# Patient Record
Sex: FEMALE | Race: BLACK OR AFRICAN AMERICAN | Employment: FULL TIME | URBAN - METROPOLITAN AREA
[De-identification: names, ages, dates, MRNs, and addresses within clinical notes are randomized per-mention and may not be internally consistent; named-entity substitution may affect disease eponyms.]

---

## 2017-01-24 ENCOUNTER — TELEPHONE (OUTPATIENT)
Dept: FAMILY MEDICINE CLINIC | Age: 42
End: 2017-01-24

## 2017-01-24 NOTE — TELEPHONE ENCOUNTER
We don't do Dexa in pre menopausal women unless there is a health problem discovered that warrants it- unless she has a reason, will wait until office visit to discuss.    Left message

## 2017-01-24 NOTE — TELEPHONE ENCOUNTER
appt scheduled   Patient states at last visit Dr Shawna Sauer was going to order Dexa scan.  Would like this done

## 2017-01-24 NOTE — TELEPHONE ENCOUNTER
----- Message from Pearl Lea sent at 1/24/2017  8:56 AM EST -----  Regarding: Dr. Radha Lutz  Pt has an appointment scheduled for 2/14/17 for medication refills. She would like to speak with someone to try and be worked into the schedule before then.   Her contact number is [phone]

## 2017-01-26 ENCOUNTER — OFFICE VISIT (OUTPATIENT)
Dept: FAMILY MEDICINE CLINIC | Age: 42
End: 2017-01-26

## 2017-01-26 VITALS
TEMPERATURE: 98.5 F | DIASTOLIC BLOOD PRESSURE: 62 MMHG | OXYGEN SATURATION: 98 % | HEIGHT: 64 IN | WEIGHT: 187.8 LBS | SYSTOLIC BLOOD PRESSURE: 118 MMHG | BODY MASS INDEX: 32.06 KG/M2 | RESPIRATION RATE: 18 BRPM | HEART RATE: 97 BPM

## 2017-01-26 DIAGNOSIS — M85.80 OSTEOPENIA: ICD-10-CM

## 2017-01-26 DIAGNOSIS — L70.0 ACNE VULGARIS: ICD-10-CM

## 2017-01-26 DIAGNOSIS — M50.30 DDD (DEGENERATIVE DISC DISEASE), CERVICAL: ICD-10-CM

## 2017-01-26 DIAGNOSIS — L02.32 BOIL OF BUTTOCK: ICD-10-CM

## 2017-01-26 DIAGNOSIS — G43.109 MIGRAINE WITH AURA AND WITHOUT STATUS MIGRAINOSUS, NOT INTRACTABLE: ICD-10-CM

## 2017-01-26 DIAGNOSIS — F42.9 OBSESSIVE-COMPULSIVE DISORDER: ICD-10-CM

## 2017-01-26 DIAGNOSIS — F98.8 ADD (ATTENTION DEFICIT DISORDER) WITHOUT HYPERACTIVITY: Primary | Chronic | ICD-10-CM

## 2017-01-26 DIAGNOSIS — F41.8 DEPRESSION WITH ANXIETY: ICD-10-CM

## 2017-01-26 RX ORDER — ALPRAZOLAM 0.25 MG/1
TABLET ORAL
Qty: 25 TAB | Refills: 2 | Status: SHIPPED | OUTPATIENT
Start: 2017-01-26 | End: 2017-04-28 | Stop reason: SDUPTHER

## 2017-01-26 RX ORDER — DEXTROAMPHETAMINE SACCHARATE, AMPHETAMINE ASPARTATE, DEXTROAMPHETAMINE SULFATE AND AMPHETAMINE SULFATE 2.5; 2.5; 2.5; 2.5 MG/1; MG/1; MG/1; MG/1
TABLET ORAL
Qty: 120 TAB | Refills: 0 | Status: SHIPPED | OUTPATIENT
Start: 2017-02-24 | End: 2017-04-28 | Stop reason: SDUPTHER

## 2017-01-26 RX ORDER — DEXTROAMPHETAMINE SACCHARATE, AMPHETAMINE ASPARTATE, DEXTROAMPHETAMINE SULFATE AND AMPHETAMINE SULFATE 2.5; 2.5; 2.5; 2.5 MG/1; MG/1; MG/1; MG/1
10 TABLET ORAL SEE ADMIN INSTRUCTIONS
Qty: 120 TAB | Refills: 0 | Status: SHIPPED | OUTPATIENT
Start: 2017-01-26 | End: 2017-04-28 | Stop reason: SDUPTHER

## 2017-01-26 RX ORDER — AZITHROMYCIN 250 MG/1
TABLET, FILM COATED ORAL
Qty: 6 TAB | Refills: 0 | Status: SHIPPED | OUTPATIENT
Start: 2017-01-26 | End: 2017-01-31

## 2017-01-26 RX ORDER — OXYCODONE AND ACETAMINOPHEN 10; 325 MG/1; MG/1
1 TABLET ORAL
Qty: 50 TAB | Refills: 0 | Status: SHIPPED | OUTPATIENT
Start: 2017-01-26 | End: 2017-04-28 | Stop reason: SDUPTHER

## 2017-01-26 RX ORDER — DEXTROAMPHETAMINE SACCHARATE, AMPHETAMINE ASPARTATE, DEXTROAMPHETAMINE SULFATE AND AMPHETAMINE SULFATE 2.5; 2.5; 2.5; 2.5 MG/1; MG/1; MG/1; MG/1
TABLET ORAL
Qty: 120 TAB | Refills: 0 | Status: SHIPPED | OUTPATIENT
Start: 2017-03-25 | End: 2017-04-28 | Stop reason: SDUPTHER

## 2017-01-26 RX ORDER — OXYCODONE AND ACETAMINOPHEN 10; 325 MG/1; MG/1
1 TABLET ORAL
Qty: 50 TAB | Refills: 0 | Status: SHIPPED | OUTPATIENT
Start: 2017-02-25 | End: 2017-04-28 | Stop reason: SDUPTHER

## 2017-01-26 RX ORDER — DOXYCYCLINE 100 MG/1
100 CAPSULE ORAL 2 TIMES DAILY
Qty: 30 CAP | Refills: 11 | Status: SHIPPED | OUTPATIENT
Start: 2017-01-26 | End: 2017-07-15 | Stop reason: ALTCHOICE

## 2017-01-26 RX ORDER — OXYCODONE AND ACETAMINOPHEN 10; 325 MG/1; MG/1
1 TABLET ORAL
Qty: 50 TAB | Refills: 0 | Status: SHIPPED | OUTPATIENT
Start: 2017-03-25 | End: 2017-04-28 | Stop reason: SDUPTHER

## 2017-01-26 NOTE — LETTER
1/26/2017 11:25 AM 
 
Ms. Kenzie Cano0 [facility] [address] Effective January 1, 5989 a new policy will be implemented by [address] [place] [address] for patients who are receiving controlled pain medications (i.e. hydrocodone, oxycodone, hydromorphone, etc). While these medications are intended to help individuals with their chronic pain symptoms, they have the potential for serious side effects. These side effects may include, but are not limited to respiratory distress, low blood pressure, confusion, dependency and abuse. Since these medications are regulated by the Drug Enforcement Agency [facility]) and given a rise in abuse of these medications across the nation, we are obligated to monitor the use of these medications more closely. All patients receiving chronic opiate pain medications will be required to sign a controlled substance agreement. This is an agreement between patient and provider to ensure compliance with treatment. It also includes urine drug screens, maximum dosing limits for medications and follow-up appointments every three months for prescription refills. Your prescriptions will only be filled at a office visit We understand these changes may be inconvenient or frustrating, however, they align with current clinical guidelines and are intended to improve patient care and safety. Please call now to schedule an appointment with me for your next refill. Please address any questions or concerns with your provider at your next visit. Thank you for entrusting your healthcare in our hands. [address] [place] [address] Providers Sincerely, 
 
 
Giorgio Ceja MD

## 2017-01-26 NOTE — PATIENT INSTRUCTIONS

## 2017-01-26 NOTE — PROGRESS NOTES
HISTORY OF PRESENT ILLNESS  HPI  Erika Hdz is a 37 y.o. Female with a history of ADD, depression and OCD who presents to the office today for a chronic pain follow up and a skin lesion. Pt notes she has had a boil on her right buttock for the past 2 months. She notes it will go away and then come back when her period starts. She also notes a hard lump on her back. Pt is also concerned about her acne. She has had it for years and it continues to be active. She doesn't recall what she has used for it in the past. She mainly has small cysts that form with very few pustules or papules. Pt notes ongoing problems with carpal tunnel type symptoms. They have improved with the use of night time wrist splints but have not resolved. 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 Gino Dakin 8/25/2016    Asthma      last attack 2 months ago    Bilateral carpal tunnel syndrome- R>>L 9/25/2016    Insomnia     Migraine 2/20/2010    Psychiatric disorder      Depression, anxiety    Psychophysiological insomnia 2/23/2016     Past Surgical History   Procedure Laterality Date    Hx breast reduction  2002    Hx appendectomy  1996    Hx orthopaedic  2003     cervical disc    Hx orthopaedic       second right hand digit fx with pins index     Current Outpatient Prescriptions on File Prior to Visit   Medication Sig Dispense Refill    escitalopram oxalate (LEXAPRO) 20 mg tablet TAKE 1 TABLET BY MOUTH EVERY DAY 90 Tab 3    zolpidem (AMBIEN) 10 mg tablet TAKE 2 TABLETS BY MOUTH AT BEDTIME 60 Tab 5     No current facility-administered medications on file prior to visit.       Allergies   Allergen Reactions    Pcn [Penicillins] Other (comments)     As a child     Family History   Problem Relation Age of Onset    Diabetes Father     Hypertension Father     Heart Attack Father     High Cholesterol Father     Diabetes Mother     High Cholesterol Mother      Social History     Social History    Marital status:      Spouse name: N/A    Number of children: N/A    Years of education: N/A     Social History Main Topics    Smoking status: Current Every Day Smoker     Packs/day: 1.50     Years: 18.00     Types: Cigarettes    Smokeless tobacco: Never Used    Alcohol use Yes      Comment: occasionally    Drug use: No    Sexual activity: Yes     Partners: Male     Birth control/ protection: Condom     Other Topics Concern    None     Social History Narrative             Review of Systems   Constitutional: Negative for chills, diaphoresis, fever, malaise/fatigue and weight loss. Eyes: Negative for blurred vision, double vision, pain and redness. Respiratory: Negative for cough, shortness of breath and wheezing. Cardiovascular: Negative for chest pain, palpitations, orthopnea, claudication, leg swelling and PND. Musculoskeletal: Positive for joint pain (hands). Skin: Negative for itching and rash. Boil on right buttock  Lump on back  Acne   Neurological: Positive for tingling (hands). Negative for dizziness, tremors, sensory change, speech change, focal weakness, seizures, loss of consciousness, weakness and headaches.       Results for orders placed or performed during the hospital encounter of 06/01/16   CULTURE, URINE   Result Value Ref Range    Special Requests: NO SPECIAL REQUESTS      [place] Count 76110  COLONIES/mL        Culture result: MIXED UROGENITAL JOHANNA ISOLATED     URINALYSIS W/MICROSCOPIC   Result Value Ref Range    Color YELLOW/STRAW      Appearance CLOUDY (A) CLEAR      Specific gravity 1.018 1.003 - 1.030      pH (UA) 7.0 5.0 - 8.0      Protein NEGATIVE  NEG mg/dL    Glucose NEGATIVE  NEG mg/dL    Ketone NEGATIVE  NEG mg/dL    Bilirubin NEGATIVE  NEG      Blood NEGATIVE  NEG      Urobilinogen 1.0 0.2 - 1.0 EU/dL    Nitrites NEGATIVE  NEG      Leukocyte Esterase SMALL (A) NEG      WBC 5-10 0 - 4 /hpf    RBC 0-5 0 - 5 /hpf    Epithelial cells MODERATE (A) FEW /lpf    Bacteria NEGATIVE  NEG /hpf    Hyaline Cast 0-2 0 - 5 /lpf   HCG URINE, QL   Result Value Ref Range    HCG urine, Ql. NEGATIVE  NEG               Physical Exam  Visit Vitals    /62 (BP 1 Location: Left arm, BP Patient Position: Sitting)    Pulse 97    Temp 98.5 °F (36.9 °C) (Oral)    Resp 18    Ht 5' 4\" (1.626 m)    Wt 187 lb 12.8 oz (85.2 kg)    LMP 01/12/2017 (Approximate)    SpO2 98%    BMI 32.24 kg/m2     Head: Normocephalic, without obvious abnormality, atraumatic  Eyes: conjunctivae/corneas clear. PERRL, EOM's intact. Neck: supple, symmetrical, trachea midline, no adenopathy, thyroid: not enlarged, symmetric, no tenderness/mass/nodules, no carotid bruit and no JVD  Lungs: clear to auscultation bilaterally  Heart: regular rate and rhythm, S1, S2 normal, no murmur, click, rub or gallop  Extremities: extremities normal, atraumatic, no cyanosis or edema  Pulses: 2+ and symmetric  Lymph nodes: Cervical, supraclavicular, and axillary nodes normal.  Neurologic: Grossly normal  Skin: she has several 3-4mm cysts that are forming on the face related to her acne, she does have multiple scars from previous acne; on her back she has a typical 1-2cm sebaceous cyst with no signs of infection; in the buttock crease where it meats the proximal medial thigh, there is a 2.5cm in diameter cyst that appears to be chronically infected with some dusky erythema, there is a sign of it draining recently         ASSESSMENT and PLAN    ICD-10-CM ICD-9-CM    1. 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 Porfirio Sandifer F90.0 314.00 dextroamphetamine-amphetamine (ADDERALL) 10 mg tablet      dextroamphetamine-amphetamine (ADDERALL) 10 mg tablet      dextroamphetamine-amphetamine (ADDERALL) 10 mg tablet   2. Migraine with aura and without status migrainosus, not intractable G43.109 346.00    3.  Depression with anxiety F41.8 300.4 ALPRAZolam (XANAX) 0.25 mg tablet   4. DDD (degenerative disc disease), cervical M50.30 722.4 oxyCODONE-acetaminophen (PERCOCET 10)  mg per tablet      oxyCODONE-acetaminophen (PERCOCET 10)  mg per tablet      oxyCODONE-acetaminophen (PERCOCET 10)  mg per tablet   5. Boil of buttock L02.32 680.5 azithromycin (ZITHROMAX) 250 mg tablet   6. Osteopenia M85.80 733.90 DEXA BONE DENSITY STUDY AXIAL   7. Obsessive-compulsive disorder F42.9 300.3    8. Acne vulgaris L70.0 706.1 doxycycline (VIBRAMYCIN) 100 mg capsule     Sabino Antonio was seen today for attention deficit disorder, pain (chronic) and skin problem. Diagnoses and all orders for this visit:    ADD (attention deficit disorder) without hyperactivity- neuropsych testing + ADD -Dr. Flower Rollins  -     dextroamphetamine-amphetamine (ADDERALL) 10 mg tablet; Take 1 Tab (10 mg total) by mouth See Admin InstructionsEarliest Fill Date: 1/26/17.  2 in am and 1-2 in afternoon    May fill today  -     dextroamphetamine-amphetamine (ADDERALL) 10 mg tablet; Earliest Fill Date: 2/24/17. TAKE 2 TABS PO Q AM AND 1-2 TABS IN AFTERNOON   DO NOT FILL 02/24/17  -     dextroamphetamine-amphetamine (ADDERALL) 10 mg tablet; Earliest Fill Date: 3/25/17. TAKE 2 TABS PO Q AM, AND 1-2 TABS Q AFTERNOON  DO NOT AMENA UNTIL 03/25/17    Migraine with aura and without status migrainosus, not intractable    Depression with anxiety  -     ALPRAZolam (XANAX) 0.25 mg tablet; TAKE 1/2 TO 1 TABLET BY MOUTH TWICE A DAY AS NEEDED  MAY FILL TODAY    DDD (degenerative disc disease), cervical  -     oxyCODONE-acetaminophen (PERCOCET 10)  mg per tablet; Take 1 Tab by mouth every six (6) hours as needed for Pain. 50 is a 30 day supply    MAY FILL TODAY  -     oxyCODONE-acetaminophen (PERCOCET 10)  mg per tablet; Take 1 Tab by mouth every six (6) hours as needed for Pain. Max Daily Amount: 4 Tabs.  50 IS A 30 DAY SUPPLY  DO NOT FILL UNTIL 02/24/17  -     oxyCODONE-acetaminophen (PERCOCET 10)  mg per tablet; Take 1 Tab by mouth every six (6) hours as needed for Pain. Max Daily Amount: 4 Tabs. 50 IS A 30 DAY SUPPLY  DO NOT FILL UNTIL 03/25/2017    Boil of buttock  -     azithromycin (ZITHROMAX) 250 mg tablet; Take 2 tablets today, then take 1 tablet daily    Osteopenia  -     DEXA BONE DENSITY STUDY AXIAL; Future    Obsessive-compulsive disorder    Acne vulgaris  -     doxycycline (VIBRAMYCIN) 100 mg capsule; Take 1 Cap by mouth two (2) times a day. Follow-up Disposition:  Return in about 3 months (around 4/26/2017), or if right prox thigh/buttock boil/symptoms worsen or fail to improve, for F/U chronic controlled substance use. reviewed medications and side effects in detail  Please call my office if there are any questions- [phone]. Discussed expected course/resolution/complications of diagnosis in detail with patient. Medication risks/benefits/costs/interactions/alternatives discussed with patient. Pt was given an after visit summary which includes diagnoses, current medications & vitals. Pt expressed understanding with the diagnosis and plan. Total 45 minutes,60 % counseling re: Patient to call if no better in 3 -4 days and prn new problems. Her depression is much better on her present regimen. Denies suicidal or homicidal ideations. I suggested changing soaps to an antibacterial soap like Lever 2000 or Dial which may help some of these cysts from forming. I suggested warms soaks on any actively infected cysts and I gave her a Z-shaun. After she is done with the Z-shaun she should start on Doxycycline 500mg once a day for the acne on her face; follow up in 1 month. Reviewed the controlled substance agreement, patient signed it and has no questions. A copy was placed in the chart.  was checked and there was no suspicious behavior.    Also, discussed symptoms of concern that were noted today in the note above, treatment options( including doing nothing), when to follow up before recommended time frame. Also, answered all questions. This document was written by Lazara Rodriguez, as dictated by Keith Cole MD.   I have reviewed and agree with the above note and have made corrections where appropriate Clarke Daugherty M.D.

## 2017-01-26 NOTE — PROGRESS NOTES
Chief Complaint   Patient presents with    Attention Deficit Disorder     follow up    Pain (Chronic)     follow up DDD cervical, shaina have patient sign Controll substance agreement    Skin Problem     c/o boil right buttucks on and off few months little tender       Reviewed Record in preparation for visit and have obtained necessary documentation. Identified pt with two pt identifiers (Name @ )    Health Maintenance Due   Topic    PAP AKA CERVICAL CYTOLOGY          1. Have you been to the ER, urgent care clinic since your last visit? Hospitalized since your last visit? No    2. Have you seen or consulted any other health care providers outside of the [address] [place] [address] since your last visit? Include any pap smears or colon screening.  No

## 2017-01-26 NOTE — MR AVS SNAPSHOT
Visit Information Date & Time Provider Department Dept. Phone Encounter #  
 1/26/2017 11:00 AM Edgar Tripp MD [facility] [phone] [id number] Your Appointments 2/14/2017  2:00 PM  
ROUTINE CARE with Edgar Tripp MD  
[facility]) Appt Note: Medication refills [address] [phone]  
[phone]  
  
   
 [address] [phone] Upcoming Health Maintenance Date Due  
 PAP AKA CERVICAL CYTOLOGY 8/11/2015 DTaP/Tdap/Td series (2 - Td) 6/6/2026 Allergies as of 1/26/2017  Review Complete On: 1/26/2017 By: Cece Flores LPN Severity Noted Reaction Type Reactions Pcn [Penicillins]  02/20/2010    Other (comments) As a child Current Immunizations  Never Reviewed Name Date Influenza Vaccine (Quad) PF 9/23/2016 Not reviewed this visit You Were Diagnosed With   
  
 Codes Comments ADD (attention deficit disorder) without hyperactivity    -  Primary ICD-10-CM: F90.0 ICD-9-CM: 314.00 Migraine with aura and without status migrainosus, not intractable     ICD-10-CM: G43.109 ICD-9-CM: 346.00 Depression with anxiety     ICD-10-CM: F41.8 ICD-9-CM: 300.4 DDD (degenerative disc disease), cervical     ICD-10-CM: M50.30 ICD-9-CM: 722.4 Boil of buttock     ICD-10-CM: L02.32 
ICD-9-CM: 680.5 Osteopenia     ICD-10-CM: M85.80 ICD-9-CM: 733.90 Vitals BP Pulse Temp Resp Height(growth percentile) Weight(growth percentile)  
 118/62 (BP 1 Location: Left arm, BP Patient Position: Sitting) 97 98.5 °F (36.9 °C) (Oral) 18 5' 4\" (1.626 m) 187 lb 12.8 oz (85.2 kg) LMP SpO2 BMI OB Status Smoking Status 01/12/2017 (Approximate) 98% 32.24 kg/m2 Having regular periods Current Every Day Smoker Vitals History BMI and BSA Data Body Mass Index Body Surface Area  
 32.24 kg/m 2 1.96 m 2 Preferred Pharmacy Pharmacy Name Phone [facility], [facility] [address] [place] [address] [phone] Your Updated Medication List  
  
   
This list is accurate as of: 1/26/17 12:50 PM.  Always use your most recent med list.  
  
  
  
  
 ALPRAZolam 0.25 mg tablet Commonly known as:  XANAX  
TAKE 1/2 TO 1 TABLET BY MOUTH TWICE A DAY AS NEEDED MAY FILL TODAY  
  
 azithromycin 250 mg tablet Commonly known as:  Idelia Fines Take 2 tablets today, then take 1 tablet daily * dextroamphetamine-amphetamine 10 mg tablet Commonly known as:  ADDERALL Take 1 Tab (10 mg total) by mouth See Admin InstructionsEarliest Fill Date: 1/26/17.  2 in am and 1-2 in afternoon  May fill today * dextroamphetamine-amphetamine 10 mg tablet Commonly known as:  ADDERALL Earliest Fill Date: 2/24/17. TAKE 2 TABS PO Q AM AND 1-2 TABS IN AFTERNOON  DO NOT FILL 02/24/17 Start taking on:  2/24/2017 * dextroamphetamine-amphetamine 10 mg tablet Commonly known as:  ADDERALL Earliest Fill Date: 3/25/17. TAKE 2 TABS PO Q AM, AND 1-2 TABS Q AFTERNOON DO NOT MAENA UNTIL 03/25/17 Start taking on:  3/25/2017  
  
 doxycycline 100 mg capsule Commonly known as:  VIBRAMYCIN Take 1 Cap by mouth two (2) times a day. escitalopram oxalate 20 mg tablet Commonly known as:  Arty Hampton TAKE 1 TABLET BY MOUTH EVERY DAY  
  
 * oxyCODONE-acetaminophen  mg per tablet Commonly known as:  PERCOCET 10 Take 1 Tab by mouth every six (6) hours as needed for Pain. 50 is a 30 day supply  MAY FILL TODAY  
  
 * oxyCODONE-acetaminophen  mg per tablet Commonly known as:  PERCOCET 10 Take 1 Tab by mouth every six (6) hours as needed for Pain. Max Daily Amount: 4 Tabs. 50 IS A 30 DAY SUPPLY DO NOT FILL UNTIL 02/24/17 Start taking on:  2/25/2017 * oxyCODONE-acetaminophen  mg per tablet Commonly known as:  PERCOCET 10 Take 1 Tab by mouth every six (6) hours as needed for Pain.  Max Daily Amount: 4 Tabs. 50 IS A 30 DAY SUPPLY DO NOT FILL UNTIL 03/25/2017 Start taking on:  3/25/2017  
  
 zolpidem 10 mg tablet Commonly known as:  AMBIEN  
TAKE 2 TABLETS BY MOUTH AT BEDTIME  
  
 * Notice: This list has 6 medication(s) that are the same as other medications prescribed for you. Read the directions carefully, and ask your doctor or other care provider to review them with you. Prescriptions Printed Refills  
 dextroamphetamine-amphetamine (ADDERALL) 10 mg tablet 0 Sig: Take 1 Tab (10 mg total) by mouth See Admin InstructionsEarliest Fill Date: 1/26/17.  2 in am and 1-2 in afternoon May fill today Class: Print Route: Oral  
 dextroamphetamine-amphetamine (ADDERALL) 10 mg tablet 0 Starting on: 2/24/2017 Sig: Earliest Lynn Lubin Date: 2/24/17. TAKE 2 TABS PO Q AM AND 1-2 TABS IN AFTERNOON  
DO NOT FILL 02/24/17 Class: Print  
 dextroamphetamine-amphetamine (ADDERALL) 10 mg tablet 0 Starting on: 3/25/2017 Sig: Earliest Lynn Lubin Date: 3/25/17. TAKE 2 TABS PO Q AM, AND 1-2 TABS Q AFTERNOON 
DO NOT AMENA UNTIL 03/25/17 Class: Print  
 oxyCODONE-acetaminophen (PERCOCET 10)  mg per tablet 0 Sig: Take 1 Tab by mouth every six (6) hours as needed for Pain. 50 is a 30 day supply MAY FILL TODAY Class: Print Route: Oral  
 oxyCODONE-acetaminophen (PERCOCET 10)  mg per tablet 0 Starting on: 2/25/2017 Sig: Take 1 Tab by mouth every six (6) hours as needed for Pain. Max Daily Amount: 4 Tabs. 50 IS A 30 DAY SUPPLY 
DO NOT FILL UNTIL 02/24/17 Class: Print Route: Oral  
 oxyCODONE-acetaminophen (PERCOCET 10)  mg per tablet 0 Starting on: 3/25/2017 Sig: Take 1 Tab by mouth every six (6) hours as needed for Pain. Max Daily Amount: 4 Tabs. 50 IS A 30 DAY SUPPLY 
DO NOT FILL UNTIL 03/25/2017 Class: Print Route: Oral  
 ALPRAZolam (XANAX) 0.25 mg tablet 2 Sig: TAKE 1/2 TO 1 TABLET BY MOUTH TWICE A DAY AS NEEDED 
MAY FILL TODAY Class: Print Prescriptions Sent to Pharmacy Refills  
 azithromycin (ZITHROMAX) 250 mg tablet 0 Sig: Take 2 tablets today, then take 1 tablet daily Class: Normal  
 Pharmacy: [facility]pharmacy [place] [address] Ph #: [phone]  
 doxycycline (VIBRAMYCIN) 100 mg capsule 11 Sig: Take 1 Cap by mouth two (2) times a day. Class: Normal  
 Pharmacy: [address] [place] [address], [address] [place] [address] Ph #: [phone] Route: Oral  
  
To-Do List   
 01/26/2017 Imaging:  DEXA BONE DENSITY STUDY AXIAL Referral Information Referral ID Referred By Referred To  
  
 [id number] [facility] Not Available Visits Status Start Date End Date 1 New Request 1/26/17 1/26/18 If your referral has a status of pending review or denied, additional information will be sent to support the outcome of this decision. Patient Instructions Learning About Anxiety Disorders What are anxiety disorders? Anxiety disorders are a type of medical problem. They cause severe anxiety. When you feel anxious, you feel that something bad is about to happen. This feeling interferes with your life. These disorders include: · Generalized anxiety disorder. You feel worried and stressed about many everyday events and activities. This goes on for several months and disrupts your life on most days. · Panic disorder. You have repeated panic attacks. A panic attack is a sudden, intense fear or anxiety. It may make you feel short of breath. Your heart may pound. · Social anxiety disorder. You feel very anxious about what you will say or do in front of people. For example, you may be scared to talk or eat in public. This problem affects your daily life. · Phobias. You are very scared of a specific object, situation, or activity. For example, you may fear spiders, high places, or small spaces. What are the symptoms? Generalized anxiety disorder Symptoms may include: · Feeling worried and stressed about many things almost every day. · Feeling tired or irritable. You may have a hard time concentrating. · Having headaches or muscle aches. · Having a hard time swallowing. · Feeling shaky, sweating, or having hot flashes. Panic disorder You may have repeated panic attacks when there is no reason for feeling afraid. You may change your daily activities because you worry that you will have another attack. Symptoms may include: 
· Intense fear, terror, or anxiety. · Trouble breathing or very fast breathing. · Chest pain or tightness. · A heartbeat that races or is not regular. Social anxiety disorder Symptoms may include: · Fear about a social situation, such as eating in front of others or speaking in public. You may worry a lot. Or you may be afraid that something bad will happen. · Anxiety that can cause you to blush, sweat, and feel shaky. · A heartbeat that is faster than normal. 
· A hard time focusing. Phobias Symptoms may include: · More fear than most people of being around an object, being in a situation, or doing an activity. You might also be stressed about the chance of being around the thing you fear. · Worry about losing control, panicking, fainting, or having physical symptoms like a faster heartbeat when you are around the situation or object. How are these disorders treated? Anxiety disorders can be treated with medicines or counseling. A combination of both may be used. Medicines may include: · Antidepressants. These may help your symptoms by keeping chemicals in your brain in balance. · Benzodiazepines. These may give you short-term relief of your symptoms. Some people use cognitive-behavioral therapy. A therapist helps you learn to change stressful or bad thoughts into helpful thoughts. Lead a healthy lifestyle A healthy lifestyle may help you feel better. · Get at least 30 minutes of exercise on most days of the week. Walking is a good choice. · Eat a healthy diet. Include fruits, vegetables, lean proteins, and whole grains in your diet each day. · Try to go to bed at the same time every night. Try for 8 hours of sleep a night. · Find ways to manage stress. Try relaxation exercises. · Avoid alcohol and illegal drugs. Follow-up care is a key part of your treatment and safety. Be sure to make and go to all appointments, and call your doctor if you are having problems. It's also a good idea to know your test results and keep a list of the medicines you take. Where can you learn more? Go to http://clare-amanda.info/. Enter A980 in the search box to learn more about \"Learning About Anxiety Disorders. \" Current as of: July 26, 2016 Content Version: 11.1 © 5250-8660 [employer]. Care instructions adapted under license by [employer] (which disclaims liability or warranty for this information). If you have questions about a medical condition or this instruction, always ask your healthcare professional. [place] [address] any warranty or liability for your use of this information. Introducing [facility]! Mg [employer]ello introduces [employer] patient portal. Now you can access parts of your medical record, email your doctor's office, and request medication refills online. 1. In your internet browser, go to [email or web address] 2. Click on the First Time User? Click Here link in the Sign In box. You will see the New Member Sign Up page. 3. Enter your [employer] Access Code exactly as it appears below. You will not need to use this code after youve completed the sign-up process. If you do not sign up before the expiration date, you must request a new code. · [employer] Access Code: [email or web address] Expires: 4/26/2017 12:50 PM 
 
 4. Enter the last four digits of your Social Security Number (xxxx) and Date of Birth (mm/dd/yyyy) as indicated and click Submit. You will be taken to the next sign-up page. 5. Create a KAYAK ID. This will be your KAYAK login ID and cannot be changed, so think of one that is secure and easy to remember. 6. Create a KAYAK password. You can change your password at any time. 7. Enter your Password Reset Question and Answer. This can be used at a later time if you forget your password. 8. Enter your e-mail address. You will receive e-mail notification when new information is available in [address] 9. Click Sign Up. You can now view and download portions of your medical record. 10. Click the Download Summary menu link to download a portable copy of your medical information. If you have questions, please visit the Frequently Asked Questions section of the KAYAK website. Remember, KAYAK is NOT to be used for urgent needs. For medical emergencies, dial 911. Now available from your iPhone and Android! Please provide this summary of care documentation to your next provider. Your primary care clinician is listed as [place] [address], [facility]/s . If you have any questions after today's visit, please call [phone].

## 2017-02-28 ENCOUNTER — HOSPITAL ENCOUNTER (OUTPATIENT)
Dept: BONE DENSITY | Age: 42
Discharge: HOME | End: 2017-02-28
Attending: FAMILY MEDICINE
Payer: COMMERCIAL

## 2017-02-28 DIAGNOSIS — M85.80 OSTEOPENIA: ICD-10-CM

## 2017-02-28 PROCEDURE — 77080 DXA BONE DENSITY AXIAL: CPT

## 2017-03-10 ENCOUNTER — TELEPHONE (OUTPATIENT)
Dept: FAMILY MEDICINE CLINIC | Age: 42
End: 2017-03-10

## 2017-03-10 NOTE — TELEPHONE ENCOUNTER
Stan [address] [place] [address]    Patient is requesting a call with her dexa scan. She states that it is ok to leave a detailed message on her voicemail.

## 2017-04-17 DIAGNOSIS — F51.04 PSYCHOPHYSIOLOGICAL INSOMNIA: ICD-10-CM

## 2017-04-17 RX ORDER — ZOLPIDEM TARTRATE 10 MG/1
TABLET ORAL
Qty: 60 TAB | Refills: 5 | Status: SHIPPED | OUTPATIENT
Start: 2017-04-17 | End: 2017-06-15 | Stop reason: SDUPTHER

## 2017-04-26 ENCOUNTER — TELEPHONE (OUTPATIENT)
Dept: FAMILY MEDICINE CLINIC | Age: 42
End: 2017-04-26

## 2017-04-26 NOTE — TELEPHONE ENCOUNTER
----- Message from Shady Tomas sent at 4/26/2017  7:05 AM EDT -----  Regarding: Dr. Isabel Sheldon would like a callback to schedule sick visit for Cyst on R leg. Pt would like to be seen today 4/26/17, or 4/27/17. Best (C) [phone].

## 2017-04-26 NOTE — TELEPHONE ENCOUNTER
Called patient to schedule appointment, patient informed me that she needs to have her medications refilled, she needs to come in for anxiety, and she needs to be seen for her cyst. Advised patient I could get her in with NP Sunny Mcgraw today for her cyst but she would have to see Dr. Mindy Mancera for medications, advised patient of Dr. Bertin Marie next available in June. Patient states that this is unacceptable and she wants to speak with the nurse, advised patient per new rules we have no \"work in visits\" anymore. Patient states that there has been before so why now, advised patient I could not speak for the physicians and there schedules. Advised patient next time that she comes in for a medication appointment to go ahead and schedule 3 months out instead of waiting till the month she comes in, patient states that she now understands this, she states that she has never had a problem before as Flavia Mac normally works her in. Advised patient again we can no longer do this.

## 2017-04-28 ENCOUNTER — OFFICE VISIT (OUTPATIENT)
Dept: FAMILY MEDICINE CLINIC | Age: 42
End: 2017-04-28

## 2017-04-28 VITALS
HEART RATE: 86 BPM | RESPIRATION RATE: 18 BRPM | DIASTOLIC BLOOD PRESSURE: 74 MMHG | HEIGHT: 64 IN | BODY MASS INDEX: 31.21 KG/M2 | TEMPERATURE: 98 F | SYSTOLIC BLOOD PRESSURE: 118 MMHG | WEIGHT: 182.8 LBS | OXYGEN SATURATION: 99 %

## 2017-04-28 DIAGNOSIS — F98.8 ADD (ATTENTION DEFICIT DISORDER) WITHOUT HYPERACTIVITY: Chronic | ICD-10-CM

## 2017-04-28 DIAGNOSIS — F41.8 DEPRESSION WITH ANXIETY: ICD-10-CM

## 2017-04-28 DIAGNOSIS — G56.03 BILATERAL CARPAL TUNNEL SYNDROME: Chronic | ICD-10-CM

## 2017-04-28 DIAGNOSIS — L72.3 INFECTED SEBACEOUS CYST: Primary | ICD-10-CM

## 2017-04-28 DIAGNOSIS — M50.30 DDD (DEGENERATIVE DISC DISEASE), CERVICAL: ICD-10-CM

## 2017-04-28 DIAGNOSIS — L08.9 INFECTED SEBACEOUS CYST: Primary | ICD-10-CM

## 2017-04-28 RX ORDER — DEXTROAMPHETAMINE SACCHARATE, AMPHETAMINE ASPARTATE, DEXTROAMPHETAMINE SULFATE AND AMPHETAMINE SULFATE 2.5; 2.5; 2.5; 2.5 MG/1; MG/1; MG/1; MG/1
TABLET ORAL
Qty: 120 TAB | Refills: 0 | Status: SHIPPED | OUTPATIENT
Start: 2017-04-28 | End: 2017-06-15 | Stop reason: SDUPTHER

## 2017-04-28 RX ORDER — OXYCODONE AND ACETAMINOPHEN 10; 325 MG/1; MG/1
1 TABLET ORAL
Qty: 50 TAB | Refills: 0 | Status: SHIPPED | OUTPATIENT
Start: 2017-04-28 | End: 2017-06-15 | Stop reason: SDUPTHER

## 2017-04-28 RX ORDER — DEXTROAMPHETAMINE SACCHARATE, AMPHETAMINE ASPARTATE, DEXTROAMPHETAMINE SULFATE AND AMPHETAMINE SULFATE 2.5; 2.5; 2.5; 2.5 MG/1; MG/1; MG/1; MG/1
10 TABLET ORAL SEE ADMIN INSTRUCTIONS
Qty: 120 TAB | Refills: 0 | Status: SHIPPED | OUTPATIENT
Start: 2017-06-27 | End: 2017-07-26 | Stop reason: SDUPTHER

## 2017-04-28 RX ORDER — ALPRAZOLAM 0.25 MG/1
TABLET ORAL
Qty: 25 TAB | Refills: 2 | Status: SHIPPED | OUTPATIENT
Start: 2017-04-28 | End: 2017-06-28 | Stop reason: SDUPTHER

## 2017-04-28 RX ORDER — OXYCODONE AND ACETAMINOPHEN 10; 325 MG/1; MG/1
1 TABLET ORAL
Qty: 50 TAB | Refills: 0 | Status: SHIPPED | OUTPATIENT
Start: 2017-05-28 | End: 2017-06-28 | Stop reason: SDUPTHER

## 2017-04-28 RX ORDER — OXYCODONE AND ACETAMINOPHEN 10; 325 MG/1; MG/1
1 TABLET ORAL
Qty: 50 TAB | Refills: 0 | Status: SHIPPED | OUTPATIENT
Start: 2017-06-27 | End: 2017-07-26 | Stop reason: SDUPTHER

## 2017-04-28 RX ORDER — DEXTROAMPHETAMINE SACCHARATE, AMPHETAMINE ASPARTATE, DEXTROAMPHETAMINE SULFATE AND AMPHETAMINE SULFATE 2.5; 2.5; 2.5; 2.5 MG/1; MG/1; MG/1; MG/1
TABLET ORAL
Qty: 120 TAB | Refills: 0 | Status: SHIPPED | OUTPATIENT
Start: 2017-05-28 | End: 2017-06-15 | Stop reason: SDUPTHER

## 2017-04-28 NOTE — PROGRESS NOTES
HISTORY OF PRESENT ILLNESS  HPI  Amanda Warner is a 37 y.o. Female with history of migraines, insomnia, OCD, anxiety, depression, and ADD who presents to office today for medication follow-up. Pt takes 1-2 Adderall BID, usually 2 in the AM and 1-2 in the afternoon. Pt takes oxycodone for back pain and migraines. She takes Xanax for anxiety, with 25 lasting a month. Pt complains of an intermittent cyst on the inside of her right thigh at the crease. She has been given Z-shaun and doxycycline before with temporary relief but notes that the cyst returned 3-4 weeks later. She states that the cyst seems to return right before she begins menstruating. Pt complains of bilateral tingling in her fingers. She wears wrist splints at night. 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 Becki Robert 8/25/2016    Asthma     last attack 2 months ago    Bilateral carpal tunnel syndrome- R>>L 9/25/2016    Insomnia     Migraine 2/20/2010    Psychiatric disorder     Depression, anxiety    Psychophysiological insomnia 2/23/2016     Past Surgical History:   Procedure Laterality Date    HX APPENDECTOMY  1996    HX BREAST REDUCTION  2002    HX ORTHOPAEDIC  2003    cervical disc    HX ORTHOPAEDIC      second right hand digit fx with pins index     Current Outpatient Prescriptions on File Prior to Visit   Medication Sig Dispense Refill    zolpidem (AMBIEN) 10 mg tablet TAKE 2 TABLETS BY MOUTH AT BEDTIME AS NEEDED FOR INSOMNIA 60 Tab 5    doxycycline (VIBRAMYCIN) 100 mg capsule Take 1 Cap by mouth two (2) times a day. 30 Cap 11    escitalopram oxalate (LEXAPRO) 20 mg tablet TAKE 1 TABLET BY MOUTH EVERY DAY 90 Tab 3     No current facility-administered medications on file prior to visit.       Allergies   Allergen Reactions    Pcn [Penicillins] Other (comments)     As a child     Family History   Problem Relation Age of Onset    Diabetes Father     Hypertension Father     Heart Attack Father     High Cholesterol Father     Diabetes Mother     High Cholesterol Mother      Social History     Social History    Marital status:      Spouse name: N/A    Number of children: N/A    Years of education: N/A     Social History Main Topics    Smoking status: Current Every Day Smoker     Packs/day: 1.50     Years: 18.00     Types: Cigarettes    Smokeless tobacco: Never Used    Alcohol use Yes      Comment: occasionally    Drug use: No    Sexual activity: Yes     Partners: Male     Birth control/ protection: Condom     Other Topics Concern    None     Social History Narrative             Review of Systems   Constitutional: Negative for chills, diaphoresis, fever, malaise/fatigue and weight loss. Eyes: Negative for blurred vision, double vision, pain and redness. Respiratory: Negative for cough, shortness of breath and wheezing. Cardiovascular: Negative for chest pain, palpitations, orthopnea, claudication, leg swelling and PND. Skin: Positive for rash (right thigh cyst). Negative for itching. Neurological: Positive for tingling (bilateral fingers). Negative for dizziness, tremors, sensory change, speech change, focal weakness, seizures, loss of consciousness, weakness and headaches.      Results for orders placed or performed during the hospital encounter of 06/01/16   CULTURE, URINE   Result Value Ref Range    Special Requests: NO SPECIAL REQUESTS      [place] Count [id number]  COLONIES/mL        Culture result: MIXED UROGENITAL JOHANNA ISOLATED     URINALYSIS W/MICROSCOPIC   Result Value Ref Range    Color YELLOW/STRAW      Appearance CLOUDY (A) CLEAR      Specific gravity 1.018 1.003 - 1.030      pH (UA) 7.0 5.0 - 8.0      Protein NEGATIVE  NEG mg/dL    Glucose NEGATIVE  NEG mg/dL    Ketone NEGATIVE  NEG mg/dL    Bilirubin NEGATIVE  NEG      Blood NEGATIVE  NEG      Urobilinogen 1.0 0.2 - 1.0 EU/dL    Nitrites NEGATIVE  NEG Leukocyte Esterase SMALL (A) NEG      WBC 5-10 0 - 4 /hpf    RBC 0-5 0 - 5 /hpf    Epithelial cells MODERATE (A) FEW /lpf    Bacteria NEGATIVE  NEG /hpf    Hyaline cast 0-2 0 - 5 /lpf   HCG URINE, QL   Result Value Ref Range    HCG urine, Ql. NEGATIVE  NEG               Physical Exam  Visit Vitals    /74 (BP 1 Location: Left arm, BP Patient Position: Sitting)    Pulse 86    Temp 98 °F (36.7 °C) (Oral)    Resp 18    Ht 5' 4\" (1.626 m)    Wt 182 lb 12.8 oz (82.9 kg)    LMP 04/04/2017 (Approximate)    SpO2 99%    BMI 31.38 kg/m2     Neck: supple, symmetrical, trachea midline, no adenopathy, thyroid: not enlarged, symmetric, no tenderness/mass/nodules, no carotid bruit and no JVD  Lungs: clear to auscultation bilaterally  Heart: regular rate and rhythm, S1, S2 normal, no murmur, click, rub or gallop  Skin: 1.5cm in diameter tender subcutaneous cyst in the right proximal thigh at the crease          ASSESSMENT and PLAN    ICD-10-CM ICD-9-CM    1. Infected sebaceous cyst L72.3 706.2     L08.9     2. 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 Mary De La Cruz F98.8 314.00 dextroamphetamine-amphetamine (ADDERALL) 10 mg tablet      dextroamphetamine-amphetamine (ADDERALL) 10 mg tablet      dextroamphetamine-amphetamine (ADDERALL) 10 mg tablet   3. DDD (degenerative disc disease), cervical M50.30 722.4 oxyCODONE-acetaminophen (PERCOCET 10)  mg per tablet      oxyCODONE-acetaminophen (PERCOCET 10)  mg per tablet      oxyCODONE-acetaminophen (PERCOCET 10)  mg per tablet   4. Depression with anxiety F41.8 300.4 ALPRAZolam (XANAX) 0.25 mg tablet   5. Bilateral carpal tunnel syndrome- R>>L G56.03 354.0      Stephania Sidhu was seen today for attention deficit disorder and pain (chronic).     Diagnoses and all orders for this visit:    Infected sebaceous cyst    ADD (attention deficit disorder) without hyperactivity- neuropsych testing + ADD -Dr. Mary De La Cruz  - dextroamphetamine-amphetamine (ADDERALL) 10 mg tablet; Take 1 Tab (10 mg total) by mouth See Admin InstructionsEarliest Fill Date: 6/27/17.  2 in am and 1-2 in afternoon    May fill 06/27/17  -     dextroamphetamine-amphetamine (ADDERALL) 10 mg tablet; Earliest Fill Date: 5/28/17. TAKE 2 TABS PO Q AM AND 1-2 TABS IN AFTERNOON   DO NOT FILL 05/28/17  -     dextroamphetamine-amphetamine (ADDERALL) 10 mg tablet; Earliest Fill Date: 4/28/17. TAKE 2 TABS PO Q AM, AND 1-2 TABS Q AFTERNOON    DDD (degenerative disc disease), cervical  -     oxyCODONE-acetaminophen (PERCOCET 10)  mg per tablet; Take 1 Tab by mouth every six (6) hours as needed for Pain. 50 is a 30 day supply    MAY FILL 06/27/17  -     oxyCODONE-acetaminophen (PERCOCET 10)  mg per tablet; Take 1 Tab by mouth every six (6) hours as needed for Pain. Max Daily Amount: 4 Tabs. 50 IS A 30 DAY SUPPLY  DO NOT FILL UNTIL 05/28/17  -     oxyCODONE-acetaminophen (PERCOCET 10)  mg per tablet; Take 1 Tab by mouth every six (6) hours as needed for Pain. Max Daily Amount: 4 Tabs. 50 IS A 30 DAY SUPPLY    Depression with anxiety  -     ALPRAZolam (XANAX) 0.25 mg tablet; TAKE 1/2 TO 1 TABLET BY MOUTH TWICE A DAY AS NEEDED  MAY FILL TODAY    Bilateral carpal tunnel syndrome- R>>L      Follow-up Disposition:  Return in about 3 months (around 7/28/2017), or Carpal tunnel symptoms don't resolve, for F/U chronic controlled substance use. reviewed medications and side effects in detail  Please call my office if there are any questions- [phone]. Discussed expected course/resolution/complications of diagnosis in detail with patient. Medication risks/benefits/costs/interactions/alternatives discussed with patient. Pt was given an after visit summary which includes diagnoses, current medications & vitals. Pt expressed understanding with the diagnosis and plan.   Total 25 minutes,60 % counseling re: Patient to call if infected cystno better in 3 -4 days and prn new problems. I suggested heat on the infected cyst area, and she'll use doxycycline that she has at home. I suggested she give Dr. Areli Bridges a call to have that surgically removed, as it will probably keep coming back otherwise. Reviewed the controlled substance agreement, patient signed it and has no questions. A copy was placed in the chart.  was checked and there was no suspicious behavior. I reviewed her medicines and refilled the Adderall and the Xanax, as well as the oxycodone. She notes that she's having continuing carpal tunnel-like symptoms with tingling in her fingers bilaterally even though she's wearing splints at night, so I referred her to Dr. Vinh Borjas, a hand specialist. She should continue the splints at night. Also, discussed symptoms of concern that were noted today in the note above, treatment options( including doing nothing), when to follow up before recommended time frame. Also, answered all questions. This document was written by Yaniv Rodrigues, as dictated by Bridget Mon MD.   I have reviewed and agree with the above note and have made corrections where appropriate Clarke Hunter M.D.

## 2017-04-28 NOTE — PROGRESS NOTES
Chief Complaint   Patient presents with    Attention Deficit Disorder     med refill    Pain (Chronic)     1. Have you been to the ER, urgent care clinic since your last visit? Hospitalized since your last visit? No    2. Have you seen or consulted any other health care providers outside of the [facility] since your last visit? Include any pap smears or colon screening.  No

## 2017-04-28 NOTE — MR AVS SNAPSHOT
Visit Information Date & Time Provider Department Dept. Phone Encounter #  
 4/28/2017 11:30 AM Cassandra Mack MD [address] [place] [address] [phone] [id number] Your Appointments 7/26/2017  2:00 PM  
ROUTINE CARE with Cassandra Mack MD  
[facility]) Appt Note: 3 month med check [address] [address]  
[phone]  
  
   
 [address] [address] Upcoming Health Maintenance Date Due  
 PAP AKA CERVICAL CYTOLOGY 8/11/2015 DTaP/Tdap/Td series (2 - Td) 6/6/2026 Allergies as of 4/28/2017  Review Complete On: 4/28/2017 By: Kenny Branch LPN Severity Noted Reaction Type Reactions Pcn [Penicillins]  02/20/2010    Other (comments) As a child Current Immunizations  Never Reviewed Name Date Influenza Vaccine (Quad) PF 9/23/2016 Not reviewed this visit You Were Diagnosed With   
  
 Codes Comments ADD (attention deficit disorder) without hyperactivity     ICD-10-CM: F98.8 ICD-9-CM: 314.00   
 DDD (degenerative disc disease), cervical     ICD-10-CM: M50.30 ICD-9-CM: 722.4 Depression with anxiety     ICD-10-CM: F41.8 ICD-9-CM: 300.4 Vitals BP Pulse Temp Resp Height(growth percentile) Weight(growth percentile) 118/74 (BP 1 Location: Left arm, BP Patient Position: Sitting) 86 98 °F (36.7 °C) (Oral) 18 5' 4\" (1.626 m) 182 lb 12.8 oz (82.9 kg) LMP SpO2 BMI OB Status Smoking Status 04/04/2017 (Approximate) 99% 31.38 kg/m2 Having regular periods Current Every Day Smoker BMI and BSA Data Body Mass Index Body Surface Area  
 31.38 kg/m 2 1.93 m 2 Preferred Pharmacy Pharmacy Name Phone [facility] [address] [place] [address] [phone] Your Updated Medication List  
  
   
This list is accurate as of: 4/28/17 12:55 PM.  Always use your most recent med list.  
  
  
  
  
 ALPRAZolam 0.25 mg tablet Commonly known as:  XANAX  
TAKE 1/2 TO 1 TABLET BY MOUTH TWICE A DAY AS NEEDED MAY FILL TODAY * dextroamphetamine-amphetamine 10 mg tablet Commonly known as:  ADDERALL Earliest Fill Date: 4/28/17. TAKE 2 TABS PO Q AM, AND 1-2 TABS Q AFTERNOON  
  
 * dextroamphetamine-amphetamine 10 mg tablet Commonly known as:  ADDERALL Earliest Fill Date: 5/28/17. TAKE 2 TABS PO Q AM AND 1-2 TABS IN AFTERNOON  DO NOT FILL 05/28/17 Start taking on:  5/28/2017 * dextroamphetamine-amphetamine 10 mg tablet Commonly known as:  ADDERALL Take 1 Tab (10 mg total) by mouth See Admin InstructionsEarliest Fill Date: 6/27/17.  2 in am and 1-2 in afternoon  May fill 06/27/17 Start taking on:  6/27/2017  
  
 doxycycline 100 mg capsule Commonly known as:  VIBRAMYCIN Take 1 Cap by mouth two (2) times a day. escitalopram oxalate 20 mg tablet Commonly known as:  Jose Mancera TAKE 1 TABLET BY MOUTH EVERY DAY  
  
 * oxyCODONE-acetaminophen  mg per tablet Commonly known as:  PERCOCET 10 Take 1 Tab by mouth every six (6) hours as needed for Pain. Max Daily Amount: 4 Tabs. 50 IS A 30 DAY SUPPLY  
  
 * oxyCODONE-acetaminophen  mg per tablet Commonly known as:  PERCOCET 10 Take 1 Tab by mouth every six (6) hours as needed for Pain. Max Daily Amount: 4 Tabs. 50 IS A 30 DAY SUPPLY DO NOT FILL UNTIL 05/28/17 Start taking on:  5/28/2017 * oxyCODONE-acetaminophen  mg per tablet Commonly known as:  PERCOCET 10 Take 1 Tab by mouth every six (6) hours as needed for Pain. 50 is a 30 day supply  MAY FILL 06/27/17 Start taking on:  6/27/2017  
  
 zolpidem 10 mg tablet Commonly known as:  AMBIEN  
TAKE 2 TABLETS BY MOUTH AT BEDTIME AS NEEDED FOR INSOMNIA * Notice: This list has 6 medication(s) that are the same as other medications prescribed for you.  Read the directions carefully, and ask your doctor or other care provider to review them with you. Prescriptions Printed Refills  
 dextroamphetamine-amphetamine (ADDERALL) 10 mg tablet 0 Starting on: 6/27/2017 Sig: Take 1 Tab (10 mg total) by mouth See Admin InstructionsEarliest Fill Date: 6/27/17.  2 in am and 1-2 in afternoon May fill 06/27/17 Class: Print Route: Oral  
 dextroamphetamine-amphetamine (ADDERALL) 10 mg tablet 0 Starting on: 5/28/2017 Sig: Earliest Vin Vázquezo Date: 5/28/17. TAKE 2 TABS PO Q AM AND 1-2 TABS IN AFTERNOON  
DO NOT FILL 05/28/17 Class: Print  
 dextroamphetamine-amphetamine (ADDERALL) 10 mg tablet 0 Sig: Earliest Vin Vázquezo Date: 4/28/17. TAKE 2 TABS PO Q AM, AND 1-2 TABS Q AFTERNOON Class: Print  
 oxyCODONE-acetaminophen (PERCOCET 10)  mg per tablet 0 Starting on: 6/27/2017 Sig: Take 1 Tab by mouth every six (6) hours as needed for Pain. 50 is a 30 day supply MAY FILL 06/27/17 Class: Print Route: Oral  
 oxyCODONE-acetaminophen (PERCOCET 10)  mg per tablet 0 Starting on: 5/28/2017 Sig: Take 1 Tab by mouth every six (6) hours as needed for Pain. Max Daily Amount: 4 Tabs. 50 IS A 30 DAY SUPPLY 
DO NOT FILL UNTIL 05/28/17 Class: Print Route: Oral  
 oxyCODONE-acetaminophen (PERCOCET 10)  mg per tablet 0 Sig: Take 1 Tab by mouth every six (6) hours as needed for Pain. Max Daily Amount: 4 Tabs. 50 IS A 30 DAY SUPPLY Class: Print Route: Oral  
 ALPRAZolam (XANAX) 0.25 mg tablet 2 Sig: TAKE 1/2 TO 1 TABLET BY MOUTH TWICE A DAY AS NEEDED 
MAY FILL TODAY Class: Print Patient Instructions Migraine Headache: Care Instructions Your Care Instructions Migraines are painful, throbbing headaches that often start on one side of the head. They may cause nausea and vomiting and make you sensitive to light, sound, or smell. Without treatment, migraines can last from 4 hours to a few days. Medicines can help prevent migraines or stop them after they have started. Your doctor can help you find which ones work best for you. Follow-up care is a key part of your treatment and safety. Be sure to make and go to all appointments, and call your doctor if you are having problems. It's also a good idea to know your test results and keep a list of the medicines you take. How can you care for yourself at home? · Do not drive if you have taken a prescription pain medicine. · Rest in a quiet, dark room until your headache is gone. Close your eyes, and try to relax or go to sleep. Don't watch TV or read. · Put a cold, moist cloth or cold pack on the painful area for 10 to 20 minutes at a time. Put a thin cloth between the cold pack and your skin. · Use a warm, moist towel or a heating pad set on low to relax tight shoulder and neck muscles. · Have someone gently massage your neck and shoulders. · Take your medicines exactly as prescribed. Call your doctor if you think you are having a problem with your medicine. You will get more details on the specific medicines your doctor prescribes. · Be careful not to take pain medicine more often than the instructions allow. You could get worse or more frequent headaches when the medicine wears off. To prevent migraines · Keep a headache diary so you can figure out what triggers your headaches. Avoiding triggers may help you prevent headaches. Record when each headache began, how long it lasted, and what the pain was like. (Was it throbbing, aching, stabbing, or dull?) Write down any other symptoms you had with the headache, such as nausea, flashing lights or dark spots, or sensitivity to bright light or loud noise. Note if the headache occurred near your period. List anything that might have triggered the headache. Triggers may include certain foods (chocolate, cheese, wine) or odors, smoke, bright light, stress, or lack of sleep. · If your doctor has prescribed medicine for your migraines, take it as directed. You may have medicine that you take only when you get a migraine and medicine that you take all the time to help prevent migraines. ¨ If your doctor has prescribed medicine for when you get a headache, take it at the first sign of a migraine, unless your doctor has given you other instructions. ¨ If your doctor has prescribed medicine to prevent migraines, take it exactly as prescribed. Call your doctor if you think you are having a problem with your medicine. · Find healthy ways to deal with stress. Migraines are most common during or right after stressful times. Take time to relax before and after you do something that has caused a migraine in the past. 
· Try to keep your muscles relaxed by keeping good posture. Check your jaw, face, neck, and shoulder muscles for tension. Try to relax them. When you sit at a desk, change positions often. And make sure to stretch for 30 seconds each hour. · Get plenty of sleep and exercise. · Eat meals on a regular schedule. Avoid foods and drinks that often trigger migraines. These include chocolate, alcohol (especially red wine and port), aspartame, monosodium glutamate (MSG), and some additives found in foods (such as hot dogs, mandel, cold cuts, aged cheeses, and pickled foods). · Limit caffeine. Don't drink too much coffee, tea, or soda. But don't quit caffeine suddenly. That can also give you migraines. · Do not smoke or allow others to smoke around you. If you need help quitting, talk to your doctor about stop-smoking programs and medicines. These can increase your chances of quitting for good. · If you are taking birth control pills or hormone therapy, talk to your doctor about whether they are triggering your migraines. When should you call for help? Call 911 anytime you think you may need emergency care. For example, call if: 
· You have signs of a stroke. These may include: ¨ Sudden numbness, paralysis, or weakness in your face, arm, or leg, especially on only one side of your body. ¨ Sudden vision changes. ¨ Sudden trouble speaking. ¨ Sudden confusion or trouble understanding simple statements. ¨ Sudden problems with walking or balance. ¨ A sudden, severe headache that is different from past headaches. Call your doctor now or seek immediate medical care if: 
· You have new or worse nausea and vomiting. · You have a new or higher fever. · Your headache gets much worse. Watch closely for changes in your health, and be sure to contact your doctor if: 
· You are not getting better after 2 days (48 hours). Where can you learn more? Go to http://clare-amanda.info/. Enter S847 in the search box to learn more about \"Migraine Headache: Care Instructions. \" Current as of: October 14, 2016 Content Version: 11.2 © 8575-3400 [employer]. Care instructions adapted under license by [employer] (which disclaims liability or warranty for this information). If you have questions about a medical condition or this instruction, always ask your healthcare professional. [place] [address] any warranty or liability for your use of this information. Introducing [facility]! [address] introduces [employer] patient portal. Now you can access parts of your medical record, email your doctor's office, and request medication refills online. 1. In your internet browser, go to https://[employer]. [employer] 2. Click on the First Time User? Click Here link in the Sign In box. You will see the New Member Sign Up page. 3. Enter your [employer] Access Code exactly as it appears below. You will not need to use this code after youve completed the sign-up process. If you do not sign up before the expiration date, you must request a new code. · [employer] Access Code: [email or web address] Expires: 7/27/2017 12:55 PM 
 
 4. Enter the last four digits of your Social Security Number (xxxx) and Date of Birth (mm/dd/yyyy) as indicated and click Submit. You will be taken to the next sign-up page. 5. Create a CARD.com ID. This will be your [employer]com login ID and cannot be changed, so think of one that is secure and easy to remember. 6. Create a [employer]com password. You can change your password at any time. 7. Enter your Password Reset Question and Answer. This can be used at a later time if you forget your password. 8. Enter your e-mail address. You will receive e-mail notification when new information is available in [address] 9. Click Sign Up. You can now view and download portions of your medical record. 10. Click the Download Summary menu link to download a portable copy of your medical information. If you have questions, please visit the Frequently Asked Questions section of the [email or web address] website. Remember, [employer]ARD[employer]com is NOT to be used for urgent needs. For medical emergencies, dial 911. Now available from your iPhone and Android! Please provide this summary of care documentation to your next provider. Your primary care clinician is listed as [place] [address], [facility]/s . If you have any questions after today's visit, please call [phone].

## 2017-06-15 ENCOUNTER — TELEPHONE (OUTPATIENT)
Dept: FAMILY MEDICINE CLINIC | Age: 42
End: 2017-06-15

## 2017-06-15 ENCOUNTER — OFFICE VISIT (OUTPATIENT)
Dept: FAMILY MEDICINE CLINIC | Age: 42
End: 2017-06-15

## 2017-06-15 VITALS
TEMPERATURE: 98.7 F | WEIGHT: 187.6 LBS | HEART RATE: 87 BPM | BODY MASS INDEX: 32.03 KG/M2 | HEIGHT: 64 IN | OXYGEN SATURATION: 99 % | SYSTOLIC BLOOD PRESSURE: 124 MMHG | DIASTOLIC BLOOD PRESSURE: 84 MMHG | RESPIRATION RATE: 18 BRPM

## 2017-06-15 DIAGNOSIS — F98.8 ADD (ATTENTION DEFICIT DISORDER) WITHOUT HYPERACTIVITY: Chronic | ICD-10-CM

## 2017-06-15 DIAGNOSIS — F31.32 BIPOLAR 1 DISORDER, DEPRESSED, MODERATE (HCC): Primary | ICD-10-CM

## 2017-06-15 DIAGNOSIS — F42.2 MIXED OBSESSIONAL THOUGHTS AND ACTS: ICD-10-CM

## 2017-06-15 DIAGNOSIS — M50.30 DDD (DEGENERATIVE DISC DISEASE), CERVICAL: ICD-10-CM

## 2017-06-15 DIAGNOSIS — F51.04 PSYCHOPHYSIOLOGICAL INSOMNIA: ICD-10-CM

## 2017-06-15 RX ORDER — LITHIUM CARBONATE 300 MG/1
300 CAPSULE ORAL 2 TIMES DAILY WITH MEALS
Qty: 60 CAP | Refills: 0 | Status: SHIPPED | OUTPATIENT
Start: 2017-06-15 | End: 2017-06-29 | Stop reason: SDUPTHER

## 2017-06-15 NOTE — PROGRESS NOTES
HISTORY OF PRESENT ILLNESS  HPI  Arian Garcia is a 37 y.o. Female with history of migraines, insomnia, OCD, anxiety, depression, and ADD who presents to office today for depression. Pt complains of worsening depression and anxiety. She states that she has read about bipolar disorder and thinks that it fits her symptoms, noting periods where she excessively spends money; she has never been on lithium before. Pt has not been able to see a psychiatrist recently. She has seen several psychiatrists before, including Dr. Leonila Trinidad and Dr. Franklin Mariee, and last saw a psychiatrist several years ago. Pt notes a family history of anxiety and depression with her mother and sister, but she denies any family history of bipolar disorder. Pt takes Ambien with relief from insomnia. 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 Ramirez Olivas 8/25/2016    Asthma     last attack 2 months ago    Bilateral carpal tunnel syndrome- R>>L 9/25/2016    Insomnia     Migraine 2/20/2010    Psychiatric disorder     Depression, anxiety    Psychophysiological insomnia 2/23/2016     Past Surgical History:   Procedure Laterality Date    HX APPENDECTOMY  1996    HX BREAST REDUCTION  2002    HX ORTHOPAEDIC  2003    cervical disc    HX ORTHOPAEDIC      second right hand digit fx with pins index     Current Outpatient Prescriptions on File Prior to Visit   Medication Sig Dispense Refill    [START ON 6/27/2017] dextroamphetamine-amphetamine (ADDERALL) 10 mg tablet Take 1 Tab (10 mg total) by mouth See Admin InstructionsEarliest Fill Date: 6/27/17.  2 in am and 1-2 in afternoon    May fill 06/27/17 120 Tab 0    [START ON 6/27/2017] oxyCODONE-acetaminophen (PERCOCET 10)  mg per tablet Take 1 Tab by mouth every six (6) hours as needed for Pain.  50 is a 30 day supply    MAY FILL 06/27/17 50 Tab 0    oxyCODONE-acetaminophen (PERCOCET 10)  mg per tablet Take 1 Tab by mouth every six (6) hours as needed for Pain. Max Daily Amount: 4 Tabs. 50 IS A 30 DAY SUPPLY  DO NOT FILL UNTIL 05/28/17 50 Tab 0    ALPRAZolam (XANAX) 0.25 mg tablet TAKE 1/2 TO 1 TABLET BY MOUTH TWICE A DAY AS NEEDED  MAY FILL TODAY 25 Tab 2    doxycycline (VIBRAMYCIN) 100 mg capsule Take 1 Cap by mouth two (2) times a day. 30 Cap 11    escitalopram oxalate (LEXAPRO) 20 mg tablet TAKE 1 TABLET BY MOUTH EVERY DAY 90 Tab 3     No current facility-administered medications on file prior to visit. Allergies   Allergen Reactions    Pcn [Penicillins] Other (comments)     As a child     Family History   Problem Relation Age of Onset    Diabetes Father     Hypertension Father     Heart Attack Father     High Cholesterol Father     Diabetes Mother     High Cholesterol Mother      Social History     Social History    Marital status:      Spouse name: N/A    Number of children: N/A    Years of education: N/A     Social History Main Topics    Smoking status: Current Every Day Smoker     Packs/day: 1.50     Years: 18.00     Types: Cigarettes    Smokeless tobacco: Never Used    Alcohol use Yes      Comment: occasionally    Drug use: No    Sexual activity: Yes     Partners: Male     Birth control/ protection: Condom     Other Topics Concern    None     Social History Narrative               Review of Systems   Constitutional: Negative for chills, diaphoresis, fever, malaise/fatigue and weight loss. Eyes: Negative for blurred vision, double vision, pain and redness. Respiratory: Negative for cough, shortness of breath and wheezing. Cardiovascular: Negative for chest pain, palpitations, orthopnea, claudication, leg swelling and PND. Skin: Negative for itching and rash. Neurological: Negative for dizziness, tingling, tremors, sensory change, speech change, focal weakness, seizures, loss of consciousness, weakness and headaches. Psychiatric/Behavioral: Positive for depression. Negative for suicidal ideas. The patient is nervous/anxious. Results for orders placed or performed during the hospital encounter of 06/01/16   CULTURE, URINE   Result Value Ref Range    Special Requests: NO SPECIAL REQUESTS      [place] Count [id number]  COLONIES/mL        Culture result: MIXED UROGENITAL JOHANNA ISOLATED     URINALYSIS W/MICROSCOPIC   Result Value Ref Range    Color YELLOW/STRAW      Appearance CLOUDY (A) CLEAR      Specific gravity 1.018 1.003 - 1.030      pH (UA) 7.0 5.0 - 8.0      Protein NEGATIVE  NEG mg/dL    Glucose NEGATIVE  NEG mg/dL    Ketone NEGATIVE  NEG mg/dL    Bilirubin NEGATIVE  NEG      Blood NEGATIVE  NEG      Urobilinogen 1.0 0.2 - 1.0 EU/dL    Nitrites NEGATIVE  NEG      Leukocyte Esterase SMALL (A) NEG      WBC 5-10 0 - 4 /hpf    RBC 0-5 0 - 5 /hpf    Epithelial cells MODERATE (A) FEW /lpf    Bacteria NEGATIVE  NEG /hpf    Hyaline cast 0-2 0 - 5 /lpf   HCG URINE, QL   Result Value Ref Range    HCG urine, Ql. NEGATIVE  NEG                 Physical Exam  Visit Vitals    /84 (BP 1 Location: Left arm, BP Patient Position: Sitting)    Pulse 87    Temp 98.7 °F (37.1 °C) (Oral)    Resp 18    Ht 5' 4\" (1.626 m)    Wt 187 lb 9.6 oz (85.1 kg)    LMP 06/02/2017 (Exact Date)    SpO2 99%    BMI 32.2 kg/m2     Pt's teary-eyed during most of the visit today. She is obviously overwhelmed but does have normal thought processes. Lungs: clear to auscultation bilaterally  Heart: regular rate and rhythm, S1, S2 normal, no murmur, click, rub or gallop  Neurologic: Grossly normal            ASSESSMENT and PLAN    ICD-10-CM ICD-9-CM    1. Bipolar 1 disorder, depressed, moderate (HCC) F31.32 296.52 lithium carbonate 300 mg capsule   2. 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 Talia Gee F98.8 314.00    3. Mixed obsessional thoughts and acts F42.2 300.3    4. Psychophysiological insomnia F51.04 307.42    5.  DDD (degenerative disc disease), cervical M50.30 722.4      Logan Trivedi was seen today for anxiety. Diagnoses and all orders for this visit:    Bipolar 1 disorder, depressed, moderate (HCC)  -     lithium carbonate 300 mg capsule; Take 1 Cap by mouth two (2) times daily (with meals). 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 Christopher Otero    Mixed obsessional thoughts and acts    Psychophysiological insomnia    DDD (degenerative disc disease), cervical    Follow-up Disposition: Not on File     reviewed medications and side effects in detail  Please call my office if there are any questions- [phone]. Discussed expected course/resolution/complications of diagnosis in detail with patient. Medication risks/benefits/costs/interactions/alternatives discussed with patient. Pt was given an after visit summary which includes diagnoses, current medications & vitals. Pt expressed understanding with the diagnosis and plan. Total 45 minutes,60 % counseling re: Patient to call if no better in 3 -4 days and prn new problems. I started her on lithium today and kahlil come back in two weeks, at which point well check her electrolytes and see how shes responding. Well check her lithium level as well. I reviewed the side effects from lithium, and shell call us if she has any of those. I encouraged her to try to make an appointment with a psychiatrist even if its months away, as they can help us adjust her medicines if the lithium isnt working for her. She denies SIHI. Also, discussed symptoms of concern that were noted today in the note above, treatment options( including doing nothing), when to follow up before recommended time frame. Also, answered all questions. This document was written by Pedro Hinds, as dictated by Analy Alanis MD.  I have reviewed and agree with the above note and have made corrections where appropriate Clarke Reza M.D.

## 2017-06-15 NOTE — MR AVS SNAPSHOT
Visit Information Date & Time Provider Department Dept. Phone Encounter #  
 6/15/2017  1:45 PM Oscar Baeza  [facility] [phone] [id number] Your Appointments 6/28/2017  4:15 PM  
ROUTINE CARE with Oscar Baeza MD  
[facility]) Appt Note: 2 week follow up  
 [address] [facility] [address]  
[phone]  
  
   
 [address] [address]  
  
    
 7/26/2017  2:00 PM  
ROUTINE CARE with Oscar Baeza MD  
[address] [facility]) Appt Note: 3 month med check [address] [place] [address]  
[phone] Upcoming Health Maintenance Date Due  
 PAP AKA CERVICAL CYTOLOGY 8/11/2015 INFLUENZA AGE 9 TO ADULT 8/1/2017 DTaP/Tdap/Td series (2 - Td) 6/6/2026 Allergies as of 6/15/2017  Review Complete On: 6/15/2017 By: Malathi Spence LPN Severity Noted Reaction Type Reactions Pcn [Penicillins]  02/20/2010    Other (comments) As a child Current Immunizations  Never Reviewed Name Date Influenza Vaccine (Quad) PF 9/23/2016 Not reviewed this visit Vitals BP Pulse Temp Resp Height(growth percentile) Weight(growth percentile) 124/84 (BP 1 Location: Left arm, BP Patient Position: Sitting) 87 98.7 °F (37.1 °C) (Oral) 18 5' 4\" (1.626 m) 187 lb 9.6 oz (85.1 kg) LMP SpO2 BMI OB Status Smoking Status 06/02/2017 (Exact Date) 99% 32.2 kg/m2 Having regular periods Current Every Day Smoker BMI and BSA Data Body Mass Index Body Surface Area  
 32.2 kg/m 2 1.96 m 2 Preferred Pharmacy Pharmacy Name Phone [facility] [address] [place] [address] [facility] [address] [place] [address] [phone] Your Updated Medication List  
  
   
This list is accurate as of: 6/15/17  2:36 PM.  Always use your most recent med list.  
  
  
  
  
 ALPRAZolam 0.25 mg tablet Commonly known as:  Jamil Garcia TAKE 1/2 TO 1 TABLET BY MOUTH TWICE A DAY AS NEEDED MAY FILL TODAY  
  
 dextroamphetamine-amphetamine 10 mg tablet Commonly known as:  ADDERALL Take 1 Tab (10 mg total) by mouth See Admin InstructionsEarliest Fill Date: 6/27/17.  2 in am and 1-2 in afternoon  May fill 06/27/17 Start taking on:  6/27/2017  
  
 doxycycline 100 mg capsule Commonly known as:  VIBRAMYCIN Take 1 Cap by mouth two (2) times a day. escitalopram oxalate 20 mg tablet Commonly known as:  Wendelin Saas TAKE 1 TABLET BY MOUTH EVERY DAY  
  
 lithium carbonate 300 mg capsule Take 1 Cap by mouth two (2) times daily (with meals). * oxyCODONE-acetaminophen  mg per tablet Commonly known as:  PERCOCET 10 Take 1 Tab by mouth every six (6) hours as needed for Pain. Max Daily Amount: 4 Tabs. 50 IS A 30 DAY SUPPLY DO NOT FILL UNTIL 05/28/17 * oxyCODONE-acetaminophen  mg per tablet Commonly known as:  PERCOCET 10 Take 1 Tab by mouth every six (6) hours as needed for Pain. 50 is a 30 day supply  MAY FILL 06/27/17 Start taking on:  6/27/2017 * Notice: This list has 2 medication(s) that are the same as other medications prescribed for you. Read the directions carefully, and ask your doctor or other care provider to review them with you. Prescriptions Sent to Pharmacy Refills  
 lithium carbonate 300 mg capsule 0 Sig: Take 1 Cap by mouth two (2) times daily (with meals). Class: Normal  
 Pharmacy: [address] [place] [address] #: [phone] Route: Oral  
  
Patient Instructions Anxiety Disorder: Care Instructions Your Care Instructions Anxiety is a normal reaction to stress. Difficult situations can cause you to have symptoms such as sweaty palms and a nervous feeling. In an anxiety disorder, the symptoms are far more severe.  Constant worry, muscle tension, trouble sleeping, nausea and diarrhea, and other symptoms can make normal daily activities difficult or impossible. These symptoms may occur for no reason, and they can affect your work, school, or social life. Medicines, counseling, and self-care can all help. Follow-up care is a key part of your treatment and safety. Be sure to make and go to all appointments, and call your doctor if you are having problems. It's also a good idea to know your test results and keep a list of the medicines you take. How can you care for yourself at home? · Take medicines exactly as directed. Call your doctor if you think you are having a problem with your medicine. · Go to your counseling sessions and follow-up appointments. · Recognize and accept your anxiety. Then, when you are in a situation that makes you anxious, say to yourself, \"This is not an emergency. I feel uncomfortable, but I am not in danger. I can keep going even if I feel anxious. \" · Be kind to your body: ¨ Relieve tension with exercise or a massage. ¨ Get enough rest. 
¨ Avoid alcohol, caffeine, nicotine, and illegal drugs. They can increase your anxiety level and cause sleep problems. ¨ Learn and do relaxation techniques. See below for more about these techniques. · Engage your mind. Get out and do something you enjoy. Go to a funny movie, or take a walk or hike. Plan your day. Having too much or too little to do can make you anxious. · Keep a record of your symptoms. Discuss your fears with a good friend or family member, or join a support group for people with similar problems. Talking to others sometimes relieves stress. · Get involved in social groups, or volunteer to help others. Being alone sometimes makes things seem worse than they are. · Get at least 30 minutes of exercise on most days of the week to relieve stress. Walking is a good choice. You also may want to do other activities, such as running, swimming, cycling, or playing tennis or team sports. Relaxation techniques Do relaxation exercises 10 to 20 minutes a day. You can play soothing, relaxing music while you do them, if you wish. · Tell others in your house that you are going to do your relaxation exercises. Ask them not to disturb you. · Find a comfortable place, away from all distractions and noise. · Lie down on your back, or sit with your back straight. · Focus on your breathing. Make it slow and steady. · Breathe in through your nose. Breathe out through either your nose or mouth. · Breathe deeply, filling up the area between your navel and your rib cage. Breathe so that your belly goes up and down. · Do not hold your breath. · Breathe like this for 5 to 10 minutes. Notice the feeling of calmness throughout your whole body. As you continue to breathe slowly and deeply, relax by doing the following for another 5 to 10 minutes: · Tighten and relax each muscle group in your body. You can begin at your toes and work your way up to your head. · Imagine your muscle groups relaxing and becoming heavy. · Empty your mind of all thoughts. · Let yourself relax more and more deeply. · Become aware of the state of calmness that surrounds you. · When your relaxation time is over, you can bring yourself back to alertness by moving your fingers and toes and then your hands and feet and then stretching and moving your entire body. Sometimes people fall asleep during relaxation, but they usually wake up shortly afterward. · Always give yourself time to return to full alertness before you drive a car or do anything that might cause an accident if you are not fully alert. Never play a relaxation tape while you drive a car. When should you call for help? Call 911 anytime you think you may need emergency care. For example, call if: 
· You feel you cannot stop from hurting yourself or someone else.  
Keep the numbers for these national suicide hotlines: 2-268-420-TALK (9-994-416-705.689.8488) and 0-317-LALHGNR (5-919.836.5525). If you or someone you know talks about suicide or feeling hopeless, get help right away. Watch closely for changes in your health, and be sure to contact your doctor if: 
· You have anxiety or fear that affects your life. · You have symptoms of anxiety that are new or different from those you had before. Where can you learn more? Go to http://clare-amanda.info/. Enter P754 in the search box to learn more about \"Anxiety Disorder: Care Instructions. \" Current as of: July 26, 2016 Content Version: 11.2 © 5334-3837 [employer]arel. Care instructions adapted under license by [employer] (which disclaims liability or warranty for this information). If you have questions about a medical condition or this instruction, always ask your healthcare professional. [place] [address] any warranty or liability for your use of this information. Introducing [facility] & HEALTH SERVICES! Aram Andres introduces Easy Pairings patient portal. Now you can access parts of your medical record, email your doctor's office, and request medication refills online. 1. In your internet browser, go to [email or web address] 2. Click on the First Time User? Click Here link in the Sign In box. You will see the New Member Sign Up page. 3. Enter your Easy Pairings Access Code exactly as it appears below. You will not need to use this code after youve completed the sign-up process. If you do not sign up before the expiration date, you must request a new code. · Easy Pairings Access Code: [email or web address] Expires: 7/27/2017 12:55 PM 
 
4. Enter the last four digits of your Social Security Number (xxxx) and Date of Birth (mm/dd/yyyy) as indicated and click Submit. You will be taken to the next sign-up page. 5. Create a Easy Pairings ID.  This will be your Easy Pairings login ID and cannot be changed, so think of one that is secure and easy to remember. 6. Create a Lithera password. You can change your password at any time. 7. Enter your Password Reset Question and Answer. This can be used at a later time if you forget your password. 8. Enter your e-mail address. You will receive e-mail notification when new information is available in [address] 9. Click Sign Up. You can now view and download portions of your medical record. 10. Click the Download Summary menu link to download a portable copy of your medical information. If you have questions, please visit the Frequently Asked Questions section of the Lithera website. Remember, Lithera is NOT to be used for urgent needs. For medical emergencies, dial 911. Now available from your iPhone and Android! Please provide this summary of care documentation to your next provider. Your primary care clinician is listed as [address] [place] [address], [facility] . If you have any questions after today's visit, please call [phone].

## 2017-06-15 NOTE — LETTER
NOTIFICATION RETURN TO WORK / SCHOOL 
 
6/15/2017 2:27 PM 
 
Ms. Nazanin Savage [address] [id number] To Whom It May Concern: 
 
Nazanin Savage is currently under the care of TOD Fleming. She will be out of work until 6/26/2017 due to illness. If there are questions or concerns please have the patient contact our office.  
 
 
 
Sincerely, 
 
 
Cezar De La Cruz MD

## 2017-06-15 NOTE — PROGRESS NOTES
Chief Complaint   Patient presents with    Anxiety     anxiety and depression. Patient thinks that she may be bipolar. Patient feels like she is having all of the symptoms of being bipolar. 1. Have you been to the ER, urgent care clinic since your last visit? Hospitalized since your last visit? No    2. Have you seen or consulted any other health care providers outside of the [address] [place] [address] since your last visit? Include any pap smears or colon screening. No    Patient states that work increases anxiety and depression.

## 2017-06-15 NOTE — LETTER
NOTIFICATION RETURN TO WORK / SCHOOL 
 
6/15/2017 2:25 PM 
 
Ms. Billi Drew [address] [facility] [address] To Whom It May Concern: 
 
Billi Drew is currently under the care of TOD Fleming. She will be If there are questions or concerns please have the patient contact our office.  
 
 
 
Sincerely, 
 
 
Carissa Arteaga MD

## 2017-06-15 NOTE — PATIENT INSTRUCTIONS

## 2017-06-26 ENCOUNTER — TELEPHONE (OUTPATIENT)
Dept: FAMILY MEDICINE CLINIC | Age: 42
End: 2017-06-26

## 2017-06-26 NOTE — TELEPHONE ENCOUNTER
MD Rachelle Vanegas LPN        Caller: Unspecified (Today, 10:48 AM)                     OK medications one day early

## 2017-06-26 NOTE — TELEPHONE ENCOUNTER
José @ [facility]  [phone]    Toma Finch states that Rob Harris had prescriptions for percocet 10 mg and adderrall 10 mg that say not to fill until 06/27/17. She is wanting to fill them today and asked the pharmacist to check with Dr. Kush Allison.

## 2017-06-28 ENCOUNTER — OFFICE VISIT (OUTPATIENT)
Dept: FAMILY MEDICINE CLINIC | Age: 42
End: 2017-06-28

## 2017-06-28 VITALS
HEART RATE: 87 BPM | TEMPERATURE: 98.6 F | DIASTOLIC BLOOD PRESSURE: 82 MMHG | RESPIRATION RATE: 18 BRPM | OXYGEN SATURATION: 96 % | HEIGHT: 64 IN | BODY MASS INDEX: 32.27 KG/M2 | WEIGHT: 189 LBS | SYSTOLIC BLOOD PRESSURE: 128 MMHG

## 2017-06-28 DIAGNOSIS — F31.32 BIPOLAR DISORDER WITH MODERATE DEPRESSION (HCC): Primary | ICD-10-CM

## 2017-06-28 DIAGNOSIS — Z79.899 LITHIUM USE: ICD-10-CM

## 2017-06-28 DIAGNOSIS — F98.8 ADD (ATTENTION DEFICIT DISORDER) WITHOUT HYPERACTIVITY: Chronic | ICD-10-CM

## 2017-06-28 DIAGNOSIS — R41.840 DISTURBED CONCENTRATION: ICD-10-CM

## 2017-06-28 DIAGNOSIS — F51.04 PSYCHOPHYSIOLOGICAL INSOMNIA: ICD-10-CM

## 2017-06-28 DIAGNOSIS — F41.8 DEPRESSION WITH ANXIETY: ICD-10-CM

## 2017-06-28 RX ORDER — ALPRAZOLAM 0.25 MG/1
TABLET ORAL
Qty: 25 TAB | Refills: 2 | Status: SHIPPED | OUTPATIENT
Start: 2017-06-28 | End: 2017-08-22 | Stop reason: SDUPTHER

## 2017-06-28 NOTE — MR AVS SNAPSHOT
Visit Information Date & Time Provider Department Dept. Phone Encounter #  
 6/28/2017  4:15 PM Oscar Baeza MD [address] [place] [address] [phone] [id number] Your Appointments 7/26/2017  2:00 PM  
ROUTINE CARE with Oscar Baeza MD  
[facility]) Appt Note: 3 month med check [address] [address]  
[phone]  
  
   
 [address] [address] Upcoming Health Maintenance Date Due  
 PAP AKA CERVICAL CYTOLOGY 8/11/2015 INFLUENZA AGE 9 TO ADULT 8/1/2017 DTaP/Tdap/Td series (2 - Td) 6/6/2026 Allergies as of 6/28/2017  Review Complete On: 6/28/2017 By: Malathi Spence LPN Severity Noted Reaction Type Reactions Pcn [Penicillins]  02/20/2010    Other (comments) As a child Current Immunizations  Never Reviewed Name Date Influenza Vaccine (Quad) PF 9/23/2016 Not reviewed this visit You Were Diagnosed With   
  
 Codes Comments Bipolar [facility] with [facility] ([facility]    -  Primary ICD-10-CM: F31.32 
ICD-9-CM: 296.52 Depression with anxiety     ICD-10-CM: F41.8 ICD-9-CM: 300.4 Lithium use     ICD-10-CM: K90.838 ICD-9-CM: V58.69 Vitals BP Pulse Temp Resp Height(growth percentile) Weight(growth percentile) 128/82 (BP 1 Location: Right arm, BP Patient Position: Sitting) 87 98.6 °F (37 °C) (Oral) 18 5' 4\" (1.626 m) 189 lb (85.7 kg) LMP SpO2 BMI OB Status Smoking Status 06/27/2017 (Exact Date) 96% 32.44 kg/m2 Having regular periods Current Every Day Smoker BMI and BSA Data Body Mass Index Body Surface Area  
 32.44 kg/m 2 1.97 m 2 Preferred Pharmacy Pharmacy Name Phone [facility] [address] [place] [address] [phone] Your Updated Medication List  
  
   
This list is accurate as of: 6/28/17  5:22 PM.  Always use your most recent med list.  
  
  
  
  
 ALPRAZolam 0.25 mg tablet Commonly known as:  XANAX  
TAKE 1/2 TO 1 TABLET BY MOUTH TWICE A DAY AS NEEDED MAY FILL TODAY  
  
 dextroamphetamine-amphetamine 10 mg tablet Commonly known as:  ADDERALL Take 1 Tab (10 mg total) by mouth See Admin InstructionsEarliest Fill Date: 6/27/17.  2 in am and 1-2 in afternoon  May fill 06/27/17  
  
 doxycycline 100 mg capsule Commonly known as:  VIBRAMYCIN Take 1 Cap by mouth two (2) times a day. escitalopram oxalate 20 mg tablet Commonly known as:  Markie Cameron TAKE 1 TABLET BY MOUTH EVERY DAY  
  
 lithium carbonate 300 mg capsule Take 1 Cap by mouth two (2) times daily (with meals). oxyCODONE-acetaminophen  mg per tablet Commonly known as:  PERCOCET 10 Take 1 Tab by mouth every six (6) hours as needed for Pain. 50 is a 30 day supply  MAY FILL 06/27/17 Prescriptions Printed Refills ALPRAZolam (XANAX) 0.25 mg tablet 2 Sig: TAKE 1/2 TO 1 TABLET BY MOUTH TWICE A DAY AS NEEDED 
MAY FILL TODAY Class: Print We Performed the Following CBC W/O DIFF [71140 CPT(R)] LITHIUM [id number] CPT(R)] METABOLIC PANEL, COMPREHENSIVE [39942 CPT(R)] TSH 3RD GENERATION [48918 CPT(R)] Patient Instructions Bipolar Disorder: Care Instructions Your Care Instructions Bipolar disorder is an illness that causes extreme mood changes, from times of very high energy (manic episodes) to times of depression. But many people with bipolar disorder show only the symptoms of depression. These moods may cause problems with your work, school, family life, friendships, and how well you function. This disease is also called manic-depression. There is no cure for bipolar disorder, but it can be helped with medicines. Counseling may also help. It is important to take your medicines exactly as prescribed, even when you feel well. You may need lifelong treatment. Follow-up care is a key part of your treatment and safety. Be sure to make and go to all appointments, and call your doctor if you are having problems. It's also a good idea to know your test results and keep a list of the medicines you take. How can you care for yourself at home? · Be safe with medicines. Take your medicines exactly as prescribed. Do not stop or change a medicine without talking to your doctor first. Martha Hinojosa and your doctor may need to try different combinations of medicines to find what works for you. · Take your medicines on schedule to keep your moods even. When you feel good, you may think that you do not need your medicines. But it is important to keep taking them. · Go to your counseling sessions. Call and talk with your counselor if you can't go to a session or if you don't think the sessions are helping. Do not just stop going. · Get at least 30 minutes of activity on most days of the week. Walking is a good choice. You also may want to do other things, such as running, swimming, or cycling. · Get enough sleep. Keep your room dark and quiet. Try to go to bed at the same time every night. · Eat a healthy diet. This includes whole grains, dairy, fruits, vegetables, and protein. Eat foods from each of these groups. · Try to lower your stress. Manage your time, build a strong support system, and lead a healthy lifestyle. To lower your stress, try physical activity, slow deep breathing, or getting a massage. · Do not use alcohol or illegal drugs. · Learn the early signs of your mood changes. You can then take steps to help yourself feel better. · Ask for help from friends and family when you need it. You may need help with daily chores when you are depressed. When you are manic, you may need support to control your high energy levels. What should you do if someone in your family has bipolar disorder? · Learn about the disease and the signs that it is getting worse. · Remind your family member that you love him or her. · Make a plan with all family members about how to take care of your loved one when his or her symptoms are bad. · Talk about your fears and concerns and those of other family members. Seek counseling if needed. · Do not focus attention only on the person who is in treatment. · Remind yourself that it will take time for changes to occur. · Do not blame yourself for the disease. · Know your legal rights and the legal rights of your family member. Support groups or counselors can help you with this information. · Take care of yourself. Keep up with your own interests, such as your career, hobbies, and friends. Use exercise, positive self-talk, deep breathing, and other relaxing exercises to help lower your stress. · Give yourself time to grieve. You may need to deal with emotions such as anger, fear, and frustration. After you work through your feelings, you will be better able to care for yourself and your family. · If you are having a hard time with your feelings or with your relationship with your family member, talk with a counselor. When should you call for help? Call 911 anytime you think you may need emergency care. For example, call if: 
· You feel like hurting yourself or someone else. · Someone who has bipolar disorder displays dangerous behavior, and you think the person might hurt himself or herself or someone else. Call your doctor now or seek immediate medical care if: 
· You hear voices. · Someone you know has bipolar disorder and talks about suicide. Keep the numbers for these national suicide hotlines: 6-019-898-TALK (1-262-599-841-631-5627) and 6-777-RZXBGYQ (5-629.720.6388). If a suicide threat seems real, with a specific plan and a way to carry it out, stay with the person, or ask someone you trust to stay with the person, until you can get help. · Someone you know has bipolar disorder and: 
¨ Starts to give away possessions. ¨ Is using illegal drugs or drinking alcohol heavily. ¨ Talks or writes about death, including writing suicide notes or talking about guns, knives, or pills. ¨ Talks or writes about hurting someone else. ¨ Starts to spend a lot of time alone. ¨ Acts very aggressively or suddenly appears calm. ¨ Talks about beliefs that are not based in reality (delusions). Watch closely for changes in your health, and be sure to contact your doctor if: 
· You cannot go to your counseling sessions. Where can you learn more? Go to http://clare-amanda.info/. Enter K052 in the search box to learn more about \"Bipolar Disorder: Care Instructions. \" Current as of: July 26, 2016 Content Version: 11.3 © 2350-8617 Divitel, ARI. Care instructions adapted under license by Krik[employer] (which disclaims liability or warranty for this information). If you have questions about a medical condition or this instruction, always ask your healthcare professional. [place] [address] any warranty or liability for your use of this information. Introducing hospitals & HEALTH SERVICES! [facility] introduces [employer] patient portal. Now you can access parts of your medical record, email your doctor's office, and request medication refills online. 1. In your internet browser, go to [email or web address] 2. Click on the First Time User? Click Here link in the Sign In box. You will see the New Member Sign Up page. 3. Enter your [employer] Access Code exactly as it appears below. You will not need to use this code after youve completed the sign-up process. If you do not sign up before the expiration date, you must request a new code. · [employer] Access Code: [email or web address] Expires: 7/27/2017 12:55 PM 
 
4. Enter the last four digits of your Social Security Number (xxxx) and Date of Birth (mm/dd/yyyy) as indicated and click Submit.  You will be taken to the next sign-up page. 5. Create a Tabblo ID. This will be your Tabblo login ID and cannot be changed, so think of one that is secure and easy to remember. 6. Create a Tabblo password. You can change your password at any time. 7. Enter your Password Reset Question and Answer. This can be used at a later time if you forget your password. 8. Enter your e-mail address. You will receive e-mail notification when new information is available in [address]. 9. Click Sign Up. You can now view and download portions of your medical record. 10. Click the Download Summary menu link to download a portable copy of your medical information. If you have questions, please visit the Frequently Asked Questions section of the Tabblo website. Remember, Tabblo is NOT to be used for urgent needs. For medical emergencies, dial 911. Now available from your iPhone and Android! Please provide this summary of care documentation to your next provider. Your primary care clinician is listed as [address] [place] [address], [facility]/s . If you have any questions after today's visit, please call [phone].

## 2017-06-28 NOTE — PROGRESS NOTES
Chief Complaint   Patient presents with    Follow-up     medication change     1. Have you been to the ER, urgent care clinic since your last visit? Hospitalized since your last visit? No    2. Have you seen or consulted any other health care providers outside of the [address] [place] [address] since your last visit? Include any pap smears or colon screening.  No

## 2017-06-28 NOTE — PROGRESS NOTES
HISTORY OF PRESENT ILLNESS  HPI  Adams Zambrano is a 37 y.o. Female with history of migraines, insomnia, OCD, ADD, anxiety, and depression who presents to office today with her friend for medication follow-up. Pt began lithium 300mg BID on 6/15/17. She notes that the lithium seems to be relieving her anxiety and depression symptoms a little. She states that she had some initial stomach upset with the lithium but that this seems to be resolving. Pt also takes Xanax 0.25mg prn up to BID with relief, typically earlier in the day. Pt requests that FMLA forms be filled out regarding her anxiety and depression. She was out of work from 6/18 - 6/26/17. Pt provides a contact number of [phone]. Pt notes that she performs better when working from home. 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 Андрей Mccormack 8/25/2016    Asthma     last attack 2 months ago    Bilateral carpal tunnel syndrome- R>>L 9/25/2016    Bipolar disorder with moderate depression ([facility] [address] 6/28/2017    Depression with anxiety 3/2/2010    Insomnia     Lithium use 6/28/2017    Migraine 2/20/2010    Psychiatric disorder     Depression, anxiety    Psychophysiological insomnia 2/23/2016     Past Surgical History:   Procedure Laterality Date    HX APPENDECTOMY  1996    HX BREAST REDUCTION  2002    HX ORTHOPAEDIC  2003    cervical disc    HX ORTHOPAEDIC      second right hand digit fx with pins index     Current Outpatient Prescriptions on File Prior to Visit   Medication Sig Dispense Refill    lithium carbonate 300 mg capsule Take 1 Cap by mouth two (2) times daily (with meals).  60 Cap 0    dextroamphetamine-amphetamine (ADDERALL) 10 mg tablet Take 1 Tab (10 mg total) by mouth See Admin InstructionsEarliest Fill Date: 6/27/17.  2 in am and 1-2 in afternoon    May fill 06/27/17 120 Tab 0    oxyCODONE-acetaminophen (PERCOCET 10)  mg per tablet Take 1 Tab by mouth every six (6) hours as needed for Pain. 50 is a 30 day supply    MAY FILL 06/27/17 50 Tab 0    doxycycline (VIBRAMYCIN) 100 mg capsule Take 1 Cap by mouth two (2) times a day. 30 Cap 11    escitalopram oxalate (LEXAPRO) 20 mg tablet TAKE 1 TABLET BY MOUTH EVERY DAY 90 Tab 3     No current facility-administered medications on file prior to visit. Allergies   Allergen Reactions    Pcn [Penicillins] Other (comments)     As a child     Family History   Problem Relation Age of Onset    Diabetes Father     Hypertension Father     Heart Attack Father     High Cholesterol Father     Diabetes Mother     High Cholesterol Mother      Social History     Social History    Marital status:      Spouse name: N/A    Number of children: N/A    Years of education: N/A     Social History Main Topics    Smoking status: Current Every Day Smoker     Packs/day: 1.50     Years: 18.00     Types: Cigarettes    Smokeless tobacco: Never Used    Alcohol use Yes      Comment: occasionally    Drug use: No    Sexual activity: Yes     Partners: Male     Birth control/ protection: Condom     Other Topics Concern    None     Social History Narrative               Review of Systems   Constitutional: Negative for chills, diaphoresis, fever, malaise/fatigue and weight loss. Eyes: Negative for blurred vision, double vision, pain and redness. Respiratory: Negative for cough, shortness of breath and wheezing. Cardiovascular: Negative for chest pain, palpitations, orthopnea, claudication, leg swelling and PND. Skin: Negative for itching and rash. Neurological: Negative for dizziness, tingling, tremors, sensory change, speech change, focal weakness, seizures, loss of consciousness, weakness and headaches. Psychiatric/Behavioral: Negative for suicidal ideas.      Results for orders placed or performed during the hospital encounter of 06/01/16   CULTURE, URINE   Result Value Ref Range    Special Requests: NO SPECIAL REQUESTS      [place] Count [id number]  COLONIES/mL        Culture result: MIXED UROGENITAL JOHANNA ISOLATED     URINALYSIS W/MICROSCOPIC   Result Value Ref Range    Color YELLOW/STRAW      Appearance CLOUDY (A) CLEAR      Specific gravity 1.018 1.003 - 1.030      pH (UA) 7.0 5.0 - 8.0      Protein NEGATIVE  NEG mg/dL    Glucose NEGATIVE  NEG mg/dL    Ketone NEGATIVE  NEG mg/dL    Bilirubin NEGATIVE  NEG      Blood NEGATIVE  NEG      Urobilinogen 1.0 0.2 - 1.0 EU/dL    Nitrites NEGATIVE  NEG      Leukocyte Esterase SMALL (A) NEG      WBC 5-10 0 - 4 /hpf    RBC 0-5 0 - 5 /hpf    Epithelial cells MODERATE (A) FEW /lpf    Bacteria NEGATIVE  NEG /hpf    Hyaline cast 0-2 0 - 5 /lpf   HCG URINE, QL   Result Value Ref Range    HCG urine, Ql. NEGATIVE  NEG                 Physical Exam  Visit Vitals    /82 (BP 1 Location: Right arm, BP Patient Position: Sitting)    Pulse 87    Temp 98.6 °F (37 °C) (Oral)    Resp 18    Ht 5' 4\" (1.626 m)    Wt 189 lb (85.7 kg)    LMP 06/27/2017 (Exact Date)    SpO2 96%    BMI 32.44 kg/m2     Pt still has a flat affect but was not teary-eyed and not nearly as upset as she was last visit. Lungs: clear to auscultation bilaterally  Heart: regular rate and rhythm, S1, S2 normal, no murmur, click, rub or gallop  Neurologic: Grossly normal            ASSESSMENT and PLAN    ICD-10-CM ICD-9-CM    1. Bipolar disorder with moderate depression ([facility] [address] F31.32 296.52 LITHIUM      CBC W/O DIFF      TSH 3RD GENERATION      METABOLIC PANEL, COMPREHENSIVE      CANCELED: ELECTROLYTES   2. Depression with anxiety F41.8 300.4 ALPRAZolam (XANAX) 0.25 mg tablet      TSH 3RD GENERATION   3. Lithium use Z79.899 V58.69 CBC W/O DIFF      TSH 3RD GENERATION      METABOLIC PANEL, COMPREHENSIVE   4. 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 Yolanda Cramer F98.8 314.00    5. Psychophysiological insomnia F51.04 307.42    6.  Disturbed concentration- acute due to depression R41.840 799.51      Jose Regan was seen today for follow-up. Diagnoses and all orders for this visit:    Bipolar disorder with moderate depression ([facility] [address]  -     LITHIUM  -     Cancel: ELECTROLYTES  -     CBC W/O DIFF  -     TSH 3RD GENERATION  -     METABOLIC PANEL, COMPREHENSIVE    Depression with anxiety  -     ALPRAZolam (XANAX) 0.25 mg tablet; TAKE 1/2 TO 1 TABLET BY MOUTH TWICE A DAY AS NEEDED  MAY FILL TODAY  -     TSH 3RD GENERATION    Lithium use  -     CBC W/O DIFF  -     TSH 3RD GENERATION  -     METABOLIC PANEL, COMPREHENSIVE    ADD (attention deficit disorder) without hyperactivity- neuropsych testing + ADD -Dr. Talia Gee    Psychophysiological insomnia    Disturbed concentration- acute due to depression      Follow-up Disposition: Not on File     lab results and schedule of future lab studies reviewed with patient  reviewed diet, exercise and weight control  cardiovascular risk and specific lipid/LDL goals reviewed  reviewed medications and side effects in detail  Please call my office if there are any questions- [phone]. I will arrange for follow up after the lab tests done from today return  Recommended a weekly \"heart check. \" I went into detail how to do this. Call for refills on medications as needed. Discussed expected course/resolution/complications of diagnosis in detail with patient. Medication risks/benefits/costs/interactions/alternatives discussed with patient. Pt was given an after visit summary which includes diagnoses, current medications & vitals. Pt expressed understanding with the diagnosis and plan. Total 25 minutes,60 % counseling re: We'll check her lithium level and some other labs to monitor the use of lithium. I told her that lithium can work quickly within a week or so but will very often take 2-3 weeks, and that's after you get to a good lithium level.  After we get her lithium level back, we'll learn when she should start to see some improvements and whether she'll need to adjust the dose, and we'll have to follow up again in a few weeks. She asked about counseling; I encouraged her to check her insurance card for the number for counseling, and they should be able to direct her to somebody. If they give her a list of providers, I would be glad to look at that and provide suggestions. .  Denies suicidal or homicidal ideations. Also, discussed symptoms of concern that were noted today in the note above, treatment options( including doing nothing), when to follow up before recommended time frame. Also, answered all questions. This document was written by Aly Castro, as dictated by Kvng Sena MD.  I have reviewed and agree with the above note and have made corrections where appropriate Clarke Hatch M.D.

## 2017-06-29 ENCOUNTER — TELEPHONE (OUTPATIENT)
Dept: FAMILY MEDICINE CLINIC | Age: 42
End: 2017-06-29

## 2017-06-29 DIAGNOSIS — F31.32 BIPOLAR 1 DISORDER, DEPRESSED, MODERATE (HCC): ICD-10-CM

## 2017-06-29 LAB
ALBUMIN SERPL-MCNC: 4.2 G/DL (ref 3.5–5.5)
ALBUMIN/GLOB SERPL: 1.7 {RATIO} (ref 1.2–2.2)
ALP SERPL-CCNC: 80 IU/L (ref 39–117)
ALT SERPL-CCNC: 12 IU/L (ref 0–32)
AST SERPL-CCNC: 15 IU/L (ref 0–40)
BILIRUB SERPL-MCNC: <0.2 MG/DL (ref 0–1.2)
BUN SERPL-MCNC: 10 MG/DL (ref 6–24)
BUN/CREAT SERPL: 17 (ref 9–23)
CALCIUM SERPL-MCNC: 9.1 MG/DL (ref 8.7–10.2)
CHLORIDE SERPL-SCNC: 100 MMOL/L (ref 96–106)
CO2 SERPL-SCNC: 22 MMOL/L (ref 18–29)
CREAT SERPL-MCNC: 0.6 MG/DL (ref 0.57–1)
ERYTHROCYTE [DISTWIDTH] IN BLOOD BY AUTOMATED COUNT: 13.1 % (ref 12.3–15.4)
GLOBULIN SER CALC-MCNC: 2.5 G/DL (ref 1.5–4.5)
GLUCOSE SERPL-MCNC: 72 MG/DL (ref 65–99)
HCT VFR BLD AUTO: 41.2 % (ref 34–46.6)
HGB BLD-MCNC: 13.6 G/DL (ref 11.1–15.9)
LITHIUM SERPL-SCNC: 0.2 MMOL/L (ref 0.6–1.2)
MCH RBC QN AUTO: 30.8 PG (ref 26.6–33)
MCHC RBC AUTO-ENTMCNC: 33 G/DL (ref 31.5–35.7)
MCV RBC AUTO: 93 FL (ref 79–97)
PLATELET # BLD AUTO: 306 X10E3/UL (ref 150–379)
POTASSIUM SERPL-SCNC: 4.6 MMOL/L (ref 3.5–5.2)
PROT SERPL-MCNC: 6.7 G/DL (ref 6–8.5)
RBC # BLD AUTO: 4.42 X10E6/UL (ref 3.77–5.28)
SODIUM SERPL-SCNC: 138 MMOL/L (ref 134–144)
TSH SERPL DL<=0.005 MIU/L-ACNC: 2.67 UIU/ML (ref 0.45–4.5)
WBC # BLD AUTO: 10.2 X10E3/UL (ref 3.4–10.8)

## 2017-06-29 RX ORDER — LITHIUM CARBONATE 300 MG/1
300 CAPSULE ORAL
Qty: 90 CAP | Refills: 0
Start: 2017-06-29 | End: 2017-08-28 | Stop reason: DRUGHIGH

## 2017-06-29 NOTE — TELEPHONE ENCOUNTER
Lithium level is low. Everything else is OK. Increase the Lithium to 300 TID and recheck in 2 weeks, non fasting.    Work note OK

## 2017-06-29 NOTE — TELEPHONE ENCOUNTER
Not feeling well  today. Did not go work today and wanted you to know. Lots of anxiety about going into work today.  Just wanted you to know in case she needs a note

## 2017-06-29 NOTE — LETTER
NOTIFICATION RETURN TO WORK / SCHOOL 
 
6/30/2017 9:04 AM 
 
Ms. Say Cota [address] [facility] [address] To Whom It May Concern: 
 
Say Cota is currently under the care of TOD Fleming. She was out of work 06/29/17 and 06/30/17 If there are questions or concerns please have the patient contact our office.  
 
 
 
Sincerely, 
 
 
David Silva MD

## 2017-06-29 NOTE — TELEPHONE ENCOUNTER
Patient is calling, patient states that she would like a call back from [facility] because she is doing really bad today, patient states that she is having really bad anxiety and depression and would like to know what to do. Patient did not state any further details other than she wanted a call back from [facility].     Best call back # for patient: [phone]

## 2017-06-30 NOTE — TELEPHONE ENCOUNTER
----- Message from Meredith Batres sent at 6/29/2017  5:51 PM EDT -----  Regarding: Dr. Yany Fink  Pt is returning call to [facility], nurse, regarding test results. Best contact number to reach pt is [phone].

## 2017-06-30 NOTE — TELEPHONE ENCOUNTER
Patient informed of results. Voiced understanding. She will fax FMLA forms.  Work not given for 06/29-06/30

## 2017-07-01 NOTE — PROGRESS NOTES
Pt already informed about low Lithium level. Everything else is OK. Will increase the Litium to 300 mg TID and recheck in 2-3 weeks. Everything else the same.

## 2017-07-03 ENCOUNTER — TELEPHONE (OUTPATIENT)
Dept: FAMILY MEDICINE CLINIC | Age: 42
End: 2017-07-03

## 2017-07-03 NOTE — TELEPHONE ENCOUNTER
Patient is calling, patient states that she would like a call back from [employer] in regards to her time off from work. Advised patient I would send back a message and [employer] will be calling her back as soon as possible.      Best call back # for patient: [phone]

## 2017-07-03 NOTE — TELEPHONE ENCOUNTER
Patient states approved for short term disability till 07/19/17  She has follow up appointment 07/14/17

## 2017-07-14 ENCOUNTER — OFFICE VISIT (OUTPATIENT)
Dept: FAMILY MEDICINE CLINIC | Age: 42
End: 2017-07-14

## 2017-07-14 VITALS
DIASTOLIC BLOOD PRESSURE: 90 MMHG | TEMPERATURE: 98.9 F | HEIGHT: 64 IN | RESPIRATION RATE: 16 BRPM | WEIGHT: 187 LBS | HEART RATE: 80 BPM | BODY MASS INDEX: 31.92 KG/M2 | OXYGEN SATURATION: 98 % | SYSTOLIC BLOOD PRESSURE: 136 MMHG

## 2017-07-14 DIAGNOSIS — F31.32 BIPOLAR DISORDER WITH MODERATE DEPRESSION (HCC): Primary | ICD-10-CM

## 2017-07-14 RX ORDER — ZOLPIDEM TARTRATE 10 MG/1
TABLET ORAL
COMMUNITY
Start: 2017-07-12 | End: 2017-10-04 | Stop reason: SDUPTHER

## 2017-07-14 RX ORDER — RIZATRIPTAN BENZOATE 10 MG/1
10 TABLET, ORALLY DISINTEGRATING ORAL
Qty: 15 TAB | Refills: 2 | Status: SHIPPED | OUTPATIENT
Start: 2017-07-14 | End: 2017-10-16 | Stop reason: SDUPTHER

## 2017-07-14 NOTE — PROGRESS NOTES
Chief Complaint   Patient presents with    Follow-up     2 week follow up      1. Have you been to the ER, urgent care clinic since your last visit? Hospitalized since your last visit? No    2. Have you seen or consulted any other health care providers outside of the [facility]"" since your last visit? Include any pap smears or colon screening.  No

## 2017-07-14 NOTE — PROGRESS NOTES
HISTORY OF PRESENT ILLNESS  HPI  Ozzie Roberson is a 37 y.o. Female with history of migraines, insomnia, bipolar disorder, anxiety, depression, ADD, and OCD who presents to office today for medication follow-up. Pt began lithium 300mg BID on 6/15/17. Her dose was increased to TID on 6/29/17, and she notes that her anxiety and depression symptoms have improved since then. Pt is also taking Lexapro 20mg once a day. Pt states that she has migraines 3-4 times a week, for which she takes Percocet; she notes that she is currently having a migraine. She has tried Imitrex and Topamax before without relief. 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 Army Ramirez 8/25/2016    Asthma     last attack 2 months ago    Bilateral carpal tunnel syndrome- R>>L 9/25/2016    Bipolar disorder with moderate depression ([address] 6/28/2017    Depression with anxiety 3/2/2010    Insomnia     Lithium use 6/28/2017    Migraine 2/20/2010    Psychiatric disorder     Depression, anxiety    Psychophysiological insomnia 2/23/2016     Past Surgical History:   Procedure Laterality Date    HX APPENDECTOMY  1996    HX BREAST REDUCTION  2002    HX ORTHOPAEDIC  2003    cervical disc    HX ORTHOPAEDIC      second right hand digit fx with pins index     Current Outpatient Prescriptions on File Prior to Visit   Medication Sig Dispense Refill    lithium carbonate 300 mg capsule Take 1 Cap by mouth three (3) times daily (with meals).  90 Cap 0    ALPRAZolam (XANAX) 0.25 mg tablet TAKE 1/2 TO 1 TABLET BY MOUTH TWICE A DAY AS NEEDED  MAY FILL TODAY 25 Tab 2    dextroamphetamine-amphetamine (ADDERALL) 10 mg tablet Take 1 Tab (10 mg total) by mouth See Admin InstructionsEarliest Fill Date: 6/27/17.  2 in am and 1-2 in afternoon    May fill 06/27/17 120 Tab 0    oxyCODONE-acetaminophen (PERCOCET 10)  mg per tablet Take 1 Tab by mouth every six (6) hours as needed for Pain. 50 is a 30 day supply    MAY FILL 06/27/17 50 Tab 0    escitalopram oxalate (LEXAPRO) 20 mg tablet TAKE 1 TABLET BY MOUTH EVERY DAY 90 Tab 3     No current facility-administered medications on file prior to visit. Allergies   Allergen Reactions    Pcn [Penicillins] Other (comments)     As a child     Family History   Problem Relation Age of Onset    Diabetes Father     Hypertension Father     Heart Attack Father     High Cholesterol Father     Diabetes Mother     High Cholesterol Mother      Social History     Social History    Marital status:      Spouse name: N/A    Number of children: N/A    Years of education: N/A     Social History Main Topics    Smoking status: Current Every Day Smoker     Packs/day: 1.50     Years: 18.00     Types: Cigarettes    Smokeless tobacco: Never Used    Alcohol use Yes      Comment: occasionally    Drug use: No    Sexual activity: Yes     Partners: Male     Birth control/ protection: Condom     Other Topics Concern    None     Social History Narrative             Review of Systems   Constitutional: Negative for chills, diaphoresis, fever, malaise/fatigue and weight loss. Eyes: Positive for photophobia. Negative for blurred vision, double vision, pain and redness. Respiratory: Negative for cough, shortness of breath and wheezing. Cardiovascular: Negative for chest pain, palpitations, orthopnea, claudication, leg swelling and PND. Skin: Negative for itching and rash. Neurological: Negative for dizziness, tingling, tremors, sensory change, speech change, focal weakness, seizures, loss of consciousness, weakness and headaches.      Results for orders placed or performed in visit on 06/28/17   LITHIUM   Result Value Ref Range    Lithium level 0.2 (L) 0.6 - 1.2 mmol/L   CBC W/O DIFF   Result Value Ref Range    WBC 10.2 3.4 - 10.8 x10E3/uL    RBC 4.42 3.77 - 5.28 x10E6/uL    HGB 13.6 11.1 - 15.9 g/dL    HCT 41.2 34.0 - 46.6 %    MCV 93 79 - 97 fL    MCH 30.8 26.6 - 33.0 pg    MCHC 33.0 31.5 - 35.7 g/dL    RDW 13.1 12.3 - 15.4 %    PLATELET 502 373 - 364 x10E3/uL   TSH 3RD GENERATION   Result Value Ref Range    TSH 2.670 0.450 - 6.438 uIU/mL   METABOLIC PANEL, COMPREHENSIVE   Result Value Ref Range    Glucose 72 65 - 99 mg/dL    BUN 10 6 - 24 mg/dL    Creatinine 0.60 0.57 - 1.00 mg/dL    GFR est non- >59 mL/min/1.73    GFR est  >59 mL/min/1.73    BUN/Creatinine ratio 17 9 - 23    Sodium 138 134 - 144 mmol/L    Potassium 4.6 3.5 - 5.2 mmol/L    Chloride 100 96 - 106 mmol/L    CO2 22 18 - 29 mmol/L    Calcium 9.1 8.7 - 10.2 mg/dL    Protein, total 6.7 6.0 - 8.5 g/dL    Albumin 4.2 3.5 - 5.5 g/dL    GLOBULIN, TOTAL 2.5 1.5 - 4.5 g/dL    A-G Ratio 1.7 1.2 - 2.2    Bilirubin, total <0.2 0.0 - 1.2 mg/dL    Alk. phosphatase 80 39 - 117 IU/L    AST (SGOT) 15 0 - 40 IU/L    ALT (SGPT) 12 0 - 32 IU/L               Physical Exam  Visit Vitals    /90 (BP 1 Location: Left arm, BP Patient Position: Sitting)    Pulse 80    Temp 98.9 °F (37.2 °C) (Oral)    Resp 16    Ht 5' 4\" (1.626 m)    Wt 187 lb (84.8 kg)    LMP 06/27/2017 (Exact Date)    SpO2 98%    BMI 32.1 kg/m2     She seems to be very light sensitive. Eyes: conjunctivae/corneas clear. PERRL, EOM's intact. Fundi benign  Lungs: clear to auscultation bilaterally  Heart: regular rate and rhythm, S1, S2 normal, no murmur, click, rub or gallop  Neurologic: Grossly normal          ASSESSMENT and PLAN    ICD-10-CM ICD-9-CM    1. Bipolar disorder with moderate depression ([address] N99.23 531.53 METABOLIC PANEL, COMPREHENSIVE      LITHIUM     Oh Vito was seen today for follow-up. Diagnoses and all orders for this visit:    Bipolar disorder with moderate depression ([facility] [address]  -     METABOLIC PANEL, COMPREHENSIVE  -     LITHIUM    Other orders  -     rizatriptan (MAXALT-MLT) 10 mg disintegrating tablet; Take 1 Tab by mouth once as needed for Migraine for up to 1 dose.       Follow-up Disposition:  Return in about 1 month (around 8/14/2017), or if depression or migraine symptoms worsen or fail to improve, for F/U bipolar and migraines, response to maxalt. lab results and schedule of future lab studies reviewed with patient  reviewed diet, exercise and weight control  cardiovascular risk and specific lipid/LDL goals reviewed  reviewed medications and side effects in detail  Please call my office if there are any questions- [phone]. I will arrange for follow up after the lab tests done from today return  Recommended a weekly \"heart check. \" I went into detail how to do this. Call for refills on medications as needed. Discussed expected course/resolution/complications of diagnosis in detail with patient. Medication risks/benefits/costs/interactions/alternatives discussed with patient. Pt was given an after visit summary which includes diagnoses, current medications & vitals. Pt expressed understanding with the diagnosis and plan    We'll check her lithium on a new dose of 300mg TID. I checked a CMP to monitor the electrolytes. I suggested we try Maxalt for her migraine headaches, and I gave her directions on how to use that. She'll call back if it does not seem to work or is not covered by her insurance. This document was written by Flower Espino, as dictated by Stevan Loyola MD.  I have reviewed and agree with the above note and have made corrections where appropriate Clarke Barragan M.D.

## 2017-07-14 NOTE — MR AVS SNAPSHOT
Visit Information Date & Time Provider Department Dept. Phone Encounter #  
 7/14/2017 10:00 AM Ladan López MD [address] [place] [address] [phone] [id number] Your Appointments 7/26/2017  2:00 PM  
ROUTINE CARE with Ladan López MD  
[facility]) Appt Note: 3 month med check [address] [id number]  
[phone]  
  
   
 [address] [address] Upcoming Health Maintenance Date Due  
 PAP AKA CERVICAL CYTOLOGY 8/11/2015 INFLUENZA AGE 9 TO ADULT 8/1/2017 DTaP/Tdap/Td series (2 - Td) 6/6/2026 Allergies as of 7/14/2017  Review Complete On: 7/14/2017 By: Remington Martínez Severity Noted Reaction Type Reactions Pcn [Penicillins]  02/20/2010    Other (comments) As a child Current Immunizations  Never Reviewed Name Date Influenza Vaccine (Quad) PF 9/23/2016 Not reviewed this visit You Were Diagnosed With   
  
 Codes Comments Bipolar disorder with moderate depression ([facility]    -  Primary ICD-10-CM: F31.32 
ICD-9-CM: 296.52 Vitals BP Pulse Temp Resp Height(growth percentile) Weight(growth percentile) 136/90 (BP 1 Location: Left arm, BP Patient Position: Sitting) 80 98.9 °F (37.2 °C) (Oral) 16 5' 4\" (1.626 m) 187 lb (84.8 kg) LMP SpO2 BMI OB Status Smoking Status 06/27/2017 (Exact Date) 98% 32.1 kg/m2 Having regular periods Current Every Day Smoker Vitals History BMI and BSA Data Body Mass Index Body Surface Area  
 32.1 kg/m 2 1.96 m 2 Preferred Pharmacy Pharmacy Name Phone [facility] [address] [phone] Your Updated Medication List  
  
   
This list is accurate as of: 7/14/17 10:54 AM.  Always use your most recent med list.  
  
  
  
  
 ALPRAZolam 0.25 mg tablet Commonly known as:  Darline Poe TAKE 1/2 TO 1 TABLET BY MOUTH TWICE A DAY AS NEEDED MAY FILL TODAY  
  
 dextroamphetamine-amphetamine 10 mg tablet Commonly known as:  ADDERALL Take 1 Tab (10 mg total) by mouth See Admin InstructionsEarliest Fill Date: 6/27/17.  2 in am and 1-2 in afternoon  May fill 06/27/17  
  
 doxycycline 100 mg capsule Commonly known as:  VIBRAMYCIN Take 1 Cap by mouth two (2) times a day. escitalopram oxalate 20 mg tablet Commonly known as:  Paul Rachel TAKE 1 TABLET BY MOUTH EVERY DAY  
  
 lithium carbonate 300 mg capsule Take 1 Cap by mouth three (3) times daily (with meals). oxyCODONE-acetaminophen  mg per tablet Commonly known as:  PERCOCET 10 Take 1 Tab by mouth every six (6) hours as needed for Pain. 50 is a 30 day supply  MAY FILL 06/27/17  
  
 rizatriptan 10 mg disintegrating tablet Commonly known as:  MAXALT-MLT Take 1 Tab by mouth once as needed for Migraine for up to 1 dose. zolpidem 10 mg tablet Commonly known as:  AMBIEN Prescriptions Sent to Pharmacy Refills  
 rizatriptan (MAXALT-MLT) 10 mg disintegrating tablet 2 Sig: Take 1 Tab by mouth once as needed for Migraine for up to 1 dose. Class: Normal  
 Pharmacy: [address] [place] [address] #: [phone] Route: Oral  
  
We Performed the Following LITHIUM [id number] CPT(R)] METABOLIC PANEL, COMPREHENSIVE [50111 CPT(R)] Introducing [facility]! Ariel Deras introduces [employer] patient portal. Now you can access parts of your medical record, email your doctor's office, and request medication refills online. 1. In your internet browser, go to [email or web address] 2. Click on the First Time User? Click Here link in the Sign In box. You will see the New Member Sign Up page. 3. Enter your [employer] Access Code exactly as it appears below.  You will not need to use this code after youve completed the sign-up process. If you do not sign up before the expiration date, you must request a new code. · [employer] Access Code: [email or web address] Expires: 7/27/2017 12:55 PM 
 
4. Enter the last four digits of your Social Security Number (xxxx) and Date of Birth (mm/dd/yyyy) as indicated and click Submit. You will be taken to the next sign-up page. 5. Create a [employer] ID. This will be your [employer] login ID and cannot be changed, so think of one that is secure and easy to remember. 6. Create a [employer] password. You can change your password at any time. 7. Enter your Password Reset Question and Answer. This can be used at a later time if you forget your password. 8. Enter your e-mail address. You will receive e-mail notification when new information is available in [address] 9. Click Sign Up. You can now view and download portions of your medical record. 10. Click the Download Summary menu link to download a portable copy of your medical information. If you have questions, please visit the Frequently Asked Questions section of the [employer] website. Remember, [employer] is NOT to be used for urgent needs. For medical emergencies, dial 911. Now available from your iPhone and Android! Please provide this summary of care documentation to your next provider. Your primary care clinician is listed as [address] [address], [facility]/s . If you have any questions after today's visit, please call [phone].

## 2017-07-15 LAB
ALBUMIN SERPL-MCNC: 4.5 G/DL (ref 3.5–5.5)
ALBUMIN/GLOB SERPL: 1.7 {RATIO} (ref 1.2–2.2)
ALP SERPL-CCNC: 77 IU/L (ref 39–117)
ALT SERPL-CCNC: 23 IU/L (ref 0–32)
AST SERPL-CCNC: 25 IU/L (ref 0–40)
BILIRUB SERPL-MCNC: 0.4 MG/DL (ref 0–1.2)
BUN SERPL-MCNC: 8 MG/DL (ref 6–24)
BUN/CREAT SERPL: 11 (ref 9–23)
CALCIUM SERPL-MCNC: 9.3 MG/DL (ref 8.7–10.2)
CHLORIDE SERPL-SCNC: 100 MMOL/L (ref 96–106)
CO2 SERPL-SCNC: 22 MMOL/L (ref 18–29)
CREAT SERPL-MCNC: 0.71 MG/DL (ref 0.57–1)
GLOBULIN SER CALC-MCNC: 2.7 G/DL (ref 1.5–4.5)
GLUCOSE SERPL-MCNC: 84 MG/DL (ref 65–99)
LITHIUM SERPL-SCNC: 0.6 MMOL/L (ref 0.6–1.2)
POTASSIUM SERPL-SCNC: 4.1 MMOL/L (ref 3.5–5.2)
PROT SERPL-MCNC: 7.2 G/DL (ref 6–8.5)
SODIUM SERPL-SCNC: 139 MMOL/L (ref 134–144)

## 2017-07-17 NOTE — PROGRESS NOTES
GREAT NEWS!! Lithium is at the low end of therapeutic range for the first time; let's recheck in 1 month. Everything else is OK.

## 2017-07-18 ENCOUNTER — TELEPHONE (OUTPATIENT)
Dept: FAMILY MEDICINE CLINIC | Age: 42
End: 2017-07-18

## 2017-07-18 NOTE — TELEPHONE ENCOUNTER
Patient has appt 7/26/17 with pcp and would like to know if she can come only to  meds and not be seen in the office visit since she was just seen last week.     Advised I will send to provider

## 2017-07-20 NOTE — TELEPHONE ENCOUNTER
Dr. Jenn Sapp says its ok to cancel appt next week as long as she is clearly doing better; call for refill as needed, but I do want to recheck things in 4-6 weeks with an appt non fasting to decide on dose adjustment, go over things as far as how long to take the medication, side effects, etc, whether tapering it is recommended, etc.                                                                                                                                                                                                 Patient requesting refill and will follow up in one month.

## 2017-07-26 DIAGNOSIS — M50.30 DDD (DEGENERATIVE DISC DISEASE), CERVICAL: ICD-10-CM

## 2017-07-26 DIAGNOSIS — F98.8 ADD (ATTENTION DEFICIT DISORDER) WITHOUT HYPERACTIVITY: Chronic | ICD-10-CM

## 2017-07-26 RX ORDER — DEXTROAMPHETAMINE SACCHARATE, AMPHETAMINE ASPARTATE, DEXTROAMPHETAMINE SULFATE AND AMPHETAMINE SULFATE 2.5; 2.5; 2.5; 2.5 MG/1; MG/1; MG/1; MG/1
10 TABLET ORAL SEE ADMIN INSTRUCTIONS
Qty: 120 TAB | Refills: 0 | Status: SHIPPED | OUTPATIENT
Start: 2017-07-26 | End: 2017-08-28 | Stop reason: SDUPTHER

## 2017-07-26 RX ORDER — OXYCODONE AND ACETAMINOPHEN 10; 325 MG/1; MG/1
1 TABLET ORAL
Qty: 50 TAB | Refills: 0 | Status: SHIPPED | OUTPATIENT
Start: 2017-07-26 | End: 2017-08-28 | Stop reason: SDUPTHER

## 2017-08-18 ENCOUNTER — TELEPHONE (OUTPATIENT)
Dept: FAMILY MEDICINE CLINIC | Age: 42
End: 2017-08-18

## 2017-08-18 DIAGNOSIS — F41.8 DEPRESSION WITH ANXIETY: ICD-10-CM

## 2017-08-18 NOTE — TELEPHONE ENCOUNTER
[facility]     -   [phone]      -    Patient called back to schedule an appt- Next avail is 9-5-2017-  Patient requesting something sooner-  Please advise-

## 2017-08-22 NOTE — TELEPHONE ENCOUNTER
Appointment scheduled for an earlier date. Patient also request refill on Xanax. Refill request put in and routed to provider.     Last refill on xanax 6/28/2017

## 2017-08-22 NOTE — TELEPHONE ENCOUNTER
[address]    Patient is calling to check the status of her request to be worked in with Dr. Rebekah Sweeney sooner than 09/05/17. The original request came in on 08/18 and appears to have been incorrectly routed. Forwarding to Dr. Alma Rosa Jimenez nurse.

## 2017-08-23 RX ORDER — ALPRAZOLAM 0.25 MG/1
TABLET ORAL
Qty: 25 TAB | Refills: 2 | Status: SHIPPED | OUTPATIENT
Start: 2017-08-23 | End: 2017-11-11 | Stop reason: SDUPTHER

## 2017-08-28 ENCOUNTER — OFFICE VISIT (OUTPATIENT)
Dept: FAMILY MEDICINE CLINIC | Age: 42
End: 2017-08-28

## 2017-08-28 VITALS
HEART RATE: 88 BPM | OXYGEN SATURATION: 98 % | HEIGHT: 64 IN | DIASTOLIC BLOOD PRESSURE: 72 MMHG | BODY MASS INDEX: 32.85 KG/M2 | RESPIRATION RATE: 16 BRPM | SYSTOLIC BLOOD PRESSURE: 108 MMHG | TEMPERATURE: 98.2 F | WEIGHT: 192.4 LBS

## 2017-08-28 DIAGNOSIS — M50.30 DDD (DEGENERATIVE DISC DISEASE), CERVICAL: ICD-10-CM

## 2017-08-28 DIAGNOSIS — F31.32 BIPOLAR 1 DISORDER, DEPRESSED, MODERATE (HCC): ICD-10-CM

## 2017-08-28 DIAGNOSIS — F41.8 DEPRESSION WITH ANXIETY: ICD-10-CM

## 2017-08-28 DIAGNOSIS — Z79.899 LITHIUM USE: ICD-10-CM

## 2017-08-28 DIAGNOSIS — F42.2 MIXED OBSESSIONAL THOUGHTS AND ACTS: ICD-10-CM

## 2017-08-28 DIAGNOSIS — F31.32 BIPOLAR DISORDER WITH MODERATE DEPRESSION (HCC): Primary | ICD-10-CM

## 2017-08-28 DIAGNOSIS — F98.8 ADD (ATTENTION DEFICIT DISORDER) WITHOUT HYPERACTIVITY: Chronic | ICD-10-CM

## 2017-08-28 RX ORDER — OXYCODONE AND ACETAMINOPHEN 10; 325 MG/1; MG/1
1 TABLET ORAL
Qty: 50 TAB | Refills: 0 | Status: SHIPPED | OUTPATIENT
Start: 2017-10-28 | End: 2017-11-27 | Stop reason: SDUPTHER

## 2017-08-28 RX ORDER — DEXTROAMPHETAMINE SACCHARATE, AMPHETAMINE ASPARTATE, DEXTROAMPHETAMINE SULFATE AND AMPHETAMINE SULFATE 2.5; 2.5; 2.5; 2.5 MG/1; MG/1; MG/1; MG/1
10 TABLET ORAL SEE ADMIN INSTRUCTIONS
Qty: 120 TAB | Refills: 0 | Status: SHIPPED | OUTPATIENT
Start: 2017-08-28 | End: 2017-11-27 | Stop reason: SDUPTHER

## 2017-08-28 RX ORDER — DEXTROAMPHETAMINE SACCHARATE, AMPHETAMINE ASPARTATE, DEXTROAMPHETAMINE SULFATE AND AMPHETAMINE SULFATE 2.5; 2.5; 2.5; 2.5 MG/1; MG/1; MG/1; MG/1
TABLET ORAL
Qty: 120 TAB | Refills: 0 | Status: SHIPPED | OUTPATIENT
Start: 2017-09-28 | End: 2017-11-27 | Stop reason: SDUPTHER

## 2017-08-28 RX ORDER — LITHIUM CARBONATE 300 MG/1
300 CAPSULE ORAL 2 TIMES DAILY WITH MEALS
Qty: 60 CAP | Refills: 0
Start: 2017-08-28 | End: 2018-02-27 | Stop reason: SDUPTHER

## 2017-08-28 RX ORDER — OXYCODONE AND ACETAMINOPHEN 10; 325 MG/1; MG/1
1 TABLET ORAL
Qty: 50 TAB | Refills: 0 | Status: SHIPPED | OUTPATIENT
Start: 2017-09-28 | End: 2017-11-27 | Stop reason: SDUPTHER

## 2017-08-28 RX ORDER — OXYCODONE AND ACETAMINOPHEN 10; 325 MG/1; MG/1
1 TABLET ORAL
Qty: 50 TAB | Refills: 0 | Status: SHIPPED | OUTPATIENT
Start: 2017-08-28 | End: 2017-11-27 | Stop reason: SDUPTHER

## 2017-08-28 RX ORDER — DEXTROAMPHETAMINE SACCHARATE, AMPHETAMINE ASPARTATE, DEXTROAMPHETAMINE SULFATE AND AMPHETAMINE SULFATE 2.5; 2.5; 2.5; 2.5 MG/1; MG/1; MG/1; MG/1
10 TABLET ORAL SEE ADMIN INSTRUCTIONS
Qty: 120 TAB | Refills: 0 | Status: SHIPPED | OUTPATIENT
Start: 2017-10-28 | End: 2017-11-27 | Stop reason: SDUPTHER

## 2017-08-28 NOTE — LETTER
NOTIFICATION RETURN TO WORK / SCHOOL 
 
8/28/2017 11:47 AM 
 
Ms. Tio Vasquez [address] [facility] [address] To Whom It May Concern: 
 
Tio Vasquez is currently under the care of TOD Fleming. She was seen in the office today If there are questions or concerns please have the patient contact our office.  
 
 
 
Sincerely, 
 
 
Aminah Garcia MD

## 2017-08-28 NOTE — MR AVS SNAPSHOT
Visit Information Date & Time Provider Department Dept. Phone Encounter #  
 8/28/2017 10:15 AM Cesar Tirado MD [facility] [phone] [id number] Your Appointments 11/27/2017 10:45 AM  
ROUTINE CARE with Cesar Tirado MD  
[facility]) Appt Note: 3 month med check [address] [phone]  
[phone]  
  
   
 [address] [phone] Upcoming Health Maintenance Date Due  
 PAP AKA CERVICAL CYTOLOGY 8/28/2020 DTaP/Tdap/Td series (2 - Td) 6/6/2026 Allergies as of 8/28/2017  Review Complete On: 7/15/2017 By: Cesar Tirado MD  
  
 Severity Noted Reaction Type Reactions Pcn [Penicillins]  02/20/2010    Other (comments) As a child Current Immunizations  Never Reviewed Name Date Influenza Vaccine (Quad) PF 9/23/2016 Not reviewed this visit You Were Diagnosed With   
  
 Codes Comments Bipolar disorder with moderate depression ([facility] [address]    -  Primary ICD-10-CM: F31.32 
ICD-9-CM: 296.52 Depression with anxiety     ICD-10-CM: F41.8 ICD-9-CM: 300.4 Mixed obsessional thoughts and acts     ICD-10-CM: F42.2 ICD-9-CM: 300.3 Lithium use     ICD-10-CM: C69.794 ICD-9-CM: V58.69 Bipolar 1 disorder, depressed, moderate (HCC)     ICD-10-CM: F31.32 
ICD-9-CM: 296.52   
 ADD (attention deficit disorder) without hyperactivity     ICD-10-CM: F98.8 ICD-9-CM: 314.00   
 DDD (degenerative disc disease), cervical     ICD-10-CM: M50.30 ICD-9-CM: 722.4 Vitals BP Pulse Temp Resp Height(growth percentile) Weight(growth percentile) 108/72 88 98.2 °F (36.8 °C) (Oral) 16 5' 4\" (1.626 m) 192 lb 6.4 oz (87.3 kg) LMP SpO2 BMI OB Status Smoking Status 08/28/2017 98% 33.03 kg/m2 Having regular periods Current Every Day Smoker Vitals History BMI and BSA Data Body Mass Index Body Surface Area  33.03 kg/m 2 1.99 m 2  
  
  
 Preferred Pharmacy Pharmacy Name Phone [facility] Desouza, [facility] [address] [place] [address] [phone] Your Updated Medication List  
  
   
This list is accurate as of: 8/28/17 11:48 AM.  Always use your most recent med list.  
  
  
  
  
 ALPRAZolam 0.25 mg tablet Commonly known as:  XANAX  
TAKE 1/2 TO 1 TABLET BY MOUTH TWICE A DAY AS NEEDED  
  
 * dextroamphetamine-amphetamine 10 mg tablet Commonly known as:  ADDERALL Take 1 Tab (10 mg total) by mouth See Admin Instructions. 2 in am and 1-2 in afternoon * dextroamphetamine-amphetamine 10 mg tablet Commonly known as:  ADDERALL Earliest Fill Date: 9/28/17. TAKE 2 TABS PO Q AM AND 1-2 TABS IN AFTERNOON  DO NOT FILL 09/28/17 Start taking on:  9/28/2017 * dextroamphetamine-amphetamine 10 mg tablet Commonly known as:  ADDERALL Take 1 Tab (10 mg total) by mouth See Admin InstructionsEarliest Fill Date: 10/28/17.  2 in am and 1-2 in afternoon  May fill 10/28/17 Start taking on:  10/28/2017  
  
 escitalopram oxalate 20 mg tablet Commonly known as:  Donnie Goes TAKE 1 TABLET BY MOUTH EVERY DAY  
  
 lithium carbonate 300 mg capsule Take 1 Cap by mouth two (2) times daily (with meals). * oxyCODONE-acetaminophen  mg per tablet Commonly known as:  PERCOCET 10 Take 1 Tab by mouth every six (6) hours as needed for Pain. 50 is a 30 day supply * oxyCODONE-acetaminophen  mg per tablet Commonly known as:  PERCOCET 10 Take 1 Tab by mouth every six (6) hours as needed for Pain. Max Daily Amount: 4 Tabs. 50 IS A 30 DAY SUPPLY DO NOT FILL UNTIL 09/28/17 Start taking on:  9/28/2017 * oxyCODONE-acetaminophen  mg per tablet Commonly known as:  PERCOCET 10 Take 1 Tab by mouth every six (6) hours as needed for Pain. 50 is a 30 day supply  MAY FILL 10/28/17 Start taking on:  10/28/2017  
  
 rizatriptan 10 mg disintegrating tablet Commonly known as:  MAXALT-MLT Take 1 Tab by mouth once as needed for Migraine for up to 1 dose. zolpidem 10 mg tablet Commonly known as:  AMBIEN  
  
 * Notice: This list has 6 medication(s) that are the same as other medications prescribed for you. Read the directions carefully, and ask your doctor or other care provider to review them with you. Prescriptions Printed Refills  
 dextroamphetamine-amphetamine (ADDERALL) 10 mg tablet 0 Starting on: 10/28/2017 Sig: Take 1 Tab (10 mg total) by mouth See Admin InstructionsEarliest Fill Date: 10/28/17.  2 in am and 1-2 in afternoon May fill 10/28/17 Class: Print Route: Oral  
 dextroamphetamine-amphetamine (ADDERALL) 10 mg tablet 0 Starting on: 9/28/2017 Sig: Earliest Robbert Fears Date: 9/28/17. TAKE 2 TABS PO Q AM AND 1-2 TABS IN AFTERNOON  
DO NOT FILL 09/28/17 Class: Print  
 oxyCODONE-acetaminophen (PERCOCET 10)  mg per tablet 0 Starting on: 10/28/2017 Sig: Take 1 Tab by mouth every six (6) hours as needed for Pain. 50 is a 30 day supply MAY FILL 10/28/17 Class: Print Route: Oral  
 oxyCODONE-acetaminophen (PERCOCET 10)  mg per tablet 0 Starting on: 9/28/2017 Sig: Take 1 Tab by mouth every six (6) hours as needed for Pain. Max Daily Amount: 4 Tabs. 50 IS A 30 DAY SUPPLY 
DO NOT FILL UNTIL 09/28/17 Class: Print Route: Oral  
 oxyCODONE-acetaminophen (PERCOCET 10)  mg per tablet 0 Sig: Take 1 Tab by mouth every six (6) hours as needed for Pain. 50 is a 30 day supply Class: Print Route: Oral  
 dextroamphetamine-amphetamine (ADDERALL) 10 mg tablet 0 Sig: Take 1 Tab (10 mg total) by mouth See Admin Instructions. 2 in am and 1-2 in afternoon Class: Print Route: Oral  
  
We Performed the Following CBC W/O DIFF [34642 CPT(R)] LITHIUM [id number] CPT(R)] METABOLIC PANEL, COMPREHENSIVE [75397 CPT(R)] Introducing [facility]! Alem Almanza introduces [employer] patient portal. Now you can access parts of your medical record, email your doctor's office, and request medication refills online. 1. In your internet browser, go to [email or web address] 2. Click on the First Time User? Click Here link in the Sign In box. You will see the New Member Sign Up page. 3. Enter your [employer] Access Code exactly as it appears below. You will not need to use this code after youve completed the sign-up process. If you do not sign up before the expiration date, you must request a new code. · [employer] Access Code: [email or web address] Expires: 11/26/2017 11:48 AM 
 
4. Enter the last four digits of your Social Security Number (xxxx) and Date of Birth (mm/dd/yyyy) as indicated and click Submit. You will be taken to the next sign-up page. 5. Create a [employer] ID. This will be your [employer] login ID and cannot be changed, so think of one that is secure and easy to remember. 6. Create a [employer] password. You can change your password at any time. 7. Enter your Password Reset Question and Answer. This can be used at a later time if you forget your password. 8. Enter your e-mail address. You will receive e-mail notification when new information is available in [facility] 9. Click Sign Up. You can now view and download portions of your medical record. 10. Click the Download Summary menu link to download a portable copy of your medical information. If you have questions, please visit the Frequently Asked Questions section of the [employer] website. Remember, [employer] is NOT to be used for urgent needs. For medical emergencies, dial 911. Now available from your iPhone and Android! Please provide this summary of care documentation to your next provider. Your primary care clinician is listed as [address] [address], [facility]/s  If you have any questions after today's visit, please call [phone].

## 2017-08-28 NOTE — PROGRESS NOTES
Pt presents to office today for a 1 month follow up on Depression and Anxiety and recheck Lithium levels as well. Pt states that she has taking her Lithium only once daily instead of 3 times . She reports that she has been needing her Xanax more lately. Chief Complaint   Patient presents with    Labs     Lithium levels. (1 month recheck), pt states she has been taking only 1 tab daily. 1. Have you been to the ER, urgent care clinic since your last visit? Hospitalized since your last visit? No    2. Have you seen or consulted any other health care providers outside of the [address] [place] [address] since your last visit? Include any pap smears or colon screening.  No

## 2017-08-28 NOTE — PROGRESS NOTES
HISTORY OF PRESENT ILLNESS  HPI  Yvonne Zarate is a 40 y.o. Female with history of migraines, bipolar disorder, depression, anxiety, OCD, and ADD who presents to office today for follow-up. Pt states that she has been going to therapy for her depression and has attended three sessions so far. She states, however, that she has only been taking her lithium 300mg once a day for the past 3-4 weeks due to side effects of fatigue and weight gain; she had been taking the lithium TID. Pt notes increased stress due to her job. 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 Louie Adams 8/25/2016    Asthma     last attack 2 months ago    Bilateral carpal tunnel syndrome- R>>L 9/25/2016    Bipolar disorder with moderate depression ([facility] [address] 6/28/2017    Depression with anxiety 3/2/2010    Insomnia     Lithium use 6/28/2017    Migraine 2/20/2010    Psychiatric disorder     Depression, anxiety    Psychophysiological insomnia 2/23/2016     Past Surgical History:   Procedure Laterality Date    HX APPENDECTOMY  1996    HX BREAST REDUCTION  2002    HX ORTHOPAEDIC  2003    cervical disc    HX ORTHOPAEDIC      second right hand digit fx with pins index     Current Outpatient Prescriptions on File Prior to Visit   Medication Sig Dispense Refill    ALPRAZolam (XANAX) 0.25 mg tablet TAKE 1/2 TO 1 TABLET BY MOUTH TWICE A DAY AS NEEDED 25 Tab 2    zolpidem (AMBIEN) 10 mg tablet       rizatriptan (MAXALT-MLT) 10 mg disintegrating tablet Take 1 Tab by mouth once as needed for Migraine for up to 1 dose. 15 Tab 2    escitalopram oxalate (LEXAPRO) 20 mg tablet TAKE 1 TABLET BY MOUTH EVERY DAY 90 Tab 3     No current facility-administered medications on file prior to visit.       Allergies   Allergen Reactions    Pcn [Penicillins] Other (comments)     As a child     Family History   Problem Relation Age of Onset    Diabetes Father     Hypertension Father     Heart Attack Father     High Cholesterol Father     Diabetes Mother     High Cholesterol Mother      Social History     Social History    Marital status:      Spouse name: N/A    Number of children: N/A    Years of education: N/A     Social History Main Topics    Smoking status: Current Every Day Smoker     Packs/day: 1.50     Years: 18.00     Types: Cigarettes    Smokeless tobacco: Never Used    Alcohol use Yes      Comment: occasionally    Drug use: No    Sexual activity: Yes     Partners: Male     Birth control/ protection: Condom     Other Topics Concern    None     Social History Narrative               Review of Systems   Constitutional: Negative for chills, diaphoresis, fever, malaise/fatigue and weight loss. Eyes: Negative for blurred vision, double vision, pain and redness. Respiratory: Negative for cough, shortness of breath and wheezing. Cardiovascular: Negative for chest pain, palpitations, orthopnea, claudication, leg swelling and PND. Skin: Negative for itching and rash. Neurological: Negative for dizziness, tingling, tremors, sensory change, speech change, focal weakness, seizures, loss of consciousness, weakness and headaches.      Results for orders placed or performed in visit on 08/28/17   LITHIUM   Result Value Ref Range    Lithium level 0.3 (L) 0.6 - 1.2 mmol/L   CBC W/O DIFF   Result Value Ref Range    WBC 9.1 3.4 - 10.8 x10E3/uL    RBC 4.27 3.77 - 5.28 x10E6/uL    HGB 13.0 11.1 - 15.9 g/dL    HCT 39.1 34.0 - 46.6 %    MCV 92 79 - 97 fL    MCH 30.4 26.6 - 33.0 pg    MCHC 33.2 31.5 - 35.7 g/dL    RDW 13.2 12.3 - 15.4 %    PLATELET 359 642 - 766 Q00M4/HN   METABOLIC PANEL, COMPREHENSIVE   Result Value Ref Range    Glucose 79 65 - 99 mg/dL    BUN 10 6 - 24 mg/dL    Creatinine 0.74 0.57 - 1.00 mg/dL    GFR est non- >59 mL/min/1.73    GFR est  >59 mL/min/1.73    BUN/Creatinine ratio 14 9 - 23    Sodium 139 134 - 144 mmol/L    Potassium 4.8 3.5 - 5.2 mmol/L    Chloride 102 96 - 106 mmol/L    CO2 22 18 - 29 mmol/L    Calcium 9.0 8.7 - 10.2 mg/dL    Protein, total 6.6 6.0 - 8.5 g/dL    Albumin 4.3 3.5 - 5.5 g/dL    GLOBULIN, TOTAL 2.3 1.5 - 4.5 g/dL    A-G Ratio 1.9 1.2 - 2.2    Bilirubin, total <0.2 0.0 - 1.2 mg/dL    Alk. phosphatase 65 39 - 117 IU/L    AST (SGOT) 14 0 - 40 IU/L    ALT (SGPT) 8 0 - 32 IU/L             Physical Exam  Visit Vitals    /72    Pulse 88    Temp 98.2 °F (36.8 °C) (Oral)    Resp 16    Ht 5' 4\" (1.626 m)    Wt 192 lb 6.4 oz (87.3 kg)    LMP 08/28/2017    SpO2 98%    BMI 33.03 kg/m2      Pt is much less distraught than she was several months ago; in fact, she looks much more relaxed. She still has somewhat of a flat affect. Head: Normocephalic, without obvious abnormality, atraumatic  Eyes: conjunctivae/corneas clear. PERRL, EOM's intact. Neck: supple, symmetrical, trachea midline, no adenopathy, thyroid: not enlarged, symmetric, no tenderness/mass/nodules, no carotid bruit and no JVD  Lungs: clear to auscultation bilaterally  Heart: regular rate and rhythm, S1, S2 normal, no murmur, click, rub or gallop  Extremities: extremities normal, atraumatic, no cyanosis or edema  Pulses: 2+ and symmetric  Lymph nodes: Cervical, supraclavicular, and axillary nodes normal.  Neurologic: Grossly normal          ASSESSMENT and PLAN    ICD-10-CM ICD-9-CM    1. Bipolar disorder with moderate depression ([facility] [address] F31.32 296.52    2. Depression with anxiety F41.8 300.4    3. Mixed obsessional thoughts and acts F42.2 300.3    4. Lithium use Z79.899 V58.69 LITHIUM      CBC W/O DIFF      METABOLIC PANEL, COMPREHENSIVE   5. Bipolar 1 disorder, depressed, moderate (HCC) F31.32 296.52 lithium carbonate 300 mg capsule   6.  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 Andrés Vela F98.8 314.00 dextroamphetamine-amphetamine (ADDERALL) 10 mg tablet      dextroamphetamine-amphetamine (ADDERALL) 10 mg tablet dextroamphetamine-amphetamine (ADDERALL) 10 mg tablet   7. DDD (degenerative disc disease), cervical M50.30 722.4 oxyCODONE-acetaminophen (PERCOCET 10)  mg per tablet      oxyCODONE-acetaminophen (PERCOCET 10)  mg per tablet      oxyCODONE-acetaminophen (PERCOCET 10)  mg per tablet     Diagnoses and all orders for this visit:    1. Bipolar disorder with moderate depression ([address]    2. Depression with anxiety    3. Mixed obsessional thoughts and acts    4. Lithium use  -     LITHIUM  -     CBC W/O DIFF  -     METABOLIC PANEL, COMPREHENSIVE    5. Bipolar 1 disorder, depressed, moderate (HCC)  -     lithium carbonate 300 mg capsule; Take 1 Cap by mouth two (2) times daily (with meals). 6. 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 Ashley Escudero  -     dextroamphetamine-amphetamine (ADDERALL) 10 mg tablet; Take 1 Tab (10 mg total) by mouth See Admin InstructionsEarliest Fill Date: 10/28/17.  2 in am and 1-2 in afternoon    May fill 10/28/17  -     dextroamphetamine-amphetamine (ADDERALL) 10 mg tablet; Earliest Fill Date: 9/28/17. TAKE 2 TABS PO Q AM AND 1-2 TABS IN AFTERNOON   DO NOT FILL 09/28/17  -     dextroamphetamine-amphetamine (ADDERALL) 10 mg tablet; Take 1 Tab (10 mg total) by mouth See Admin Instructions. 2 in am and 1-2 in afternoon    7. DDD (degenerative disc disease), cervical  -     oxyCODONE-acetaminophen (PERCOCET 10)  mg per tablet; Take 1 Tab by mouth every six (6) hours as needed for Pain. 50 is a 30 day supply    MAY FILL 10/28/17  -     oxyCODONE-acetaminophen (PERCOCET 10)  mg per tablet; Take 1 Tab by mouth every six (6) hours as needed for Pain. Max Daily Amount: 4 Tabs. 50 IS A 30 DAY SUPPLY  DO NOT FILL UNTIL 09/28/17  -     oxyCODONE-acetaminophen (PERCOCET 10)  mg per tablet; Take 1 Tab by mouth every six (6) hours as needed for Pain.  50 is a 30 day supply      Follow-up Disposition:  Return in about 1 month (around 9/28/2017), or if symptoms worsen or fail to improve, for changes in medication for depression. reviewed medications and side effects in detail  Please call my office if there are any questions- [phone]. Discussed expected course/resolution/complications of diagnosis in detail with patient. Medication risks/benefits/costs/interactions/alternatives discussed with patient. Pt was given an after visit summary which includes diagnoses, current medications & vitals. Pt expressed understanding with the diagnosis and plan. .  Total 25 minutes,60 % counseling re: I recommended that, because she's not responding well to the lithium, only having a partial response and unable to tolerate the dose that she might need, she try to contact a psychiatrist through her counselor. She tried this on her own and couldn't get an appointment for 6 months; hopefully they can get her in earlier. I re-stressed to the pt that I do not treat bipolar disease until the pt is stable on medication, and I look to the psychiatrist to help us on that. I started medicine purely because we couldn't get her in to a [facility] chiatrist. I encouraged her to continue her counseling as well. She agreed to increase the lithium to BID, but she is still concerned about her weight gain. She has done well with the Atkins diet in the past, and I encouraged her to go back to that as far as weight loss. Also, discussed symptoms of concern that were noted today in the note above, treatment options( including doing nothing), when to follow up before recommended time frame. Also, answered all questions. This document was written by Kevin Guadalupe, as dictated by Loman Bernheim, MD.  I have reviewed and agree with the above note and have made corrections where appropriate Clarke Schaefer M.D.

## 2017-08-29 LAB
ALBUMIN SERPL-MCNC: 4.3 G/DL (ref 3.5–5.5)
ALBUMIN/GLOB SERPL: 1.9 {RATIO} (ref 1.2–2.2)
ALP SERPL-CCNC: 65 IU/L (ref 39–117)
ALT SERPL-CCNC: 8 IU/L (ref 0–32)
AST SERPL-CCNC: 14 IU/L (ref 0–40)
BILIRUB SERPL-MCNC: <0.2 MG/DL (ref 0–1.2)
BUN SERPL-MCNC: 10 MG/DL (ref 6–24)
BUN/CREAT SERPL: 14 (ref 9–23)
CALCIUM SERPL-MCNC: 9 MG/DL (ref 8.7–10.2)
CHLORIDE SERPL-SCNC: 102 MMOL/L (ref 96–106)
CO2 SERPL-SCNC: 22 MMOL/L (ref 18–29)
CREAT SERPL-MCNC: 0.74 MG/DL (ref 0.57–1)
ERYTHROCYTE [DISTWIDTH] IN BLOOD BY AUTOMATED COUNT: 13.2 % (ref 12.3–15.4)
GLOBULIN SER CALC-MCNC: 2.3 G/DL (ref 1.5–4.5)
GLUCOSE SERPL-MCNC: 79 MG/DL (ref 65–99)
HCT VFR BLD AUTO: 39.1 % (ref 34–46.6)
HGB BLD-MCNC: 13 G/DL (ref 11.1–15.9)
LITHIUM SERPL-SCNC: 0.3 MMOL/L (ref 0.6–1.2)
MCH RBC QN AUTO: 30.4 PG (ref 26.6–33)
MCHC RBC AUTO-ENTMCNC: 33.2 G/DL (ref 31.5–35.7)
MCV RBC AUTO: 92 FL (ref 79–97)
PLATELET # BLD AUTO: 259 X10E3/UL (ref 150–379)
POTASSIUM SERPL-SCNC: 4.8 MMOL/L (ref 3.5–5.2)
PROT SERPL-MCNC: 6.6 G/DL (ref 6–8.5)
RBC # BLD AUTO: 4.27 X10E6/UL (ref 3.77–5.28)
SODIUM SERPL-SCNC: 139 MMOL/L (ref 134–144)
WBC # BLD AUTO: 9.1 X10E3/UL (ref 3.4–10.8)

## 2017-10-02 ENCOUNTER — TELEPHONE (OUTPATIENT)
Dept: FAMILY MEDICINE CLINIC | Age: 42
End: 2017-10-02

## 2017-10-02 NOTE — TELEPHONE ENCOUNTER
Patient is calling, she is requesting a call back from the nurse as she has paperwork that needs to be filled out, and she would like to know if she needs to come in for an appointment.     Best call back # for patient: [phone]

## 2017-10-04 RX ORDER — ZOLPIDEM TARTRATE 10 MG/1
TABLET ORAL
Qty: 60 TAB | Refills: 5 | Status: SHIPPED | OUTPATIENT
Start: 2017-10-04 | End: 2018-03-16 | Stop reason: SDUPTHER

## 2017-10-06 ENCOUNTER — OFFICE VISIT (OUTPATIENT)
Dept: FAMILY MEDICINE CLINIC | Age: 42
End: 2017-10-06

## 2017-10-06 VITALS
RESPIRATION RATE: 18 BRPM | WEIGHT: 181.8 LBS | DIASTOLIC BLOOD PRESSURE: 79 MMHG | HEART RATE: 79 BPM | SYSTOLIC BLOOD PRESSURE: 134 MMHG | HEIGHT: 64 IN | TEMPERATURE: 98.2 F | OXYGEN SATURATION: 98 % | BODY MASS INDEX: 31.04 KG/M2

## 2017-10-06 DIAGNOSIS — F42.9 OBSESSIVE-COMPULSIVE DISORDER, UNSPECIFIED TYPE: ICD-10-CM

## 2017-10-06 DIAGNOSIS — F31.32 BIPOLAR DISORDER WITH MODERATE DEPRESSION (HCC): ICD-10-CM

## 2017-10-06 DIAGNOSIS — G43.109 MIGRAINE WITH AURA AND WITHOUT STATUS MIGRAINOSUS, NOT INTRACTABLE: Primary | ICD-10-CM

## 2017-10-06 RX ORDER — METOPROLOL SUCCINATE 25 MG/1
25 TABLET, EXTENDED RELEASE ORAL DAILY
Qty: 30 TAB | Refills: 2 | Status: SHIPPED | OUTPATIENT
Start: 2017-10-06 | End: 2017-11-14 | Stop reason: SDUPTHER

## 2017-10-06 NOTE — PROGRESS NOTES
HISTORY OF PRESENT ILLNESS  HPI  Vel Lepe is a 40 y.o. female with history of migraines, insomnia, bipolar disorder, OCD, anxiety, and depression who presents to office today with her boyfriend for documentation. Pt brings in FMLA papers to be filled out regarding her migraines. She states she missed three days of work last month due to migraines and has missed none so far this month. She takes Percocet and rizatriptan prn migraines with some relief. She has tried Topamax before, which she stopped due to side effects; she does not believe she has tried any beta blockers before. Pt is currently taking lithium 300mg BID for depression/ bipolar disease, though she notes that she occasionally forgets to take the second dose. Her next counseling appointment is next week. Denies suicidal or homicidal ideations. 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 Lorraine Hawk 8/25/2016    Asthma     last attack 2 months ago    Bilateral carpal tunnel syndrome- R>>L 9/25/2016    Bipolar disorder with moderate depression ([facility] [address] 6/28/2017    Depression with anxiety 3/2/2010    Insomnia     Lithium use 6/28/2017    Migraine 2/20/2010    Psychiatric disorder     Depression, anxiety    Psychophysiological insomnia 2/23/2016     Past Surgical History:   Procedure Laterality Date    HX APPENDECTOMY  1996    HX BREAST REDUCTION  2002    HX ORTHOPAEDIC  2003    cervical disc    HX ORTHOPAEDIC      second right hand digit fx with pins index     Current Outpatient Prescriptions on File Prior to Visit   Medication Sig Dispense Refill    zolpidem (AMBIEN) 10 mg tablet TAKE 2 TABLETS BY MOUTH AT BEDTIME 60 Tab 5    lithium carbonate 300 mg capsule Take 1 Cap by mouth two (2) times daily (with meals).  60 Cap 0    [START ON 10/28/2017] dextroamphetamine-amphetamine (ADDERALL) 10 mg tablet Take 1 Tab (10 mg total) by mouth See Admin InstructionsEarliest Fill Date: 10/28/17.  2 in am and 1-2 in afternoon    May fill 10/28/17 120 Tab 0    dextroamphetamine-amphetamine (ADDERALL) 10 mg tablet Earliest Fill Date: 9/28/17. TAKE 2 TABS PO Q AM AND 1-2 TABS IN AFTERNOON   DO NOT FILL 09/28/17 120 Tab 0    [START ON 10/28/2017] oxyCODONE-acetaminophen (PERCOCET 10)  mg per tablet Take 1 Tab by mouth every six (6) hours as needed for Pain. 50 is a 30 day supply    MAY FILL 10/28/17 50 Tab 0    oxyCODONE-acetaminophen (PERCOCET 10)  mg per tablet Take 1 Tab by mouth every six (6) hours as needed for Pain. Max Daily Amount: 4 Tabs. 50 IS A 30 DAY SUPPLY  DO NOT FILL UNTIL 09/28/17 50 Tab 0    oxyCODONE-acetaminophen (PERCOCET 10)  mg per tablet Take 1 Tab by mouth every six (6) hours as needed for Pain. 50 is a 30 day supply 50 Tab 0    dextroamphetamine-amphetamine (ADDERALL) 10 mg tablet Take 1 Tab (10 mg total) by mouth See Admin Instructions. 2 in am and 1-2 in afternoon 120 Tab 0    ALPRAZolam (XANAX) 0.25 mg tablet TAKE 1/2 TO 1 TABLET BY MOUTH TWICE A DAY AS NEEDED 25 Tab 2    rizatriptan (MAXALT-MLT) 10 mg disintegrating tablet Take 1 Tab by mouth once as needed for Migraine for up to 1 dose. 15 Tab 2    escitalopram oxalate (LEXAPRO) 20 mg tablet TAKE 1 TABLET BY MOUTH EVERY DAY 90 Tab 3     No current facility-administered medications on file prior to visit.       Allergies   Allergen Reactions    Pcn [Penicillins] Other (comments)     As a child     Family History   Problem Relation Age of Onset    Diabetes Father     Hypertension Father     Heart Attack Father     High Cholesterol Father     Diabetes Mother     High Cholesterol Mother      Social History     Social History    Marital status:      Spouse name: N/A    Number of children: N/A    Years of education: N/A     Social History Main Topics    Smoking status: Current Every Day Smoker     Packs/day: 1.50     Years: 18.00     Types: Cigarettes    Smokeless tobacco: Never Used    Alcohol use Yes      Comment: occasionally    Drug use: No    Sexual activity: Yes     Partners: Male     Birth control/ protection: Condom     Other Topics Concern    None     Social History Narrative             Review of Systems   Constitutional: Negative for chills, diaphoresis, fever, malaise/fatigue and weight loss. Eyes: Negative for blurred vision, double vision, pain and redness. Respiratory: Negative for cough, shortness of breath and wheezing. Cardiovascular: Negative for chest pain, palpitations, orthopnea, claudication, leg swelling and PND. Skin: Negative for itching and rash. Neurological: Negative for dizziness, tingling, tremors, sensory change, speech change, focal weakness, seizures, loss of consciousness, weakness and headaches. Results for orders placed or performed in visit on 08/28/17   LITHIUM   Result Value Ref Range    Lithium level 0.3 (L) 0.6 - 1.2 mmol/L   CBC W/O DIFF   Result Value Ref Range    WBC 9.1 3.4 - 10.8 x10E3/uL    RBC 4.27 3.77 - 5.28 x10E6/uL    HGB 13.0 11.1 - 15.9 g/dL    HCT 39.1 34.0 - 46.6 %    MCV 92 79 - 97 fL    MCH 30.4 26.6 - 33.0 pg    MCHC 33.2 31.5 - 35.7 g/dL    RDW 13.2 12.3 - 15.4 %    PLATELET 363 022 - 484 [id number]/HK   METABOLIC PANEL, COMPREHENSIVE   Result Value Ref Range    Glucose 79 65 - 99 mg/dL    BUN 10 6 - 24 mg/dL    Creatinine 0.74 0.57 - 1.00 mg/dL    GFR est non- >59 mL/min/1.73    GFR est  >59 mL/min/1.73    BUN/Creatinine ratio 14 9 - 23    Sodium 139 134 - 144 mmol/L    Potassium 4.8 3.5 - 5.2 mmol/L    Chloride 102 96 - 106 mmol/L    CO2 22 18 - 29 mmol/L    Calcium 9.0 8.7 - 10.2 mg/dL    Protein, total 6.6 6.0 - 8.5 g/dL    Albumin 4.3 3.5 - 5.5 g/dL    GLOBULIN, TOTAL 2.3 1.5 - 4.5 g/dL    A-G Ratio 1.9 1.2 - 2.2    Bilirubin, total <0.2 0.0 - 1.2 mg/dL    Alk.  phosphatase 65 39 - 117 IU/L    AST (SGOT) 14 0 - 40 IU/L    ALT (SGPT) 8 0 - 32 IU/L             Physical Exam  Visit Vitals    /79 (BP 1 Location: Left arm, BP Patient Position: Sitting)    Pulse 79    Temp 98.2 °F (36.8 °C) (Oral)    Resp 18    Ht 5' 4\" (1.626 m)    Wt 181 lb 12.8 oz (82.5 kg)    LMP 09/09/2017    SpO2 98%    BMI 31.21 kg/m2     Lungs: clear to auscultation bilaterally  Heart: regular rate and rhythm, S1, S2 normal, no murmur, click, rub or gallop  Neurologic: Grossly normal          ASSESSMENT and PLAN    ICD-10-CM ICD-9-CM    1. Migraine with aura and without status migrainosus, not intractable G43.109 346.00 metoprolol succinate (TOPROL-XL) 25 mg XL tablet   2. Bipolar disorder with moderate depression ([facility] [address] F31.32 296.52    3. Obsessive-compulsive disorder, unspecified type F42.9 300.3      Diagnoses and all orders for this visit:    1. Migraine with aura and without status migrainosus, not intractable  -     metoprolol succinate (TOPROL-XL) 25 mg XL tablet; Take 1 Tab by mouth daily. Indications: MIGRAINE PREVENTION    2. Bipolar disorder with moderate depression ([facility] [address]    3. Obsessive-compulsive disorder, unspecified type      Follow-up Disposition:  Return in about 1 month (around 11/6/2017), or worsening of migraine frequency, for F/U start of Toprol XL for migraine prevention. reviewed medications and side effects in detail  Please call my office if there are any questions- [phone]. Discussed expected course/resolution/complications of diagnosis in detail with patient. Medication risks/benefits/costs/interactions/alternatives discussed with patient. Pt was given an after visit summary which includes diagnoses, current medications & vitals. Pt expressed understanding with the diagnosis and plan. Total 25 minutes,60 % counseling re: Form to be filled out for pt and faxed to the number on the form. It will be ready by Monday the 9th.  She is interested in trying a preventative medicine for her headache and has tried Topamax before but nothing else that she can recall, so we'll try her on Toprol XL and start at 25mg. We'll increase it depending on how she tolerates it and her response to prevention of the migraines. She left a telephone number of [phone] to call her back about the form we're filling out for her. Also, discussed symptoms of concern that were noted today in the note above, treatment options( including doing nothing), when to follow up before recommended time frame. Also, answered all questions. Patient to call prn new problems. This document was written by Toño Carver, as dictated by Tala Varela MD.  I have reviewed and agree with the above note and have made corrections where appropriate Clarke Howell M.D.

## 2017-10-06 NOTE — MR AVS SNAPSHOT
Visit Information Date & Time Provider Department Dept. Phone Encounter #  
 10/6/2017  4:00 PM Rui Marquez MD [address] [place] [address] [phone] [id number] Your Appointments 11/27/2017 10:45 AM  
ROUTINE CARE with Rui Marquez MD  
[facility]) Appt Note: 3 month med check [address] [phone]  
[phone]  
  
   
 [address] [phone] Upcoming Health Maintenance Date Due  
 PAP AKA CERVICAL CYTOLOGY 8/28/2020 DTaP/Tdap/Td series (2 - Td) 6/6/2026 Allergies as of 10/6/2017  Review Complete On: 10/6/2017 By: Sumit Malone LPN Severity Noted Reaction Type Reactions Pcn [Penicillins]  02/20/2010    Other (comments) As a child Current Immunizations  Never Reviewed Name Date Influenza Vaccine (Quad) PF 9/23/2016 Not reviewed this visit Vitals BP Pulse Temp Resp Height(growth percentile) Weight(growth percentile) 134/79 (BP 1 Location: Left arm, BP Patient Position: Sitting) 79 98.2 °F (36.8 °C) (Oral) 18 5' 4\" (1.626 m) 181 lb 12.8 oz (82.5 kg) LMP SpO2 BMI OB Status Smoking Status 09/09/2017 98% 31.21 kg/m2 Having regular periods Current Every Day Smoker Vitals History BMI and BSA Data Body Mass Index Body Surface Area  
 31.21 kg/m 2 1.93 m 2 Preferred Pharmacy Pharmacy Name Phone [facility] [address] [place] [address] [phone] Your Updated Medication List  
  
   
This list is accurate as of: 10/6/17  5:16 PM.  Always use your most recent med list.  
  
  
  
  
 ALPRAZolam 0.25 mg tablet Commonly known as:  XANAX  
TAKE 1/2 TO 1 TABLET BY MOUTH TWICE A DAY AS NEEDED  
  
 * dextroamphetamine-amphetamine 10 mg tablet Commonly known as:  ADDERALL Take 1 Tab (10 mg total) by mouth See Admin Instructions. 2 in am and 1-2 in afternoon * dextroamphetamine-amphetamine 10 mg tablet Commonly known as:  ADDERALL Earliest Fill Date: 9/28/17. TAKE 2 TABS PO Q AM AND 1-2 TABS IN AFTERNOON  DO NOT FILL 09/28/17 * dextroamphetamine-amphetamine 10 mg tablet Commonly known as:  ADDERALL Take 1 Tab (10 mg total) by mouth See Admin InstructionsEarliest Fill Date: 10/28/17.  2 in am and 1-2 in afternoon  May fill 10/28/17 Start taking on:  10/28/2017  
  
 escitalopram oxalate 20 mg tablet Commonly known as:  Gradie Kil TAKE 1 TABLET BY MOUTH EVERY DAY  
  
 lithium carbonate 300 mg capsule Take 1 Cap by mouth two (2) times daily (with meals). * oxyCODONE-acetaminophen  mg per tablet Commonly known as:  PERCOCET 10 Take 1 Tab by mouth every six (6) hours as needed for Pain. 50 is a 30 day supply * oxyCODONE-acetaminophen  mg per tablet Commonly known as:  PERCOCET 10 Take 1 Tab by mouth every six (6) hours as needed for Pain. Max Daily Amount: 4 Tabs. 50 IS A 30 DAY SUPPLY DO NOT FILL UNTIL 09/28/17 * oxyCODONE-acetaminophen  mg per tablet Commonly known as:  PERCOCET 10 Take 1 Tab by mouth every six (6) hours as needed for Pain. 50 is a 30 day supply  MAY FILL 10/28/17 Start taking on:  10/28/2017  
  
 rizatriptan 10 mg disintegrating tablet Commonly known as:  MAXALT-MLT Take 1 Tab by mouth once as needed for Migraine for up to 1 dose. zolpidem 10 mg tablet Commonly known as:  AMBIEN  
TAKE 2 TABLETS BY MOUTH AT BEDTIME  
  
 * Notice: This list has 6 medication(s) that are the same as other medications prescribed for you. Read the directions carefully, and ask your doctor or other care provider to review them with you. Patient Instructions Recurring Migraine Headache: Care Instructions Your Care Instructions Migraines are painful, throbbing headaches. They often start on one side of the head.  They may cause nausea and vomiting and make you sensitive to light, sound, or smell. Some people may have only a few migraines throughout life. Others have them as often as several times a month. The goal of treatment is to reduce the number of migraines you have and relieve your symptoms. Even with treatment, you may continue to have migraines. You play an important role in dealing with your headaches. Work on avoiding things that seem to trigger your migraines. When you feel a headache coming on, act quickly to stop it before it gets worse. Follow-up care is a key part of your treatment and safety. Be sure to make and go to all appointments, and call your doctor if you are having problems. It's also a good idea to know your test results and keep a list of the medicines you take. How can you care for yourself at home? · Do not drive if you have taken a prescription pain medicine. · Rest in a quiet, dark room until your headache is gone. Close your eyes and try to relax or go to sleep. Do not watch TV or read. · Put a cold, moist cloth or cold pack on the painful area for 10 to 20 minutes at a time. Put a thin cloth between the cold pack and your skin. · Have someone gently massage your neck and shoulders. · Take your medicines exactly as prescribed. Call your doctor if you think you are having a problem with your medicine. You will get more details on the specific medicines your doctor prescribes. To prevent migraines · Keep a headache diary so you can figure out what triggers your headaches. Avoiding triggers may help you prevent headaches. Record when each headache began, how long it lasted, and what the pain was like. Use words like throbbing, aching, stabbing, or dull. Write down any other symptoms you had with the headache. These may include nausea, flashing lights or dark spots, or sensitivity to bright light or loud noise. Note if the headache occurred near your period.  List anything that might have triggered the headache. Triggers may include certain foods (chocolate, cheese, wine) or odors, smoke, bright light, stress, or lack of sleep. · If your doctor has prescribed medicine for your migraines, take it as directed. You may have medicine that you take only when you get a migraine and medicine that you take all the time to help prevent migraines. ¨ If your doctor has prescribed medicine for when you get a headache, take it at the first sign of a migraine, unless your doctor has given you other instructions. ¨ If your doctor has prescribed medicine to prevent migraines, take it exactly as prescribed. Call your doctor if you think you are having a problem with your medicine. · Find healthy ways to deal with stress. Migraines are most common during or right after stressful times. Take time to relax before and after you do something that has caused a migraine in the past. 
· Try to keep your muscles relaxed by keeping good posture. Check your jaw, face, neck, and shoulder muscles for tension. Try to relax them. When sitting at a desk, change positions often. Stretch for 30 seconds each hour. · Get regular sleep and exercise. · Eat regular meals, and avoid foods and drinks that often trigger migraines. These include chocolate and alcohol, especially red wine and port. Chemicals used in food, such as aspartame and monosodium glutamate (MSG), also can trigger migraines. So can some food additives, such as those found in hot dogs, mandel, cold cuts, aged cheeses, and pickled foods. · Limit caffeine by not drinking too much coffee, tea, or soda. Do not quit caffeine suddenly, because that can also give you migraines. · Do not smoke or allow others to smoke around you. If you need help quitting, talk to your doctor about stop-smoking programs and medicines. These can increase your chances of quitting for good.  
· If you are taking birth control pills or hormone therapy, talk to your doctor about whether they are triggering your migraines. When should you call for help? Call 911 anytime you think you may need emergency care. For example, call if: 
· You have symptoms of a stroke. These may include: 
¨ Sudden numbness, tingling, weakness, or loss of movement in your face, arm, or leg, especially on only one side of your body. ¨ Sudden vision changes. ¨ Sudden trouble speaking. ¨ Sudden confusion or trouble understanding simple statements. ¨ Sudden problems with walking or balance. ¨ A sudden, severe headache that is different from past headaches. Call your doctor now or seek immediate medical care if: 
· You develop a fever and a stiff neck. · You have new nausea and vomiting, or you cannot keep down food or liquids. Watch closely for changes in your health, and be sure to contact your doctor if: 
· You have a headache that does not get better within 1 or 2 days. · Your headaches get worse or happen more often. Where can you learn more? Go to http://clare-amanda.info/. Enter V975 in the search box to learn more about \"Recurring Migraine Headache: Care Instructions. \" Current as of: October 14, 2016 Content Version: 11.3 © 1447-2680 [employer]. Care instructions adapted under license by [employer] (which disclaims liability or warranty for this information). If you have questions about a medical condition or this instruction, always ask your healthcare professional. [place] [address] any warranty or liability for your use of this information. Migraine Aura Without a Headache: Care Instructions Your Care Instructions A migraine aura without a headache is a type of migraine. When you have an aura, you may see spots, wavy lines, or flashing lights. Your hands, arms, or face may tingle or feel numb. But unlike other migraines, a headache doesn't follow the aura. Some people have both types of migraines. Although they sometimes have an aura without the headache, at other times they may get a headache after an aura. Without treatment, migraines can last from 4 hours to a few days. Medicines can help prevent migraines or stop them once they have started. Your doctor can help you find which ones work best for you. Follow-up care is a key part of your treatment and safety. Be sure to make and go to all appointments, and call your doctor if you are having problems. It's also a good idea to know your test results and keep a list of the medicines you take. How can you care for yourself at home? · Do not drive if you have taken a prescription pain medicine. · Rest in a quiet, dark room until your aura or headache is gone. Close your eyes. Try to relax or go to sleep. Don't watch TV or read. · If you get a headache, put a cold, moist cloth or cold pack on the painful area for 10 to 20 minutes at a time. Put a thin cloth between the cold pack and your skin. · Use a warm, moist towel or a heating pad set on low. This can relax tight shoulder and neck muscles. · Have someone gently massage your neck and shoulders. · Be safe with medicines. Take your medicines exactly as prescribed. Call your doctor if you think you are having a problem with your medicine. You will get more details on the specific medicines your doctor prescribes. · Be careful not to take medicine more often than the instructions allow. This may cause worse or more frequent auras or headaches when the medicine wears off. To prevent migraines · Keep a diary so you can figure out what triggers your auras or headaches. Avoiding triggers may help you prevent migraines. Record when each aura or headache began, how long it lasted, and what the symptoms were like. Write down any other symptoms you had with the aura. These may include nausea or sensitivity to bright light or loud noise.  Note if the aura or headache occurred near your period. List anything that might have triggered the aura. Triggers may include certain foods (chocolate, cheese, wine) or odors, smoke, bright light, stress, or lack of sleep. · If your doctor has prescribed medicine for your migraines, take it as directed. You may have medicine that you take only when you get a migraine and medicine that you take all the time to help prevent migraines. ¨ If your doctor has prescribed medicine for when you get migraines, take it at the first sign of an aura, unless your doctor has given you other instructions. ¨ If your doctor has prescribed medicine to prevent migraines, take it exactly as prescribed. Call your doctor if you think you are having a problem with your medicine. · Find healthy ways to deal with stress. Migraines are most common during or right after stressful times. Take time to relax before and after you do something that has caused a migraine in the past. 
· Get plenty of sleep and exercise. · Eat regular meals, and avoid foods and drinks that often trigger migraines. These include chocolate and alcohol, especially red wine and port. Chemicals used in food, such as aspartame and monosodium glutamate (MSG), also can trigger migraines. So can some food additives, such as those found in hot dogs, mandel, cold cuts, aged cheeses, and pickled foods. · Limit caffeine by not drinking too much coffee, tea, or soda. But don't quit caffeine suddenly, because that can also give you migraines. · Do not smoke or allow others to smoke around you. If you need help quitting, talk to your doctor about stop-smoking programs and medicines. These can increase your chances of quitting for good. · If you are taking birth control pills or hormone therapy, talk to your doctor about whether they are triggering your migraines. When should you call for help? Call 911 anytime you think you may need emergency care. For example, call if: · You have symptoms of a stroke. These may include: 
¨ Sudden numbness, tingling, weakness, or loss of movement in your face, arm, or leg, especially on only one side of your body. ¨ Sudden vision changes. ¨ Sudden trouble speaking. ¨ Sudden confusion or trouble understanding simple statements. ¨ Sudden problems with walking or balance. ¨ A sudden, severe headache that is different from past headaches. Call your doctor now or seek immediate medical care if: 
· You have new or worse nausea and vomiting. · You have a new or higher fever. · Your headache gets much worse. Watch closely for changes in your health, and be sure to contact your doctor if: 
· You are not getting better after 2 days (48 hours). Where can you learn more? Go to http://clare-amanda.info/. Enter 415 76 117 in the search box to learn more about \"Migraine Aura Without a Headache: Care Instructions. \" Current as of: October 14, 2016 Content Version: 11.3 © 1652-2845 [employer]. Care instructions adapted under license by [employer] (which disclaims liability or warranty for this information). If you have questions about a medical condition or this instruction, always ask your healthcare professional. [place] [address] any warranty or liability for your use of this information. Introducing [facility]! Elvia Aggarwal introduces viseto patient portal. Now you can access parts of your medical record, email your doctor's office, and request medication refills online. 1. In your internet browser, go to [email or web address] 2. Click on the First Time User? Click Here link in the Sign In box. You will see the New Member Sign Up page. 3. Enter your viseto Access Code exactly as it appears below. You will not need to use this code after youve completed the sign-up process. If you do not sign up before the expiration date, you must request a new code. · [employer] Access Code: [email or web address] Expires: 11/26/2017 11:48 AM 
 
4. Enter the last four digits of your Social Security Number (xxxx) and Date of Birth (mm/dd/yyyy) as indicated and click Submit. You will be taken to the next sign-up page. 5. Create a [employer] ID. This will be your [employer] login ID and cannot be changed, so think of one that is secure and easy to remember. 6. Create a [employer] password. You can change your password at any time. 7. Enter your Password Reset Question and Answer. This can be used at a later time if you forget your password. 8. Enter your e-mail address. You will receive e-mail notification when new information is available in [address] 9. Click Sign Up. You can now view and download portions of your medical record. 10. Click the Download Summary menu link to download a portable copy of your medical information. If you have questions, please visit the Frequently Asked Questions section of the [employer] website. Remember, [employer] is NOT to be used for urgent needs. For medical emergencies, dial 911. Now available from your iPhone and Android! Please provide this summary of care documentation to your next provider. Your primary care clinician is listed as [place] [address], [facility]/s . If you have any questions after today's visit, please call [phone].

## 2017-10-06 NOTE — PATIENT INSTRUCTIONS
Recurring Migraine Headache: Care Instructions  Your Care Instructions  Migraines are painful, throbbing headaches. They often start on one side of the head. They may cause nausea and vomiting and make you sensitive to light, sound, or smell. Some people may have only a few migraines throughout life. Others have them as often as several times a month. The goal of treatment is to reduce the number of migraines you have and relieve your symptoms. Even with treatment, you may continue to have migraines. You play an important role in dealing with your headaches. Work on avoiding things that seem to trigger your migraines. When you feel a headache coming on, act quickly to stop it before it gets worse. Follow-up care is a key part of your treatment and safety. Be sure to make and go to all appointments, and call your doctor if you are having problems. It's also a good idea to know your test results and keep a list of the medicines you take. How can you care for yourself at home? · Do not drive if you have taken a prescription pain medicine. · Rest in a quiet, dark room until your headache is gone. Close your eyes and try to relax or go to sleep. Do not watch TV or read. · Put a cold, moist cloth or cold pack on the painful area for 10 to 20 minutes at a time. Put a thin cloth between the cold pack and your skin. · Have someone gently massage your neck and shoulders. · Take your medicines exactly as prescribed. Call your doctor if you think you are having a problem with your medicine. You will get more details on the specific medicines your doctor prescribes. To prevent migraines  · Keep a headache diary so you can figure out what triggers your headaches. Avoiding triggers may help you prevent headaches. Record when each headache began, how long it lasted, and what the pain was like. Use words like throbbing, aching, stabbing, or dull. Write down any other symptoms you had with the headache.  These may include nausea, flashing lights or dark spots, or sensitivity to bright light or loud noise. Note if the headache occurred near your period. List anything that might have triggered the headache. Triggers may include certain foods (chocolate, cheese, wine) or odors, smoke, bright light, stress, or lack of sleep. · If your doctor has prescribed medicine for your migraines, take it as directed. You may have medicine that you take only when you get a migraine and medicine that you take all the time to help prevent migraines. ¨ If your doctor has prescribed medicine for when you get a headache, take it at the first sign of a migraine, unless your doctor has given you other instructions. ¨ If your doctor has prescribed medicine to prevent migraines, take it exactly as prescribed. Call your doctor if you think you are having a problem with your medicine. · Find healthy ways to deal with stress. Migraines are most common during or right after stressful times. Take time to relax before and after you do something that has caused a migraine in the past.  · Try to keep your muscles relaxed by keeping good posture. Check your jaw, face, neck, and shoulder muscles for tension. Try to relax them. When sitting at a desk, change positions often. Stretch for 30 seconds each hour. · Get regular sleep and exercise. · Eat regular meals, and avoid foods and drinks that often trigger migraines. These include chocolate and alcohol, especially red wine and port. Chemicals used in food, such as aspartame and monosodium glutamate (MSG), also can trigger migraines. So can some food additives, such as those found in hot dogs, mandel, cold cuts, aged cheeses, and pickled foods. · Limit caffeine by not drinking too much coffee, tea, or soda. Do not quit caffeine suddenly, because that can also give you migraines. · Do not smoke or allow others to smoke around you.  If you need help quitting, talk to your doctor about stop-smoking programs and medicines. These can increase your chances of quitting for good. · If you are taking birth control pills or hormone therapy, talk to your doctor about whether they are triggering your migraines. When should you call for help? Call 911 anytime you think you may need emergency care. For example, call if:  · You have symptoms of a stroke. These may include:  ¨ Sudden numbness, tingling, weakness, or loss of movement in your face, arm, or leg, especially on only one side of your body. ¨ Sudden vision changes. ¨ Sudden trouble speaking. ¨ Sudden confusion or trouble understanding simple statements. ¨ Sudden problems with walking or balance. ¨ A sudden, severe headache that is different from past headaches. Call your doctor now or seek immediate medical care if:  · You develop a fever and a stiff neck. · You have new nausea and vomiting, or you cannot keep down food or liquids. Watch closely for changes in your health, and be sure to contact your doctor if:  · You have a headache that does not get better within 1 or 2 days. · Your headaches get worse or happen more often. Where can you learn more? Go to http://clareTolerxamanda.info/. Enter V975 in the search box to learn more about \"Recurring Migraine Headache: Care Instructions. \"  Current as of: October 14, 2016  Content Version: 11.3  © 0695-6864 [employer]. Care instructions adapted under license by [employer] (which disclaims liability or warranty for this information). If you have questions about a medical condition or this instruction, always ask your healthcare professional. [place] [address] any warranty or liability for your use of this information. Migraine Aura Without a Headache: Care Instructions  Your Care Instructions  A migraine aura without a headache is a type of migraine. When you have an aura, you may see spots, wavy lines, or flashing lights.  Your hands, arms, or face may tingle or feel numb. But unlike other migraines, a headache doesn't follow the aura. Some people have both types of migraines. Although they sometimes have an aura without the headache, at other times they may get a headache after an aura. Without treatment, migraines can last from 4 hours to a few days. Medicines can help prevent migraines or stop them once they have started. Your doctor can help you find which ones work best for you. Follow-up care is a key part of your treatment and safety. Be sure to make and go to all appointments, and call your doctor if you are having problems. It's also a good idea to know your test results and keep a list of the medicines you take. How can you care for yourself at home? · Do not drive if you have taken a prescription pain medicine. · Rest in a quiet, dark room until your aura or headache is gone. Close your eyes. Try to relax or go to sleep. Don't watch TV or read. · If you get a headache, put a cold, moist cloth or cold pack on the painful area for 10 to 20 minutes at a time. Put a thin cloth between the cold pack and your skin. · Use a warm, moist towel or a heating pad set on low. This can relax tight shoulder and neck muscles. · Have someone gently massage your neck and shoulders. · Be safe with medicines. Take your medicines exactly as prescribed. Call your doctor if you think you are having a problem with your medicine. You will get more details on the specific medicines your doctor prescribes. · Be careful not to take medicine more often than the instructions allow. This may cause worse or more frequent auras or headaches when the medicine wears off. To prevent migraines  · Keep a diary so you can figure out what triggers your auras or headaches. Avoiding triggers may help you prevent migraines. Record when each aura or headache began, how long it lasted, and what the symptoms were like. Write down any other symptoms you had with the aura.  These may include nausea or sensitivity to bright light or loud noise. Note if the aura or headache occurred near your period. List anything that might have triggered the aura. Triggers may include certain foods (chocolate, cheese, wine) or odors, smoke, bright light, stress, or lack of sleep. · If your doctor has prescribed medicine for your migraines, take it as directed. You may have medicine that you take only when you get a migraine and medicine that you take all the time to help prevent migraines. ¨ If your doctor has prescribed medicine for when you get migraines, take it at the first sign of an aura, unless your doctor has given you other instructions. ¨ If your doctor has prescribed medicine to prevent migraines, take it exactly as prescribed. Call your doctor if you think you are having a problem with your medicine. · Find healthy ways to deal with stress. Migraines are most common during or right after stressful times. Take time to relax before and after you do something that has caused a migraine in the past.  · Get plenty of sleep and exercise. · Eat regular meals, and avoid foods and drinks that often trigger migraines. These include chocolate and alcohol, especially red wine and port. Chemicals used in food, such as aspartame and monosodium glutamate (MSG), also can trigger migraines. So can some food additives, such as those found in hot dogs, mandel, cold cuts, aged cheeses, and pickled foods. · Limit caffeine by not drinking too much coffee, tea, or soda. But don't quit caffeine suddenly, because that can also give you migraines. · Do not smoke or allow others to smoke around you. If you need help quitting, talk to your doctor about stop-smoking programs and medicines. These can increase your chances of quitting for good. · If you are taking birth control pills or hormone therapy, talk to your doctor about whether they are triggering your migraines. When should you call for help?   Call 911 anytime you think you may need emergency care. For example, call if:  · You have symptoms of a stroke. These may include:  ¨ Sudden numbness, tingling, weakness, or loss of movement in your face, arm, or leg, especially on only one side of your body. ¨ Sudden vision changes. ¨ Sudden trouble speaking. ¨ Sudden confusion or trouble understanding simple statements. ¨ Sudden problems with walking or balance. ¨ A sudden, severe headache that is different from past headaches. Call your doctor now or seek immediate medical care if:  · You have new or worse nausea and vomiting. · You have a new or higher fever. · Your headache gets much worse. Watch closely for changes in your health, and be sure to contact your doctor if:  · You are not getting better after 2 days (48 hours). Where can you learn more? Go to http://clare-amanda.info/. Enter 415 [id number] in the search box to learn more about \"Migraine Aura Without a Headache: Care Instructions. \"  Current as of: October 14, 2016  Content Version: 11.3  © 6469-1113 [employer]. Care instructions adapted under license by [employer] (which disclaims liability or warranty for this information). If you have questions about a medical condition or this instruction, always ask your healthcare professional. [address] any warranty or liability for your use of this information.

## 2017-10-06 NOTE — PROGRESS NOTES
\"Reviewed record in preparation for visit and have obtained the necessary documentation\"  Chief Complaint   Patient presents with    Migraine     FMLA paperwork refilled out for new employer      1. Have you been to the ER, urgent care clinic since your last visit? Hospitalized since your last visit? No    2. Have you seen or consulted any other health care providers outside of the [address] [place] [address] since your last visit? Include any pap smears or colon screening.  No

## 2017-10-13 ENCOUNTER — TELEPHONE (OUTPATIENT)
Dept: FAMILY MEDICINE CLINIC | Age: 42
End: 2017-10-13

## 2017-10-13 NOTE — TELEPHONE ENCOUNTER
Patient is returning a call she received from the nurse. Tired reaching nurse no success. Patient also states the [facility] paperwork needs to corrected \"question 7 needs to be a month and number 8 needs to be no\" on the paperwork and  has to initial with date and re fax the paperwork.      Best call back # [phone]

## 2017-10-16 ENCOUNTER — TELEPHONE (OUTPATIENT)
Dept: FAMILY MEDICINE CLINIC | Age: 42
End: 2017-10-16

## 2017-10-16 RX ORDER — RIZATRIPTAN BENZOATE 10 MG/1
TABLET, ORALLY DISINTEGRATING ORAL
Qty: 15 TAB | Refills: 2 | Status: SHIPPED | OUTPATIENT
Start: 2017-10-16 | End: 2018-02-27 | Stop reason: SDUPTHER

## 2017-10-18 ENCOUNTER — TELEPHONE (OUTPATIENT)
Dept: FAMILY MEDICINE CLINIC | Age: 42
End: 2017-10-18

## 2017-10-18 NOTE — TELEPHONE ENCOUNTER
----- Message from Marquis Nava sent at 10/17/2017  6:07 PM EDT -----  Regarding: Dr. Artemio Gregg  Patient would like a call back to [phone] to know if her [facility] paperwork has been faxed.

## 2017-11-11 DIAGNOSIS — F41.8 DEPRESSION WITH ANXIETY: ICD-10-CM

## 2017-11-14 DIAGNOSIS — G43.109 MIGRAINE WITH AURA AND WITHOUT STATUS MIGRAINOSUS, NOT INTRACTABLE: ICD-10-CM

## 2017-11-14 RX ORDER — ALPRAZOLAM 0.25 MG/1
TABLET ORAL
Qty: 25 TAB | Refills: 2 | Status: SHIPPED | OUTPATIENT
Start: 2017-11-14 | End: 2018-01-10 | Stop reason: SDUPTHER

## 2017-11-14 RX ORDER — METOPROLOL SUCCINATE 25 MG/1
25 TABLET, EXTENDED RELEASE ORAL DAILY
Qty: 90 TAB | Refills: 3 | Status: SHIPPED | OUTPATIENT
Start: 2017-11-14 | End: 2017-12-04 | Stop reason: SDUPTHER

## 2017-11-14 NOTE — TELEPHONE ENCOUNTER
Pharmacy on file verified, 90 days supply   . Requested Prescriptions     Pending Prescriptions Disp Refills    metoprolol succinate (TOPROL-XL) 25 mg XL tablet 30 Tab 2     Sig: Take 1 Tab by mouth daily.  Indications: MIGRAINE PREVENTION

## 2017-11-27 ENCOUNTER — OFFICE VISIT (OUTPATIENT)
Dept: FAMILY MEDICINE CLINIC | Age: 42
End: 2017-11-27

## 2017-11-27 VITALS
RESPIRATION RATE: 18 BRPM | OXYGEN SATURATION: 99 % | TEMPERATURE: 98.3 F | SYSTOLIC BLOOD PRESSURE: 130 MMHG | DIASTOLIC BLOOD PRESSURE: 86 MMHG | BODY MASS INDEX: 32.23 KG/M2 | WEIGHT: 188.8 LBS | HEIGHT: 64 IN | HEART RATE: 79 BPM

## 2017-11-27 DIAGNOSIS — F31.32 BIPOLAR DISORDER WITH MODERATE DEPRESSION (HCC): Primary | ICD-10-CM

## 2017-11-27 DIAGNOSIS — F98.8 ADD (ATTENTION DEFICIT DISORDER) WITHOUT HYPERACTIVITY: Chronic | ICD-10-CM

## 2017-11-27 DIAGNOSIS — M50.30 DDD (DEGENERATIVE DISC DISEASE), CERVICAL: ICD-10-CM

## 2017-11-27 DIAGNOSIS — M47.816 FACET ARTHRITIS OF LUMBAR REGION: ICD-10-CM

## 2017-11-27 RX ORDER — ACETAMINOPHEN 325 MG/1
TABLET ORAL
COMMUNITY
End: 2019-05-26 | Stop reason: ALTCHOICE

## 2017-11-27 RX ORDER — DEXTROAMPHETAMINE SACCHARATE, AMPHETAMINE ASPARTATE, DEXTROAMPHETAMINE SULFATE AND AMPHETAMINE SULFATE 2.5; 2.5; 2.5; 2.5 MG/1; MG/1; MG/1; MG/1
TABLET ORAL
Qty: 120 TAB | Refills: 0 | Status: SHIPPED | OUTPATIENT
Start: 2017-12-27 | End: 2018-02-27 | Stop reason: SDUPTHER

## 2017-11-27 RX ORDER — OXYCODONE AND ACETAMINOPHEN 10; 325 MG/1; MG/1
1 TABLET ORAL
Qty: 50 TAB | Refills: 0 | Status: SHIPPED | OUTPATIENT
Start: 2018-01-26 | End: 2018-02-27 | Stop reason: SDUPTHER

## 2017-11-27 RX ORDER — AZITHROMYCIN 250 MG/1
TABLET, FILM COATED ORAL
Qty: 6 TAB | Refills: 0 | Status: SHIPPED | OUTPATIENT
Start: 2017-11-27 | End: 2017-12-02

## 2017-11-27 RX ORDER — OXYCODONE AND ACETAMINOPHEN 10; 325 MG/1; MG/1
1 TABLET ORAL
Qty: 50 TAB | Refills: 0 | Status: SHIPPED | OUTPATIENT
Start: 2017-12-27 | End: 2018-02-27 | Stop reason: SDUPTHER

## 2017-11-27 RX ORDER — DEXTROAMPHETAMINE SACCHARATE, AMPHETAMINE ASPARTATE, DEXTROAMPHETAMINE SULFATE AND AMPHETAMINE SULFATE 2.5; 2.5; 2.5; 2.5 MG/1; MG/1; MG/1; MG/1
10 TABLET ORAL SEE ADMIN INSTRUCTIONS
Qty: 120 TAB | Refills: 0 | Status: SHIPPED | OUTPATIENT
Start: 2017-11-27 | End: 2018-02-27 | Stop reason: SDUPTHER

## 2017-11-27 RX ORDER — OXYCODONE AND ACETAMINOPHEN 10; 325 MG/1; MG/1
1 TABLET ORAL
Qty: 50 TAB | Refills: 0 | Status: SHIPPED | OUTPATIENT
Start: 2017-11-27 | End: 2018-02-27 | Stop reason: SDUPTHER

## 2017-11-27 RX ORDER — DEXTROAMPHETAMINE SACCHARATE, AMPHETAMINE ASPARTATE, DEXTROAMPHETAMINE SULFATE AND AMPHETAMINE SULFATE 2.5; 2.5; 2.5; 2.5 MG/1; MG/1; MG/1; MG/1
10 TABLET ORAL SEE ADMIN INSTRUCTIONS
Qty: 120 TAB | Refills: 0 | Status: SHIPPED | OUTPATIENT
Start: 2018-01-26 | End: 2018-02-27 | Stop reason: SDUPTHER

## 2017-11-27 NOTE — PATIENT INSTRUCTIONS
Learning About Attention Deficit Hyperactivity Disorder (ADHD) in Adults  What is ADHD? Attention deficit hyperactivity disorder (ADHD) is a condition in which people have a hard time paying attention. Adults with ADHD also may be more active than normal. They tend to act without thinking. ADHD may make it harder for them to focus, get organized, and finish tasks. ADHD most often starts in childhood and lasts into adulthood. Many adults don't know that they have ADHD until their children are diagnosed. Then they begin to see their own symptoms. Doctors don't know what causes ADHD. But it tends to run in families. What are the symptoms? The most common types of ADHD symptoms in adults are attention problems and hyperactivity. Attention problems  Adults with ADHD often find it hard to:  · Finish tasks that don't interest them or aren't easy. But they may become obsessed with activities that they find interesting and enjoy. · Keep relationships. · Focus their attention on conversations, reading materials, or jobs. They may change jobs a lot. · Remember things. They may misplace or lose things. · Pay attention. They are easily distracted. They find it hard to focus on one task. · Think before they act. They may make quick decisions. They may act before they think about the effect of their actions. Hyperactivity  Adults with ADHD may:  · Fidget. They may swing their legs, shift in their seats, or tap their fingers. · Move around a lot. They may feel \"revved up\" or on the go. They may not be able to slow down until they are very tired. · Find it hard to relax. They may feel restless and find it hard to do quiet things like read or watch TV. How does ADHD affect daily life? ADHD in adults may affect:  · Job performance. They may find it hard to organize their work, manage their time, and focus on one task at a time. They may forget, misplace, or lose things.  They may quit their jobs out of boredom. · Relationships. Adults with ADHD may find it hard to focus their attention on conversations. It is hard for them to \"read\" the behavior and moods of others and express their own feelings. · Temper. They may get easily frustrated. This often can make it harder for them to deal with stress. These adults may overreact and have a short, quick temper. · The ability to solve problems. Adults who have a hard time waiting for things they want may act before they think about the effect of their actions. They may take part in risky behaviors. These include unprotected sex, unsafe driving, alcohol and drug use, or unwise business ventures. How is ADHD treated? ?ADHD can be treated with medicines, behavior training, or counseling. Or it may be a combination of these treatments. Medicines  ? Stimulant medicines are most often used to treat ADHD. These may include:  ? · Amphetamines (such as Adderall and Dexedrine). ? · Methylphenidate (such as Concerta, Daytrana, Focalin, Metadate, and Ritalin). ? Other medicines that may be used are:  ? · Atomoxetine, such as Strattera, a nonstimulant medicine for ADHD. ? · Antihypertensives. These include clonidine (such as Catapres) and guanfacine (such as Tenex). ? · Antidepressants, which include bupropion (Wellbutrin). ?Behavior training  ? Behavior training can help adults with ADHD learn how to:  ? · Get organized. A daily organizer or planner can help these adults organize their daily tasks. They can write down appointments and other things they need to remember. ? · Decrease distractions. They can set up their work or home environment so that there are fewer things that will distract them. They may find using headphones or a \"white noise\" machine helpful. College students can arrange a quiet living situation. They may need a single dorm room. ? · Work on relationships. Social skills training can help adults with ADHD relate to family, friends, and coworkers. Couples counseling or family therapy can also help improve relationships. ? Counseling  ? Counseling is not meant to treat inattention, hyperactivity, or impulsiveness. But it can help with some of the problems that go along with ADHD. These include not getting along well with others and having problems following rules. Where can you learn more? Go to http://clare-amanda.info/. Enter D983 in the search box to learn more about \"Learning About Attention Deficit Hyperactivity Disorder (ADHD) in Adults. \"  Current as of: May 12, 2017  Content Version: 11.4  © 3937-0236 [employer], "[employer]. Care instructions adapted under license by [employer] (which disclaims liability or warranty for this information). If you have questions about a medical condition or this instruction, always ask your healthcare professional. [address] any warranty or liability for your use of this information.

## 2017-11-27 NOTE — MR AVS SNAPSHOT
Visit Information Date & Time Provider Department Dept. Phone Encounter #  
 11/27/2017 10:45 AM Blessing Mendes MD [address] [place] [address] [phone] [id number] Your Appointments 2/27/2018 10:15 AM  
ROUTINE CARE with Blessing Mendes MD  
[facility]) Appt Note: 3 month med check [address] [phone]  
[phone]  
  
   
 [address] [id number] Upcoming Health Maintenance Date Due  
 PAP AKA CERVICAL CYTOLOGY 8/28/2020 DTaP/Tdap/Td series (2 - Td) 6/6/2026 Allergies as of 11/27/2017  Review Complete On: 11/27/2017 By: Vikash Millard LPN Severity Noted Reaction Type Reactions Pcn [Penicillins]  02/20/2010    Other (comments) As a child Current Immunizations  Never Reviewed Name Date Influenza Vaccine (Quad) PF 9/23/2016 Not reviewed this visit You Were Diagnosed With   
  
 Codes Comments Bipolar disorder with moderate depression ([facility]    -  Primary ICD-10-CM: F31.32 
ICD-9-CM: 296.52   
 ADD (attention deficit disorder) without hyperactivity     ICD-10-CM: F98.8 ICD-9-CM: 314.00   
 DDD (degenerative disc disease), cervical     ICD-10-CM: M50.30 ICD-9-CM: 722.4 Vitals BP Pulse Temp Resp Height(growth percentile) Weight(growth percentile) 130/86 (BP 1 Location: Left arm, BP Patient Position: Sitting) 79 98.3 °F (36.8 °C) (Oral) 18 5' 4\" (1.626 m) 188 lb 12.8 oz (85.6 kg) SpO2 BMI OB Status Smoking Status 99% 32.41 kg/m2 Having regular periods Current Every Day Smoker Vitals History BMI and BSA Data Body Mass Index Body Surface Area  
 32.41 kg/m 2 1.97 m 2 Preferred Pharmacy Pharmacy Name Phone [facility] [address] [place] [address] [phone] Your Updated Medication List  
  
   
This list is accurate as of: 11/27/17  1:10 PM.  Always use your most recent med list.  
  
  
  
  
 ALPRAZolam 0.25 mg tablet Commonly known as:  XANAX  
TAKE 1/2 TO 1 TABLET BY MOUTH TWICE A DAY AS NEEDED  
  
 azithromycin 250 mg tablet Commonly known as:  Bowman Sleight Take 2 tablets today, then take 1 tablet daily * dextroamphetamine-amphetamine 10 mg tablet Commonly known as:  ADDERALL Take 1 Tab (10 mg total) by mouth See Admin InstructionsEarliest Fill Date: 11/27/17.  2 in am and 1-2 in afternoon * dextroamphetamine-amphetamine 10 mg tablet Commonly known as:  ADDERALL Earliest Fill Date: 12/27/17. TAKE 2 TABS PO Q AM AND 1-2 TABS IN AFTERNOON Start taking on:  12/27/2017 * dextroamphetamine-amphetamine 10 mg tablet Commonly known as:  ADDERALL Take 1 Tab (10 mg total) by mouth See Admin InstructionsEarliest Fill Date: 1/26/18.  2 in am and 1-2 in afternoon Start taking on:  1/26/2018  
  
 escitalopram oxalate 20 mg tablet Commonly known as:  Madison Levo TAKE 1 TABLET BY MOUTH EVERY DAY  
  
 lithium carbonate 300 mg capsule Take 1 Cap by mouth two (2) times daily (with meals). metoprolol succinate 25 mg XL tablet Commonly known as:  TOPROL-XL Take 1 Tab by mouth daily. Indications: MIGRAINE PREVENTION  
  
 * oxyCODONE-acetaminophen  mg per tablet Commonly known as:  PERCOCET 10 Take 1 Tab by mouth every six (6) hours as needed for Pain. 50 is a 30 day supply * oxyCODONE-acetaminophen  mg per tablet Commonly known as:  PERCOCET 10 Take 1 Tab by mouth every six (6) hours as needed for Pain. Max Daily Amount: 4 Tabs. 50 IS A 30 DAY SUPPLY Start taking on:  12/27/2017 * oxyCODONE-acetaminophen  mg per tablet Commonly known as:  PERCOCET 10 Take 1 Tab by mouth every six (6) hours as needed for Pain. 50 is a 30 day supply Start taking on:  1/26/2018  
  
 rizatriptan 10 mg disintegrating tablet Commonly known as:  MAXALT-MLT TAKE 1 TABLET BY MOUTH ONCE AS NEEDED FOR MIGRAINE FOR UP TO 1 DOSE. TYLENOL 325 mg tablet Generic drug:  acetaminophen Take  by mouth every four (4) hours as needed for Pain. VICKS DAYQUIL PO Take  by mouth.  
  
 zolpidem 10 mg tablet Commonly known as:  AMBIEN  
TAKE 2 TABLETS BY MOUTH AT BEDTIME  
  
 * Notice: This list has 6 medication(s) that are the same as other medications prescribed for you. Read the directions carefully, and ask your doctor or other care provider to review them with you. Prescriptions Printed Refills  
 dextroamphetamine-amphetamine (ADDERALL) 10 mg tablet 0 Sig: Take 1 Tab (10 mg total) by mouth See Admin InstructionsEarliest Fill Date: 11/27/17.  2 in am and 1-2 in afternoon Class: Print Route: Oral  
 dextroamphetamine-amphetamine (ADDERALL) 10 mg tablet 0 Starting on: 12/27/2017 Sig: Norris Rascon Date: 12/27/17. TAKE 2 TABS PO Q AM AND 1-2 TABS IN AFTERNOON Class: Print  
 oxyCODONE-acetaminophen (PERCOCET 10)  mg per tablet 0 Sig: Take 1 Tab by mouth every six (6) hours as needed for Pain. 50 is a 30 day supply Class: Print Route: Oral  
 oxyCODONE-acetaminophen (PERCOCET 10)  mg per tablet 0 Starting on: 12/27/2017 Sig: Take 1 Tab by mouth every six (6) hours as needed for Pain. Max Daily Amount: 4 Tabs. 50 IS A 30 DAY SUPPLY Class: Print Route: Oral  
 oxyCODONE-acetaminophen (PERCOCET 10)  mg per tablet 0 Starting on: 1/26/2018 Sig: Take 1 Tab by mouth every six (6) hours as needed for Pain. 50 is a 30 day supply Class: Print Route: Oral  
 dextroamphetamine-amphetamine (ADDERALL) 10 mg tablet 0 Starting on: 1/26/2018 Sig: Take 1 Tab (10 mg total) by mouth See Admin InstructionsEarliest Fill Date: 1/26/18.  2 in am and 1-2 in afternoon Class: Print Route: Oral  
 azithromycin (ZITHROMAX) 250 mg tablet 0 Sig: Take 2 tablets today, then take 1 tablet daily Class: Print We Performed the Following LITHIUM [id number] CPT(R)] METABOLIC PANEL, BASIC [70406 CPT(R)] Patient Instructions Learning About Attention Deficit Hyperactivity Disorder (ADHD) in Adults What is ADHD? Attention deficit hyperactivity disorder (ADHD) is a condition in which people have a hard time paying attention. Adults with ADHD also may be more active than normal. They tend to act without thinking. ADHD may make it harder for them to focus, get organized, and finish tasks. ADHD most often starts in childhood and lasts into adulthood. Many adults don't know that they have ADHD until their children are diagnosed. Then they begin to see their own symptoms. Doctors don't know what causes ADHD. But it tends to run in families. What are the symptoms? The most common types of ADHD symptoms in adults are attention problems and hyperactivity. Attention problems Adults with ADHD often find it hard to: · Finish tasks that don't interest them or aren't easy. But they may become obsessed with activities that they find interesting and enjoy. · Keep relationships. · Focus their attention on conversations, reading materials, or jobs. They may change jobs a lot. · Remember things. They may misplace or lose things. · Pay attention. They are easily distracted. They find it hard to focus on one task. · Think before they act. They may make quick decisions. They may act before they think about the effect of their actions. Hyperactivity Adults with ADHD may: · Fidget. They may swing their legs, shift in their seats, or tap their fingers. · Move around a lot. They may feel \"revved up\" or on the go. They may not be able to slow down until they are very tired. · Find it hard to relax. They may feel restless and find it hard to do quiet things like read or watch TV. How does ADHD affect daily life? ADHD in adults may affect: · Job performance. They may find it hard to organize their work, manage their time, and focus on one task at a time. They may forget, misplace, or lose things. They may quit their jobs out of boredom. · Relationships. Adults with ADHD may find it hard to focus their attention on conversations. It is hard for them to \"read\" the behavior and moods of others and express their own feelings. · Temper. They may get easily frustrated. This often can make it harder for them to deal with stress. These adults may overreact and have a short, quick temper. · The ability to solve problems. Adults who have a hard time waiting for things they want may act before they think about the effect of their actions. They may take part in risky behaviors. These include unprotected sex, unsafe driving, alcohol and drug use, or unwise business ventures. How is ADHD treated? ?ADHD can be treated with medicines, behavior training, or counseling. Or it may be a combination of these treatments. Medicines ? Stimulant medicines are most often used to treat ADHD. These may include: 
? · Amphetamines (such as Adderall and Dexedrine). ? · Methylphenidate (such as Concerta, Daytrana, Focalin, Metadate, and Ritalin). ? Other medicines that may be used are: 
? · Atomoxetine, such as Strattera, a nonstimulant medicine for ADHD. ? · Antihypertensives. These include clonidine (such as Catapres) and guanfacine (such as Tenex). ? · Antidepressants, which include bupropion (Wellbutrin). ?Behavior training ? Behavior training can help adults with ADHD learn how to: 
? · Get organized. A daily organizer or planner can help these adults organize their daily tasks. They can write down appointments and other things they need to remember. ? · Decrease distractions. They can set up their work or home environment so that there are fewer things that will distract them.  They may find using headphones or a \"white noise\" machine helpful. College students can arrange a quiet living situation. They may need a single dorm room. ? · Work on relationships. Social skills training can help adults with ADHD relate to family, friends, and coworkers. Couples counseling or family therapy can also help improve relationships. ? Counseling ? Counseling is not meant to treat inattention, hyperactivity, or impulsiveness. But it can help with some of the problems that go along with ADHD. These include not getting along well with others and having problems following rules. Where can you learn more? Go to http://calre-amanda.info/. Enter H365 in the search box to learn more about \"Learning About Attention Deficit Hyperactivity Disorder (ADHD) in Adults. \" Current as of: May 12, 2017 Content Version: 11.4 © 7857-1179 Help[employer]cash. Care instructions adapted under license by [employer] (which disclaims liability or warranty for this information). If you have questions about a medical condition or this instruction, always ask your healthcare professional. [address] any warranty or liability for your use of this information. Introducing [facility]! Cj Humphreys introduces Change.org patient portal. Now you can access parts of your medical record, email your doctor's office, and request medication refills online. 1. In your internet browser, go to [email or web address] 2. Click on the First Time User? Click Here link in the Sign In box. You will see the New Member Sign Up page. 3. Enter your Change.org Access Code exactly as it appears below. You will not need to use this code after youve completed the sign-up process. If you do not sign up before the expiration date, you must request a new code. · Change.org Access Code: [email or web address] Expires: 2/25/2018  1:10 PM 
 
 4. Enter the last four digits of your Social Security Number (xxxx) and Date of Birth (mm/dd/yyyy) as indicated and click Submit. You will be taken to the next sign-up page. 5. Create a Cyntellect ID. This will be your Cyntellect login ID and cannot be changed, so think of one that is secure and easy to remember. 6. Create a Cyntellect password. You can change your password at any time. 7. Enter your Password Reset Question and Answer. This can be used at a later time if you forget your password. 8. Enter your e-mail address. You will receive e-mail notification when new information is available in [address] 9. Click Sign Up. You can now view and download portions of your medical record. 10. Click the Download Summary menu link to download a portable copy of your medical information. If you have questions, please visit the Frequently Asked Questions section of the Cyntellect website. Remember, Cyntellect is NOT to be used for urgent needs. For medical emergencies, dial 911. Now available from your iPhone and Android! Please provide this summary of care documentation to your next provider. Your primary care clinician is listed as [address] [address], [facility]/s . If you have any questions after today's visit, please call [phone].

## 2017-11-27 NOTE — PROGRESS NOTES
\"Reviewed record in preparation for visit and have obtained the necessary documentation\"  Chief Complaint   Patient presents with    Attention Deficit Disorder     follow up, medication refills     Back Pain     DDD, chronic, follow up,medication refills     Joint Pain     DJD,  chronic, follow up,medication refills     Cold Symptoms     cough,congestion,sore throat,SOB,chest tightness,and hoarseness x 1 day, OTC Dayquil and Tylenol with some relief        1. Have you been to the ER, urgent care clinic since your last visit? Hospitalized since your last visit? No    2. Have you seen or consulted any other health care providers outside of the [facility] since your last visit? Include any pap smears or colon screening.  No

## 2017-11-27 NOTE — PROGRESS NOTES
HISTORY OF PRESENT ILLNESS  HPI  Irean Phalen is a 40 y.o. female with history of migraines, insomnia, bipolar disorder, OCD, anxiety, and depression who presents to office today for medication check and cold symptoms. Pt reports that she got a sore throat last night and has had a voice issue this morning. Pt reports nonproductive cough that also started last night. Pt states that she is seeing a counselor once a month. Pt reports that she was going more frequently, but her insurance does not cover it more frequently. Pt reports that she does not see a psychiatrist, just a therapist with relief. Pt reports that she takes the Lithium half of the time. Pt reports that in the past two weeks she took the Lithium once a day, but has not taken it twice a day. Pt reports that she takes it in the morning and has been doing well, but admits to missing most of the PM doses     Past Medical History:   Diagnosis Date    ADD (attention deficit disorder) without hyperactivity- neuropsych testing + ADD -Dr. Dale Marquez 8/25/2016    Asthma     last attack 2 months ago    Bilateral carpal tunnel syndrome- R>>L 9/25/2016    Bipolar disorder with [facility] depression ([facility] 6/28/2017    Depression with anxiety 3/2/2010    Facet arthritis of lumbar region ([facility] 12/3/2017    Insomnia     Lithium use 6/28/2017    Migraine 2/20/2010    Psychiatric disorder     Depression, anxiety    Psychophysiological insomnia 2/23/2016     Past Surgical History:   Procedure Laterality Date    HX APPENDECTOMY  1996    HX BREAST REDUCTION  2002    HX ORTHOPAEDIC  2003    cervical disc    HX ORTHOPAEDIC      second right hand digit fx with pins index     Current Outpatient Prescriptions on File Prior to Visit   Medication Sig Dispense Refill    ALPRAZolam (XANAX) 0.25 mg tablet TAKE 1/2 TO 1 TABLET BY MOUTH TWICE A DAY AS NEEDED 25 Tab 2    metoprolol succinate (TOPROL-XL) 25 mg XL tablet Take 1 Tab by mouth daily.  Indications: MIGRAINE PREVENTION 90 Tab 3    rizatriptan (MAXALT-MLT) 10 mg disintegrating tablet TAKE 1 TABLET BY MOUTH ONCE AS NEEDED FOR MIGRAINE FOR UP TO 1 DOSE. 15 Tab 2    zolpidem (AMBIEN) 10 mg tablet TAKE 2 TABLETS BY MOUTH AT BEDTIME 60 Tab 5    lithium carbonate 300 mg capsule Take 1 Cap by mouth two (2) times daily (with meals). 60 Cap 0    escitalopram oxalate (LEXAPRO) 20 mg tablet TAKE 1 TABLET BY MOUTH EVERY DAY 90 Tab 3     No current facility-administered medications on file prior to visit. Allergies   Allergen Reactions    Pcn [Penicillins] Other (comments)     As a child     Family History   Problem Relation Age of Onset    Diabetes Father     Hypertension Father     Heart Attack Father     High Cholesterol Father     Diabetes Mother     High Cholesterol Mother      Social History     Social History    Marital status:      Spouse name: N/A    Number of children: N/A    Years of education: N/A     Social History Main Topics    Smoking status: Current Every Day Smoker     Packs/day: 1.50     Years: 18.00     Types: Cigarettes    Smokeless tobacco: Never Used    Alcohol use Yes      Comment: occasionally    Drug use: No    Sexual activity: Yes     Partners: Male     Birth control/ protection: Condom     Other Topics Concern    None     Social History Narrative       Review of Systems   Constitutional: Negative for chills, diaphoresis, fever, malaise/fatigue and weight loss. HENT: Positive for sore throat. Eyes: Negative for blurred vision, double vision, pain and redness. Respiratory: Positive for cough. Negative for sputum production, shortness of breath and wheezing. Cardiovascular: Negative for chest pain, palpitations, orthopnea, claudication, leg swelling and PND. Skin: Negative for itching and rash. Neurological: Negative for dizziness, tingling, tremors, sensory change, speech change, focal weakness, seizures, loss of consciousness, weakness and headaches.      Results for orders placed or performed in visit on 08/28/17   LITHIUM   Result Value Ref Range    Lithium level 0.3 (L) 0.6 - 1.2 mmol/L   CBC W/O DIFF   Result Value Ref Range    WBC 9.1 3.4 - 10.8 x10E3/uL    RBC 4.27 3.77 - 5.28 x10E6/uL    HGB 13.0 11.1 - 15.9 g/dL    HCT 39.1 34.0 - 46.6 %    MCV 92 79 - 97 fL    MCH 30.4 26.6 - 33.0 pg    MCHC 33.2 31.5 - 35.7 g/dL    RDW 13.2 12.3 - 15.4 %    PLATELET 303 144 - 985 Y24X3/CT   METABOLIC PANEL, COMPREHENSIVE   Result Value Ref Range    Glucose 79 65 - 99 mg/dL    BUN 10 6 - 24 mg/dL    Creatinine 0.74 0.57 - 1.00 mg/dL    GFR est non- >59 mL/min/1.73    GFR est  >59 mL/min/1.73    BUN/Creatinine ratio 14 9 - 23    Sodium 139 134 - 144 mmol/L    Potassium 4.8 3.5 - 5.2 mmol/L    Chloride 102 96 - 106 mmol/L    CO2 22 18 - 29 mmol/L    Calcium 9.0 8.7 - 10.2 mg/dL    Protein, total 6.6 6.0 - 8.5 g/dL    Albumin 4.3 3.5 - 5.5 g/dL    GLOBULIN, TOTAL 2.3 1.5 - 4.5 g/dL    A-G Ratio 1.9 1.2 - 2.2    Bilirubin, total <0.2 0.0 - 1.2 mg/dL    Alk. phosphatase 65 39 - 117 IU/L    AST (SGOT) 14 0 - 40 IU/L    ALT (SGPT) 8 0 - 32 IU/L     Physical Exam   Eyes: conjunctivae/corneas clear. PERRL, EOM's intact. Ears: TMs normal, no erythema. Nose: Moderate congestion, no drainage. Throat: Diffuse erythema, no exudate, no petechiae. Neck: Slightly tender, supple, symmetrical, trachea midline, no adenopathy, thyroid: not enlarged, symmetric, no tenderness/mass/nodules, no carotid bruit and no JVD  Lungs: clear to auscultation bilaterally  Heart: regular rate and rhythm, S1, S2 normal, no murmur, click, rub or gallop    Visit Vitals    /86 (BP 1 Location: Left arm, BP Patient Position: Sitting)    Pulse 79    Temp 98.3 °F (36.8 °C) (Oral)    Resp 18    Ht 5' 4\" (1.626 m)    Wt 188 lb 12.8 oz (85.6 kg)    SpO2 99%    BMI 32.41 kg/m2     ASSESSMENT and PLAN    ICD-10-CM ICD-9-CM    1.  Bipolar disorder [facility] moderate depression [facility] F31.32 296.52 LITHIUM METABOLIC PANEL, BASIC   2. 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 Darren Ortega F98.8 314.00 dextroamphetamine-amphetamine (ADDERALL) 10 mg tablet      dextroamphetamine-amphetamine (ADDERALL) 10 mg tablet      dextroamphetamine-amphetamine (ADDERALL) 10 mg tablet   3. DDD (degenerative disc disease), cervical M50.30 722.4 oxyCODONE-acetaminophen (PERCOCET 10)  mg per tablet      oxyCODONE-acetaminophen (PERCOCET 10)  mg per tablet      oxyCODONE-acetaminophen (PERCOCET 10)  mg per tablet   4. Facet arthritis of lumbar region [facility]) M46.96 721.3      Diagnoses and all orders for this visit:    1. Bipolar disorder with moderate depression ([facility]  -     LITHIUM  -     METABOLIC PANEL, BASIC    2. 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 Darren Ortega  -     dextroamphetamine-amphetamine (ADDERALL) 10 mg tablet; Take 1 Tab (10 mg total) by mouth See Admin InstructionsEarliest Fill Date: 11/27/17.  2 in am and 1-2 in afternoon  -     dextroamphetamine-amphetamine (ADDERALL) 10 mg tablet; Earliest Fill Date: 12/27/17. TAKE 2 TABS PO Q AM AND 1-2 TABS IN AFTERNOON  -     dextroamphetamine-amphetamine (ADDERALL) 10 mg tablet; Take 1 Tab (10 mg total) by mouth See Admin InstructionsEarliest Fill Date: 1/26/18.  2 in am and 1-2 in afternoon    3. DDD (degenerative disc disease), cervical  -     oxyCODONE-acetaminophen (PERCOCET 10)  mg per tablet; Take 1 Tab by mouth every six (6) hours as needed for Pain. 50 is a 30 day supply  -     oxyCODONE-acetaminophen (PERCOCET 10)  mg per tablet; Take 1 Tab by mouth every six (6) hours as needed for Pain. Max Daily Amount: 4 Tabs. 50 IS A 30 DAY SUPPLY  -     oxyCODONE-acetaminophen (PERCOCET 10)  mg per tablet; Take 1 Tab by mouth every six (6) hours as needed for Pain. 50 is a 30 day supply    4.  Facet arthritis of lumbar region [facility])  Assessment & Plan:    Lab Results Component Value Date/Time    WBC 9.1 08/28/2017 11:54 AM    HGB 13.0 08/28/2017 11:54 AM    HCT 39.1 08/28/2017 11:54 AM    PLATELET 915 20/73/2554 11:54 AM    Creatinine 0.74 08/28/2017 11:55 AM    BUN 10 08/28/2017 11:55 AM    Potassium 4.8 08/28/2017 11:55 AM         Other orders  -     azithromycin (ZITHROMAX) 250 mg tablet; Take 2 tablets today, then take 1 tablet daily    Follow-up Disposition:  Return if URI symptoms worsen or fail to improve, for 3 month F/U chronic controlled substance use, ADD 3 month follow up. reviewed medications and side effects in detail  Please call my office if there are any questions- [phone]. Discussed expected course/resolution/complications of diagnosis in detail with patient. Medication risks/benefits/costs/interactions/alternatives discussed with patient. Pt was given an after visit summary which includes diagnoses, current medications & vitals. Pt expressed understanding with the diagnosis and plan. Total 25 minutes,60 % counseling re: Patient to call if no better in 3 -4 days and prn new problems. Probably viral illness; encouraged to treat symptomatically, salt water, chloraseptic, ect. If no better in 48 to 72 hours to try the [address] For her bipolar disease she is receiving counseling but not from a psychiatrist encouraged her to locate one as long term they should be following the lithium, not myself- I have encouraged her to do this ever since I gave her the Lithium( only given because she thought it might work for her presumed bipolar disease, but with the understanding she would make an appt an keep it even if it was 3-4 months away. . Did check lithium level today as well as electrolytes. Refilled ADD medications and chronic pain medications. Controlled substance agreement in chart.  checked.  checked and found to have no suspicious activity. Signed and agreed to the controlled substance agreement in the past year; copy is in the chart. Also, discussed symptoms of concern that were noted today in the note above, treatment options( including doing nothing), when to follow up before recommended time frame. Also, answered all questions. Written by David Rogel, as dictated by, Marino Weiss MD.   I have reviewed and agree with the above note and have made corrections where appropriate Mark C. Autry Heimlich M.D.

## 2017-12-02 DIAGNOSIS — F42.9 OBSESSIVE-COMPULSIVE DISORDER: ICD-10-CM

## 2017-12-02 DIAGNOSIS — F41.8 DEPRESSION WITH ANXIETY: ICD-10-CM

## 2017-12-03 PROBLEM — M47.816 FACET ARTHRITIS OF LUMBAR REGION: Status: ACTIVE | Noted: 2017-12-03

## 2017-12-03 NOTE — ASSESSMENT & PLAN NOTE
Lab Results   Component Value Date/Time    WBC 9.1 08/28/2017 11:54 AM    HGB 13.0 08/28/2017 11:54 AM    HCT 39.1 08/28/2017 11:54 AM    PLATELET 692 13/83/1257 11:54 AM    Creatinine 0.74 08/28/2017 11:55 AM    BUN 10 08/28/2017 11:55 AM    Potassium 4.8 08/28/2017 11:55 AM

## 2017-12-04 DIAGNOSIS — G43.109 MIGRAINE WITH AURA AND WITHOUT STATUS MIGRAINOSUS, NOT INTRACTABLE: ICD-10-CM

## 2017-12-04 RX ORDER — METOPROLOL SUCCINATE 25 MG/1
25 TABLET, EXTENDED RELEASE ORAL DAILY
Qty: 90 TAB | Refills: 0 | Status: SHIPPED | OUTPATIENT
Start: 2017-12-04 | End: 2019-05-26 | Stop reason: ALTCHOICE

## 2017-12-04 RX ORDER — ESCITALOPRAM OXALATE 20 MG/1
TABLET ORAL
Qty: 90 TAB | Refills: 3 | Status: SHIPPED | OUTPATIENT
Start: 2017-12-04 | End: 2019-08-22 | Stop reason: ALTCHOICE

## 2017-12-04 NOTE — TELEPHONE ENCOUNTER
Pharmacy on file verified. Pharmacy sent a fax  90 days supply  Requested Prescriptions     Pending Prescriptions Disp Refills    metoprolol succinate (TOPROL-XL) 25 mg XL tablet 90 Tab 3     Sig: Take 1 Tab by mouth daily.  Indications: MIGRAINE PREVENTION

## 2017-12-29 NOTE — PROGRESS NOTES
Dexa is normal-- done because \"osteopenia \" on June 2016 LS spine.- No need to repeat until menopausal

## 2018-01-10 DIAGNOSIS — F41.8 DEPRESSION WITH ANXIETY: ICD-10-CM

## 2018-01-11 RX ORDER — ALPRAZOLAM 0.25 MG/1
TABLET ORAL
Qty: 25 TAB | Refills: 0 | Status: SHIPPED | OUTPATIENT
Start: 2018-01-11 | End: 2018-01-31 | Stop reason: SDUPTHER

## 2018-02-27 ENCOUNTER — OFFICE VISIT (OUTPATIENT)
Dept: FAMILY MEDICINE CLINIC | Age: 43
End: 2018-02-27

## 2018-02-27 VITALS
WEIGHT: 187.8 LBS | RESPIRATION RATE: 18 BRPM | SYSTOLIC BLOOD PRESSURE: 135 MMHG | HEIGHT: 64 IN | DIASTOLIC BLOOD PRESSURE: 80 MMHG | BODY MASS INDEX: 32.06 KG/M2 | OXYGEN SATURATION: 100 % | HEART RATE: 81 BPM | TEMPERATURE: 99 F

## 2018-02-27 DIAGNOSIS — M50.30 DDD (DEGENERATIVE DISC DISEASE), CERVICAL: ICD-10-CM

## 2018-02-27 DIAGNOSIS — M47.816 FACET ARTHRITIS OF LUMBAR REGION: ICD-10-CM

## 2018-02-27 DIAGNOSIS — F31.32 BIPOLAR 1 DISORDER, DEPRESSED, MODERATE (HCC): ICD-10-CM

## 2018-02-27 DIAGNOSIS — F98.8 ADD (ATTENTION DEFICIT DISORDER) WITHOUT HYPERACTIVITY: Chronic | ICD-10-CM

## 2018-02-27 DIAGNOSIS — F41.8 DEPRESSION WITH ANXIETY: ICD-10-CM

## 2018-02-27 RX ORDER — DEXTROAMPHETAMINE SACCHARATE, AMPHETAMINE ASPARTATE, DEXTROAMPHETAMINE SULFATE AND AMPHETAMINE SULFATE 2.5; 2.5; 2.5; 2.5 MG/1; MG/1; MG/1; MG/1
10 TABLET ORAL SEE ADMIN INSTRUCTIONS
Qty: 120 TAB | Refills: 0 | Status: SHIPPED | OUTPATIENT
Start: 2018-04-28 | End: 2018-05-22 | Stop reason: SDUPTHER

## 2018-02-27 RX ORDER — LITHIUM CARBONATE 300 MG/1
300 CAPSULE ORAL 2 TIMES DAILY WITH MEALS
Qty: 60 CAP | Refills: 5 | Status: SHIPPED | OUTPATIENT
Start: 2018-02-27 | End: 2018-05-29 | Stop reason: SDUPTHER

## 2018-02-27 RX ORDER — OXYCODONE AND ACETAMINOPHEN 10; 325 MG/1; MG/1
1 TABLET ORAL
Qty: 50 TAB | Refills: 0 | Status: SHIPPED | OUTPATIENT
Start: 2018-02-27 | End: 2018-05-22 | Stop reason: SDUPTHER

## 2018-02-27 RX ORDER — OXYCODONE AND ACETAMINOPHEN 10; 325 MG/1; MG/1
1 TABLET ORAL
Qty: 50 TAB | Refills: 0 | Status: SHIPPED | OUTPATIENT
Start: 2018-04-28 | End: 2018-05-22 | Stop reason: SDUPTHER

## 2018-02-27 RX ORDER — DEXTROAMPHETAMINE SACCHARATE, AMPHETAMINE ASPARTATE, DEXTROAMPHETAMINE SULFATE AND AMPHETAMINE SULFATE 2.5; 2.5; 2.5; 2.5 MG/1; MG/1; MG/1; MG/1
TABLET ORAL
Qty: 120 TAB | Refills: 0 | Status: SHIPPED | OUTPATIENT
Start: 2018-03-29 | End: 2018-05-22 | Stop reason: SDUPTHER

## 2018-02-27 RX ORDER — ALPRAZOLAM 0.25 MG/1
TABLET ORAL
Qty: 25 TAB | Refills: 0 | Status: SHIPPED | OUTPATIENT
Start: 2018-02-27 | End: 2018-03-16 | Stop reason: SDUPTHER

## 2018-02-27 RX ORDER — OXYCODONE AND ACETAMINOPHEN 10; 325 MG/1; MG/1
1 TABLET ORAL
Qty: 50 TAB | Refills: 0 | Status: SHIPPED | OUTPATIENT
Start: 2018-03-29 | End: 2018-05-22 | Stop reason: SDUPTHER

## 2018-02-27 RX ORDER — RIZATRIPTAN BENZOATE 10 MG/1
TABLET, ORALLY DISINTEGRATING ORAL
Qty: 15 TAB | Refills: 2 | Status: SHIPPED | OUTPATIENT
Start: 2018-02-27 | End: 2018-04-11 | Stop reason: SDUPTHER

## 2018-02-27 RX ORDER — LITHIUM CARBONATE 300 MG/1
300 CAPSULE ORAL 2 TIMES DAILY WITH MEALS
Qty: 60 CAP | Refills: 2 | Status: SHIPPED | OUTPATIENT
Start: 2018-02-27 | End: 2018-02-27 | Stop reason: SDUPTHER

## 2018-02-27 RX ORDER — DEXTROAMPHETAMINE SACCHARATE, AMPHETAMINE ASPARTATE, DEXTROAMPHETAMINE SULFATE AND AMPHETAMINE SULFATE 2.5; 2.5; 2.5; 2.5 MG/1; MG/1; MG/1; MG/1
10 TABLET ORAL SEE ADMIN INSTRUCTIONS
Qty: 120 TAB | Refills: 0 | Status: SHIPPED | OUTPATIENT
Start: 2018-02-27 | End: 2018-05-22 | Stop reason: SDUPTHER

## 2018-02-27 NOTE — PROGRESS NOTES
HISTORY OF PRESENT ILLNESS  HPI  Shania Aguirre is a 40 y.o. female with history of migraines, DDD, ADD, insomnia, OCD, bipolar disorder, anxiety, and depression who presents to office today for medication refill. Pt notes that her prescription for lithium 300mg BID, which she takes for bipolar disorder, ran out soon after Summer. Pt notes that her Adderall ran out as well. She states that the Adderall helps her focus. Pt takes Percocet prn migraines and back pain. She began rizatriptan 10mg on 7/14 with some relief from migraines. She notes that, when she doesn't have the Percocet available, she typically goes to the ER for her migraines. Pt notes she is pleased with the therapist she is seeing twice a week, Haydee Funez. 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 Camila Cerrato 8/25/2016    Asthma     last attack 2 months ago    Bilateral carpal tunnel syndrome- R>>L 9/25/2016    Bipolar disorder with moderate depression ([address] 6/28/2017    Depression with anxiety 3/2/2010    Facet arthritis of lumbar region ([address] 12/3/2017    Insomnia     Lithium use 6/28/2017    Migraine 2/20/2010    Psychiatric disorder     Depression, anxiety    Psychophysiological insomnia 2/23/2016     Past Surgical History:   Procedure Laterality Date    HX APPENDECTOMY  1996    HX BREAST REDUCTION  2002    HX ORTHOPAEDIC  2003    cervical disc    HX ORTHOPAEDIC      second right hand digit fx with pins index     Current Outpatient Prescriptions on File Prior to Visit   Medication Sig Dispense Refill    escitalopram oxalate (LEXAPRO) 20 mg tablet TAKE 1 TABLET BY MOUTH EVERY DAY 90 Tab 3    metoprolol succinate (TOPROL-XL) 25 mg XL tablet Take 1 Tab by mouth daily. Indications: MIGRAINE PREVENTION 90 Tab 0    DM/PSEUDOEPHED/ACETAMINOPHEN (VICKS DAYQUIL PO) Take  by mouth.       acetaminophen (TYLENOL) 325 mg tablet Take  by mouth every four (4) hours as needed for Pain.  zolpidem (AMBIEN) 10 mg tablet TAKE 2 TABLETS BY MOUTH AT BEDTIME 60 Tab 5     No current facility-administered medications on file prior to visit. Allergies   Allergen Reactions    Pcn [Penicillins] Other (comments)     As a child     Family History   Problem Relation Age of Onset    Diabetes Father     Hypertension Father     Heart Attack Father     High Cholesterol Father     Diabetes Mother     High Cholesterol Mother      Social History     Social History    Marital status:      Spouse name: N/A    Number of children: N/A    Years of education: N/A     Social History Main Topics    Smoking status: Current Every Day Smoker     Packs/day: 1.50     Years: 18.00     Types: Cigarettes    Smokeless tobacco: Never Used    Alcohol use Yes      Comment: occasionally    Drug use: No    Sexual activity: Yes     Partners: Male     Birth control/ protection: Condom     Other Topics Concern    None     Social History Narrative                   Review of Systems   Constitutional: Negative for chills, diaphoresis, fever, malaise/fatigue and weight loss. Eyes: Negative for blurred vision, double vision, pain and redness. Respiratory: Negative for cough, shortness of breath and wheezing. Cardiovascular: Negative for chest pain, palpitations, orthopnea, claudication, leg swelling and PND. Skin: Negative for itching and rash. Neurological: Negative for dizziness, tingling, tremors, sensory change, speech change, focal weakness, seizures, loss of consciousness, weakness and headaches.      Results for orders placed or performed in visit on 08/28/17   LITHIUM   Result Value Ref Range    Lithium level 0.3 (L) 0.6 - 1.2 mmol/L   CBC W/O DIFF   Result Value Ref Range    WBC 9.1 3.4 - 10.8 x10E3/uL    RBC 4.27 3.77 - 5.28 x10E6/uL    HGB 13.0 11.1 - 15.9 g/dL    HCT 39.1 34.0 - 46.6 %    MCV 92 79 - 97 fL    MCH 30.4 26.6 - 33.0 pg    MCHC 33.2 31.5 - 35.7 g/dL    RDW 13.2 12.3 - 15.4 %    PLATELET 296 718 - 896 [id number]/JJ   METABOLIC PANEL, COMPREHENSIVE   Result Value Ref Range    Glucose 79 65 - 99 mg/dL    BUN 10 6 - 24 mg/dL    Creatinine 0.74 0.57 - 1.00 mg/dL    GFR est non- >59 mL/min/1.73    GFR est  >59 mL/min/1.73    BUN/Creatinine ratio 14 9 - 23    Sodium 139 134 - 144 mmol/L    Potassium 4.8 3.5 - 5.2 mmol/L    Chloride 102 96 - 106 mmol/L    CO2 22 18 - 29 mmol/L    Calcium 9.0 8.7 - 10.2 mg/dL    Protein, total 6.6 6.0 - 8.5 g/dL    Albumin 4.3 3.5 - 5.5 g/dL    GLOBULIN, TOTAL 2.3 1.5 - 4.5 g/dL    A-G Ratio 1.9 1.2 - 2.2    Bilirubin, total <0.2 0.0 - 1.2 mg/dL    Alk. phosphatase 65 39 - 117 IU/L    AST (SGOT) 14 0 - 40 IU/L    ALT (SGPT) 8 0 - 32 IU/L                 Physical Exam  Visit Vitals    /80 (BP 1 Location: Left arm, BP Patient Position: Sitting)    Pulse 81    Temp 99 °F (37.2 °C) (Oral)    Resp 18    Ht 5' 4\" (1.626 m)    Wt 187 lb 12.8 oz (85.2 kg)    SpO2 100%    BMI 32.24 kg/m2     Head: Normocephalic, without obvious abnormality, atraumatic  Eyes: conjunctivae/corneas clear. PERRL, EOM's intact. Neck: supple, symmetrical, trachea midline, no adenopathy, thyroid: not enlarged, symmetric, no tenderness/mass/nodules, no carotid bruit and no JVD  Lungs: clear to auscultation bilaterally  Heart: regular rate and rhythm, S1, S2 normal, no murmur, click, rub or gallop  Extremities: extremities normal, atraumatic, no cyanosis or edema  Pulses: 2+ and symmetric  Lymph nodes: Cervical, supraclavicular, and axillary nodes normal.  Neurologic: Grossly normal                ASSESSMENT and PLAN    ICD-10-CM ICD-9-CM    1. 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 Melchor Mary F98.8 314.00 dextroamphetamine-amphetamine (ADDERALL) 10 mg tablet      dextroamphetamine-amphetamine (ADDERALL) 10 mg tablet      dextroamphetamine-amphetamine (ADDERALL) 10 mg tablet   2.  Bipolar 1 disorder, depressed, moderate (HCC) F31.32 296.52 CBC W/O DIFF      METABOLIC PANEL, COMPREHENSIVE      LITHIUM      lithium carbonate 300 mg capsule      DISCONTINUED: lithium carbonate 300 mg capsule   3. DDD (degenerative disc disease), cervical M50.30 722.4 oxyCODONE-acetaminophen (PERCOCET 10)  mg per tablet      oxyCODONE-acetaminophen (PERCOCET 10)  mg per tablet      oxyCODONE-acetaminophen (PERCOCET 10)  mg per tablet   4. Depression with anxiety F41.8 300.4 ALPRAZolam (XANAX) 0.25 mg tablet   5. Facet arthritis of lumbar region [facility]) M46.96 721.3      Diagnoses and all orders for this visit:    1. 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 Jian De Leon  -     dextroamphetamine-amphetamine (ADDERALL) 10 mg tablet; Take 1 Tab (10 mg total) by mouth See Admin InstructionsEarliest Fill Date: 4/28/18.  2 in am and 1-2 in afternoon  -     dextroamphetamine-amphetamine (ADDERALL) 10 mg tablet; Earliest Fill Date: 3/29/18. TAKE 2 TABS PO Q AM AND 1-2 TABS IN AFTERNOON  -     dextroamphetamine-amphetamine (ADDERALL) 10 mg tablet; Take 1 Tab (10 mg total) by mouth See Admin InstructionsEarliest Fill Date: 2/27/18.  2 in am and 1-2 in afternoon    2. Bipolar 1 disorder, depressed, moderate (HCC)  -     CBC W/O DIFF  -     METABOLIC PANEL, COMPREHENSIVE  -     LITHIUM  -     lithium carbonate 300 mg capsule; Take 1 Cap by mouth two (2) times daily (with meals). 3. DDD (degenerative disc disease), cervical  -     oxyCODONE-acetaminophen (PERCOCET 10)  mg per tablet; Take 1 Tab by mouth every six (6) hours as needed for Pain. 50 is a 30 day supply  -     oxyCODONE-acetaminophen (PERCOCET 10)  mg per tablet; Take 1 Tab by mouth every six (6) hours as needed for Pain. Max Daily Amount: 4 Tabs. 50 IS A 30 DAY SUPPLY  -     oxyCODONE-acetaminophen (PERCOCET 10)  mg per tablet; Take 1 Tab by mouth every six (6) hours as needed for Pain.  50 is a 30 day supply    4. Depression with anxiety  -     ALPRAZolam (XANAX) 0.25 mg tablet; TAKE 1/2 TO 1 TAB BY MOUTH TWICE A DAY    5. Facet arthritis of lumbar region [facility])  Assessment & Plan:  Well Controlled, based on history, physical exam and review of pertinent labs, studies and medications; meds reconciled; continue current treatment plan, lifestyle modifications recommended, medication compliance emphasized. Lab Results   Component Value Date/Time    WBC 9.1 08/28/2017 11:54 AM    HGB 13.0 08/28/2017 11:54 AM    HCT 39.1 08/28/2017 11:54 AM    PLATELET 099 81/29/8478 11:54 AM    Creatinine 0.74 08/28/2017 11:55 AM    BUN 10 08/28/2017 11:55 AM    Potassium 4.8 08/28/2017 11:55 AM         Other orders  -     rizatriptan (MAXALT-MLT) 10 mg disintegrating tablet; TAKE 1 TABLET BY MOUTH ONCE AS NEEDED FOR MIGRAINE FOR UP TO 1 DOSE. Follow-up Disposition:  Return in about 3 months (around 5/27/2018), or worsening bipolar or depression, for f/u bpolar disease,, ADD 3 month follow up, 3 month F/U chronic controlled substance use. lab results and schedule of future lab studies reviewed with patient  reviewed diet, exercise and weight control  cardiovascular risk and specific lipid/LDL goals reviewed  reviewed medications and side effects in detail  Please call my office if there are any questions- [phone]. I will arrange for follow up after the lab tests done from today return    Call for refills on medications as needed. Discussed expected course/resolution/complications of diagnosis in detail with patient. Medication risks/benefits/costs/interactions/alternatives discussed with patient. Pt was given an after visit summary which includes diagnoses, current medications & vitals. Pt expressed understanding with the diagnosis and plan. Total 25 minutes,60 % counseling re: She'll return for lab work after about a month when she gets back on her medications.  I stressed to the pt that some medicines have to be monitored closely because of risks, and lithium is one of those. We usually monitor blood work every 3 months for that. Also, starting and stopping lithium can aggravate and/or cause an exacerbation of her bipolar disease. Continue the regular counseling Seeing Jean Leonardo- feels it is really helping. Signed and agreed to the controlled substance agreement in the past year; copy is in the chart.  checked and found to have no suspicious activity. Refilled her Maxalt that she finds works for the migraine headaches, not as much for the stress headaches. Also, discussed symptoms of concern that were noted today in the note above, treatment options( including doing nothing), when to follow up before recommended time frame. Also, answered all questions. This document was written by Geovany Batres, as dictated by Cayla Zaman MD.  I have reviewed and agree with the above note and have made corrections where appropriate Clarke Allen M.D.

## 2018-02-27 NOTE — MR AVS SNAPSHOT
[address] [facility] 
 
 
 [address] [place] [address] 
[phone] Patient: Nadine Lugo MRN: [id number] AYQ:8/1/2009 Visit Information Date & Time Provider Department Dept. Phone Encounter #  
 2/27/2018 10:15 AM James Godoy  [facility] [phone] [id number] Your Appointments 5/22/2018 10:15 AM  
SAME DAY with James Godoy MD  
[facility]) Appt Note: 3 month med check [address] [address]  
[phone]  
  
   
 [address] [address] Upcoming Health Maintenance Date Due  
 PAP AKA CERVICAL CYTOLOGY 8/28/2020 DTaP/Tdap/Td series (2 - Td) 6/6/2026 Allergies as of 2/27/2018  Review Complete On: 2/27/2018 By: Hamzah Agosto LPN Severity Noted Reaction Type Reactions Pcn [Penicillins]  02/20/2010    Other (comments) As a child Current Immunizations  Never Reviewed Name Date Influenza Vaccine (Quad) PF 9/23/2016 Not reviewed this visit You Were Diagnosed With   
  
 Codes Comments ADD (attention deficit disorder) without hyperactivity     ICD-10-CM: F98.8 ICD-9-CM: 314.00   
 DDD (degenerative disc disease), cervical     ICD-10-CM: M50.30 ICD-9-CM: 722.4 Depression with anxiety     ICD-10-CM: F41.8 ICD-9-CM: 300.4 Bipolar 1 disorder, depressed, moderate (HCC)     ICD-10-CM: F31.32 
ICD-9-CM: 296.52 Vitals BP Pulse Temp Resp Height(growth percentile) Weight(growth percentile) 135/80 (BP 1 Location: Left arm, BP Patient Position: Sitting) 81 99 °F (37.2 °C) (Oral) 18 5' 4\" (1.626 m) 187 lb 12.8 oz (85.2 kg) SpO2 BMI OB Status Smoking Status 100% 32.24 kg/m2 Having regular periods Current Every Day Smoker Vitals History BMI and BSA Data Body Mass Index Body Surface Area  
 32.24 kg/m 2 1.96 m 2 Preferred Pharmacy Pharmacy Name Phone [id number]- [place], [place] - [address] [phone] Your Updated Medication List  
  
   
This list is accurate as of 2/27/18 11:22 AM.  Always use your most recent med list.  
  
  
  
  
 ALPRAZolam 0.25 mg tablet Commonly known as:  XANAX  
TAKE 1/2 TO 1 TAB BY MOUTH TWICE A DAY  
  
 * dextroamphetamine-amphetamine 10 mg tablet Commonly known as:  ADDERALL Take 1 Tab (10 mg total) by mouth See Admin InstructionsEarliest Fill Date: 2/27/18.  2 in am and 1-2 in afternoon * dextroamphetamine-amphetamine 10 mg tablet Commonly known as:  ADDERALL Earliest Fill Date: 3/29/18. TAKE 2 TABS PO Q AM AND 1-2 TABS IN AFTERNOON Start taking on:  3/29/2018 * dextroamphetamine-amphetamine 10 mg tablet Commonly known as:  ADDERALL Take 1 Tab (10 mg total) by mouth See Admin InstructionsEarliest Fill Date: 4/28/18.  2 in am and 1-2 in afternoon Start taking on:  4/28/2018  
  
 escitalopram oxalate 20 mg tablet Commonly known as:  Ky Lemmings TAKE 1 TABLET BY MOUTH EVERY DAY  
  
 lithium carbonate 300 mg capsule Take 1 Cap by mouth two (2) times daily (with meals). metoprolol succinate 25 mg XL tablet Commonly known as:  TOPROL-XL Take 1 Tab by mouth daily. Indications: MIGRAINE PREVENTION  
  
 * oxyCODONE-acetaminophen  mg per tablet Commonly known as:  PERCOCET 10 Take 1 Tab by mouth every six (6) hours as needed for Pain. 50 is a 30 day supply * oxyCODONE-acetaminophen  mg per tablet Commonly known as:  PERCOCET 10 Take 1 Tab by mouth every six (6) hours as needed for Pain. Max Daily Amount: 4 Tabs. 50 IS A 30 DAY SUPPLY Start taking on:  3/29/2018 * oxyCODONE-acetaminophen  mg per tablet Commonly known as:  PERCOCET 10 Take 1 Tab by mouth every six (6) hours as needed for Pain. 50 is a 30 day supply Start taking on:  4/28/2018 rizatriptan 10 mg disintegrating tablet Commonly known as:  MAXALT-MLT  
TAKE 1 TABLET BY MOUTH ONCE AS NEEDED FOR MIGRAINE FOR UP TO 1 DOSE. TYLENOL 325 mg tablet Generic drug:  acetaminophen Take  by mouth every four (4) hours as needed for Pain. VICKS DAYQUIL PO Take  by mouth.  
  
 zolpidem 10 mg tablet Commonly known as:  AMBIEN  
TAKE 2 TABLETS BY MOUTH AT BEDTIME  
  
 * Notice: This list has 6 medication(s) that are the same as other medications prescribed for you. Read the directions carefully, and ask your doctor or other care provider to review them with you. Prescriptions Printed Refills  
 dextroamphetamine-amphetamine (ADDERALL) 10 mg tablet 0 Starting on: 4/28/2018 Sig: Take 1 Tab (10 mg total) by mouth See Admin InstructionsEarliest Fill Date: 4/28/18.  2 in am and 1-2 in afternoon Class: Print Route: Oral  
 dextroamphetamine-amphetamine (ADDERALL) 10 mg tablet 0 Starting on: 3/29/2018 Sig: Earliest Valery Chase Date: 3/29/18. TAKE 2 TABS PO Q AM AND 1-2 TABS IN AFTERNOON Class: Print  
 dextroamphetamine-amphetamine (ADDERALL) 10 mg tablet 0 Sig: Take 1 Tab (10 mg total) by mouth See Admin InstructionsEarliest Fill Date: 2/27/18.  2 in am and 1-2 in afternoon Class: Print Route: Oral  
 oxyCODONE-acetaminophen (PERCOCET 10)  mg per tablet 0 Sig: Take 1 Tab by mouth every six (6) hours as needed for Pain. 50 is a 30 day supply Class: Print Route: Oral  
 oxyCODONE-acetaminophen (PERCOCET 10)  mg per tablet 0 Starting on: 3/29/2018 Sig: Take 1 Tab by mouth every six (6) hours as needed for Pain. Max Daily Amount: 4 Tabs. 50 IS A 30 DAY SUPPLY Class: Print Route: Oral  
 oxyCODONE-acetaminophen (PERCOCET 10)  mg per tablet 0 Starting on: 4/28/2018 Sig: Take 1 Tab by mouth every six (6) hours as needed for Pain. 50 is a 30 day supply Class: Print  Route: Oral  
 ALPRAZolam (XANAX) 0.25 mg tablet 0 Sig: TAKE 1/2 TO 1 TAB BY MOUTH TWICE A DAY Class: Print  
 rizatriptan (MAXALT-MLT) 10 mg disintegrating tablet 2 Sig: TAKE 1 TABLET BY MOUTH ONCE AS NEEDED FOR MIGRAINE FOR UP TO 1 DOSE. Class: Print  
 lithium carbonate 300 mg capsule 5 Sig: Take 1 Cap by mouth two (2) times daily (with meals). Class: Print Route: Oral  
  
We Performed the Following CBC W/O DIFF [80349 CPT(R)] LITHIUM [id number] CPT(R)] METABOLIC PANEL, COMPREHENSIVE [65008 CPT(R)] Introducing \[facility]\"" & HEALTH SERVICES! Rosaura Montague introduces [email or web address] patient portal. Now you can access parts of your medical record, email your doctor's office, and request medication refills online. 1. In your internet browser, go to [email or web address] 2. Click on the First Time User? Click Here link in the Sign In box. You will see the New Member Sign Up page. 3. Enter your [employer] Access Code exactly as it appears below. You will not need to use this code after youve completed the sign-up process. If you do not sign up before the expiration date, you must request a new code. · [email or web address] Access Code: [email or web address] Expires: 5/28/2018 11:22 AM 
 
4. Enter the last four digits of your Social Security Number (xxxx) and Date of Birth (mm/dd/yyyy) as indicated and click Submit. You will be taken to the next sign-up page. 5. Create a Introhivet ID. This will be your [email or web address] login ID and cannot be changed, so think of one that is secure and easy to remember. 6. Create a [employer] password. You can change your password at any time. 7. Enter your Password Reset Question and Answer. This can be used at a later time if you forget your password. 8. Enter your e-mail address. You will receive e-mail notification when new information is available in [address] 9. Click Sign Up. You can now view and download portions of your medical record. 10. Click the Download Summary menu link to download a portable copy of your medical information. If you have questions, please visit the Frequently Asked Questions section of the [facility] website. Remember, [facility] is NOT to be used for urgent needs. For medical emergencies, dial 911. Now available from your iPhone and Android! Please provide this summary of care documentation to your next provider. Your primary care clinician is listed as [address] [address], [facility]s . If you have any questions after today's visit, please call [phone].

## 2018-02-27 NOTE — PROGRESS NOTES
Chief Complaint   Patient presents with    Medication Evaluation    Attention Deficit Disorder    Bipolar     1. Have you been to the ER, urgent care clinic since your last visit? Hospitalized since your last visit? No    2. Have you seen or consulted any other health care providers outside of the [address] [place] [address] since your last visit? Include any pap smears or colon screening.  No

## 2018-02-28 NOTE — ASSESSMENT & PLAN NOTE
Well Controlled, based on history, physical exam and review of pertinent labs, studies and medications; meds reconciled; continue current treatment plan, lifestyle modifications recommended, medication compliance emphasized.   Lab Results   Component Value Date/Time    WBC 9.1 08/28/2017 11:54 AM    HGB 13.0 08/28/2017 11:54 AM    HCT 39.1 08/28/2017 11:54 AM    PLATELET 573 73/02/9999 11:54 AM    Creatinine 0.74 08/28/2017 11:55 AM    BUN 10 08/28/2017 11:55 AM    Potassium 4.8 08/28/2017 11:55 AM

## 2018-03-19 ENCOUNTER — TELEPHONE (OUTPATIENT)
Dept: FAMILY MEDICINE CLINIC | Age: 43
End: 2018-03-19

## 2018-03-19 NOTE — TELEPHONE ENCOUNTER
----- Message from Vipin Gonzalez sent at 3/16/2018  5:22 PM EDT -----  Regarding: Dr. Braulio Park  Pt is returning call to practice, best contact number [phone].

## 2018-03-19 NOTE — TELEPHONE ENCOUNTER
----- Message from Nadine Lemus sent at 3/17/2018 11:19 AM EDT -----  Regarding: /Refill  Pt requesting if Rx\" Carlos Bueno was sent to pharmacy    [phone]

## 2018-03-28 LAB
ALBUMIN SERPL-MCNC: 4.7 G/DL (ref 3.5–5.5)
ALBUMIN/GLOB SERPL: 1.7 {RATIO} (ref 1.2–2.2)
ALP SERPL-CCNC: 81 IU/L (ref 39–117)
ALT SERPL-CCNC: 14 IU/L (ref 0–32)
AST SERPL-CCNC: 15 IU/L (ref 0–40)
BILIRUB SERPL-MCNC: <0.2 MG/DL (ref 0–1.2)
BUN SERPL-MCNC: 18 MG/DL (ref 6–24)
BUN/CREAT SERPL: 28 (ref 9–23)
CALCIUM SERPL-MCNC: 9.3 MG/DL (ref 8.7–10.2)
CHLORIDE SERPL-SCNC: 101 MMOL/L (ref 96–106)
CO2 SERPL-SCNC: 21 MMOL/L (ref 18–29)
CREAT SERPL-MCNC: 0.64 MG/DL (ref 0.57–1)
ERYTHROCYTE [DISTWIDTH] IN BLOOD BY AUTOMATED COUNT: 13.7 % (ref 12.3–15.4)
GFR SERPLBLD CREATININE-BSD FMLA CKD-EPI: 111 ML/MIN/1.73
GFR SERPLBLD CREATININE-BSD FMLA CKD-EPI: 128 ML/MIN/1.73
GLOBULIN SER CALC-MCNC: 2.8 G/DL (ref 1.5–4.5)
GLUCOSE SERPL-MCNC: 84 MG/DL (ref 65–99)
HCT VFR BLD AUTO: 43.6 % (ref 34–46.6)
HGB BLD-MCNC: 14.2 G/DL (ref 11.1–15.9)
LITHIUM SERPL-SCNC: 0.4 MMOL/L (ref 0.6–1.2)
MCH RBC QN AUTO: 30.3 PG (ref 26.6–33)
MCHC RBC AUTO-ENTMCNC: 32.6 G/DL (ref 31.5–35.7)
MCV RBC AUTO: 93 FL (ref 79–97)
PLATELET # BLD AUTO: 286 X10E3/UL (ref 150–379)
POTASSIUM SERPL-SCNC: 4.5 MMOL/L (ref 3.5–5.2)
PROT SERPL-MCNC: 7.5 G/DL (ref 6–8.5)
RBC # BLD AUTO: 4.68 X10E6/UL (ref 3.77–5.28)
SODIUM SERPL-SCNC: 140 MMOL/L (ref 134–144)
WBC # BLD AUTO: 14.5 X10E3/UL (ref 3.4–10.8)

## 2018-04-11 DIAGNOSIS — F41.8 DEPRESSION WITH ANXIETY: ICD-10-CM

## 2018-04-11 DIAGNOSIS — F31.32 BIPOLAR DISORDER WITH MODERATE DEPRESSION (HCC): Primary | ICD-10-CM

## 2018-04-11 RX ORDER — RIZATRIPTAN BENZOATE 10 MG/1
10 TABLET, ORALLY DISINTEGRATING ORAL
Qty: 15 TAB | Refills: 5 | Status: SHIPPED | OUTPATIENT
Start: 2018-04-11 | End: 2018-08-26 | Stop reason: ALTCHOICE

## 2018-04-11 RX ORDER — ALPRAZOLAM 0.25 MG/1
TABLET ORAL
Qty: 30 TAB | Refills: 3 | Status: SHIPPED | OUTPATIENT
Start: 2018-04-11 | End: 2018-08-08 | Stop reason: SDUPTHER

## 2018-04-11 NOTE — LETTER
6/22/2018 10:22 AM 
 
Ms. Tio Vasquez [address] [facility] [address] Dear Tio Vasquez: 
 
Please find your most recent results below. Resulted Orders LITHIUM Result Value Ref Range Lithium level 0.3 (L) 0.6 - 1.2 mmol/L Comment:  
                                    Detection Limit = 0.1 
                          <0.1 indicates None Detected Narrative Performed at:  [address]  [id number] : Salty Sauer MD, Phone:  [phone] RECOMMENDATIONS: 
Lithium level slightly worse- check on what dose she is actually taking and I will make a change to get her level up to therapeutic. Please call me if you have any questions: [phone] Sincerely, 
 
 
Aminah Garcia MD

## 2018-04-11 NOTE — TELEPHONE ENCOUNTER
#25 is lasting just short of 25 days; I want to give her #30 and it must last 30 days to make it easier to monitor. Please let her know. Also, recent labs are normal except low lithium level. If getting BID regularly as on Rx, .have her increase to 2 in AM and 1 in PM on M&F and remain 1 BID the other days.  Recheck level in 3-4 weeks

## 2018-04-12 NOTE — TELEPHONE ENCOUNTER
Patient informed of note. rx called in . Patient states she didn't take her lithium on vacation but she is taking it regularly now.  Will check level in 1 month

## 2018-05-22 ENCOUNTER — OFFICE VISIT (OUTPATIENT)
Dept: FAMILY MEDICINE CLINIC | Age: 43
End: 2018-05-22

## 2018-05-22 VITALS
SYSTOLIC BLOOD PRESSURE: 122 MMHG | OXYGEN SATURATION: 98 % | DIASTOLIC BLOOD PRESSURE: 78 MMHG | TEMPERATURE: 98.4 F | WEIGHT: 178 LBS | BODY MASS INDEX: 30.39 KG/M2 | RESPIRATION RATE: 18 BRPM | HEIGHT: 64 IN | HEART RATE: 88 BPM

## 2018-05-22 DIAGNOSIS — M50.30 DDD (DEGENERATIVE DISC DISEASE), CERVICAL: ICD-10-CM

## 2018-05-22 DIAGNOSIS — M51.36 DDD (DEGENERATIVE DISC DISEASE), LUMBAR: ICD-10-CM

## 2018-05-22 DIAGNOSIS — F51.04 PSYCHOPHYSIOLOGICAL INSOMNIA: ICD-10-CM

## 2018-05-22 DIAGNOSIS — M47.816 FACET ARTHRITIS OF LUMBAR REGION: ICD-10-CM

## 2018-05-22 DIAGNOSIS — G43.109 MIGRAINE WITH AURA AND WITHOUT STATUS MIGRAINOSUS, NOT INTRACTABLE: ICD-10-CM

## 2018-05-22 DIAGNOSIS — F41.8 DEPRESSION WITH ANXIETY: ICD-10-CM

## 2018-05-22 DIAGNOSIS — F31.32 BIPOLAR DISORDER WITH MODERATE DEPRESSION (HCC): Primary | ICD-10-CM

## 2018-05-22 DIAGNOSIS — F98.8 ADD (ATTENTION DEFICIT DISORDER) WITHOUT HYPERACTIVITY: Chronic | ICD-10-CM

## 2018-05-22 RX ORDER — LIDOCAINE 50 MG/G
PATCH TOPICAL
Qty: 15 EACH | Refills: 3 | Status: SHIPPED | OUTPATIENT
Start: 2018-05-22 | End: 2018-09-25 | Stop reason: SDUPTHER

## 2018-05-22 RX ORDER — DEXTROAMPHETAMINE SACCHARATE, AMPHETAMINE ASPARTATE, DEXTROAMPHETAMINE SULFATE AND AMPHETAMINE SULFATE 2.5; 2.5; 2.5; 2.5 MG/1; MG/1; MG/1; MG/1
10 TABLET ORAL SEE ADMIN INSTRUCTIONS
Qty: 120 TAB | Refills: 0 | Status: SHIPPED | OUTPATIENT
Start: 2018-07-27 | End: 2018-08-22 | Stop reason: SDUPTHER

## 2018-05-22 RX ORDER — DEXTROAMPHETAMINE SACCHARATE, AMPHETAMINE ASPARTATE, DEXTROAMPHETAMINE SULFATE AND AMPHETAMINE SULFATE 2.5; 2.5; 2.5; 2.5 MG/1; MG/1; MG/1; MG/1
TABLET ORAL
Qty: 120 TAB | Refills: 0 | Status: SHIPPED | OUTPATIENT
Start: 2018-06-27 | End: 2018-08-22 | Stop reason: SDUPTHER

## 2018-05-22 RX ORDER — OXYCODONE AND ACETAMINOPHEN 10; 325 MG/1; MG/1
1 TABLET ORAL
Qty: 50 TAB | Refills: 0 | Status: SHIPPED | OUTPATIENT
Start: 2018-05-28 | End: 2018-08-22 | Stop reason: SDUPTHER

## 2018-05-22 RX ORDER — OXYCODONE AND ACETAMINOPHEN 10; 325 MG/1; MG/1
1 TABLET ORAL
Qty: 50 TAB | Refills: 0 | Status: SHIPPED | OUTPATIENT
Start: 2018-06-27 | End: 2018-08-22 | Stop reason: SDUPTHER

## 2018-05-22 RX ORDER — DEXTROAMPHETAMINE SACCHARATE, AMPHETAMINE ASPARTATE, DEXTROAMPHETAMINE SULFATE AND AMPHETAMINE SULFATE 2.5; 2.5; 2.5; 2.5 MG/1; MG/1; MG/1; MG/1
10 TABLET ORAL SEE ADMIN INSTRUCTIONS
Qty: 120 TAB | Refills: 0 | Status: SHIPPED | OUTPATIENT
Start: 2018-05-28 | End: 2018-08-22 | Stop reason: SDUPTHER

## 2018-05-22 RX ORDER — OXYCODONE AND ACETAMINOPHEN 10; 325 MG/1; MG/1
1 TABLET ORAL
Qty: 50 TAB | Refills: 0 | Status: SHIPPED | OUTPATIENT
Start: 2018-07-27 | End: 2018-08-22 | Stop reason: SDUPTHER

## 2018-05-22 NOTE — PROGRESS NOTES
HISTORY OF PRESENT ILLNESS  HPI  Monserrat Vidales is a 40 y.o. Female with a history of migraines, DDD, ADD, depression with anxiety, OCD and bipolar disorder with moderate depression, who presents at the office today for a follow up on her Adderall and oxycodone. Pt is taking her lithium and Prozac regularly and notes that she does feel better since starting on it. Pt denies unusual SOB, chest pain, and any recent ER visits or hospitalizations. Pt takes Excedrin or rizatriptan prn depending on the level of pain of her migraine. Pt notes that the rizatriptan does work better than the Excedrin, but it does not always work. 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 Stinson Spindle 8/25/2016    Asthma     last attack 2 months ago    Bilateral carpal tunnel syndrome- R>>L 9/25/2016    Bipolar disorder with moderate depression ([address] 6/28/2017    Depression with anxiety 3/2/2010    Facet arthritis of lumbar region ([facility] [address] 12/3/2017    Insomnia     Lithium use 6/28/2017    Migraine 2/20/2010    Psychiatric disorder     Depression, anxiety    Psychophysiological insomnia 2/23/2016     Past Surgical History:   Procedure Laterality Date    HX APPENDECTOMY  1996    HX BREAST REDUCTION  2002    HX ORTHOPAEDIC  2003    cervical disc    HX ORTHOPAEDIC      second right hand digit fx with pins index     Current Outpatient Prescriptions on File Prior to Visit   Medication Sig Dispense Refill    ALPRAZolam (XANAX) 0.25 mg tablet TAKE 1/2 TO 1 TABLET BY MOUTH TWICE A DAY AS NEEDED FOR ANXIETY 30 Tab 3    rizatriptan (MAXALT-MLT) 10 mg disintegrating tablet Take 1 Tab by mouth once as needed for Migraine for up to 1 dose. May repeat in 2 hrs x1 if headache no better.  15 Tab 5    zolpidem (AMBIEN) 10 mg tablet TAKE 2 TABLETS BY MOUTH AT BEDTIME 60 Tab 2    lithium carbonate 300 mg capsule Take 1 Cap by mouth two (2) times daily (with meals). 60 Cap 5    escitalopram oxalate (LEXAPRO) 20 mg tablet TAKE 1 TABLET BY MOUTH EVERY DAY 90 Tab 3    metoprolol succinate (TOPROL-XL) 25 mg XL tablet Take 1 Tab by mouth daily. Indications: MIGRAINE PREVENTION 90 Tab 0    acetaminophen (TYLENOL) 325 mg tablet Take  by mouth every four (4) hours as needed for Pain.  DM/PSEUDOEPHED/ACETAMINOPHEN (VICKS DAYQUIL PO) Take  by mouth. No current facility-administered medications on file prior to visit. Allergies   Allergen Reactions    Pcn [Penicillins] Other (comments)     As a child     Family History   Problem Relation Age of Onset    Diabetes Father     Hypertension Father     Heart Attack Father     High Cholesterol Father     Diabetes Mother     High Cholesterol Mother      Social History     Social History    Marital status:      Spouse name: N/A    Number of children: N/A    Years of education: N/A     Social History Main Topics    Smoking status: Current Every Day Smoker     Packs/day: 1.50     Years: 18.00     Types: Cigarettes    Smokeless tobacco: Never Used    Alcohol use Yes      Comment: occasionally    Drug use: No    Sexual activity: Yes     Partners: Male     Birth control/ protection: Condom     Other Topics Concern    None     Social History Narrative             Review of Systems   Constitutional: Negative for chills, diaphoresis, fever, malaise/fatigue and weight loss. Eyes: Negative for blurred vision, double vision, pain and redness. Respiratory: Negative for cough, shortness of breath and wheezing. Cardiovascular: Negative for chest pain, palpitations, orthopnea, claudication, leg swelling and PND. Skin: Negative for itching and rash. Neurological: Negative for dizziness, tingling, tremors, sensory change, speech change, focal weakness, seizures, loss of consciousness, weakness and headaches.      Results for orders placed or performed in visit on 02/27/18   CBC W/O DIFF   Result Value Ref Range    WBC 14.5 (H) 3.4 - 10.8 x10E3/uL    RBC 4.68 3.77 - 5.28 x10E6/uL    HGB 14.2 11.1 - 15.9 g/dL    HCT 43.6 34.0 - 46.6 %    MCV 93 79 - 97 fL    MCH 30.3 26.6 - 33.0 pg    MCHC 32.6 31.5 - 35.7 g/dL    RDW 13.7 12.3 - 15.4 %    PLATELET 111 503 - 762 [id number]   METABOLIC PANEL, COMPREHENSIVE   Result Value Ref Range    Glucose 84 65 - 99 mg/dL    BUN 18 6 - 24 mg/dL    Creatinine 0.64 0.57 - 1.00 mg/dL    GFR est non- >59 mL/min/1.73    GFR est  >59 mL/min/1.73    BUN/Creatinine ratio 28 (H) 9 - 23    Sodium 140 134 - 144 mmol/L    Potassium 4.5 3.5 - 5.2 mmol/L    Chloride 101 96 - 106 mmol/L    CO2 21 18 - 29 mmol/L    Calcium 9.3 8.7 - 10.2 mg/dL    Protein, total 7.5 6.0 - 8.5 g/dL    Albumin 4.7 3.5 - 5.5 g/dL    GLOBULIN, TOTAL 2.8 1.5 - 4.5 g/dL    A-G Ratio 1.7 1.2 - 2.2    Bilirubin, total <0.2 0.0 - 1.2 mg/dL    Alk. phosphatase 81 39 - 117 IU/L    AST (SGOT) 15 0 - 40 IU/L    ALT (SGPT) 14 0 - 32 IU/L   LITHIUM   Result Value Ref Range    Lithium level 0.4 (L) 0.6 - 1.2 mmol/L           Physical Exam  Visit Vitals    /78    Pulse 88    Temp 98.4 °F (36.9 °C)    Resp 18    Ht 5' 4\" (1.626 m)    Wt 178 lb (80.7 kg)    SpO2 98%    BMI 30.55 kg/m2     Pt appears much more relaxed and happy than previous visits. Neck: supple, symmetrical, trachea midline, no adenopathy, thyroid: not enlarged, symmetric, no tenderness/mass/nodules, no carotid bruit and no JVD  Lungs: clear to auscultation bilaterally  Heart: regular rate and rhythm, S1, S2 normal, no murmur, click, rub or gallop  Neurologic: Grossly normal      ASSESSMENT and PLAN    ICD-10-CM ICD-9-CM    1. Bipolar disorder with moderate depression ([facility] [address] F31.32 296.52    2. DDD (degenerative disc disease), lumbar M51.36 722.52 lidocaine (LIDODERM) 5 %   3.  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 Mervin Trinidad F98.8 314.00 dextroamphetamine-amphetamine (ADDERALL) 10 mg tablet dextroamphetamine-amphetamine (ADDERALL) 10 mg tablet      dextroamphetamine-amphetamine (ADDERALL) 10 mg tablet   4. DDD (degenerative disc disease), cervical M50.30 722.4 oxyCODONE-acetaminophen (PERCOCET 10)  mg per tablet      oxyCODONE-acetaminophen (PERCOCET 10)  mg per tablet      oxyCODONE-acetaminophen (PERCOCET 10)  mg per tablet   5. Psychophysiological insomnia F51.04 307.42    6. Migraine with aura and without status migrainosus, not intractable G43.109 346.00 oxyCODONE-acetaminophen (PERCOCET 10)  mg per tablet      oxyCODONE-acetaminophen (PERCOCET 10)  mg per tablet      oxyCODONE-acetaminophen (PERCOCET 10)  mg per tablet   7. Depression with anxiety F41.8 300.4    8. Facet arthritis of lumbar region ([facility] [address] M46.96 721.3 oxyCODONE-acetaminophen (PERCOCET 10)  mg per tablet      oxyCODONE-acetaminophen (PERCOCET 10)  mg per tablet      oxyCODONE-acetaminophen (PERCOCET 10)  mg per tablet     Diagnoses and all orders for this visit:    1. Bipolar disorder with moderate depression ([address]    2. DDD (degenerative disc disease), lumbar  -     lidocaine (LIDODERM) 5 %; Apply patch to the affected area for 12 hours a day and remove for 12 hours a day. 3. 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 Geovanna Coles  -     dextroamphetamine-amphetamine (ADDERALL) 10 mg tablet; Take 1 Tab (10 mg total) by mouth See Admin InstructionsEarliest Fill Date: 7/27/18.  2 in am and 1-2 in afternoon  -     dextroamphetamine-amphetamine (ADDERALL) 10 mg tablet; Earliest Fill Date: 6/27/18. TAKE 2 TABS PO Q AM AND 1-2 TABS IN AFTERNOON  -     dextroamphetamine-amphetamine (ADDERALL) 10 mg tablet; Take 1 Tab (10 mg total) by mouth See Admin InstructionsEarliest Fill Date: 5/28/18.  2 in am and 1-2 in afternoon    4. DDD (degenerative disc disease), cervical  -     oxyCODONE-acetaminophen (PERCOCET 10)  mg per tablet;  Take 1 Tab by mouth every six (6) hours as needed for Pain. 50 is a 30 day supply  -     oxyCODONE-acetaminophen (PERCOCET 10)  mg per tablet; Take 1 Tab by mouth every six (6) hours as needed for Pain. Max Daily Amount: 4 Tabs. 50 IS A 30 DAY SUPPLY  -     oxyCODONE-acetaminophen (PERCOCET 10)  mg per tablet; Take 1 Tab by mouth every six (6) hours as needed for Pain. 50 is a 30 day supply    5. Psychophysiological insomnia    6. Migraine with aura and without status migrainosus, not intractable  -     oxyCODONE-acetaminophen (PERCOCET 10)  mg per tablet; Take 1 Tab by mouth every six (6) hours as needed for Pain. 50 is a 30 day supply  -     oxyCODONE-acetaminophen (PERCOCET 10)  mg per tablet; Take 1 Tab by mouth every six (6) hours as needed for Pain. Max Daily Amount: 4 Tabs. 50 IS A 30 DAY SUPPLY  -     oxyCODONE-acetaminophen (PERCOCET 10)  mg per tablet; Take 1 Tab by mouth every six (6) hours as needed for Pain. 50 is a 30 day supply    7. Depression with anxiety    8. Facet arthritis of lumbar region ([address]  -     oxyCODONE-acetaminophen (PERCOCET 10)  mg per tablet; Take 1 Tab by mouth every six (6) hours as needed for Pain. 50 is a 30 day supply  -     oxyCODONE-acetaminophen (PERCOCET 10)  mg per tablet; Take 1 Tab by mouth every six (6) hours as needed for Pain. Max Daily Amount: 4 Tabs. 50 IS A 30 DAY SUPPLY  -     oxyCODONE-acetaminophen (PERCOCET 10)  mg per tablet; Take 1 Tab by mouth every six (6) hours as needed for Pain. 50 is a 30 day supply      Follow-up Disposition:  Return in about 6 months (around 11/22/2018), or return of severe anxiety or depression, for f/u bipolar/anxiety/ADD/depression. reviewed medications and side effects in detail  Please call my office if there are any questions- [phone]. Discussed expected course/resolution/complications of diagnosis in detail with patient.    Medication risks/benefits/costs/interactions/alternatives discussed with patient. Pt was given an after visit summary which includes diagnoses, current medications & vitals. Pt expressed understanding with the diagnosis and plan. Patient to call if no better in 3 -4 days and prn new problems. Total 25 minutes,60 % counseling re: BMI is significantly elevated- in the obese range. I reviewed diet, exercise and weight control. Discussed weight control in detail, the importance of mainly decreased carbs, and for weight maintenance, exercise; discussed different diets and that it isn't as important to watch the type of foods as it is to decrease calorie intake no matter what type of diet you do, etc.     Long discussion about chronic narcotic use, the risks of addiction, tolerance , overdose,etc. The national spotlight on this problem and the need for a narcotic agreement in order to continue receiving these, etc.  Continue to do the non-medication things that reduce pain such as adequate sleep, avoiding high impact activities, but at the same time staying active and avoiding sedentary activity. In addition, the patient was counseled today on the potential risks of opiate use including but not limited to death if not taken as prescribed or if taken in conjunction with other sedative, hypnotic, or other pain medications, and the need to refrain from any recreational drugs and/or alcohol. Further, we discussed the rationale and potential benefits of an opiate support regimen for the management of chronic non-malignant pain conditions. Reviewed all of her medications, the dosing, side effects, and benefits and potential risks. Pt uses Maxalt only once per migraine and I informed her that she can take a second tablet 2 hours later. Also, discussed symptoms of concern that were noted today in the note above, treatment options( including doing nothing), when to follow up before recommended time frame. Also, answered all questions.     This document was written by Raafel Vo Marybeth Alistair, as dictated by Aminata Chan MD.  I have reviewed and agree with the above note and have made corrections where appropriate Clarke Sapp M.D.

## 2018-05-22 NOTE — PROGRESS NOTES
Chief Complaint   Patient presents with    Medication Refill   1. Have you been to the ER, urgent care clinic since your last visit? Hospitalized since your last visit? No    2. Have you seen or consulted any other health care providers outside of the [address] [place] [address] since your last visit? Include any pap smears or colon screening.  No

## 2018-05-22 NOTE — MR AVS SNAPSHOT
[address] [facility] [address] 
 
 
 [address] [place] [address] 
[phone] Patient: Monserrat Vidales MRN: [id number] EJS:[birth year] Visit Information Date & Time Provider Department Dept. Phone Encounter #  
 5/22/2018 10:15 AM Amina Mcleod MD [address] [place] [address] [phone] [id number] Your Appointments 8/20/2018 10:45 AM  
ROUTINE CARE with Amina Mcleod MD  
[facility]) Appt Note: 3 month med check [address] [address]  
[phone]  
  
   
 [address] [address] Upcoming Health Maintenance Date Due Influenza Age 5 to Adult 8/1/2018 PAP AKA CERVICAL CYTOLOGY 8/28/2020 DTaP/Tdap/Td series (2 - Td) 6/6/2026 Allergies as of 5/22/2018  Review Complete On: 5/22/2018 By: Karrie Chow, AKIN Severity Noted Reaction Type Reactions Pcn [Penicillins]  02/20/2010    Other (comments) As a child Current Immunizations  Never Reviewed Name Date Influenza Vaccine (Quad) PF 9/23/2016 Not reviewed this visit You Were Diagnosed With   
  
 Codes Comments Bipolar disorder with moderate depression ([facility] [address]    -  Primary ICD-10-CM: F31.32 
ICD-9-CM: 296.52   
 DDD (degenerative disc disease), lumbar     ICD-10-CM: M51.36 
ICD-9-CM: 722.52   
 ADD (attention deficit disorder) without hyperactivity     ICD-10-CM: F98.8 ICD-9-CM: 314.00   
 DDD (degenerative disc disease), cervical     ICD-10-CM: M50.30 ICD-9-CM: 722.4 Psychophysiological insomnia     ICD-10-CM: F51.04 
ICD-9-CM: 307.42 Migraine with aura and without status migrainosus, not intractable     ICD-10-CM: G43.109 ICD-9-CM: 346.00 Depression with anxiety     ICD-10-CM: F41.8 ICD-9-CM: 300.4 Facet arthritis of lumbar region [facility])     ICD-10-CM: M46.96 
ICD-9-CM: 721.3 Vitals BP Pulse Temp Resp Height(growth percentile) Weight(growth percentile) 122/78 88 98.4 °F (36.9 °C) 18 5' 4\" (1.626 m) 178 lb (80.7 kg) SpO2 BMI OB Status Smoking Status 98% 30.55 kg/m2 Having regular periods Current Every Day Smoker Vitals History BMI and BSA Data Body Mass Index Body Surface Area 30.55 kg/m 2 1.91 m 2 Preferred Pharmacy Pharmacy Name Phone [id number]- [place], [place] - [address] [phone] Your Updated Medication List  
  
   
This list is accurate as of 5/22/18 11:24 AM.  Always use your most recent med list.  
  
  
  
  
 ALPRAZolam 0.25 mg tablet Commonly known as:  XANAX  
TAKE 1/2 TO 1 TABLET BY MOUTH TWICE A DAY AS NEEDED FOR ANXIETY * dextroamphetamine-amphetamine 10 mg tablet Commonly known as:  ADDERALL Take 1 Tab (10 mg total) by mouth See Admin InstructionsEarliest Fill Date: 5/28/18.  2 in am and 1-2 in afternoon Start taking on:  5/28/2018 * dextroamphetamine-amphetamine 10 mg tablet Commonly known as:  ADDERALL Earliest Fill Date: 6/27/18. TAKE 2 TABS PO Q AM AND 1-2 TABS IN AFTERNOON Start taking on:  6/27/2018 * dextroamphetamine-amphetamine 10 mg tablet Commonly known as:  ADDERALL Take 1 Tab (10 mg total) by mouth See Admin InstructionsEarliest Fill Date: 7/27/18.  2 in am and 1-2 in afternoon Start taking on:  7/27/2018  
  
 escitalopram oxalate 20 mg tablet Commonly known as:  Minor Muscat TAKE 1 TABLET BY MOUTH EVERY DAY  
  
 lidocaine 5 % Commonly known as:  Henri Duong Apply patch to the affected area for 12 hours a day and remove for 12 hours a day. lithium carbonate 300 mg capsule Take 1 Cap by mouth two (2) times daily (with meals). metoprolol succinate 25 mg XL tablet Commonly known as:  TOPROL-XL Take 1 Tab by mouth daily.  Indications: MIGRAINE PREVENTION  
  
 * oxyCODONE-acetaminophen  mg per tablet Commonly known as:  PERCOCET 10 Take 1 Tab by mouth every six (6) hours as needed for Pain. 50 is a 30 day supply Start taking on:  5/28/2018 * oxyCODONE-acetaminophen  mg per tablet Commonly known as:  PERCOCET 10 Take 1 Tab by mouth every six (6) hours as needed for Pain. Max Daily Amount: 4 Tabs. 50 IS A 30 DAY SUPPLY Start taking on:  6/27/2018 * oxyCODONE-acetaminophen  mg per tablet Commonly known as:  PERCOCET 10 Take 1 Tab by mouth every six (6) hours as needed for Pain. 50 is a 30 day supply Start taking on:  7/27/2018  
  
 rizatriptan 10 mg disintegrating tablet Commonly known as:  MAXALT-MLT Take 1 Tab by mouth once as needed for Migraine for up to 1 dose. May repeat in 2 hrs x1 if headache no better. TYLENOL 325 mg tablet Generic drug:  acetaminophen Take  by mouth every four (4) hours as needed for Pain. VICKS DAYQUIL PO Take  by mouth.  
  
 zolpidem 10 mg tablet Commonly known as:  AMBIEN  
TAKE 2 TABLETS BY MOUTH AT BEDTIME  
  
 * Notice: This list has 6 medication(s) that are the same as other medications prescribed for you. Read the directions carefully, and ask your doctor or other care provider to review them with you. Prescriptions Printed Refills  
 dextroamphetamine-amphetamine (ADDERALL) 10 mg tablet 0 Starting on: 7/27/2018 Sig: Take 1 Tab (10 mg total) by mouth See Admin InstructionsEarliest Fill Date: 7/27/18.  2 in am and 1-2 in afternoon Class: Print Route: Oral  
 dextroamphetamine-amphetamine (ADDERALL) 10 mg tablet 0 Starting on: 6/27/2018 Sig: Earliest Maritza Banter Date: 6/27/18. TAKE 2 TABS PO Q AM AND 1-2 TABS IN AFTERNOON Class: Print  
 dextroamphetamine-amphetamine (ADDERALL) 10 mg tablet 0 Starting on: 5/28/2018  Sig: Take 1 Tab (10 mg total) by mouth See Admin InstructionsEarliest Fill Date: 5/28/18.  2 in am and 1-2 in afternoon Class: Print Route: Oral  
 oxyCODONE-acetaminophen (PERCOCET 10)  mg per tablet 0 Starting on: 7/27/2018 Sig: Take 1 Tab by mouth every six (6) hours as needed for Pain. 50 is a 30 day supply Class: Print Route: Oral  
 oxyCODONE-acetaminophen (PERCOCET 10)  mg per tablet 0 Starting on: 6/27/2018 Sig: Take 1 Tab by mouth every six (6) hours as needed for Pain. Max Daily Amount: 4 Tabs. 50 IS A 30 DAY SUPPLY Class: Print Route: Oral  
 oxyCODONE-acetaminophen (PERCOCET 10)  mg per tablet 0 Starting on: 5/28/2018 Sig: Take 1 Tab by mouth every six (6) hours as needed for Pain. 50 is a 30 day supply Class: Print Route: Oral  
  
Prescriptions Sent to Pharmacy Refills  
 lidocaine (LIDODERM) 5 % 3 Sig: Apply patch to the affected area for 12 hours a day and remove for 12 hours a day. Class: Normal  
 Pharmacy: [facility]/pharmacy [address]  #: [phone] Introducing [facility]! Ariel Deras introduces [employer] patient portal. Now you can access parts of your medical record, email your doctor's office, and request medication refills online. 1. In your internet browser, go to [email or web address] 2. Click on the First Time User? Click Here link in the Sign In box. You will see the New Member Sign Up page. 3. Enter your [employer] Access Code exactly as it appears below. You will not need to use this code after youve completed the sign-up process. If you do not sign up before the expiration date, you must request a new code. · [employer] Access Code: [email or web address] Expires: 5/28/2018 12:22 PM 
 
4. Enter the last four digits of your Social Security Number (xxxx) and Date of Birth (mm/dd/yyyy) as indicated and click Submit. You will be taken to the next sign-up page. 5. Create a [employer] ID. This will be your [employer] login ID and cannot be changed, so think of one that is secure and easy to remember. 6. Create a [employer] password. You can change your password at any time. 7. Enter your Password Reset Question and Answer. This can be used at a later time if you forget your password. 8. Enter your e-mail address. You will receive e-mail notification when new information is available in [address] 9. Click Sign Up. You can now view and download portions of your medical record. 10. Click the Download Summary menu link to download a portable copy of your medical information. If you have questions, please visit the Frequently Asked Questions section of the [employer] website. Remember, [employer] is NOT to be used for urgent needs. For medical emergencies, dial 911. Now available from your iPhone and Android! Please provide this summary of care documentation to your next provider. Your primary care clinician is listed as [address] [address], [facility]/s . If you have any questions after today's visit, please call [phone].

## 2018-05-23 LAB — LITHIUM SERPL-SCNC: 0.3 MMOL/L (ref 0.6–1.2)

## 2018-05-29 DIAGNOSIS — F31.32 BIPOLAR 1 DISORDER, DEPRESSED, MODERATE (HCC): ICD-10-CM

## 2018-05-29 NOTE — TELEPHONE ENCOUNTER
Refill 90 day    Requested Prescriptions     Pending Prescriptions Disp Refills    lithium carbonate 300 mg capsule 60 Cap 5     Sig: Take 1 Cap by mouth two (2) times daily (with meals).

## 2018-05-31 RX ORDER — LITHIUM CARBONATE 300 MG/1
300 CAPSULE ORAL 2 TIMES DAILY WITH MEALS
Qty: 180 CAP | Refills: 1 | Status: SHIPPED | OUTPATIENT
Start: 2018-05-31 | End: 2018-08-22 | Stop reason: SDUPTHER

## 2018-06-10 DIAGNOSIS — F51.04 PSYCHOPHYSIOLOGICAL INSOMNIA: ICD-10-CM

## 2018-06-10 RX ORDER — ZOLPIDEM TARTRATE 10 MG/1
TABLET ORAL
Qty: 60 TAB | Refills: 2 | Status: SHIPPED | OUTPATIENT
Start: 2018-06-10 | End: 2018-09-04 | Stop reason: SDUPTHER

## 2018-06-19 NOTE — TELEPHONE ENCOUNTER
Lithium level slightly worse- check on what dose she is actually taking and I will make a change to get her level up to therapeutic.

## 2018-08-08 DIAGNOSIS — F41.8 DEPRESSION WITH ANXIETY: ICD-10-CM

## 2018-08-08 RX ORDER — ALPRAZOLAM 0.25 MG/1
TABLET ORAL
Qty: 30 TAB | Refills: 0 | Status: SHIPPED | OUTPATIENT
Start: 2018-08-08 | End: 2018-08-22 | Stop reason: SDUPTHER

## 2018-08-08 NOTE — PROGRESS NOTES
Pt clinically doing well- Lithium level a little low, but better- willleave it for now and have her continue to follow up every 6 months and prn anxiety / agitation increse.

## 2018-08-20 ENCOUNTER — TELEPHONE (OUTPATIENT)
Dept: FAMILY MEDICINE CLINIC | Age: 43
End: 2018-08-20

## 2018-08-20 NOTE — TELEPHONE ENCOUNTER
----- Message from Chey Medickosta sent at 8/20/2018  2:59 PM EDT -----  Regarding: Dr. Mukul Troy just missed a call from Rod Maxwell nurse. Best contact [phone].

## 2018-08-20 NOTE — TELEPHONE ENCOUNTER
Patient is calling, requesting a call back in regards to an appointment. She states that she forgot she was to be seen today Monday, August 20, 2018 10:45 AM.     She states she is needing medications refilled.      Best call back [phone]

## 2018-08-22 ENCOUNTER — OFFICE VISIT (OUTPATIENT)
Dept: FAMILY MEDICINE CLINIC | Age: 43
End: 2018-08-22

## 2018-08-22 VITALS
SYSTOLIC BLOOD PRESSURE: 120 MMHG | RESPIRATION RATE: 16 BRPM | TEMPERATURE: 97.9 F | WEIGHT: 179 LBS | BODY MASS INDEX: 30.56 KG/M2 | HEIGHT: 64 IN | OXYGEN SATURATION: 98 % | DIASTOLIC BLOOD PRESSURE: 80 MMHG | HEART RATE: 72 BPM

## 2018-08-22 DIAGNOSIS — M47.816 FACET ARTHRITIS OF LUMBAR REGION: ICD-10-CM

## 2018-08-22 DIAGNOSIS — F41.8 DEPRESSION WITH ANXIETY: ICD-10-CM

## 2018-08-22 DIAGNOSIS — M50.30 DDD (DEGENERATIVE DISC DISEASE), CERVICAL: ICD-10-CM

## 2018-08-22 DIAGNOSIS — Z79.899 LITHIUM USE: ICD-10-CM

## 2018-08-22 DIAGNOSIS — G43.109 MIGRAINE WITH AURA AND WITHOUT STATUS MIGRAINOSUS, NOT INTRACTABLE: ICD-10-CM

## 2018-08-22 DIAGNOSIS — F98.8 ADD (ATTENTION DEFICIT DISORDER) WITHOUT HYPERACTIVITY: Chronic | ICD-10-CM

## 2018-08-22 DIAGNOSIS — F31.32 BIPOLAR 1 DISORDER, DEPRESSED, MODERATE (HCC): Primary | ICD-10-CM

## 2018-08-22 RX ORDER — OXYCODONE AND ACETAMINOPHEN 10; 325 MG/1; MG/1
1 TABLET ORAL
Qty: 50 TAB | Refills: 0 | Status: SHIPPED | OUTPATIENT
Start: 2018-10-25 | End: 2018-11-20 | Stop reason: SDUPTHER

## 2018-08-22 RX ORDER — OXYCODONE AND ACETAMINOPHEN 10; 325 MG/1; MG/1
1 TABLET ORAL
Qty: 50 TAB | Refills: 0 | Status: SHIPPED | OUTPATIENT
Start: 2018-08-26 | End: 2018-11-20 | Stop reason: SDUPTHER

## 2018-08-22 RX ORDER — LITHIUM CARBONATE 300 MG/1
300 CAPSULE ORAL 2 TIMES DAILY WITH MEALS
Qty: 180 CAP | Refills: 1 | Status: SHIPPED | OUTPATIENT
Start: 2018-08-22 | End: 2019-02-25 | Stop reason: SDUPTHER

## 2018-08-22 RX ORDER — OXYCODONE AND ACETAMINOPHEN 10; 325 MG/1; MG/1
1 TABLET ORAL
Qty: 50 TAB | Refills: 0 | Status: SHIPPED | OUTPATIENT
Start: 2018-08-25 | End: 2018-11-20 | Stop reason: SDUPTHER

## 2018-08-22 RX ORDER — DEXTROAMPHETAMINE SACCHARATE, AMPHETAMINE ASPARTATE, DEXTROAMPHETAMINE SULFATE AND AMPHETAMINE SULFATE 2.5; 2.5; 2.5; 2.5 MG/1; MG/1; MG/1; MG/1
TABLET ORAL
Qty: 120 TAB | Refills: 0 | Status: SHIPPED | OUTPATIENT
Start: 2018-09-25 | End: 2018-11-20 | Stop reason: SDUPTHER

## 2018-08-22 RX ORDER — ALPRAZOLAM 0.25 MG/1
TABLET ORAL
Qty: 30 TAB | Refills: 3 | Status: SHIPPED | OUTPATIENT
Start: 2018-08-22 | End: 2018-11-20 | Stop reason: SDUPTHER

## 2018-08-22 RX ORDER — DEXTROAMPHETAMINE SACCHARATE, AMPHETAMINE ASPARTATE, DEXTROAMPHETAMINE SULFATE AND AMPHETAMINE SULFATE 2.5; 2.5; 2.5; 2.5 MG/1; MG/1; MG/1; MG/1
10 TABLET ORAL SEE ADMIN INSTRUCTIONS
Qty: 120 TAB | Refills: 0 | Status: SHIPPED | OUTPATIENT
Start: 2018-10-25 | End: 2018-11-20 | Stop reason: SDUPTHER

## 2018-08-22 RX ORDER — DEXTROAMPHETAMINE SACCHARATE, AMPHETAMINE ASPARTATE, DEXTROAMPHETAMINE SULFATE AND AMPHETAMINE SULFATE 2.5; 2.5; 2.5; 2.5 MG/1; MG/1; MG/1; MG/1
10 TABLET ORAL SEE ADMIN INSTRUCTIONS
Qty: 120 TAB | Refills: 0 | Status: SHIPPED | OUTPATIENT
Start: 2018-08-26 | End: 2018-11-20 | Stop reason: SDUPTHER

## 2018-08-22 NOTE — PROGRESS NOTES
Chief Complaint   Patient presents with    Medication Refill     pt requests refills of Xanax, Lithium, and Adderall, and oxycodone     \"REVIEWED RECORD IN PREPARATION FOR VISIT AND HAVE OBTAINED THE NECESSARY DOCUMENTATION\"  1. Have you been to the ER, urgent care clinic since your last visit? Hospitalized since your last visit? No    2. Have you seen or consulted any other health care providers outside of the [address] [place] [address] since your last visit? Include any pap smears or colon screening.  No  Pt states she has been out of her Lithium for 2 weeks

## 2018-08-22 NOTE — MR AVS SNAPSHOT
[address] [facility] 
 
 
 [address] [place] [address] 
[phone] Patient: Wesley Renteria MRN: [id number] AMX:[id number]/[id number] Visit Information Date & Time Provider Department Dept. Phone Encounter #  
 8/22/2018 11:30 AM Mia Ramirez MD [address] [place] [address] [phone] [id number] Your Appointments 11/26/2018 10:45 AM  
ROUTINE CARE with Mia Ramirez MD  
[facility]) Appt Note: 3 month medication refill  
 [address] [address]  
[phone]  
  
   
 [address] [address] Upcoming Health Maintenance Date Due Influenza Age 5 to Adult 8/1/2018 PAP AKA CERVICAL CYTOLOGY 8/28/2020 DTaP/Tdap/Td series (2 - Td) 6/6/2026 Allergies as of 8/22/2018  Review Complete On: 8/22/2018 By: Carmen Barr LPN Severity Noted Reaction Type Reactions Pcn [Penicillins]  02/20/2010    Other (comments) As a child Current Immunizations  Never Reviewed Name Date Influenza Vaccine (Quad) PF 9/23/2016 Not reviewed this visit You Were Diagnosed With   
  
 Codes Comments Bipolar 1 disorder, depressed, moderate (HCC)    -  Primary ICD-10-CM: F31.32 
ICD-9-CM: 296.52 Lithium use     ICD-10-CM: Y60.045 ICD-9-CM: V58.69 Depression with anxiety     ICD-10-CM: F41.8 ICD-9-CM: 300.4 ADD (attention deficit disorder) without hyperactivity     ICD-10-CM: F98.8 ICD-9-CM: 314.00   
 DDD (degenerative disc disease), cervical     ICD-10-CM: M50.30 ICD-9-CM: 722.4 Migraine with aura and without status migrainosus, not intractable     ICD-10-CM: G43.109 ICD-9-CM: 346.00 Facet arthritis of lumbar region [facility])     ICD-10-CM: M46.96 
ICD-9-CM: 721.3 Vitals BP Pulse Temp Resp Height(growth percentile) Weight(growth percentile)  120/80 (BP 1 Location: Left arm, BP Patient Position: Sitting) 72 97.9 °F (36.6 °C) (Oral) 16 5' 4\" (1.626 m) 179 lb (81.2 kg) LMP SpO2 BMI OB Status Smoking Status 08/21/2018 98% 30.73 kg/m2 Having regular periods Current Every Day Smoker BMI and BSA Data Body Mass Index Body Surface Area 30.73 kg/m 2 1.91 m 2 Preferred Pharmacy Pharmacy Name Phone [facility]/PHARMACY #[id number], [place] - [address] [phone] Your Updated Medication List  
  
   
This list is accurate as of 8/22/18  1:28 PM.  Always use your most recent med list.  
  
  
  
  
 ALPRAZolam 0.25 mg tablet Commonly known as:  XANAX  
HALF TO 1 TABLET BY MOUTH TWICE A DAY AS NEEDED FOR ANXIETY (MUST LAST 30 DAYS-MD) * dextroamphetamine-amphetamine 10 mg tablet Commonly known as:  ADDERALL Take 1 Tab (10 mg total) by mouth See Admin InstructionsEarliest Fill Date: 8/26/18.  2 in am and 1-2 in afternoon Start taking on:  8/26/2018 * dextroamphetamine-amphetamine 10 mg tablet Commonly known as:  ADDERALL Earliest Fill Date: 9/25/18. TAKE 2 TABS PO Q AM AND 1-2 TABS IN AFTERNOON Start taking on:  9/25/2018 * dextroamphetamine-amphetamine 10 mg tablet Commonly known as:  ADDERALL Take 1 Tab (10 mg total) by mouth See Admin InstructionsEarliest Fill Date: 10/25/18.  2 in am and 1-2 in afternoon Start taking on:  10/25/2018  
  
 escitalopram oxalate 20 mg tablet Commonly known as:  Cathleen Mccallum TAKE 1 TABLET BY MOUTH EVERY DAY  
  
 lidocaine 5 % Commonly known as:  Laithee Backers Apply patch to the affected area for 12 hours a day and remove for 12 hours a day. lithium carbonate 300 mg capsule Take 1 Cap by mouth two (2) times daily (with meals). metoprolol succinate 25 mg XL tablet Commonly known as:  TOPROL-XL Take 1 Tab by mouth daily. Indications: MIGRAINE PREVENTION  
  
 * oxyCODONE-acetaminophen  mg per tablet Commonly known as:  PERCOCET 10  
 Take 1 Tab by mouth every six (6) hours as needed for Pain. Max Daily Amount: 4 Tabs. 50 IS A 30 DAY SUPPLY Start taking on:  8/25/2018 * oxyCODONE-acetaminophen  mg per tablet Commonly known as:  PERCOCET 10 Take 1 Tab by mouth every six (6) hours as needed for Pain. 50 is a 30 day supply Start taking on:  8/26/2018 * oxyCODONE-acetaminophen  mg per tablet Commonly known as:  PERCOCET 10 Take 1 Tab by mouth every six (6) hours as needed for Pain. 50 is a 30 day supply Start taking on:  10/25/2018  
  
 rizatriptan 10 mg disintegrating tablet Commonly known as:  MAXALT-MLT Take 1 Tab by mouth once as needed for Migraine for up to 1 dose. May repeat in 2 hrs x1 if headache no better. TYLENOL 325 mg tablet Generic drug:  acetaminophen Take  by mouth every four (4) hours as needed for Pain. VICKS DAYQUIL PO Take  by mouth.  
  
 zolpidem 10 mg tablet Commonly known as:  AMBIEN  
TAKE 2 TABLETS BY MOUTH AT BEDTIME AS NEEDED FOR INSOMNIA * Notice: This list has 6 medication(s) that are the same as other medications prescribed for you. Read the directions carefully, and ask your doctor or other care provider to review them with you. Prescriptions Printed Refills ALPRAZolam (XANAX) 0.25 mg tablet 3 Sig: HALF TO 1 TABLET BY MOUTH TWICE A DAY AS NEEDED FOR ANXIETY (MUST LAST 30 DAYS-MD) Class: Print  
 dextroamphetamine-amphetamine (ADDERALL) 10 mg tablet 0 Starting on: 8/26/2018 Sig: Take 1 Tab (10 mg total) by mouth See Admin InstructionsEarliest Fill Date: 8/26/18.  2 in am and 1-2 in afternoon Class: Print Route: Oral  
 dextroamphetamine-amphetamine (ADDERALL) 10 mg tablet 0 Starting on: 9/25/2018 Sig: Earliest Lawerence Radon Date: 9/25/18. TAKE 2 TABS PO Q AM AND 1-2 TABS IN AFTERNOON Class: Print  
 dextroamphetamine-amphetamine (ADDERALL) 10 mg tablet 0 Starting on: 10/25/2018 Sig: Take 1 Tab (10 mg total) by mouth See Admin InstructionsEarliest Fill Date: 10/25/18.  2 in am and 1-2 in afternoon Class: Print Route: Oral  
 oxyCODONE-acetaminophen (PERCOCET 10)  mg per tablet 0 Starting on: 8/26/2018 Sig: Take 1 Tab by mouth every six (6) hours as needed for Pain. 50 is a 30 day supply Class: Print Route: Oral  
 oxyCODONE-acetaminophen (PERCOCET 10)  mg per tablet 0 Starting on: 8/25/2018 Sig: Take 1 Tab by mouth every six (6) hours as needed for Pain. Max Daily Amount: 4 Tabs. 50 IS A 30 DAY SUPPLY Class: Print Route: Oral  
 oxyCODONE-acetaminophen (PERCOCET 10)  mg per tablet 0 Starting on: 10/25/2018 Sig: Take 1 Tab by mouth every six (6) hours as needed for Pain. 50 is a 30 day supply Class: Print Route: Oral  
  
Prescriptions Sent to Pharmacy Refills  
 lithium carbonate 300 mg capsule 1 Sig: Take 1 Cap by mouth two (2) times daily (with meals). Class: Normal  
 Pharmacy: [facility]/pharmacy [id number] [facility] [address] Ph #: [phone] Route: Oral  
  
We Performed the Following CBC W/O DIFF [00406 CPT(R)] LITHIUM [id number] CPT(R)] METABOLIC PANEL, COMPREHENSIVE [47022 CPT(R)] Introducing [facility]! Dear Allen Arzola: 
Thank you for requesting a [employer] account. Our records indicate that you already have an active [employer] account. You can access your account anytime at [email or web address] Did you know that you can access your hospital and ER discharge instructions at any time in [employer]? You can also review all of your test results from your hospital stay or ER visit. Additional Information If you have questions, please visit the Frequently Asked Questions section of the [employer] website at [email or web address] Remember, [employer] is NOT to be used for urgent needs. For medical emergencies, dial 911. Now available from your iPhone and Android! Please provide this summary of care documentation to your next provider. Your primary care clinician is listed as [address] [address], [facility]/s . If you have any questions after today's visit, please call [phone].

## 2018-08-22 NOTE — PROGRESS NOTES
HISTORY OF PRESENT ILLNESS  HPI  Renate Cruz is a 44 y.o. Female with a history of migraines, DDD, ADD, depression with anxiety, insomnia, OCD and bipolar disorder with moderate depression, who presents at the office today for a follow up. Pt has run out of her lithium and needs a refill. Pt needs refills of her Adderall and oxycodone as well. 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 Donovan Francois 8/25/2016    Asthma     last attack 2 months ago    Bilateral carpal tunnel syndrome- R>>L 9/25/2016    Bipolar disorder with moderate depression ([facility] [address] 6/28/2017    Depression with anxiety 3/2/2010    Facet arthritis of lumbar region ([facility] [address] 12/3/2017    Insomnia     Lithium use 6/28/2017    Migraine 2/20/2010    Psychiatric disorder     Depression, anxiety    Psychophysiological insomnia 2/23/2016     Past Surgical History:   Procedure Laterality Date    HX APPENDECTOMY  1996    HX BREAST REDUCTION  2002    HX ORTHOPAEDIC  2003    cervical disc    HX ORTHOPAEDIC      second right hand digit fx with pins index     Current Outpatient Prescriptions on File Prior to Visit   Medication Sig Dispense Refill    zolpidem (AMBIEN) 10 mg tablet TAKE 2 TABLETS BY MOUTH AT BEDTIME AS NEEDED FOR INSOMNIA 60 Tab 2    lidocaine (LIDODERM) 5 % Apply patch to the affected area for 12 hours a day and remove for 12 hours a day. 15 Each 3    escitalopram oxalate (LEXAPRO) 20 mg tablet TAKE 1 TABLET BY MOUTH EVERY DAY 90 Tab 3    metoprolol succinate (TOPROL-XL) 25 mg XL tablet Take 1 Tab by mouth daily. Indications: MIGRAINE PREVENTION 90 Tab 0    acetaminophen (TYLENOL) 325 mg tablet Take  by mouth every four (4) hours as needed for Pain. No current facility-administered medications on file prior to visit.       Allergies   Allergen Reactions    Pcn [Penicillins] Other (comments)     As a child     Family History   Problem Relation Age of Onset    Diabetes Father     Hypertension Father     Heart Attack Father     High Cholesterol Father     Diabetes Mother     High Cholesterol Mother      Social History     Social History    Marital status:      Spouse name: N/A    Number of children: N/A    Years of education: N/A     Social History Main Topics    Smoking status: Current Every Day Smoker     Packs/day: 1.50     Years: 18.00     Types: Cigarettes    Smokeless tobacco: Never Used    Alcohol use Yes      Comment: occasionally    Drug use: No    Sexual activity: Yes     Partners: Male     Birth control/ protection: Condom     Other Topics Concern    None     Social History Narrative             Review of Systems   Constitutional: Negative for chills, diaphoresis, fever, malaise/fatigue and weight loss. Eyes: Negative for blurred vision, double vision, pain and redness. Respiratory: Negative for cough, shortness of breath and wheezing. Cardiovascular: Negative for chest pain, palpitations, orthopnea, claudication, leg swelling and PND. Skin: Negative for itching and rash. Neurological: Negative for dizziness, tingling, tremors, sensory change, speech change, focal weakness, seizures, loss of consciousness, weakness and headaches. Results for orders placed or performed in visit on 04/11/18   LITHIUM   Result Value Ref Range    Lithium level 0.3 (L) 0.6 - 1.2 mmol/L         Physical Exam  Visit Vitals    /80 (BP 1 Location: Left arm, BP Patient Position: Sitting)    Pulse 72    Temp 97.9 °F (36.6 °C) (Oral)    Resp 16    Ht 5' 4\" (1.626 m)    Wt 179 lb (81.2 kg)    LMP 08/21/2018    SpO2 98%    BMI 30.73 kg/m2       Head: Normocephalic, without obvious abnormality, atraumatic  Eyes: conjunctivae/corneas clear. PERRL, EOM's intact.    Neck: supple, symmetrical, trachea midline, no adenopathy, thyroid: not enlarged, symmetric, no tenderness/mass/nodules, no carotid bruit and no JVD  Lungs: clear to auscultation bilaterally  Heart: regular rate and rhythm, S1, S2 normal, no murmur, click, rub or gallop  Extremities: extremities normal, atraumatic, no cyanosis or edema  Pulses: 2+ and symmetric  Lymph nodes: Cervical, supraclavicular, and axillary nodes normal.  Neurologic: Grossly normal      ASSESSMENT and PLAN    ICD-10-CM ICD-9-CM    1. Bipolar 1 disorder, depressed, moderate (HCC) F31.32 296.52 LITHIUM      lithium carbonate 300 mg capsule      CBC W/O DIFF      METABOLIC PANEL, COMPREHENSIVE   2. Lithium use Z79.899 V58.69 LITHIUM      CBC W/O DIFF      METABOLIC PANEL, COMPREHENSIVE   3. Depression with anxiety F41.8 300.4 ALPRAZolam (XANAX) 0.25 mg tablet   4. 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 Emma Oneil F98.8 314.00 dextroamphetamine-amphetamine (ADDERALL) 10 mg tablet      dextroamphetamine-amphetamine (ADDERALL) 10 mg tablet      dextroamphetamine-amphetamine (ADDERALL) 10 mg tablet   5. DDD (degenerative disc disease), cervical M50.30 722.4 oxyCODONE-acetaminophen (PERCOCET 10)  mg per tablet      oxyCODONE-acetaminophen (PERCOCET 10)  mg per tablet      oxyCODONE-acetaminophen (PERCOCET 10)  mg per tablet   6. Migraine with aura and without status migrainosus, not intractable G43.109 346.00 oxyCODONE-acetaminophen (PERCOCET 10)  mg per tablet      oxyCODONE-acetaminophen (PERCOCET 10)  mg per tablet      oxyCODONE-acetaminophen (PERCOCET 10)  mg per tablet   7. Facet arthritis of lumbar region ([facility] [address] M46.96 721.3 oxyCODONE-acetaminophen (PERCOCET 10)  mg per tablet      oxyCODONE-acetaminophen (PERCOCET 10)  mg per tablet      oxyCODONE-acetaminophen (PERCOCET 10)  mg per tablet     Diagnoses and all orders for this visit:    1. Bipolar 1 disorder, depressed, moderate (HCC)  -     LITHIUM  -     lithium carbonate 300 mg capsule; Take 1 Cap by mouth two (2) times daily (with meals).   -     CBC W/O DIFF  -     METABOLIC PANEL, COMPREHENSIVE    2. Lithium use  -     LITHIUM  -     CBC W/O DIFF  -     METABOLIC PANEL, COMPREHENSIVE    3. Depression with anxiety  -     ALPRAZolam (XANAX) 0.25 mg tablet; HALF TO 1 TABLET BY MOUTH TWICE A DAY AS NEEDED FOR ANXIETY (MUST LAST 30 DAYS-MD)    4. 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 Sweta Deng  -     dextroamphetamine-amphetamine (ADDERALL) 10 mg tablet; Take 1 Tab (10 mg total) by mouth See Admin InstructionsEarliest Fill Date: 8/26/18.  2 in am and 1-2 in afternoon  -     dextroamphetamine-amphetamine (ADDERALL) 10 mg tablet; Earliest Fill Date: 9/25/18. TAKE 2 TABS PO Q AM AND 1-2 TABS IN AFTERNOON  -     dextroamphetamine-amphetamine (ADDERALL) 10 mg tablet; Take 1 Tab (10 mg total) by mouth See Admin InstructionsEarliest Fill Date: 10/25/18.  2 in am and 1-2 in afternoon    5. DDD (degenerative disc disease), cervical  -     oxyCODONE-acetaminophen (PERCOCET 10)  mg per tablet; Take 1 Tab by mouth every six (6) hours as needed for Pain. 50 is a 30 day supply  -     oxyCODONE-acetaminophen (PERCOCET 10)  mg per tablet; Take 1 Tab by mouth every six (6) hours as needed for Pain. Max Daily Amount: 4 Tabs. 50 IS A 30 DAY SUPPLY  -     oxyCODONE-acetaminophen (PERCOCET 10)  mg per tablet; Take 1 Tab by mouth every six (6) hours as needed for Pain. 50 is a 30 day supply    6. Migraine with aura and without status migrainosus, not intractable  -     oxyCODONE-acetaminophen (PERCOCET 10)  mg per tablet; Take 1 Tab by mouth every six (6) hours as needed for Pain. 50 is a 30 day supply  -     oxyCODONE-acetaminophen (PERCOCET 10)  mg per tablet; Take 1 Tab by mouth every six (6) hours as needed for Pain. Max Daily Amount: 4 Tabs. 50 IS A 30 DAY SUPPLY  -     oxyCODONE-acetaminophen (PERCOCET 10)  mg per tablet; Take 1 Tab by mouth every six (6) hours as needed for Pain.  50 is a 30 day supply    7. Facet arthritis of lumbar region ([facility] [address]  -     oxyCODONE-acetaminophen (PERCOCET 10)  mg per tablet; Take 1 Tab by mouth every six (6) hours as needed for Pain. 50 is a 30 day supply  -     oxyCODONE-acetaminophen (PERCOCET 10)  mg per tablet; Take 1 Tab by mouth every six (6) hours as needed for Pain. Max Daily Amount: 4 Tabs. 50 IS A 30 DAY SUPPLY  -     oxyCODONE-acetaminophen (PERCOCET 10)  mg per tablet; Take 1 Tab by mouth every six (6) hours as needed for Pain. 50 is a 30 day supply      Follow-up Disposition:  Return in about 3 months (around 11/22/2018), or worsening bipolar symptoms, insomnia, arthritis, migraine head aches, for 3 month F/U chronic controlled substance use. reviewed medications and side effects in detail  Please call my office if there are any questions- [phone]. Discussed expected course/resolution/complications of diagnosis in detail with patient. Medication risks/benefits/costs/interactions/alternatives discussed with patient. Pt was given an after visit summary which includes diagnoses, current medications & vitals. Pt expressed understanding with the diagnosis and plan. Patient to call if no better in 3 -4 days and prn new problems.  checked and found to have no suspicious activity. Signed and agreed to the controlled substance agreement in the past year; copy is in the chart. Encouraged pt to come in and check her lithium level after 2 weeks of being back on lithium. She has found out once again that when off the lithium she feels terrible. She needs to stay on the lithium indefinitely. BMI is significantly elevated- in the obese range. I reviewed diet, exercise and weight control.  Discussed weight control in detail, the importance of mainly decreased carbs, and for weight maintenance, exercise; discussed different diets and that it isn't as important to watch the type of foods as it is to decrease calorie intake no matter what type of diet you do, etc.      Regular exercise is very important to your health; it helps mentally, physically, socially; it prevents injuries if done properly. Exercise, even as simple as walking 20-30 minutes daily has major benefits to your health even though your \"numbers\" are the same in the lab. See if you can add this into your daily regimen and after a few months it will become a regular habit-\"just something you do,\" like brushing your teeth. A combination of aerobic exercise and strengthening and stretching is felt to be the best for you, so this should be your ultimate goal.   This can be done in the privacy of your home or in a group setting as at the gym  Some prefer having a , others prefer to do exercise in groups or individually. Do what \"works\" for you. You need to make it simple and \"fun,\" or you most likely you will not continue it. This document was written by Candido Rosas, as dictated by Pamela Savage MD.  I have reviewed and agree with the above note and have made corrections where appropriate Clarke Rollins M.D.

## 2018-09-04 DIAGNOSIS — F51.04 PSYCHOPHYSIOLOGICAL INSOMNIA: ICD-10-CM

## 2018-09-04 DIAGNOSIS — F98.8 ADD (ATTENTION DEFICIT DISORDER) WITHOUT HYPERACTIVITY: Chronic | ICD-10-CM

## 2018-09-04 RX ORDER — DEXTROAMPHETAMINE SACCHARATE, AMPHETAMINE ASPARTATE, DEXTROAMPHETAMINE SULFATE AND AMPHETAMINE SULFATE 2.5; 2.5; 2.5; 2.5 MG/1; MG/1; MG/1; MG/1
10 TABLET ORAL SEE ADMIN INSTRUCTIONS
Qty: 120 TAB | Refills: 0 | Status: CANCELLED | OUTPATIENT
Start: 2018-09-04

## 2018-09-04 NOTE — TELEPHONE ENCOUNTER
Patient is requesting a call back in regards to her medication , need refill as soon as possible  dextroamphetamine-amphetamine (ADDERALL) 10 mg tablet  Best call back : [phone]

## 2018-09-05 NOTE — TELEPHONE ENCOUNTER
----- Message from Cely Plate sent at 9/4/2018  6:07 PM EDT -----  Regarding: Dr Reynold Velasquez  Pt is requesting a refill on \"Ambien 20mg\" called in to the pharmacy on file.  Pt does not need Adderall    Best contact # [phone]

## 2018-09-05 NOTE — TELEPHONE ENCOUNTER
Outbound call to pt to discuss medication request. Pt states she is not requesting for refill of Adderall. At this time she only needs Ambien. Pt made aware that Dr Shanae Goldman is out of the office until 09/10/2018 and the covering provider would like her to wait until he returns to address refill request. Pt voiced understanding.

## 2018-09-08 RX ORDER — ZOLPIDEM TARTRATE 10 MG/1
TABLET ORAL
Qty: 60 TAB | Refills: 2 | Status: SHIPPED | OUTPATIENT
Start: 2018-09-08 | End: 2018-11-20 | Stop reason: SDUPTHER

## 2018-09-12 ENCOUNTER — TELEPHONE (OUTPATIENT)
Dept: FAMILY MEDICINE CLINIC | Age: 43
End: 2018-09-12

## 2018-09-12 NOTE — TELEPHONE ENCOUNTER
Patient is calling letting Dr Cristela Cooper know that she has an emergency and would like to do her blood work next week, she is requesting a call back from nurse  regarding this.   Best call back : [phone]

## 2018-09-12 NOTE — TELEPHONE ENCOUNTER
patient will be in [place]. Taking care of her mom who has been diag with cancer.  Will do labs upon return

## 2018-09-25 DIAGNOSIS — M51.36 DDD (DEGENERATIVE DISC DISEASE), LUMBAR: ICD-10-CM

## 2018-09-25 RX ORDER — LIDOCAINE 50 MG/G
PATCH TOPICAL
Qty: 15 PATCH | Refills: 3 | Status: SHIPPED | OUTPATIENT
Start: 2018-09-25 | End: 2019-08-21 | Stop reason: SDUPTHER

## 2018-09-30 LAB
ALBUMIN SERPL-MCNC: 4.2 G/DL (ref 3.5–5.5)
ALBUMIN/GLOB SERPL: 1.5 {RATIO} (ref 1.2–2.2)
ALP SERPL-CCNC: 66 IU/L (ref 39–117)
ALT SERPL-CCNC: 7 IU/L (ref 0–32)
AST SERPL-CCNC: 13 IU/L (ref 0–40)
BILIRUB SERPL-MCNC: 0.2 MG/DL (ref 0–1.2)
BUN SERPL-MCNC: 9 MG/DL (ref 6–24)
BUN/CREAT SERPL: 13 (ref 9–23)
CALCIUM SERPL-MCNC: 9 MG/DL (ref 8.7–10.2)
CHLORIDE SERPL-SCNC: 101 MMOL/L (ref 96–106)
CO2 SERPL-SCNC: 22 MMOL/L (ref 20–29)
CREAT SERPL-MCNC: 0.71 MG/DL (ref 0.57–1)
ERYTHROCYTE [DISTWIDTH] IN BLOOD BY AUTOMATED COUNT: 13.7 % (ref 12.3–15.4)
GLOBULIN SER CALC-MCNC: 2.8 G/DL (ref 1.5–4.5)
GLUCOSE SERPL-MCNC: 85 MG/DL (ref 65–99)
HCT VFR BLD AUTO: 41.4 % (ref 34–46.6)
HGB BLD-MCNC: 14.4 G/DL (ref 11.1–15.9)
LITHIUM SERPL-SCNC: 0.5 MMOL/L (ref 0.6–1.2)
MCH RBC QN AUTO: 31.8 PG (ref 26.6–33)
MCHC RBC AUTO-ENTMCNC: 34.8 G/DL (ref 31.5–35.7)
MCV RBC AUTO: 91 FL (ref 79–97)
PLATELET # BLD AUTO: 267 X10E3/UL (ref 150–379)
POTASSIUM SERPL-SCNC: 4.2 MMOL/L (ref 3.5–5.2)
PROT SERPL-MCNC: 7 G/DL (ref 6–8.5)
RBC # BLD AUTO: 4.53 X10E6/UL (ref 3.77–5.28)
SODIUM SERPL-SCNC: 136 MMOL/L (ref 134–144)
WBC # BLD AUTO: 8.1 X10E3/UL (ref 3.4–10.8)

## 2018-10-02 NOTE — PROGRESS NOTES
Outbound call to patient.  verified. Patient made aware of lab results and recommendations per Dr. Aly Pedraza. Patient would like to increase dose of medication for bipolar.

## 2018-10-02 NOTE — PROGRESS NOTES
Lithium level is just below recommended level- if you are feeling well, I would leave it at that; If you don't feel it is working for the bipolar, we can increase the dose, and either way, have you keep your follow up appt in November( [facility] IN November-- [facility] 11/20 AND ONE 11/26. Thanks!

## 2018-10-06 NOTE — PROGRESS NOTES
Change to 300 TID and recheck in 2-4 weeks with office visit ( non fasting labs to be done at that visit)

## 2018-10-26 ENCOUNTER — TELEPHONE (OUTPATIENT)
Dept: FAMILY MEDICINE CLINIC | Age: 43
End: 2018-10-26

## 2018-10-26 NOTE — PROGRESS NOTES
September-patient will be in [place]. Taking care of her mom who has been diag with cancer.  Will do labs upon return

## 2018-10-26 NOTE — TELEPHONE ENCOUNTER
Outbound call to patient. Patient made aware that only Adderall will be filled at the [facility] in [place]. Patient voiced understanding.

## 2018-10-26 NOTE — TELEPHONE ENCOUNTER
Outbound call to [facility]  verified. [facility] wanted to confirm patients last date and time of patients office visit before refilling medication. Last date and time of office visit given. Adderall 10mg will be refilled at the [facility] per verbal order Dr. Miguelito Logan.

## 2018-10-26 NOTE — TELEPHONE ENCOUNTER
[address] from [facility] is calling for the refill order, they said they need the Last office visit date and the confirmation that the medication was ordered for the patient.   Best call back : [phone]

## 2018-10-26 NOTE — TELEPHONE ENCOUNTER
Patient states that she is in [place], and she would like her prescription to be refilled there for   dextroamphetamine-amphetamine (ADDERALL) 10 mg tablet  oxyCODONE-acetaminophen (PERCOCET 10)  mg per tablet    It was suppose to be refilled yesterday, but in [place] state they need to speak to the provider in order to refill the medication  She is requesting the nurse to call the   [facility]  Phone : [phone]     LOV: September 29, 2018  Last refill : 8/22/2018  Patient call back : [phone]

## 2018-11-20 ENCOUNTER — OFFICE VISIT (OUTPATIENT)
Dept: FAMILY MEDICINE CLINIC | Age: 43
End: 2018-11-20

## 2018-11-20 VITALS
BODY MASS INDEX: 29.88 KG/M2 | SYSTOLIC BLOOD PRESSURE: 116 MMHG | OXYGEN SATURATION: 97 % | WEIGHT: 175 LBS | TEMPERATURE: 99.5 F | DIASTOLIC BLOOD PRESSURE: 80 MMHG | RESPIRATION RATE: 15 BRPM | HEART RATE: 70 BPM | HEIGHT: 64 IN

## 2018-11-20 DIAGNOSIS — F98.8 ADD (ATTENTION DEFICIT DISORDER) WITHOUT HYPERACTIVITY: Chronic | ICD-10-CM

## 2018-11-20 DIAGNOSIS — F41.8 DEPRESSION WITH ANXIETY: ICD-10-CM

## 2018-11-20 DIAGNOSIS — M50.30 DDD (DEGENERATIVE DISC DISEASE), CERVICAL: ICD-10-CM

## 2018-11-20 DIAGNOSIS — G43.109 MIGRAINE WITH AURA AND WITHOUT STATUS MIGRAINOSUS, NOT INTRACTABLE: ICD-10-CM

## 2018-11-20 DIAGNOSIS — M47.816 FACET ARTHRITIS OF LUMBAR REGION: ICD-10-CM

## 2018-11-20 DIAGNOSIS — F17.210 CIGARETTE SMOKER MOTIVATED TO QUIT: ICD-10-CM

## 2018-11-20 DIAGNOSIS — Z79.899 LITHIUM USE: ICD-10-CM

## 2018-11-20 DIAGNOSIS — F31.32 BIPOLAR DISORDER WITH MODERATE DEPRESSION (HCC): Primary | ICD-10-CM

## 2018-11-20 DIAGNOSIS — F51.04 PSYCHOPHYSIOLOGICAL INSOMNIA: ICD-10-CM

## 2018-11-20 DIAGNOSIS — F17.210 CIGARETTE SMOKER: ICD-10-CM

## 2018-11-20 RX ORDER — OXYCODONE AND ACETAMINOPHEN 10; 325 MG/1; MG/1
1 TABLET ORAL
Qty: 50 TAB | Refills: 0 | Status: SHIPPED | OUTPATIENT
Start: 2019-01-25 | End: 2019-02-20 | Stop reason: SDUPTHER

## 2018-11-20 RX ORDER — DEXTROAMPHETAMINE SACCHARATE, AMPHETAMINE ASPARTATE, DEXTROAMPHETAMINE SULFATE AND AMPHETAMINE SULFATE 2.5; 2.5; 2.5; 2.5 MG/1; MG/1; MG/1; MG/1
10 TABLET ORAL SEE ADMIN INSTRUCTIONS
Qty: 120 TAB | Refills: 0 | Status: SHIPPED | OUTPATIENT
Start: 2018-11-24 | End: 2019-02-20 | Stop reason: SDUPTHER

## 2018-11-20 RX ORDER — VARENICLINE TARTRATE 25 MG
KIT ORAL
Qty: 1 DOSE PACK | Refills: 0 | Status: SHIPPED | OUTPATIENT
Start: 2018-11-20 | End: 2019-01-17 | Stop reason: SDUPTHER

## 2018-11-20 RX ORDER — DEXTROAMPHETAMINE SACCHARATE, AMPHETAMINE ASPARTATE, DEXTROAMPHETAMINE SULFATE AND AMPHETAMINE SULFATE 2.5; 2.5; 2.5; 2.5 MG/1; MG/1; MG/1; MG/1
10 TABLET ORAL SEE ADMIN INSTRUCTIONS
Qty: 120 TAB | Refills: 0 | Status: SHIPPED | OUTPATIENT
Start: 2019-01-23 | End: 2019-02-20 | Stop reason: SDUPTHER

## 2018-11-20 RX ORDER — OXYCODONE AND ACETAMINOPHEN 10; 325 MG/1; MG/1
1 TABLET ORAL
Qty: 50 TAB | Refills: 0 | Status: SHIPPED | OUTPATIENT
Start: 2018-11-26 | End: 2019-02-20 | Stop reason: SDUPTHER

## 2018-11-20 RX ORDER — ZOLPIDEM TARTRATE 10 MG/1
TABLET ORAL
Qty: 60 TAB | Refills: 2 | Status: SHIPPED | OUTPATIENT
Start: 2018-12-03 | End: 2019-02-20 | Stop reason: SDUPTHER

## 2018-11-20 RX ORDER — OXYCODONE AND ACETAMINOPHEN 10; 325 MG/1; MG/1
1 TABLET ORAL
Qty: 50 TAB | Refills: 0 | Status: SHIPPED | OUTPATIENT
Start: 2018-12-26 | End: 2019-02-20 | Stop reason: SDUPTHER

## 2018-11-20 RX ORDER — ALPRAZOLAM 0.25 MG/1
TABLET ORAL
Qty: 30 TAB | Refills: 2 | Status: SHIPPED | OUTPATIENT
Start: 2018-12-01 | End: 2019-02-20 | Stop reason: SDUPTHER

## 2018-11-20 RX ORDER — DEXTROAMPHETAMINE SACCHARATE, AMPHETAMINE ASPARTATE, DEXTROAMPHETAMINE SULFATE AND AMPHETAMINE SULFATE 2.5; 2.5; 2.5; 2.5 MG/1; MG/1; MG/1; MG/1
TABLET ORAL
Qty: 120 TAB | Refills: 0 | Status: SHIPPED | OUTPATIENT
Start: 2018-12-24 | End: 2019-02-20 | Stop reason: SDUPTHER

## 2018-11-20 NOTE — PROGRESS NOTES
HISTORY OF PRESENT ILLNESS 
HPI Shelby Funes is a 44 y.o. Female with a history of migraines, DDD of cervical spine, ADD, depression with anxiety, insomnia and bipolar disorder, who presents at the office today for Adderall, Xanax and oxycodone. Pt wants to stop smoking and asked about Chantix, which she notes that she was taking before and was helping her. Pt admits that she has not been taking her lithium regularly, and notes that she was instructed to take 300 mg TID, but has been missing her dose that she would take in the middle of the day. Past Medical History:  
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 Thompson Martinez 8/25/2016  Asthma   
 last attack 2 months ago  Bilateral carpal tunnel syndrome- R>>L 9/25/2016  Bipolar disorder with moderate depression ([facility] [place] [address] 6/28/2017  Depression with anxiety 3/2/2010  Facet arthritis of lumbar region [facility]) 12/3/2017  History of migraine headaches 11/25/2018  Insomnia  Lithium use 6/28/2017  Migraine 2/20/2010  Psychiatric disorder Depression, anxiety  Psychophysiological insomnia 2/23/2016 Past Surgical History:  
Procedure Laterality Date [address] [facility]  HX BREAST REDUCTION  2002  HX ORTHOPAEDIC  2003  
 cervical disc  HX ORTHOPAEDIC    
 second right hand digit fx with pins index Current Outpatient Medications on File Prior to Visit Medication Sig Dispense Refill  lidocaine (LIDODERM) 5 % APPLY PATCH TO THE AFFECTED AREA FOR 12 HOURS A DAY AND REMOVE FOR 12 HOURS A DAY. 15 Patch 3  
 lithium carbonate 300 mg capsule Take 1 Cap by mouth two (2) times daily (with meals). (Patient taking differently: Take 300 mg by mouth three (3) times daily (with meals). ) 180 Cap 1  
 escitalopram oxalate (LEXAPRO) 20 mg tablet TAKE 1 TABLET BY MOUTH EVERY DAY 90 Tab 3  
 metoprolol succinate (TOPROL-XL) 25 mg XL tablet Take 1 Tab by mouth daily. Indications: MIGRAINE PREVENTION 90 Tab 0  
 acetaminophen (TYLENOL) 325 mg tablet Take  by mouth every four (4) hours as needed for Pain. No current facility-administered medications on file prior to visit. Allergies Allergen Reactions  Pcn [Penicillins] Other (comments) As a child Family History Problem Relation Age of Onset  Diabetes Father  Hypertension Father  Heart Attack Father  High Cholesterol Father  Diabetes Mother  High Cholesterol Mother Social History Socioeconomic History  Marital status:  Spouse name: Not on file  Number of children: Not on file  Years of education: Not on file  Highest education level: Not on file Tobacco Use  Smoking status: Current Every Day Smoker Packs/day: 1.50 Years: 18.00 Pack years: 27.00 Types: Cigarettes  Smokeless tobacco: Never Used Substance and Sexual Activity  Alcohol use: Yes Comment: occasionally  Drug use: No  
 Sexual activity: Yes  
  Partners: Male Birth control/protection: Condom Review of Systems Constitutional: Negative for chills, diaphoresis, fever, malaise/fatigue and weight loss. Eyes: Negative for blurred vision, double vision, pain and redness. Respiratory: Negative for cough, shortness of breath and wheezing. Cardiovascular: Negative for chest pain, palpitations, orthopnea, claudication, leg swelling and PND. Skin: Negative for itching and rash. Neurological: Negative for dizziness, tingling, tremors, sensory change, speech change, focal weakness, seizures, loss of consciousness, weakness and headaches. Results for orders placed or performed in visit on 08/22/18 LITHIUM Result Value Ref Range Lithium level 0.5 (L) 0.6 - 1.2 mmol/L  
CBC W/O DIFF Result Value Ref Range WBC 8.1 3.4 - 10.8 x10E3/uL  
 RBC 4.53 3.77 - 5.28 x10E6/uL HGB 14.4 11.1 - 15.9 g/dL HCT 41.4 34.0 - 46.6 % MCV 91 79 - 97 fL  
 MCH 31.8 26.6 - 33.0 pg  
 MCHC 34.8 31.5 - 35.7 g/dL  
 RDW 13.7 12.3 - 15.4 % PLATELET 937 861 - 427 x10E3/uL METABOLIC PANEL, COMPREHENSIVE Result Value Ref Range Glucose 85 65 - 99 mg/dL BUN 9 6 - 24 mg/dL Creatinine 0.71 0.57 - 1.00 mg/dL GFR est non- >59 mL/min/1.73 GFR est  >59 mL/min/1.73  
 BUN/Creatinine ratio 13 9 - 23 Sodium 136 134 - 144 mmol/L Potassium 4.2 3.5 - 5.2 mmol/L Chloride 101 96 - 106 mmol/L  
 CO2 22 20 - 29 mmol/L Calcium 9.0 8.7 - 10.2 mg/dL Protein, total 7.0 6.0 - 8.5 g/dL Albumin 4.2 3.5 - 5.5 g/dL GLOBULIN, TOTAL 2.8 1.5 - 4.5 g/dL A-G Ratio 1.5 1.2 - 2.2 Bilirubin, total 0.2 0.0 - 1.2 mg/dL Alk. phosphatase 66 39 - 117 IU/L  
 AST (SGOT) 13 0 - 40 IU/L  
 ALT (SGPT) 7 0 - 32 IU/L Physical Exam 
Visit Vitals /80 (BP 1 Location: Left arm, BP Patient Position: Sitting) Pulse 70 Temp 99.5 °F (37.5 °C) (Oral) Comment: drinking warm coffee Resp 15 Ht 5' 4\" (1.626 m) Wt 175 lb (79.4 kg) LMP 11/03/2018 SpO2 97% BMI 30.04 kg/m² Head: Normocephalic, without obvious abnormality, atraumatic Eyes: conjunctivae/corneas clear. PERRL, EOM's intact. Neck: supple, symmetrical, trachea midline, no adenopathy, thyroid: not enlarged, symmetric, no tenderness/mass/nodules, no carotid bruit and no JVD Lungs: clear to auscultation bilaterally Heart: regular rate and rhythm, S1, S2 normal, no murmur, click, rub or gallop Extremities: extremities normal, atraumatic, no cyanosis or edema Pulses: 2+ and symmetric Lymph nodes: Cervical, supraclavicular, and axillary nodes normal. 
Neurologic: Grossly normal 
 
 
ASSESSMENT and PLAN 
  ICD-10-CM ICD-9-CM 1. [facility] F31.32 296.52 LITHIUM  
   CBC W/O DIFF  
   METABOLIC PANEL, COMPREHENSIVE 2.  Lithium use Z79.899 V58.69 LITHIUM  
   CBC W/O DIFF  
   METABOLIC PANEL, COMPREHENSIVE  
 3. Depression with anxiety F41.8 300.4 ALPRAZolam (XANAX) 0.25 mg tablet 4. Psychophysiological insomnia F51.04 307.42 zolpidem (AMBIEN) 10 mg tablet 5. Cigarette smoker motivated to quit F17.200 305.1 6. Migraine with aura and without status migrainosus, not intractable G43.109 346.00 oxyCODONE-acetaminophen (PERCOCET 10)  mg per tablet  
   oxyCODONE-acetaminophen (PERCOCET 10)  mg per tablet  
   oxyCODONE-acetaminophen (PERCOCET 10)  mg per tablet 7. DDD (degenerative disc disease), cervical M50.30 722.4 oxyCODONE-acetaminophen (PERCOCET 10)  mg per tablet  
   oxyCODONE-acetaminophen (PERCOCET 10)  mg per tablet  
   oxyCODONE-acetaminophen (PERCOCET 10)  mg per tablet 8. 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 Gilda Arevalo F98.8 314.00 dextroamphetamine-amphetamine (ADDERALL) 10 mg tablet  
   dextroamphetamine-amphetamine (ADDERALL) 10 mg tablet  
   dextroamphetamine-amphetamine (ADDERALL) 10 mg tablet 9. Facet arthritis of lumbar region ([facility] [address] M46.96 721.3 oxyCODONE-acetaminophen (PERCOCET 10)  mg per tablet  
   oxyCODONE-acetaminophen (PERCOCET 10)  mg per tablet  
   oxyCODONE-acetaminophen (PERCOCET 10)  mg per tablet 10. Cigarette smoker F17.210 305.1 varenicline (CHANTIX STARTER ZELALEM) 0.5 mg (11)- 1 mg (42) DsPk Diagnoses and all orders for this visit: 
 
1. Bipolar disorder with moderate depression ([address] -     LITHIUM 
-     CBC W/O DIFF 
-     METABOLIC PANEL, COMPREHENSIVE 2. Lithium use -     LITHIUM 
-     CBC W/O DIFF 
-     METABOLIC PANEL, COMPREHENSIVE 3. Depression with anxiety -     ALPRAZolam (XANAX) 0.25 mg tablet; HALF TO 1 TABLET BY MOUTH TWICE A DAY AS NEEDED FOR ANXIETY (MUST LAST 30 DAYS-MD) 4. Psychophysiological insomnia 
-     zolpidem (AMBIEN) 10 mg tablet; TAKE 2 TABLETS BY MOUTH AT BEDTIME AS NEEDED FOR SLEEP 5. Cigarette smoker motivated to quit 6. Migraine with aura and without status migrainosus, not intractable 
-     oxyCODONE-acetaminophen (PERCOCET 10)  mg per tablet; Take 1 Tab by mouth every six (6) hours as needed for Pain. 50 is a 30 day supply 
-     oxyCODONE-acetaminophen (PERCOCET 10)  mg per tablet; Take 1 Tab by mouth every six (6) hours as needed for Pain. Max Daily Amount: 4 Tabs. 50 IS A 30 DAY SUPPLY 
-     oxyCODONE-acetaminophen (PERCOCET 10)  mg per tablet; Take 1 Tab by mouth every six (6) hours as needed for Pain. 50 is a 30 day supply 7. DDD (degenerative disc disease), cervical 
-     oxyCODONE-acetaminophen (PERCOCET 10)  mg per tablet; Take 1 Tab by mouth every six (6) hours as needed for Pain. 50 is a 30 day supply 
-     oxyCODONE-acetaminophen (PERCOCET 10)  mg per tablet; Take 1 Tab by mouth every six (6) hours as needed for Pain. Max Daily Amount: 4 Tabs. 50 IS A 30 DAY SUPPLY 
-     oxyCODONE-acetaminophen (PERCOCET 10)  mg per tablet; Take 1 Tab by mouth every six (6) hours as needed for Pain. 50 is a 30 day supply 8. 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 Wendy Olson 
-     dextroamphetamine-amphetamine (ADDERALL) 10 mg tablet; Take 1 Tab (10 mg total) by mouth See Admin InstructionsEarliest Fill Date: 1/23/19.  2 in am and 1-2 in afternoon 
-     dextroamphetamine-amphetamine (ADDERALL) 10 mg tablet; Earliest Fill Date: 12/24/18. TAKE 2 TABS PO Q AM AND 1-2 TABS IN AFTERNOON 
-     dextroamphetamine-amphetamine (ADDERALL) 10 mg tablet; Take 1 Tab (10 mg total) by mouth See Admin InstructionsEarliest Fill Date: 11/24/18.  2 in am and 1-2 in afternoon 9. Facet arthritis of lumbar region ([address] 
-     oxyCODONE-acetaminophen (PERCOCET 10)  mg per tablet; Take 1 Tab by mouth every six (6) hours as needed for Pain. 50 is a 30 day supply 
-     oxyCODONE-acetaminophen (PERCOCET 10)  mg per tablet;  Take 1 Tab by mouth every six (6) hours as needed for Pain. Max Daily Amount: 4 Tabs. 50 IS A 30 DAY SUPPLY 
-     oxyCODONE-acetaminophen (PERCOCET 10)  mg per tablet; Take 1 Tab by mouth every six (6) hours as needed for Pain. 50 is a 30 day supply 10. Cigarette smoker 
-     varenicline (CHANTIX STARTER ZELALEM) 0.5 mg (11)- 1 mg (42) DsPk; As per package instructions Follow-up Disposition: 
Return in about 3 months (around 2/20/2019), or unable to stop smoking, for F/U lithium use, bipolar condition. lab results and schedule of future lab studies reviewed with patient 
reviewed diet, exercise and weight control 
cardiovascular risk and specific lipid/LDL goals reviewed 
reviewed medications and side effects in detail Please call my office if there are any questions- [phone]. I will arrange for follow up after the lab tests done from today return Recommended a weekly \"heart check. \" I went into detail how to do this. Call for refills on medications as needed. Discussed expected course/resolution/complications of diagnosis in detail with patient. Medication risks/benefits/costs/interactions/alternatives discussed with patient. Pt was given an after visit summary which includes diagnoses, current medications & vitals. Pt expressed understanding with the diagnosis and plan. Total 25 minutes,60 % counseling re:  
Signed and agreed to the controlled substance agreement in the past year; copy is in the chart.  checked and found to have no suspicious activity. Pt asked for a refill of her Ambien; works well, no early refills, denies side effects. I encouraged patient once again to stop smoking. Various strategies discussed in detail. Pt showed interest in stopping smoking, so we gave her a prescription of Chantix. She is familiar with this medication, as she has taken it before and tolerated the medication without issue.  The benefit/risk, onset of action, method of taking, and side effects of this medication discussed in detail with the patient. Depression is stable as long as she takes her lithium Denies suicidal or homicidal ideations. Continue regular . follow up of lithium use every 6 months  Also, discussed symptoms of concern that were noted today in the note above, treatment options( including doing nothing), when to follow up before recommended time frame. Also, answered all questions. This document was written by Roseanne Moss, as dictated by Sadia Carroll MD. 
I have reviewed and agree with the above note and have made corrections where appropriate Clarke Orta M.D.

## 2018-11-20 NOTE — PROGRESS NOTES
1. Have you been to the ER, urgent care clinic since your last visit? Hospitalized since your last visit? [facility] 9/2018 for vaginal discharge. 2. Have you seen or consulted any other health care providers outside of the [address] [place] [address] since your last visit? Include any pap smears or colon screening. No  
 
Chief Complaint Patient presents with  Attention Deficit Disorder  
  follow up on medication  Migraine  
  medication follow up  Pain (Chronic) follow up on medications  Depression  
  medication evaluation  Nicotine Dependence  
  discuss Chantix Not fasting Flu vaccine: refused. PHQ over the last two weeks 11/20/2018 Little interest or pleasure in doing things Nearly every day Feeling down, depressed, irritable, or hopeless Several days Total Score PHQ 2 4 Trouble falling or staying asleep, or sleeping too much More than half the days Feeling tired or having little energy Nearly every day Poor appetite, weight loss, or overeating Nearly every day Feeling bad about yourself - or that you are a failure or have let yourself or your family down Several days Trouble concentrating on things such as school, work, reading, or watching TV Not at all Moving or speaking so slowly that other people could have noticed; or the opposite being so fidgety that others notice Not at all Thoughts of being better off dead, or hurting yourself in some way Not at all PHQ 9 Score 13 How difficult have these problems made it for you to do your work, take care of your home and get along with others Somewhat difficult

## 2018-11-25 PROBLEM — Z86.69 HISTORY OF MIGRAINE HEADACHES: Status: ACTIVE | Noted: 2018-11-25

## 2019-01-17 DIAGNOSIS — F17.210 CIGARETTE SMOKER: ICD-10-CM

## 2019-01-18 RX ORDER — VARENICLINE TARTRATE 25 MG
KIT ORAL
Qty: 53 DOSE PACK | Refills: 0 | Status: SHIPPED | OUTPATIENT
Start: 2019-01-18 | End: 2019-03-04

## 2019-02-20 ENCOUNTER — OFFICE VISIT (OUTPATIENT)
Dept: FAMILY MEDICINE CLINIC | Age: 44
End: 2019-02-20

## 2019-02-20 VITALS
SYSTOLIC BLOOD PRESSURE: 130 MMHG | RESPIRATION RATE: 18 BRPM | HEIGHT: 64 IN | TEMPERATURE: 97.5 F | BODY MASS INDEX: 30.01 KG/M2 | DIASTOLIC BLOOD PRESSURE: 84 MMHG | HEART RATE: 84 BPM | WEIGHT: 175.8 LBS | OXYGEN SATURATION: 98 %

## 2019-02-20 DIAGNOSIS — M50.30 DDD (DEGENERATIVE DISC DISEASE), CERVICAL: ICD-10-CM

## 2019-02-20 DIAGNOSIS — M54.50 CHRONIC MIDLINE LOW BACK PAIN WITHOUT SCIATICA: Primary | ICD-10-CM

## 2019-02-20 DIAGNOSIS — M47.816 FACET ARTHRITIS OF LUMBAR REGION: ICD-10-CM

## 2019-02-20 DIAGNOSIS — Z79.899 LITHIUM USE: ICD-10-CM

## 2019-02-20 DIAGNOSIS — G43.109 MIGRAINE WITH AURA AND WITHOUT STATUS MIGRAINOSUS, NOT INTRACTABLE: ICD-10-CM

## 2019-02-20 DIAGNOSIS — F41.8 DEPRESSION WITH ANXIETY: ICD-10-CM

## 2019-02-20 DIAGNOSIS — F17.210 CIGARETTE SMOKER: ICD-10-CM

## 2019-02-20 DIAGNOSIS — F51.04 PSYCHOPHYSIOLOGICAL INSOMNIA: ICD-10-CM

## 2019-02-20 DIAGNOSIS — F31.76 BIPOLAR DISORDER, IN FULL REMISSION, MOST RECENT EPISODE DEPRESSED (HCC): ICD-10-CM

## 2019-02-20 DIAGNOSIS — F42.9 OBSESSIVE-COMPULSIVE DISORDER, UNSPECIFIED TYPE: ICD-10-CM

## 2019-02-20 DIAGNOSIS — Z79.899 MEDICATION MANAGEMENT: ICD-10-CM

## 2019-02-20 DIAGNOSIS — G89.29 CHRONIC MIDLINE LOW BACK PAIN WITHOUT SCIATICA: Primary | ICD-10-CM

## 2019-02-20 DIAGNOSIS — F98.8 ADD (ATTENTION DEFICIT DISORDER) WITHOUT HYPERACTIVITY: Chronic | ICD-10-CM

## 2019-02-20 RX ORDER — DEXTROAMPHETAMINE SACCHARATE, AMPHETAMINE ASPARTATE, DEXTROAMPHETAMINE SULFATE AND AMPHETAMINE SULFATE 2.5; 2.5; 2.5; 2.5 MG/1; MG/1; MG/1; MG/1
10 TABLET ORAL SEE ADMIN INSTRUCTIONS
Qty: 120 TAB | Refills: 0 | Status: SHIPPED | OUTPATIENT
Start: 2019-04-26 | End: 2019-05-21 | Stop reason: SDUPTHER

## 2019-02-20 RX ORDER — OXYCODONE AND ACETAMINOPHEN 10; 325 MG/1; MG/1
1 TABLET ORAL
Qty: 50 TAB | Refills: 0 | Status: SHIPPED | OUTPATIENT
Start: 2019-04-28 | End: 2019-05-21 | Stop reason: SDUPTHER

## 2019-02-20 RX ORDER — OXYCODONE AND ACETAMINOPHEN 10; 325 MG/1; MG/1
1 TABLET ORAL
Qty: 50 TAB | Refills: 0 | Status: SHIPPED | OUTPATIENT
Start: 2019-02-26 | End: 2019-05-21 | Stop reason: SDUPTHER

## 2019-02-20 RX ORDER — DEXTROAMPHETAMINE SACCHARATE, AMPHETAMINE ASPARTATE, DEXTROAMPHETAMINE SULFATE AND AMPHETAMINE SULFATE 2.5; 2.5; 2.5; 2.5 MG/1; MG/1; MG/1; MG/1
TABLET ORAL
Qty: 120 TAB | Refills: 0 | Status: SHIPPED | OUTPATIENT
Start: 2019-03-26 | End: 2019-05-21 | Stop reason: SDUPTHER

## 2019-02-20 RX ORDER — OXYCODONE AND ACETAMINOPHEN 10; 325 MG/1; MG/1
1 TABLET ORAL
Qty: 50 TAB | Refills: 0 | Status: SHIPPED | OUTPATIENT
Start: 2019-03-28 | End: 2019-05-21 | Stop reason: SDUPTHER

## 2019-02-20 RX ORDER — NALOXONE HYDROCHLORIDE 4 MG/.1ML
SPRAY NASAL
Qty: 1 EACH | Refills: 3 | Status: SHIPPED | OUTPATIENT
Start: 2019-02-20 | End: 2022-01-20

## 2019-02-20 RX ORDER — DEXTROAMPHETAMINE SACCHARATE, AMPHETAMINE ASPARTATE, DEXTROAMPHETAMINE SULFATE AND AMPHETAMINE SULFATE 2.5; 2.5; 2.5; 2.5 MG/1; MG/1; MG/1; MG/1
10 TABLET ORAL SEE ADMIN INSTRUCTIONS
Qty: 120 TAB | Refills: 0 | Status: SHIPPED | OUTPATIENT
Start: 2019-02-24 | End: 2019-05-21 | Stop reason: SDUPTHER

## 2019-02-20 RX ORDER — ZOLPIDEM TARTRATE 10 MG/1
TABLET ORAL
Qty: 60 TAB | Refills: 2 | Status: SHIPPED | OUTPATIENT
Start: 2019-02-24 | End: 2019-05-14 | Stop reason: SDUPTHER

## 2019-02-20 RX ORDER — ALPRAZOLAM 0.25 MG/1
TABLET ORAL
Qty: 30 TAB | Refills: 2 | Status: SHIPPED | OUTPATIENT
Start: 2019-02-20 | End: 2019-05-14 | Stop reason: SDUPTHER

## 2019-02-20 NOTE — PROGRESS NOTES
Chief Complaint   Patient presents with    Attention Deficit Disorder     addrerall     Medication Evaluation     xanax, ambien, oxycodone     1. Have you been to the ER, urgent care clinic since your last visit? Hospitalized since your last visit? No    2. Have you seen or consulted any other health care providers outside of the [address] [place] [address] since your last visit? Include any pap smears or colon screening.  No

## 2019-02-21 LAB
ALBUMIN SERPL-MCNC: 4.5 G/DL (ref 3.5–5.5)
ALBUMIN/GLOB SERPL: 1.8 {RATIO} (ref 1.2–2.2)
ALP SERPL-CCNC: 73 IU/L (ref 39–117)
ALT SERPL-CCNC: 11 IU/L (ref 0–32)
AST SERPL-CCNC: 12 IU/L (ref 0–40)
BILIRUB SERPL-MCNC: 0.3 MG/DL (ref 0–1.2)
BUN SERPL-MCNC: 6 MG/DL (ref 6–24)
BUN/CREAT SERPL: 10 (ref 9–23)
CALCIUM SERPL-MCNC: 9.3 MG/DL (ref 8.7–10.2)
CHLORIDE SERPL-SCNC: 102 MMOL/L (ref 96–106)
CO2 SERPL-SCNC: 23 MMOL/L (ref 20–29)
CREAT SERPL-MCNC: 0.61 MG/DL (ref 0.57–1)
ERYTHROCYTE [DISTWIDTH] IN BLOOD BY AUTOMATED COUNT: 12.9 % (ref 12.3–15.4)
GLOBULIN SER CALC-MCNC: 2.5 G/DL (ref 1.5–4.5)
GLUCOSE SERPL-MCNC: 81 MG/DL (ref 65–99)
HCT VFR BLD AUTO: 41.8 % (ref 34–46.6)
HGB BLD-MCNC: 13.8 G/DL (ref 11.1–15.9)
LITHIUM SERPL-SCNC: 0.4 MMOL/L (ref 0.6–1.2)
MCH RBC QN AUTO: 31.6 PG (ref 26.6–33)
MCHC RBC AUTO-ENTMCNC: 33 G/DL (ref 31.5–35.7)
MCV RBC AUTO: 96 FL (ref 79–97)
PLATELET # BLD AUTO: 317 X10E3/UL (ref 150–379)
POTASSIUM SERPL-SCNC: 4.3 MMOL/L (ref 3.5–5.2)
PROT SERPL-MCNC: 7 G/DL (ref 6–8.5)
RBC # BLD AUTO: 4.37 X10E6/UL (ref 3.77–5.28)
SODIUM SERPL-SCNC: 138 MMOL/L (ref 134–144)
WBC # BLD AUTO: 10.4 X10E3/UL (ref 3.4–10.8)

## 2019-02-24 LAB
AMPHETAMINES UR QL SCN: NEGATIVE NG/ML
BARBITURATES UR QL SCN: NEGATIVE NG/ML
BENZODIAZ UR QL SCN: NEGATIVE NG/ML
BZE UR QL SCN: NEGATIVE NG/ML
CANNABINOIDS UR QL CFM: POSITIVE
CREAT UR-MCNC: 26.1 MG/DL (ref 20–300)
FENTANYL+NORFENTANYL UR QL SCN: NEGATIVE PG/ML
MEPERIDINE UR QL: NEGATIVE NG/ML
METHADONE UR QL SCN: NEGATIVE NG/ML
OPIATES UR QL SCN: NEGATIVE NG/ML
OXYCODONE+OXYMORPHONE UR QL SCN: NEGATIVE
PCP UR QL: NEGATIVE NG/ML
PH UR: 6.5 [PH] (ref 4.5–8.9)
PLEASE NOTE:, 733157: ABNORMAL
PROPOXYPH UR QL SCN: NEGATIVE NG/ML
SP GR UR: 1.01
TRAMADOL UR-MCNC: POSITIVE UG/ML

## 2019-02-25 DIAGNOSIS — F31.32 BIPOLAR 1 DISORDER, DEPRESSED, MODERATE (HCC): ICD-10-CM

## 2019-02-25 RX ORDER — LITHIUM CARBONATE 300 MG/1
300 CAPSULE ORAL 2 TIMES DAILY WITH MEALS
Qty: 180 CAP | Refills: 1 | Status: SHIPPED | OUTPATIENT
Start: 2019-02-25 | End: 2019-07-21 | Stop reason: SDUPTHER

## 2019-02-27 ENCOUNTER — TELEPHONE (OUTPATIENT)
Dept: FAMILY MEDICINE CLINIC | Age: 44
End: 2019-02-27

## 2019-02-27 NOTE — TELEPHONE ENCOUNTER
Patient states narcan is $130 so she will not be getting it.  Also percocet not covered, will have pharm send PA

## 2019-02-27 NOTE — TELEPHONE ENCOUNTER
FYI- when asked, the board says the Narcan Rx should be given, but we don't have to make sure they picked it up. ..

## 2019-02-27 NOTE — TELEPHONE ENCOUNTER
Pt. is calling requesting a call back in regards to  Medication that was called in for her and she was never on it before.      Best call #[phone]

## 2019-04-11 NOTE — PROGRESS NOTES
Lithium still a little low at 0.4( prefer > 0.6) Everything else is OK. How much Lithium was she taking when this was drawn? Was she taking it regularly? If not, missing the dose how often each week?

## 2019-04-17 NOTE — PATIENT INSTRUCTIONS
Migraine Headache: Care Instructions  Your Care Instructions  Migraines are painful, throbbing headaches that often start on one side of the head. They may cause nausea and vomiting and make you sensitive to light, sound, or smell. Without treatment, migraines can last from 4 hours to a few days. Medicines can help prevent migraines or stop them after they have started. Your doctor can help you find which ones work best for you. Follow-up care is a key part of your treatment and safety. Be sure to make and go to all appointments, and call your doctor if you are having problems. It's also a good idea to know your test results and keep a list of the medicines you take. How can you care for yourself at home? · Do not drive if you have taken a prescription pain medicine. · Rest in a quiet, dark room until your headache is gone. Close your eyes, and try to relax or go to sleep. Don't watch TV or read. · Put a cold, moist cloth or cold pack on the painful area for 10 to 20 minutes at a time. Put a thin cloth between the cold pack and your skin. · Use a warm, moist towel or a heating pad set on low to relax tight shoulder and neck muscles. · Have someone gently massage your neck and shoulders. · Take your medicines exactly as prescribed. Call your doctor if you think you are having a problem with your medicine. You will get more details on the specific medicines your doctor prescribes. · Be careful not to take pain medicine more often than the instructions allow. You could get worse or more frequent headaches when the medicine wears off. To prevent migraines  · Keep a headache diary so you can figure out what triggers your headaches. Avoiding triggers may help you prevent headaches. Record when each headache began, how long it lasted, and what the pain was like.  (Was it throbbing, aching, stabbing, or dull?) Write down any other symptoms you had with the headache, such as nausea, flashing lights or dark spots, or sensitivity to bright light or loud noise. Note if the headache occurred near your period. List anything that might have triggered the headache. Triggers may include certain foods (chocolate, cheese, wine) or odors, smoke, bright light, stress, or lack of sleep. · If your doctor has prescribed medicine for your migraines, take it as directed. You may have medicine that you take only when you get a migraine and medicine that you take all the time to help prevent migraines. ¨ If your doctor has prescribed medicine for when you get a headache, take it at the first sign of a migraine, unless your doctor has given you other instructions. ¨ If your doctor has prescribed medicine to prevent migraines, take it exactly as prescribed. Call your doctor if you think you are having a problem with your medicine. · Find healthy ways to deal with stress. Migraines are most common during or right after stressful times. Take time to relax before and after you do something that has caused a migraine in the past.  · Try to keep your muscles relaxed by keeping good posture. Check your jaw, face, neck, and shoulder muscles for tension. Try to relax them. When you sit at a desk, change positions often. And make sure to stretch for 30 seconds each hour. · Get plenty of sleep and exercise. · Eat meals on a regular schedule. Avoid foods and drinks that often trigger migraines. These include chocolate, alcohol (especially red wine and port), aspartame, monosodium glutamate (MSG), and some additives found in foods (such as hot dogs, mandel, cold cuts, aged cheeses, and pickled foods). · Limit caffeine. Don't drink too much coffee, tea, or soda. But don't quit caffeine suddenly. That can also give you migraines. · Do not smoke or allow others to smoke around you. If you need help quitting, talk to your doctor about stop-smoking programs and medicines. These can increase your chances of quitting for good.   · If you are taking birth control pills or hormone therapy, talk to your doctor about whether they are triggering your migraines. When should you call for help? Call 911 anytime you think you may need emergency care. For example, call if:  · You have signs of a stroke. These may include:  ¨ Sudden numbness, paralysis, or weakness in your face, arm, or leg, especially on only one side of your body. ¨ Sudden vision changes. ¨ Sudden trouble speaking. ¨ Sudden confusion or trouble understanding simple statements. ¨ Sudden problems with walking or balance. ¨ A sudden, severe headache that is different from past headaches. Call your doctor now or seek immediate medical care if:  · You have new or worse nausea and vomiting. · You have a new or higher fever. · Your headache gets much worse. Watch closely for changes in your health, and be sure to contact your doctor if:  · You are not getting better after 2 days (48 hours). Where can you learn more? Go to http://clare-amanda.info/. Enter P596 in the search box to learn more about \"Migraine Headache: Care Instructions. \"  Current as of: October 14, 2016  Content Version: 11.2  © 2971-4902 [employer]. Care instructions adapted under license by [employer] (which disclaims liability or warranty for this information). If you have questions about a medical condition or this instruction, always ask your healthcare professional. [address] any warranty or liability for your use of this information. Rhombic Flap Text: The defect edges were debeveled with a #15 scalpel blade.  Given the location of the defect and the proximity to free margins a rhombic flap was deemed most appropriate.  Using a sterile surgical marker, an appropriate rhombic flap was drawn incorporating the defect.    The area thus outlined was incised deep to adipose tissue with a #15 scalpel blade.  The skin margins were undermined to an appropriate distance in all directions utilizing iris scissors.

## 2019-04-30 ENCOUNTER — TELEPHONE (OUTPATIENT)
Dept: FAMILY MEDICINE CLINIC | Age: 44
End: 2019-04-30

## 2019-05-14 DIAGNOSIS — F51.04 PSYCHOPHYSIOLOGICAL INSOMNIA: ICD-10-CM

## 2019-05-14 DIAGNOSIS — F41.8 DEPRESSION WITH ANXIETY: ICD-10-CM

## 2019-05-14 RX ORDER — ZOLPIDEM TARTRATE 10 MG/1
TABLET ORAL
Qty: 60 TAB | Refills: 0 | Status: SHIPPED | OUTPATIENT
Start: 2019-05-14 | End: 2019-06-12 | Stop reason: SDUPTHER

## 2019-05-14 RX ORDER — ALPRAZOLAM 0.25 MG/1
TABLET ORAL
Qty: 30 TAB | Refills: 0 | Status: SHIPPED | OUTPATIENT
Start: 2019-05-14 | End: 2019-06-10 | Stop reason: SDUPTHER

## 2019-05-15 NOTE — TELEPHONE ENCOUNTER
Called in ALPRAZolam (XANAX) 0.25 mg tablet #30 with 0 refills and zolpidem (AMBIEN) 10 mg tablet #60 and 0 refills to local pharmacy

## 2019-05-21 ENCOUNTER — OFFICE VISIT (OUTPATIENT)
Dept: FAMILY MEDICINE CLINIC | Age: 44
End: 2019-05-21

## 2019-05-21 VITALS
TEMPERATURE: 99.2 F | OXYGEN SATURATION: 99 % | RESPIRATION RATE: 16 BRPM | HEIGHT: 64 IN | HEART RATE: 76 BPM | WEIGHT: 180.6 LBS | BODY MASS INDEX: 30.83 KG/M2 | DIASTOLIC BLOOD PRESSURE: 64 MMHG | SYSTOLIC BLOOD PRESSURE: 112 MMHG

## 2019-05-21 DIAGNOSIS — F41.8 DEPRESSION WITH ANXIETY: ICD-10-CM

## 2019-05-21 DIAGNOSIS — M47.816 FACET ARTHRITIS OF LUMBAR REGION: ICD-10-CM

## 2019-05-21 DIAGNOSIS — G43.109 MIGRAINE WITH AURA AND WITHOUT STATUS MIGRAINOSUS, NOT INTRACTABLE: ICD-10-CM

## 2019-05-21 DIAGNOSIS — F98.8 ADD (ATTENTION DEFICIT DISORDER) WITHOUT HYPERACTIVITY: Chronic | ICD-10-CM

## 2019-05-21 DIAGNOSIS — M50.30 DDD (DEGENERATIVE DISC DISEASE), CERVICAL: ICD-10-CM

## 2019-05-21 DIAGNOSIS — F31.32 BIPOLAR 1 DISORDER, DEPRESSED, MODERATE (HCC): Primary | ICD-10-CM

## 2019-05-21 RX ORDER — OXYCODONE AND ACETAMINOPHEN 10; 325 MG/1; MG/1
1 TABLET ORAL
Qty: 50 TAB | Refills: 0 | Status: SHIPPED | OUTPATIENT
Start: 2019-05-21 | End: 2019-08-21 | Stop reason: SDUPTHER

## 2019-05-21 RX ORDER — DEXTROAMPHETAMINE SACCHARATE, AMPHETAMINE ASPARTATE, DEXTROAMPHETAMINE SULFATE AND AMPHETAMINE SULFATE 2.5; 2.5; 2.5; 2.5 MG/1; MG/1; MG/1; MG/1
10 TABLET ORAL SEE ADMIN INSTRUCTIONS
Qty: 120 TAB | Refills: 0 | Status: SHIPPED | OUTPATIENT
Start: 2019-05-27 | End: 2019-08-21 | Stop reason: SDUPTHER

## 2019-05-21 RX ORDER — DEXTROAMPHETAMINE SACCHARATE, AMPHETAMINE ASPARTATE, DEXTROAMPHETAMINE SULFATE AND AMPHETAMINE SULFATE 2.5; 2.5; 2.5; 2.5 MG/1; MG/1; MG/1; MG/1
TABLET ORAL
Qty: 120 TAB | Refills: 0 | Status: SHIPPED | OUTPATIENT
Start: 2019-07-26 | End: 2019-08-21 | Stop reason: SDUPTHER

## 2019-05-21 RX ORDER — DEXTROAMPHETAMINE SACCHARATE, AMPHETAMINE ASPARTATE, DEXTROAMPHETAMINE SULFATE AND AMPHETAMINE SULFATE 2.5; 2.5; 2.5; 2.5 MG/1; MG/1; MG/1; MG/1
10 TABLET ORAL SEE ADMIN INSTRUCTIONS
Qty: 120 TAB | Refills: 0 | Status: SHIPPED | OUTPATIENT
Start: 2019-06-26 | End: 2019-08-21 | Stop reason: SDUPTHER

## 2019-05-21 NOTE — PROGRESS NOTES
HISTORY OF PRESENT ILLNESS  HPI  Jian Hernandez is a 44 y.o. Female with a history of migraines, bilateral CTS, cervical DDD, facet arthritis of lumbar spine, ADD, depression with anxiety, OCD and bipolar disorder, who presents at the office today for a follow up of her Xanax, Percocet and Adderall. Pt states that she ran out of her lithium 3 weeks ago, and was off it for one week. She states that since she has been on it, she is taking it TID. Pt notes that she has been \"struggling\" since restarting it, with her mood fluctuating up and down. Pt complains of bloating and constipation. Pt reports that she normally takes Dulcolax. Pt denies unusual SOB, chest pain, and any recent ER visits or hospitalizations. 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 Jian Suárez 8/25/2016    Asthma     last attack 2 months ago    Bilateral carpal tunnel syndrome- R>>L 9/25/2016    Bipolar disorder with moderate depression ([facility] [address] 6/28/2017    Depression with anxiety 3/2/2010    Facet arthritis of lumbar region 12/3/2017    History of migraine headaches 11/25/2018    Insomnia     Lithium use 6/28/2017    Migraine 02/20/2010    Psychiatric disorder     Depression, anxiety    Psychophysiological insomnia 2/23/2016     Past Surgical History:   Procedure Laterality Date    HX APPENDECTOMY  1996    HX BREAST REDUCTION  2002    HX ORTHOPAEDIC  2003    cervical disc    HX ORTHOPAEDIC      second right hand digit fx with pins index     Current Outpatient Medications on File Prior to Visit   Medication Sig Dispense Refill    zolpidem (AMBIEN) 10 mg tablet TAKE 2 TABLETS BY MOUTH AT BEDTIME AS NEEDED FOR SLEEP 60 Tab 0    ALPRAZolam (XANAX) 0.25 mg tablet TAKE 1/2-1 TABLET BY MOUTH TWICE DAILY AS NEEDED FOR ANXIETY (MUST LAST 30 DAYS) 30 Tab 0    lithium carbonate 300 mg capsule TAKE 1 CAP BY MOUTH TWO (2) TIMES DAILY (WITH MEALS). 180 Cap 1    lidocaine (LIDODERM) 5 % APPLY PATCH TO THE AFFECTED AREA FOR 12 HOURS A DAY AND REMOVE FOR 12 HOURS A DAY. 15 Patch 3    escitalopram oxalate (LEXAPRO) 20 mg tablet TAKE 1 TABLET BY MOUTH EVERY DAY 90 Tab 3    naloxone (NARCAN) 4 mg/actuation nasal spray Use 1 spray intranasally, then discard. Repeat with new spray every 2 min as needed for opioid overdose symptoms, alternating nostrils. 1 Each 3     No current facility-administered medications on file prior to visit. Allergies   Allergen Reactions    Pcn [Penicillins] Other (comments)     As a child     Family History   Problem Relation Age of Onset    Diabetes Father     Hypertension Father     Heart Attack Father     High Cholesterol Father     Diabetes Mother     High Cholesterol Mother      Social History     Socioeconomic History    Marital status:      Spouse name: Not on file    Number of children: Not on file    Years of education: Not on file    Highest education level: Not on file   Tobacco Use    Smoking status: Current Every Day Smoker     Packs/day: 1.50     Years: 18.00     Pack years: 27.00     Types: Cigarettes    Smokeless tobacco: Never Used   Substance and Sexual Activity    Alcohol use: Yes     Comment: occasionally    Drug use: No    Sexual activity: Yes     Partners: Male     Birth control/protection: Condom           Review of Systems   Constitutional: Negative for chills, diaphoresis, fever, malaise/fatigue and weight loss. Eyes: Negative for blurred vision, double vision, pain and redness. Respiratory: Negative for cough, shortness of breath and wheezing. Cardiovascular: Negative for chest pain, palpitations, orthopnea, claudication, leg swelling and PND. Gastrointestinal: Positive for constipation. Bloating   Skin: Negative for itching and rash.    Neurological: Negative for dizziness, tingling, tremors, sensory change, speech change, focal weakness, seizures, loss of consciousness, weakness and headaches. Results for orders placed or performed in visit on 02/20/19   PAIN MGMT PANEL W/REFL, UR   Result Value Ref Range    Amphetamine Screen, urine Negative Rqaqzo=8243 ng/mL    Barbiturates Screen, urine Negative Ilkjar=839 ng/mL    Benzodiazepines Screen, urine Negative Wnaize=383 ng/mL    Cannabinoid Screen, urine Positive (A) Cutoff=20    Cocaine Metab. Screen, urine Negative Nrzakg=073 ng/mL    Opiate Screen, urine Negative Mzmaqy=307 ng/mL    Oxycodone/Oxymorphone, urine Negative Hkwnlv=226    Phencyclidine Screen, urine Negative Cutoff=25 ng/mL    Methadone Screen, urine Negative Jthhmp=895 ng/mL    Propoxyphene Screen, urine Negative Ofsuqp=003 ng/mL    Meperidine screen Negative Tjmoqf=178 ng/mL    FENTANYL, URINE Negative Phltum=7666 pg/mL    Tramadol Screen, urine Positive (A) Kyzivp=542    Creatinine, urine 26.1 20.0 - 300.0 mg/dL    Specific Gravity 1.007     pH, urine 6.5 4.5 - 8.9    Please Note Comment          Physical Exam  Visit Vitals  /64 (BP 1 Location: Left arm, BP Patient Position: Sitting)   Pulse 76   Temp 99.2 °F (37.3 °C) (Oral)   Resp 16   Ht 5' 4\" (1.626 m)   Wt 180 lb 9.6 oz (81.9 kg)   LMP 05/04/2019 (Exact Date)   SpO2 99%   BMI 31.00 kg/m²       Neck: supple, symmetrical, trachea midline, no adenopathy, thyroid: not enlarged, symmetric, no tenderness/mass/nodules, no carotid bruit and no JVD  Lungs: clear to auscultation bilaterally  Heart: regular rate and rhythm, S1, S2 normal, no murmur, click, rub or gallop  Neurologic: Grossly normal      ASSESSMENT and PLAN    ICD-10-CM ICD-9-CM    1. Bipolar 1 disorder, depressed, moderate (HCC) F31.32 296.52 LITHIUM      METABOLIC PANEL, BASIC   2. DDD (degenerative disc disease), cervical M50.30 722.4 oxyCODONE-acetaminophen (PERCOCET 10)  mg per tablet      oxyCODONE-acetaminophen (PERCOCET 10)  mg per tablet      oxyCODONE-acetaminophen (PERCOCET 10)  mg per tablet   3. Migraine with aura and without status migrainosus, not intractable G43.109 346.00 oxyCODONE-acetaminophen (PERCOCET 10)  mg per tablet      oxyCODONE-acetaminophen (PERCOCET 10)  mg per tablet      oxyCODONE-acetaminophen (PERCOCET 10)  mg per tablet   4. Facet arthritis of lumbar region M47.816 721.3 oxyCODONE-acetaminophen (PERCOCET 10)  mg per tablet      oxyCODONE-acetaminophen (PERCOCET 10)  mg per tablet      oxyCODONE-acetaminophen (PERCOCET 10)  mg per tablet   5. 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 Alberto Powers F98.8 314.00 dextroamphetamine-amphetamine (ADDERALL) 10 mg tablet      dextroamphetamine-amphetamine (ADDERALL) 10 mg tablet      dextroamphetamine-amphetamine (ADDERALL) 10 mg tablet   6. Depression with anxiety F41.8 300.4      Diagnoses and all orders for this visit:    1. Bipolar 1 disorder, depressed, moderate (HCC)  -     LITHIUM  -     METABOLIC PANEL, BASIC    2. DDD (degenerative disc disease), cervical  -     oxyCODONE-acetaminophen (PERCOCET 10)  mg per tablet; Take 1 Tab by mouth two (2) times daily as needed for Pain for up to 30 days. Max Daily Amount: 2 Tabs. 50 is a 30 day supply  -     oxyCODONE-acetaminophen (PERCOCET 10)  mg per tablet; Take 1 Tab by mouth two (2) times daily as needed for Pain for up to 30 days. Max Daily Amount: 2 Tabs. 50 IS A 30 DAY SUPPLY  -     oxyCODONE-acetaminophen (PERCOCET 10)  mg per tablet; Take 1 Tab by mouth two (2) times daily as needed for Pain for up to 30 days. Max Daily Amount: 2 Tabs. 50 is a 30 day supply    3. Migraine with aura and without status migrainosus, not intractable  -     oxyCODONE-acetaminophen (PERCOCET 10)  mg per tablet; Take 1 Tab by mouth two (2) times daily as needed for Pain for up to 30 days. Max Daily Amount: 2 Tabs. 50 is a 30 day supply  -     oxyCODONE-acetaminophen (PERCOCET 10)  mg per tablet;  Take 1 Tab by mouth two (2) times daily as needed for Pain for up to 30 days. Max Daily Amount: 2 Tabs. 50 IS A 30 DAY SUPPLY  -     oxyCODONE-acetaminophen (PERCOCET 10)  mg per tablet; Take 1 Tab by mouth two (2) times daily as needed for Pain for up to 30 days. Max Daily Amount: 2 Tabs. 50 is a 30 day supply    4. Facet arthritis of lumbar region  -     oxyCODONE-acetaminophen (PERCOCET 10)  mg per tablet; Take 1 Tab by mouth two (2) times daily as needed for Pain for up to 30 days. Max Daily Amount: 2 Tabs. 50 is a 30 day supply  -     oxyCODONE-acetaminophen (PERCOCET 10)  mg per tablet; Take 1 Tab by mouth two (2) times daily as needed for Pain for up to 30 days. Max Daily Amount: 2 Tabs. 50 IS A 30 DAY SUPPLY  -     oxyCODONE-acetaminophen (PERCOCET 10)  mg per tablet; Take 1 Tab by mouth two (2) times daily as needed for Pain for up to 30 days. Max Daily Amount: 2 Tabs. 50 is a 30 day supply    5. 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 Porfirio Sandifer  -     dextroamphetamine-amphetamine (ADDERALL) 10 mg tablet; Take 1 Tab by mouth See Admin Instructions. 2 in am and 1-2 in afternoon  -     dextroamphetamine-amphetamine (ADDERALL) 10 mg tablet; TAKE 2 TABS PO Q AM AND 1-2 TABS IN AFTERNOON  -     dextroamphetamine-amphetamine (ADDERALL) 10 mg tablet; Take 1 Tab by mouth See Admin Instructions. 2 in am and 1-2 in afternoon    6. Depression with anxiety      Follow-up and Dispositions    · Return for 3 month F/U chronic controlled substance use, F/U Bipolar disease, Lithium level. reviewed medications and side effects in detail  Please call my office if there are any questions- [phone]. Discussed expected course/resolution/complications of diagnosis in detail with patient. Medication risks/benefits/costs/interactions/alternatives discussed with patient. Pt was given an after visit summary which includes diagnoses, current medications & vitals.    Pt expressed understanding with the diagnosis and plan. Total 25 minutes,60 % counseling re:  Regular exercise is very important to your health; it helps mentally, physically, socially; it prevents injuries if done properly. Exercise, even as simple as walking 20-30 minutes daily has major benefits to your health even though your \"numbers\" are the same in the lab. See if you can add this into your daily regimen and after a few months it will become a regular habit-\"just something you do,\" like brushing your teeth. A combination of aerobic exercise and strengthening and stretching is felt to be the best for you, so this should be your ultimate goal.   This can be done in the privacy of your home or in a group setting as at the gym  Some prefer having a , others prefer to do exercise in groups or individually. Do what \"works\" for you. You need to make it simple and \"fun,\" or you most likely you will not continue it. BMI is significantly elevated- in the obese range. I reviewed diet, exercise and weight control. Discussed weight control in detail, the importance of mainly decreased carbs, and for weight maintenance, exercise; discussed different diets and that it isn't as important to watch the type of foods as it is to decrease calorie intake no matter what type of diet you do, etc.     Pt brought up concerns that she has OCD as well as bipolar disorder. We briefly discussed this, with pt explaining that she takes hours to leave the house due to having to check certain things (turn of stove, lock door, etc.). I informed her that if her lithium level is therapeutic but does not help her OCD, we can add another medication to help resolve that. I encouraged her to try never to run out of the lithium, as the result can reactivate the bipolar disease.   Also, discussed symptoms of concern that were noted today in the note above, treatment options( including doing nothing), when to follow up before recommended time frame. Also, answered all questions. This document was written by Letitia Davila, as dictated by Nelly Howadr MD.  I have reviewed and agree with the above note and have made corrections where appropriate Clarke Ya M.D.

## 2019-05-21 NOTE — PROGRESS NOTES
Giorgio Rivers is a 44 y.o. female     Chief Complaint   Patient presents with    Medication Refill     Xanax, Perocet, Adderall       Visit Vitals  Ht 5' 4\" (1.626 m)   Wt 180 lb 9.6 oz (81.9 kg)   BMI 31.00 kg/m²       Health Maintenance Due   Topic Date Due    Pneumococcal 0-64 years (1 of 1 - PPSV23) 08/07/1982    BREAST CANCER SCRN MAMMOGRAM  08/11/2012       1. Have you been to the ER, urgent care clinic since your last visit? Hospitalized since your last visit? No    2. Have you seen or consulted any other health care providers outside of the [address] [place] [address] since your last visit? Include any pap smears or colon screening.  No

## 2019-05-28 RX ORDER — RIZATRIPTAN BENZOATE 10 MG/1
TABLET, ORALLY DISINTEGRATING ORAL
Qty: 15 TAB | Refills: 2 | Status: SHIPPED | OUTPATIENT
Start: 2019-05-28 | End: 2020-02-06 | Stop reason: ALTCHOICE

## 2019-06-10 DIAGNOSIS — F41.8 DEPRESSION WITH ANXIETY: ICD-10-CM

## 2019-06-11 RX ORDER — ALPRAZOLAM 0.25 MG/1
TABLET ORAL
Qty: 30 TAB | Refills: 1 | Status: SHIPPED | OUTPATIENT
Start: 2019-06-11 | End: 2019-08-05 | Stop reason: SDUPTHER

## 2019-06-12 DIAGNOSIS — F51.04 PSYCHOPHYSIOLOGICAL INSOMNIA: ICD-10-CM

## 2019-06-12 RX ORDER — ZOLPIDEM TARTRATE 10 MG/1
TABLET ORAL
Qty: 60 TAB | Refills: 3 | Status: SHIPPED | OUTPATIENT
Start: 2019-06-12 | End: 2019-08-21 | Stop reason: SDUPTHER

## 2019-07-10 ENCOUNTER — OFFICE VISIT (OUTPATIENT)
Dept: FAMILY MEDICINE CLINIC | Age: 44
End: 2019-07-10

## 2019-07-10 VITALS
WEIGHT: 183.6 LBS | OXYGEN SATURATION: 98 % | RESPIRATION RATE: 18 BRPM | SYSTOLIC BLOOD PRESSURE: 118 MMHG | HEIGHT: 64 IN | HEART RATE: 91 BPM | TEMPERATURE: 98.6 F | DIASTOLIC BLOOD PRESSURE: 79 MMHG | BODY MASS INDEX: 31.34 KG/M2
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
## 2019-07-10 DIAGNOSIS — M79.10 MYALGIA: Primary | ICD-10-CM
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
## 2019-07-21 DIAGNOSIS — F31.32 BIPOLAR 1 DISORDER, DEPRESSED, MODERATE (HCC): ICD-10-CM

## 2019-07-22 RX ORDER — LITHIUM CARBONATE 300 MG/1
300 CAPSULE ORAL 2 TIMES DAILY WITH MEALS
Qty: 180 CAP | Refills: 1 | Status: SHIPPED | OUTPATIENT
Start: 2019-07-22 | End: 2019-12-30

## 2019-08-05 DIAGNOSIS — F41.8 DEPRESSION WITH ANXIETY: ICD-10-CM

## 2019-08-05 NOTE — TELEPHONE ENCOUNTER
Last refill 07/08/19 per   PCP: Braydon Cantu MD    Last appt: 7/10/2019  Future Appointments   Date Time Provider Lucia Reyesi   8/21/2019  4:00 PM Braydon Cantu  Ruperi Kemp       Requested Prescriptions     Pending Prescriptions Disp Refills    ALPRAZolam (XANAX) 0.25 mg tablet [Pharmacy Med Name: ALPRAZOLAM 0.25 MG TABLET] 30 Tab 1     Sig: TAKE 1/2 TO 1 TABLET BY MOUTH TWICE A DAY AS NEEDED FOR ANXIETY

## 2019-08-06 RX ORDER — ALPRAZOLAM 0.25 MG/1
TABLET ORAL
Qty: 30 TAB | Refills: 1 | Status: SHIPPED | OUTPATIENT
Start: 2019-08-06 | End: 2019-10-02 | Stop reason: SDUPTHER

## 2019-08-21 ENCOUNTER — OFFICE VISIT (OUTPATIENT)
Dept: FAMILY MEDICINE CLINIC | Age: 44
End: 2019-08-21

## 2019-08-21 VITALS
OXYGEN SATURATION: 99 % | SYSTOLIC BLOOD PRESSURE: 120 MMHG | HEIGHT: 64 IN | RESPIRATION RATE: 18 BRPM | WEIGHT: 189 LBS | HEART RATE: 88 BPM | DIASTOLIC BLOOD PRESSURE: 70 MMHG | BODY MASS INDEX: 32.27 KG/M2 | TEMPERATURE: 98.6 F

## 2019-08-21 DIAGNOSIS — F98.8 ADD (ATTENTION DEFICIT DISORDER) WITHOUT HYPERACTIVITY: Chronic | ICD-10-CM

## 2019-08-21 DIAGNOSIS — M51.36 DDD (DEGENERATIVE DISC DISEASE), LUMBAR: ICD-10-CM

## 2019-08-21 DIAGNOSIS — S39.012A ACUTE MYOFASCIAL STRAIN OF LUMBAR REGION, INITIAL ENCOUNTER: ICD-10-CM

## 2019-08-21 DIAGNOSIS — S16.1XXA STRAIN OF NECK MUSCLE, INITIAL ENCOUNTER: ICD-10-CM

## 2019-08-21 DIAGNOSIS — G43.109 MIGRAINE WITH AURA AND WITHOUT STATUS MIGRAINOSUS, NOT INTRACTABLE: ICD-10-CM

## 2019-08-21 DIAGNOSIS — M50.30 DDD (DEGENERATIVE DISC DISEASE), CERVICAL: ICD-10-CM

## 2019-08-21 DIAGNOSIS — T07.XXXA SOFT TISSUE INJURY OF MULTIPLE SITES: Primary | ICD-10-CM

## 2019-08-21 DIAGNOSIS — M47.816 FACET ARTHRITIS OF LUMBAR REGION: ICD-10-CM

## 2019-08-21 DIAGNOSIS — F51.04 PSYCHOPHYSIOLOGICAL INSOMNIA: ICD-10-CM

## 2019-08-21 RX ORDER — OXYCODONE AND ACETAMINOPHEN 10; 325 MG/1; MG/1
1 TABLET ORAL
Qty: 50 TAB | Refills: 0 | Status: SHIPPED | OUTPATIENT
Start: 2019-09-26 | End: 2019-10-26

## 2019-08-21 RX ORDER — DEXTROAMPHETAMINE SACCHARATE, AMPHETAMINE ASPARTATE, DEXTROAMPHETAMINE SULFATE AND AMPHETAMINE SULFATE 2.5; 2.5; 2.5; 2.5 MG/1; MG/1; MG/1; MG/1
10 TABLET ORAL SEE ADMIN INSTRUCTIONS
Qty: 120 TAB | Refills: 0 | Status: SHIPPED | OUTPATIENT
Start: 2019-08-27 | End: 2019-12-03 | Stop reason: SDUPTHER

## 2019-08-21 RX ORDER — OXYCODONE AND ACETAMINOPHEN 10; 325 MG/1; MG/1
1 TABLET ORAL
Qty: 100 TAB | Refills: 0 | Status: SHIPPED | OUTPATIENT
Start: 2019-08-21 | End: 2019-09-20

## 2019-08-21 RX ORDER — LIDOCAINE 50 MG/G
PATCH TOPICAL
Qty: 15 PATCH | Refills: 3 | Status: SHIPPED | OUTPATIENT
Start: 2019-08-21 | End: 2022-03-26 | Stop reason: ALTCHOICE

## 2019-08-21 RX ORDER — ZOLPIDEM TARTRATE 10 MG/1
TABLET ORAL
Qty: 60 TAB | Refills: 3 | Status: SHIPPED | OUTPATIENT
Start: 2019-08-21 | End: 2019-11-21 | Stop reason: SDUPTHER

## 2019-08-21 RX ORDER — OXYCODONE AND ACETAMINOPHEN 10; 325 MG/1; MG/1
1 TABLET ORAL
Qty: 50 TAB | Refills: 0 | Status: SHIPPED | OUTPATIENT
Start: 2019-08-27 | End: 2019-08-21 | Stop reason: CLARIF

## 2019-08-21 RX ORDER — DEXTROAMPHETAMINE SACCHARATE, AMPHETAMINE ASPARTATE, DEXTROAMPHETAMINE SULFATE AND AMPHETAMINE SULFATE 2.5; 2.5; 2.5; 2.5 MG/1; MG/1; MG/1; MG/1
10 TABLET ORAL SEE ADMIN INSTRUCTIONS
Qty: 120 TAB | Refills: 0 | Status: SHIPPED | OUTPATIENT
Start: 2019-10-26 | End: 2019-12-03 | Stop reason: SDUPTHER

## 2019-08-21 RX ORDER — OXYCODONE AND ACETAMINOPHEN 10; 325 MG/1; MG/1
1 TABLET ORAL
Qty: 50 TAB | Refills: 0 | Status: SHIPPED | OUTPATIENT
Start: 2019-10-26 | End: 2019-11-19 | Stop reason: SDUPTHER

## 2019-08-21 RX ORDER — DEXTROAMPHETAMINE SACCHARATE, AMPHETAMINE ASPARTATE, DEXTROAMPHETAMINE SULFATE AND AMPHETAMINE SULFATE 2.5; 2.5; 2.5; 2.5 MG/1; MG/1; MG/1; MG/1
10 TABLET ORAL SEE ADMIN INSTRUCTIONS
Qty: 120 TAB | Refills: 0 | Status: SHIPPED | OUTPATIENT
Start: 2019-09-26 | End: 2019-12-03 | Stop reason: SDUPTHER
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
## 2019-08-22 ENCOUNTER — TELEPHONE (OUTPATIENT)
Dept: FAMILY MEDICINE CLINIC | Age: 44
End: 2019-08-22

## 2019-08-22 NOTE — TELEPHONE ENCOUNTER
----- Message from Jonh Casas sent at 8/21/2019  6:36 PM EDT -----  Regarding: Dr. Arlette Crawford / Rx refill  Contact: [phone]  Medication Refill    Caller (if not patient):      Relationship of caller (if not patient):      Best contact number(s): [phone]      Name of medication and dosage if known: Percocet 10 mgs       Is patient out of this medication (yes/no): yes      Pharmacy name: [address] [place] [address] [place] listed in chart? (yes/no): yes  Pharmacy phone number: [phone]       Details to clarify the request: Pt stated the wrong date is on the Rx for Percocet, pt stated it says the start date is 08/27 but Rx refill 08/21      Jonh Casas
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
## 2019-10-02 DIAGNOSIS — F41.8 DEPRESSION WITH ANXIETY: ICD-10-CM

## 2019-10-02 NOTE — TELEPHONE ENCOUNTER
Last refill 09/09/19 per   PCP: Ryne Clifton MD    Last appt: 8/21/2019  No future appointments.     Requested Prescriptions     Pending Prescriptions Disp Refills    ALPRAZolam (XANAX) 0.25 mg tablet [Pharmacy Med Name: ALPRAZOLAM 0.25 MG TABLET] 30 Tab 1     Sig: TAKE 1/2 TO 1 TABLET BY MOUTH TWICE A DAY AS NEEDED FOR ANXIETY

## 2019-10-03 RX ORDER — ALPRAZOLAM 0.25 MG/1
TABLET ORAL
Qty: 30 TAB | Refills: 1 | Status: SHIPPED | OUTPATIENT
Start: 2019-10-09 | End: 2019-11-13 | Stop reason: SDUPTHER

## 2019-10-03 NOTE — TELEPHONE ENCOUNTER
Start date should be 10/9 also, because on high risk meds, needs office visit every 3 months- about 11/19

## 2019-11-13 DIAGNOSIS — F41.8 DEPRESSION WITH ANXIETY: ICD-10-CM

## 2019-11-13 RX ORDER — ALPRAZOLAM 0.25 MG/1
TABLET ORAL
Qty: 30 TAB | Refills: 1 | Status: SHIPPED | OUTPATIENT
Start: 2019-11-13 | End: 2019-12-30

## 2019-11-18 ENCOUNTER — OFFICE VISIT (OUTPATIENT)
Dept: FAMILY MEDICINE CLINIC | Age: 44
End: 2019-11-18

## 2019-11-18 VITALS
SYSTOLIC BLOOD PRESSURE: 128 MMHG | OXYGEN SATURATION: 99 % | DIASTOLIC BLOOD PRESSURE: 72 MMHG | BODY MASS INDEX: 31.58 KG/M2 | WEIGHT: 185 LBS | HEIGHT: 64 IN | TEMPERATURE: 98.8 F | HEART RATE: 74 BPM | RESPIRATION RATE: 18 BRPM

## 2019-11-18 DIAGNOSIS — F42.9 OBSESSIVE-COMPULSIVE DISORDER, UNSPECIFIED TYPE: ICD-10-CM

## 2019-11-18 DIAGNOSIS — F32.A ANXIETY AND DEPRESSION: ICD-10-CM

## 2019-11-18 DIAGNOSIS — M47.816 FACET ARTHRITIS OF LUMBAR REGION: ICD-10-CM

## 2019-11-18 DIAGNOSIS — F41.8 DEPRESSION WITH ANXIETY: ICD-10-CM

## 2019-11-18 DIAGNOSIS — F51.04 PSYCHOPHYSIOLOGICAL INSOMNIA: ICD-10-CM

## 2019-11-18 DIAGNOSIS — Z86.69 HISTORY OF MIGRAINE HEADACHES: ICD-10-CM

## 2019-11-18 DIAGNOSIS — Z79.899 LONG-TERM CURRENT USE OF LITHIUM: ICD-10-CM

## 2019-11-18 DIAGNOSIS — F31.32 BIPOLAR 1 DISORDER, DEPRESSED, MODERATE (HCC): Primary | ICD-10-CM

## 2019-11-18 DIAGNOSIS — F41.9 ANXIETY AND DEPRESSION: ICD-10-CM
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
## 2019-11-18 NOTE — PROGRESS NOTES
HISTORY OF PRESENT ILLNESS  HPI  Da Hernandez is a 38 y.o. Female with a history of migraine with aura, bilateral CTS, cervical DDD, facet arthritis of lumbar region, ADD, depression with anxiety, insomnia, OCD and bipolar disorder, who presents to the office today for a medication evaluation. Pt says she is taking three 300 mg lithium carbonate a day. Pt reports she'd only miss about 2 doses a week, otherwise taking it pretty consistently. Pt complains of back pain for which she starts PT for on Wednesday. Pt states her depression has been worse. Pt denies SIHI. Pt says she has got back to work in the last month and recently acquired new insurance. Pt denies unusual SOB, chest pain, and any recent ER visits or hospitalizations. 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 Truong Genesee 8/25/2016    Asthma     last attack 2 months ago    Bilateral carpal tunnel syndrome- R>>L 9/25/2016    Bipolar disorder with moderate depression ([facility] [address] 6/28/2017    Depression with anxiety 3/2/2010    Facet arthritis of lumbar region 12/3/2017    History of migraine headaches 11/25/2018    Insomnia     Lithium use 6/28/2017    Migraine 02/20/2010    Psychiatric disorder     Depression, anxiety    Psychophysiological insomnia 2/23/2016     Past Surgical History:   Procedure Laterality Date    HX APPENDECTOMY  1996    HX BREAST REDUCTION  2002    HX ORTHOPAEDIC  2003    cervical disc    HX ORTHOPAEDIC      second right hand digit fx with pins index     Current Outpatient Medications on File Prior to Visit   Medication Sig Dispense Refill    ALPRAZolam (XANAX) 0.25 mg tablet TAKE 1/2-1 TABLET BY MOUTH TWICE A DAY AS NEEDED FOR ANXIETY 30 Tab 1    lidocaine (LIDODERM) 5 % APPLY PATCH TO THE AFFECTED AREA FOR 12 HOURS A DAY AND REMOVE FOR 12 HOURS A DAY.  15 Patch 3    lithium carbonate 300 mg capsule TAKE 1 CAP BY MOUTH TWO (2) TIMES DAILY (WITH MEALS). 180 Cap 1    rizatriptan (MAXALT-MLT) 10 mg disintegrating tablet TAKE 1 TABLET BY MOUTH ONCE AS NEEDED FOR MIGRAINE FOR UP TO 1 DOSE. 15 Tab 2    naloxone (NARCAN) 4 mg/actuation nasal spray Use 1 spray intranasally, then discard. Repeat with new spray every 2 min as needed for opioid overdose symptoms, alternating nostrils. 1 Each 3     No current facility-administered medications on file prior to visit. Allergies   Allergen Reactions    Pcn [Penicillins] Other (comments)     As a child     Family History   Problem Relation Age of Onset    Diabetes Father     Hypertension Father     Heart Attack Father     High Cholesterol Father     Diabetes Mother     High Cholesterol Mother      Social History     Socioeconomic History    Marital status:      Spouse name: Not on file    Number of children: Not on file    Years of education: Not on file    Highest education level: Not on file   Tobacco Use    Smoking status: Current Every Day Smoker     Packs/day: 1.50     Years: 18.00     Pack years: 27.00     Types: Cigarettes    Smokeless tobacco: Never Used   Substance and Sexual Activity    Alcohol use: Yes     Comment: occasionally    Drug use: No    Sexual activity: Yes     Partners: Male     Birth control/protection: Condom             Review of Systems   Constitutional: Negative for chills, diaphoresis, fever, malaise/fatigue and weight loss. Eyes: Negative for blurred vision, double vision, pain and redness. Respiratory: Negative for cough, shortness of breath and wheezing. Cardiovascular: Negative for chest pain, palpitations, orthopnea, claudication, leg swelling and PND. Skin: Negative for itching and rash. Neurological: Negative for dizziness, tingling, tremors, sensory change, speech change, focal weakness, seizures, loss of consciousness, weakness and headaches.      Results for orders placed or performed in visit on 22/71/05   METABOLIC PANEL, COMPREHENSIVE   Result Value Ref Range    Glucose 126 (H) 65 - 99 mg/dL    BUN 9 6 - 24 mg/dL    Creatinine 0.72 0.57 - 1.00 mg/dL    GFR est non- >59 mL/min/1.73    GFR est  >59 mL/min/1.73    BUN/Creatinine ratio 13 9 - 23    Sodium 138 134 - 144 mmol/L    Potassium 4.1 3.5 - 5.2 mmol/L    Chloride 103 96 - 106 mmol/L    CO2 16 (L) 20 - 29 mmol/L    Calcium 9.4 8.7 - 10.2 mg/dL    Protein, total 7.4 6.0 - 8.5 g/dL    Albumin 4.8 3.5 - 5.5 g/dL    GLOBULIN, TOTAL 2.6 1.5 - 4.5 g/dL    A-G Ratio 1.8 1.2 - 2.2    Bilirubin, total 0.2 0.0 - 1.2 mg/dL    Alk. phosphatase 77 39 - 117 IU/L    AST (SGOT) 14 0 - 40 IU/L    ALT (SGPT) 13 0 - 32 IU/L   CBC W/O DIFF   Result Value Ref Range    WBC 12.9 (H) 3.4 - 10.8 x10E3/uL    RBC 4.66 3.77 - 5.28 x10E6/uL    HGB 14.8 11.1 - 15.9 g/dL    HCT 41.6 34.0 - 46.6 %    MCV 89 79 - 97 fL    MCH 31.8 26.6 - 33.0 pg    MCHC 35.6 31.5 - 35.7 g/dL    RDW 12.2 (L) 12.3 - 15.4 %    PLATELET 367 848 - 144 x10E3/uL   LITHIUM   Result Value Ref Range    Lithium level 0.2 (L) 0.6 - 1.2 mmol/L         Physical Exam  Visit Vitals  /72 (BP 1 Location: Left arm, BP Patient Position: Sitting)   Pulse 74   Temp 98.8 °F (37.1 °C) (Oral)   Resp 18   Ht 5' 4\" (1.626 m)   Wt 185 lb (83.9 kg)   LMP 11/04/2019 (Approximate)   SpO2 99%   BMI 31.76 kg/m²       Pt is with flat affect and at times has tears. Neck: supple, symmetrical, trachea midline, no adenopathy, thyroid: not enlarged, symmetric, no tenderness/mass/nodules, no carotid bruit and no JVD  Lungs: clear to auscultation bilaterally  Heart: regular rate and rhythm, S1, S2 normal, no murmur, click, rub or gallop  Neurologic: Grossly normal      ASSESSMENT and PLAN    ICD-10-CM ICD-9-CM    1. Bipolar 1 disorder, depressed, moderate (HCC) K19.93 632.33 METABOLIC PANEL, COMPREHENSIVE      CBC W/O DIFF      LITHIUM   2. Long-term current use of lithium U92.401 T96.63 METABOLIC PANEL, COMPREHENSIVE      CBC W/O DIFF   3. Depression with anxiety F41.8 300.4    4. Facet arthritis of lumbar region M47.816 721.3    5. Psychophysiological insomnia F51.04 307.42    6. Obsessive-compulsive disorder, unspecified type F42.9 300.3    7. History of migraine headaches Z86.69 V12.49    8. Anxiety and depression F41.9 300.00     F32.9 311      Diagnoses and all orders for this visit:    1. Bipolar 1 disorder, depressed, moderate (HCC)  -     METABOLIC PANEL, COMPREHENSIVE  -     CBC W/O DIFF  -     LITHIUM    2. Long-term current use of lithium  -     METABOLIC PANEL, COMPREHENSIVE  -     CBC W/O DIFF    3. Depression with anxiety    4. Facet arthritis of lumbar region    5. Psychophysiological insomnia    6. Obsessive-compulsive disorder, unspecified type    7. History of migraine headaches    8. Anxiety and depression    Other orders  -     LITHIUM      Follow-up and Dispositions    · Return in about 6 months (around 5/18/2020), or if symptoms worsen or fail to improve, for bipolar disease/ depression, insomnia. reviewed medications and side effects in detail  Please call my office if there are any questions- [phone]. Discussed expected course/resolution/complications of diagnosis in detail with patient. Medication risks/benefits/costs/interactions/alternatives discussed with patient. Pt was given an after visit summary which includes diagnoses, current medications & vitals. Pt expressed understanding with the diagnosis and plan. Patient to call if no better in 3 -4 days and prn new problems. Regular exercise is very important to your health; it helps mentally, physically, socially; it prevents injuries if done properly. Exercise, even as simple as walking 20-30 minutes daily has major benefits to your health even though your \"numbers\" are the same in the lab. See if you can add this into your daily regimen and after a few months it will become a regular habit-\"just something you do,\" like brushing your teeth.      A combination of aerobic exercise and strengthening and stretching is felt to be the best for you, so this should be your ultimate goal.   This can be done in the privacy of your home or in a group setting as at the gym  Some prefer having a , others prefer to do exercise in groups or individually. Do what \"works\" for you. You need to make it simple and \"fun,\" or you most likely will not continue it. Back exercise sheet given with instructions to do 2 sets of 10 of each twice a day laying on a flat/ firm surface. Reviewed each of 5 exercises in detail showing the diagrams/pictures on the sheet and what movement was recommended for each one. BMI is significantly elevated- in the obese range. I reviewed diet, exercise and weight control. Discussed weight control in detail, the importance of mainly decreased carbs, and for weight maintenance, exercise; discussed different diets and that it isn't as important to watch the type of foods as it is to decrease calorie intake no matter what type of diet you do, etc.      checked and found to have no suspicious activity. Signed and agreed to the controlled substance agreement in the past year; copy is in the chart. Long discussion about chronic narcotic use, the risks of addiction, tolerance , overdose,etc. The national spotlight on this problem and the need for a narcotic agreement in order to continue receiving these, etc.  Continue to do the non-medication things that reduce pain such as adequate sleep, avoiding high impact activities, but at the same time staying active and avoiding sedentary activity. In addition, the patient was counseled today on the potential risks of opiate use including but not limited to death if not taken as prescribed or if taken in conjunction with other sedative, hypnotic, or other pain medications, and the need to refrain from any recreational drugs and/or alcohol.  Further, we discussed the rationale and potential benefits of an opiate support regimen for the management of chronic non-malignant pain conditions. We did check her lithium level. Pt does admit to extreme anxiety and depression, sometimes not even wanting to leave the house. Even though she has negative feelings, she denies any suicidal ideations. She is aware that the changes we make in her medication are to control this and it will depend on the lithium levels from her lab work today. She did very well with Lexapro in the past for her depression, but it didn't help the janette. We will try the medication again this time. This document was written by Jace Aguilar, as dictated by Starr Cruz MD.  I have reviewed and agree with the above note and have made corrections where appropriate Clarke Lai M.D.

## 2019-11-19 DIAGNOSIS — G43.109 MIGRAINE WITH AURA AND WITHOUT STATUS MIGRAINOSUS, NOT INTRACTABLE: ICD-10-CM

## 2019-11-19 DIAGNOSIS — M50.30 DDD (DEGENERATIVE DISC DISEASE), CERVICAL: ICD-10-CM

## 2019-11-19 DIAGNOSIS — M47.816 FACET ARTHRITIS OF LUMBAR REGION: ICD-10-CM

## 2019-11-19 LAB
ALBUMIN SERPL-MCNC: 4.8 G/DL (ref 3.5–5.5)
ALBUMIN/GLOB SERPL: 1.8 {RATIO} (ref 1.2–2.2)
ALP SERPL-CCNC: 77 IU/L (ref 39–117)
ALT SERPL-CCNC: 13 IU/L (ref 0–32)
AST SERPL-CCNC: 14 IU/L (ref 0–40)
BILIRUB SERPL-MCNC: 0.2 MG/DL (ref 0–1.2)
BUN SERPL-MCNC: 9 MG/DL (ref 6–24)
BUN/CREAT SERPL: 13 (ref 9–23)
CALCIUM SERPL-MCNC: 9.4 MG/DL (ref 8.7–10.2)
CHLORIDE SERPL-SCNC: 103 MMOL/L (ref 96–106)
CO2 SERPL-SCNC: 16 MMOL/L (ref 20–29)
CREAT SERPL-MCNC: 0.72 MG/DL (ref 0.57–1)
ERYTHROCYTE [DISTWIDTH] IN BLOOD BY AUTOMATED COUNT: 12.2 % (ref 12.3–15.4)
GLOBULIN SER CALC-MCNC: 2.6 G/DL (ref 1.5–4.5)
GLUCOSE SERPL-MCNC: 126 MG/DL (ref 65–99)
HCT VFR BLD AUTO: 41.6 % (ref 34–46.6)
HGB BLD-MCNC: 14.8 G/DL (ref 11.1–15.9)
LITHIUM SERPL-SCNC: 0.2 MMOL/L (ref 0.6–1.2)
MCH RBC QN AUTO: 31.8 PG (ref 26.6–33)
MCHC RBC AUTO-ENTMCNC: 35.6 G/DL (ref 31.5–35.7)
MCV RBC AUTO: 89 FL (ref 79–97)
PLATELET # BLD AUTO: 288 X10E3/UL (ref 150–450)
POTASSIUM SERPL-SCNC: 4.1 MMOL/L (ref 3.5–5.2)
PROT SERPL-MCNC: 7.4 G/DL (ref 6–8.5)
RBC # BLD AUTO: 4.66 X10E6/UL (ref 3.77–5.28)
SODIUM SERPL-SCNC: 138 MMOL/L (ref 134–144)
WBC # BLD AUTO: 12.9 X10E3/UL (ref 3.4–10.8)

## 2019-11-19 NOTE — TELEPHONE ENCOUNTER
[facility]#[phone]  LOV 11/18/19      Pt is calling in a refill request for the following Rx: .  Requested Prescriptions     Pending Prescriptions Disp Refills    oxyCODONE-acetaminophen (PERCOCET 10)  mg per tablet 50 Tab 0     Sig: Take 1 Tab by mouth two (2) times daily as needed for Pain for up to 30 days. Max Daily Amount: 2 Tabs.  50 is a 30 day supply     [facility], [address] [place] [address]

## 2019-11-21 ENCOUNTER — TELEPHONE (OUTPATIENT)
Dept: FAMILY MEDICINE CLINIC | Age: 44
End: 2019-11-21

## 2019-11-21 DIAGNOSIS — F51.04 PSYCHOPHYSIOLOGICAL INSOMNIA: ICD-10-CM

## 2019-11-21 NOTE — TELEPHONE ENCOUNTER
PCP: Dyana Obregon MD    Last appt: 11/18/2019  Future Appointments   Date Time Provider Lucia Pelaez   2/17/2020 12:00 PM Dyana Obregon MD [facility]       Requested Prescriptions     Pending Prescriptions Disp Refills    zolpidem (AMBIEN) 10 mg tablet [Pharmacy Med Name: ZOLPIDEM TARTRATE 10 MG TABLET] 60 Tab 3     Sig: TAKE 2 TABLETS BY MOUTH EVERY DAY AT BEDTIME AS NEEDED FOR INSOMNIA     Last refill 10/31/19 per

## 2019-11-21 NOTE — TELEPHONE ENCOUNTER
----- Message from Melchor Hopper sent at 11/21/2019  3:40 PM EST -----  Regarding: / Telephone  General Message/Vendor Calls    Caller's first and last name:      Reason for call:  Medication     Callback required yes/no and why:  Yes, to let her know when the medication has been sent over. Best contact number(s):  [phone]    Details to clarify the request:  Pt states that she has tried to contact someone about her medications not being sent over but nobody has contacted her back yet. The Pt's pharmacy [facility] states the medication has still not been sent over.     Melchor Hopper

## 2019-11-22 RX ORDER — OXYCODONE AND ACETAMINOPHEN 10; 325 MG/1; MG/1
1 TABLET ORAL
Qty: 50 TAB | Refills: 0 | Status: SHIPPED | OUTPATIENT
Start: 2019-11-26 | End: 2019-12-30 | Stop reason: SDUPTHER

## 2019-11-22 RX ORDER — ZOLPIDEM TARTRATE 10 MG/1
TABLET ORAL
Qty: 60 TAB | Refills: 3 | Status: SHIPPED | OUTPATIENT
Start: 2019-11-22 | End: 2020-03-02 | Stop reason: SDUPTHER

## 2019-12-03 ENCOUNTER — TELEPHONE (OUTPATIENT)
Dept: FAMILY MEDICINE CLINIC | Age: 44
End: 2019-12-03

## 2019-12-03 DIAGNOSIS — M47.816 FACET ARTHRITIS OF LUMBAR REGION: ICD-10-CM

## 2019-12-03 DIAGNOSIS — F98.8 ADD (ATTENTION DEFICIT DISORDER) WITHOUT HYPERACTIVITY: Chronic | ICD-10-CM

## 2019-12-03 DIAGNOSIS — M50.30 DDD (DEGENERATIVE DISC DISEASE), CERVICAL: ICD-10-CM

## 2019-12-03 DIAGNOSIS — G43.109 MIGRAINE WITH AURA AND WITHOUT STATUS MIGRAINOSUS, NOT INTRACTABLE: ICD-10-CM

## 2019-12-03 NOTE — TELEPHONE ENCOUNTER
Last refill 10/27/19 per   PCP: Naomi Malcolm MD    Last appt: 11/18/2019  Future Appointments   Date Time Provider Lucia Pelaez   2/17/2020 12:00 PM Naomi Malcolm  [facility]       Requested Prescriptions      No prescriptions requested or ordered in this encounter

## 2019-12-03 NOTE — TELEPHONE ENCOUNTER
----- Message from Garfield Gutiérrez sent at 12/3/2019 11:16 AM EST -----  Regarding: Dr. Engle Shallow: [phone] (if not patient): n/a  Relationship of caller (if not patient): n/a  Best contact number(s): [phone]  Name of medication and dosage if known:  Adderall 10mg  Is patient out of this medication (yes/no): yes  Pharmacy name: [facility]  Pharmacy listed in chart? (yes/no): Yes  Pharmacy phone number: n/a  Date of last visit: 11/18/2019  Details to clarify the request: n/a

## 2019-12-04 ENCOUNTER — TELEPHONE (OUTPATIENT)
Dept: FAMILY MEDICINE CLINIC | Age: 44
End: 2019-12-04

## 2019-12-04 RX ORDER — DEXTROAMPHETAMINE SACCHARATE, AMPHETAMINE ASPARTATE, DEXTROAMPHETAMINE SULFATE AND AMPHETAMINE SULFATE 2.5; 2.5; 2.5; 2.5 MG/1; MG/1; MG/1; MG/1
10 TABLET ORAL SEE ADMIN INSTRUCTIONS
Qty: 120 TAB | Refills: 0 | Status: SHIPPED | OUTPATIENT
Start: 2019-12-04 | End: 2020-01-03

## 2019-12-04 RX ORDER — DEXTROAMPHETAMINE SACCHARATE, AMPHETAMINE ASPARTATE, DEXTROAMPHETAMINE SULFATE AND AMPHETAMINE SULFATE 2.5; 2.5; 2.5; 2.5 MG/1; MG/1; MG/1; MG/1
10 TABLET ORAL SEE ADMIN INSTRUCTIONS
Qty: 120 TAB | Refills: 0 | Status: SHIPPED | OUTPATIENT
Start: 2020-02-01 | End: 2020-02-17 | Stop reason: SDUPTHER

## 2019-12-04 RX ORDER — DEXTROAMPHETAMINE SACCHARATE, AMPHETAMINE ASPARTATE, DEXTROAMPHETAMINE SULFATE AND AMPHETAMINE SULFATE 2.5; 2.5; 2.5; 2.5 MG/1; MG/1; MG/1; MG/1
10 TABLET ORAL SEE ADMIN INSTRUCTIONS
Qty: 120 TAB | Refills: 0 | Status: SHIPPED | OUTPATIENT
Start: 2020-01-02 | End: 2020-02-01

## 2019-12-04 NOTE — TELEPHONE ENCOUNTER
----- Message from Marii Alvares MD sent at 12/4/2019  4:16 AM EST -----  We need to adjust the Lithium dose she is taking. The lithium level is low. How much Lithium was she taking when this was drawn? Was she taking it regularly? If not, missing the dose how often each week? She needs to stay on this regularly for it to work. Normal CBC( red and white blood cells and platelets). No diabetes, normal liver and kidney tests.

## 2019-12-27 DIAGNOSIS — G43.109 MIGRAINE WITH AURA AND WITHOUT STATUS MIGRAINOSUS, NOT INTRACTABLE: ICD-10-CM

## 2019-12-27 DIAGNOSIS — M50.30 DDD (DEGENERATIVE DISC DISEASE), CERVICAL: ICD-10-CM

## 2019-12-27 DIAGNOSIS — M47.816 FACET ARTHRITIS OF LUMBAR REGION: ICD-10-CM

## 2019-12-27 DIAGNOSIS — F98.8 ADD (ATTENTION DEFICIT DISORDER) WITHOUT HYPERACTIVITY: Chronic | ICD-10-CM

## 2019-12-27 RX ORDER — DEXTROAMPHETAMINE SACCHARATE, AMPHETAMINE ASPARTATE, DEXTROAMPHETAMINE SULFATE AND AMPHETAMINE SULFATE 2.5; 2.5; 2.5; 2.5 MG/1; MG/1; MG/1; MG/1
10 TABLET ORAL SEE ADMIN INSTRUCTIONS
Qty: 120 TAB | Refills: 0 | Status: CANCELLED | OUTPATIENT
Start: 2019-12-27 | End: 2020-01-26

## 2019-12-27 RX ORDER — OXYCODONE AND ACETAMINOPHEN 10; 325 MG/1; MG/1
1 TABLET ORAL
Qty: 50 TAB | Refills: 0 | Status: CANCELLED | OUTPATIENT
Start: 2019-12-27 | End: 2020-01-26

## 2019-12-27 NOTE — TELEPHONE ENCOUNTER
patient is calling requesting a refill on the following meds. pharm on file verified. Requested Prescriptions     Pending Prescriptions Disp Refills    dextroamphetamine-amphetamine (ADDERALL) 10 mg tablet 120 Tab 0     Sig: Take 1 Tab by mouth See Admin Instructions for 30 days. 2 in am and 1-2 in afternoon    oxyCODONE-acetaminophen (PERCOCET 10)  mg per tablet 50 Tab 0     Sig: Take 1 Tab by mouth two (2) times daily as needed for Pain for up to 30 days. Max Daily Amount: 2 Tabs.  50 is a 30 day supply

## 2019-12-29 DIAGNOSIS — F41.8 DEPRESSION WITH ANXIETY: ICD-10-CM

## 2019-12-30 DIAGNOSIS — G43.109 MIGRAINE WITH AURA AND WITHOUT STATUS MIGRAINOSUS, NOT INTRACTABLE: ICD-10-CM

## 2019-12-30 DIAGNOSIS — F41.8 DEPRESSION WITH ANXIETY: ICD-10-CM

## 2019-12-30 DIAGNOSIS — M47.816 FACET ARTHRITIS OF LUMBAR REGION: ICD-10-CM

## 2019-12-30 DIAGNOSIS — F98.8 ADD (ATTENTION DEFICIT DISORDER) WITHOUT HYPERACTIVITY: Chronic | ICD-10-CM

## 2019-12-30 DIAGNOSIS — M50.30 DDD (DEGENERATIVE DISC DISEASE), CERVICAL: ICD-10-CM

## 2019-12-30 DIAGNOSIS — F31.32 BIPOLAR 1 DISORDER, DEPRESSED, MODERATE (HCC): ICD-10-CM

## 2019-12-30 RX ORDER — ALPRAZOLAM 0.25 MG/1
TABLET ORAL
Qty: 30 TAB | Refills: 1 | OUTPATIENT
Start: 2019-12-30 | End: 2020-01-24

## 2019-12-30 RX ORDER — OXYCODONE AND ACETAMINOPHEN 10; 325 MG/1; MG/1
1 TABLET ORAL
Qty: 50 TAB | Refills: 0 | Status: SHIPPED | OUTPATIENT
Start: 2019-12-30 | End: 2020-01-24 | Stop reason: SDUPTHER

## 2019-12-30 RX ORDER — ALPRAZOLAM 0.25 MG/1
TABLET ORAL
Qty: 30 TAB | Refills: 1 | OUTPATIENT
Start: 2019-12-30

## 2019-12-30 RX ORDER — DEXTROAMPHETAMINE SACCHARATE, AMPHETAMINE ASPARTATE, DEXTROAMPHETAMINE SULFATE AND AMPHETAMINE SULFATE 2.5; 2.5; 2.5; 2.5 MG/1; MG/1; MG/1; MG/1
10 TABLET ORAL SEE ADMIN INSTRUCTIONS
Qty: 120 TAB | Refills: 0 | Status: CANCELLED | OUTPATIENT
Start: 2019-12-30 | End: 2020-01-29

## 2019-12-30 RX ORDER — LITHIUM CARBONATE 300 MG/1
CAPSULE ORAL
Qty: 180 CAP | Refills: 1 | Status: SHIPPED | OUTPATIENT
Start: 2019-12-30 | End: 2020-02-06

## 2019-12-30 NOTE — TELEPHONE ENCOUNTER
Last refill 12/04/19 per   PCP: Bishop Gurdeep MD    Last appt: 11/18/2019  Future Appointments   Date Time Provider Lucia Latanya   2/17/2020 12:00 PM Bishop Gurdeep  [facility]       Requested Prescriptions     Pending Prescriptions Disp Refills    ALPRAZolam (XANAX) 0.25 mg tablet [Pharmacy Med Name: ALPRAZOLAM 0.25 MG TABLET] 30 Tab 1     Sig: TAKE 1/2-1 TABLET BY MOUTH TWICE A DAY AS NEEDED FOR ANXIETY

## 2019-12-30 NOTE — TELEPHONE ENCOUNTER
Last percocet refill 11/26/19 and xanax 12/04/19 per   PCP: Jonathan Bran MD    Last appt: 11/18/2019  Future Appointments   Date Time Provider Lucia Pelaez   2/17/2020 12:00 PM Jonathan Bran MD [facility]       Requested Prescriptions     Pending Prescriptions Disp Refills    ALPRAZolam (XANAX) 0.25 mg tablet 30 Tab 1     Sig: TAKE 1/2-1 TABLET BY MOUTH TWICE A DAY AS NEEDED FOR ANXIETY    oxyCODONE-acetaminophen (PERCOCET 10)  mg per tablet 50 Tab 0     Sig: Take 1 Tab by mouth two (2) times daily as needed for Pain for up to 30 days. Max Daily Amount: 2 Tabs.  50 is a 30 day supply

## 2020-01-24 DIAGNOSIS — M47.816 FACET ARTHRITIS OF LUMBAR REGION: ICD-10-CM

## 2020-01-24 DIAGNOSIS — G43.109 MIGRAINE WITH AURA AND WITHOUT STATUS MIGRAINOSUS, NOT INTRACTABLE: ICD-10-CM

## 2020-01-24 DIAGNOSIS — F41.8 DEPRESSION WITH ANXIETY: ICD-10-CM

## 2020-01-24 DIAGNOSIS — M50.30 DDD (DEGENERATIVE DISC DISEASE), CERVICAL: ICD-10-CM

## 2020-01-24 RX ORDER — ALPRAZOLAM 0.25 MG/1
TABLET ORAL
Qty: 30 TAB | Refills: 1 | Status: SHIPPED | OUTPATIENT
Start: 2020-01-24 | End: 2020-02-27 | Stop reason: SDUPTHER

## 2020-01-24 RX ORDER — OXYCODONE AND ACETAMINOPHEN 10; 325 MG/1; MG/1
1 TABLET ORAL
Qty: 50 TAB | Refills: 0 | Status: SHIPPED | OUTPATIENT
Start: 2020-01-24 | End: 2020-02-25 | Stop reason: SDUPTHER

## 2020-01-24 RX ORDER — ALPRAZOLAM 0.25 MG/1
TABLET ORAL
Qty: 30 TAB | Refills: 1 | Status: CANCELLED | OUTPATIENT
Start: 2020-01-24

## 2020-01-24 NOTE — TELEPHONE ENCOUNTER
Last refill 12/30/19 per   PCP: Lolis Castanon MD    Last appt: 11/18/2019  Future Appointments   Date Time Provider Lucia Pelaez   2/17/2020 12:00 PM Lolis Castanon MD [facility]       Requested Prescriptions     Pending Prescriptions Disp Refills    oxyCODONE-acetaminophen (PERCOCET 10)  mg per tablet 50 Tab 0     Sig: Take 1 Tab by mouth two (2) times daily as needed for Pain for up to 30 days. Max Daily Amount: 2 Tabs.  50 is a 30 day supply

## 2020-01-24 NOTE — TELEPHONE ENCOUNTER
Last refill 01/12/20 per   PCP: Lolis Castanon MD    Last appt: 11/18/2019  Future Appointments   Date Time Provider Lucia Latanya   2/17/2020 12:00 PM Lolis Castanon  [facility]       Requested Prescriptions     Pending Prescriptions Disp Refills    ALPRAZolam (XANAX) 0.25 mg tablet [Pharmacy Med Name: ALPRAZOLAM 0.25 MG TABLET] 30 Tab 1     Sig: TAKE 1/2 - 1 TABLET BY MOUTH TWICE DAILY AS NEEDED FOR ANXIETY

## 2020-01-27 ENCOUNTER — TELEPHONE (OUTPATIENT)
Dept: FAMILY MEDICINE CLINIC | Age: 45
End: 2020-01-27

## 2020-01-27 NOTE — TELEPHONE ENCOUNTER
Patient is calling requesting a call back in regards to oxyCODONE-acetaminophen (PERCOCET 10)  mg per tablet. No additional information was given.              Best callback:[phone]  LOV: Monday, November 18, 2019

## 2020-02-04 RX ORDER — OXYCODONE AND ACETAMINOPHEN 10; 325 MG/1; MG/1
1 TABLET ORAL
Qty: 50 TAB | Refills: 0 | Status: CANCELLED | OUTPATIENT
Start: 2020-04-26 | End: 2020-05-26

## 2020-02-04 RX ORDER — OXYCODONE AND ACETAMINOPHEN 10; 325 MG/1; MG/1
1 TABLET ORAL
Qty: 50 TAB | Refills: 0 | Status: CANCELLED | OUTPATIENT
Start: 2020-03-27 | End: 2020-04-26

## 2020-02-04 RX ORDER — OXYCODONE AND ACETAMINOPHEN 10; 325 MG/1; MG/1
1 TABLET ORAL
Qty: 50 TAB | Refills: 0 | Status: CANCELLED | OUTPATIENT
Start: 2020-02-26 | End: 2020-03-27

## 2020-02-05 ENCOUNTER — OFFICE VISIT (OUTPATIENT)
Dept: FAMILY MEDICINE CLINIC | Age: 45
End: 2020-02-05

## 2020-02-05 VITALS
WEIGHT: 187 LBS | HEART RATE: 98 BPM | BODY MASS INDEX: 31.92 KG/M2 | DIASTOLIC BLOOD PRESSURE: 76 MMHG | SYSTOLIC BLOOD PRESSURE: 138 MMHG | HEIGHT: 64 IN | RESPIRATION RATE: 18 BRPM | OXYGEN SATURATION: 98 % | TEMPERATURE: 98.3 F

## 2020-02-05 DIAGNOSIS — M50.30 DDD (DEGENERATIVE DISC DISEASE), CERVICAL: ICD-10-CM

## 2020-02-05 DIAGNOSIS — M54.50 CHRONIC MIDLINE LOW BACK PAIN WITHOUT SCIATICA: ICD-10-CM

## 2020-02-05 DIAGNOSIS — F98.8 ADD (ATTENTION DEFICIT DISORDER) WITHOUT HYPERACTIVITY: Chronic | ICD-10-CM

## 2020-02-05 DIAGNOSIS — G89.29 CHRONIC MIDLINE LOW BACK PAIN WITHOUT SCIATICA: ICD-10-CM

## 2020-02-05 DIAGNOSIS — L02.92 BOIL: ICD-10-CM

## 2020-02-05 DIAGNOSIS — G43.109 MIGRAINE WITH AURA AND WITHOUT STATUS MIGRAINOSUS, NOT INTRACTABLE: ICD-10-CM

## 2020-02-05 DIAGNOSIS — Z79.899 LONG-TERM CURRENT USE OF LITHIUM: ICD-10-CM

## 2020-02-05 DIAGNOSIS — F31.32 BIPOLAR 1 DISORDER, DEPRESSED, MODERATE (HCC): Primary | ICD-10-CM

## 2020-02-05 DIAGNOSIS — M47.816 FACET ARTHRITIS OF LUMBAR REGION: ICD-10-CM

## 2020-02-05 RX ORDER — CEPHALEXIN 500 MG/1
500 CAPSULE ORAL 4 TIMES DAILY
Qty: 40 CAP | Refills: 0
Start: 2020-02-05 | End: 2020-02-07 | Stop reason: SDUPTHER

## 2020-02-05 RX ORDER — ESCITALOPRAM OXALATE 10 MG/1
10 TABLET ORAL DAILY
Qty: 90 TAB | Refills: 0 | Status: SHIPPED | OUTPATIENT
Start: 2020-02-05 | End: 2020-02-26

## 2020-02-05 NOTE — PROGRESS NOTES
Chief Complaint   Patient presents with    Depression     causing issues at work     Ear Swelling     in back of right ear for 1 week   1. Have you been to the ER, urgent care clinic since your last visit? Hospitalized since your last visit? No    2. Have you seen or consulted any other health care providers outside of the [address] [place] [address] since your last visit? Include any pap smears or colon screening.  No

## 2020-02-05 NOTE — PROGRESS NOTES
HISTORY OF PRESENT ILLNESS  HPI  Maria Ines Epps is a 38 y.o. Female with a history of migraine with aura, bilateral CTS, cervical DDD, facet arthritis of lumbar region, ADD, depression with anxiety, insomnia, OCD and bipolar disorder, who presents to the office today for a follow up on her depression. Pt says she is taking the lithium three times a day. Pt states in the last 2 weeks she has maybe missed one or two doses. Pt requests to be put back on the Lexapro. Pt complains of some swelling behind her right ear that has been present for a week and is infected. Pt denies unusual SOB, chest pain, and any recent ER visits or hospitalizations. 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 Godwin Reyes 8/25/2016    Asthma     last attack 2 months ago    Bilateral carpal tunnel syndrome- R>>L 9/25/2016    Bipolar disorder with moderate depression ([address] 6/28/2017    Depression with anxiety 3/2/2010    Facet arthritis of lumbar region 12/3/2017    History of migraine headaches 11/25/2018    Insomnia     Lithium use 6/28/2017    Migraine 02/20/2010    Psychiatric disorder     Depression, anxiety    Psychophysiological insomnia 2/23/2016     Past Surgical History:   Procedure Laterality Date    HX APPENDECTOMY  1996    HX BREAST REDUCTION  2002    HX ORTHOPAEDIC  2003    cervical disc    HX ORTHOPAEDIC      second right hand digit fx with pins index     Current Outpatient Medications on File Prior to Visit   Medication Sig Dispense Refill    ALPRAZolam (XANAX) 0.25 mg tablet TAKE 1/2 - 1 TABLET BY MOUTH TWICE DAILY AS NEEDED FOR ANXIETY 30 Tab 1    dextroamphetamine-amphetamine (ADDERALL) 10 mg tablet Take 1 Tab by mouth See Admin Instructions for 30 days.  TAKE 2 TABS PO Q AM AND 1-2 TABS IN AFTERNOON 120 Tab 0    zolpidem (AMBIEN) 10 mg tablet TAKE 2 TABLETS BY MOUTH EVERY DAY AT BEDTIME AS NEEDED FOR INSOMNIA 60 Tab 3  lidocaine (LIDODERM) 5 % APPLY PATCH TO THE AFFECTED AREA FOR 12 HOURS A DAY AND REMOVE FOR 12 HOURS A DAY. 15 Patch 3    oxyCODONE-acetaminophen (PERCOCET 10)  mg per tablet Take 1 Tab by mouth two (2) times daily as needed for Pain for up to 30 days. Max Daily Amount: 2 Tabs. 50 is a 30 day supply 50 Tab 0    [DISCONTINUED] lithium carbonate 300 mg capsule Take 300mg  am and 600 in pm (Patient taking differently: 300 mg three (3) times daily (with meals). Take 300mg  am and 600 in pm) 180 Cap 1    [DISCONTINUED] rizatriptan (MAXALT-MLT) 10 mg disintegrating tablet TAKE 1 TABLET BY MOUTH ONCE AS NEEDED FOR MIGRAINE FOR UP TO 1 DOSE. 15 Tab 2    naloxone (NARCAN) 4 mg/actuation nasal spray Use 1 spray intranasally, then discard. Repeat with new spray every 2 min as needed for opioid overdose symptoms, alternating nostrils. 1 Each 3     No current facility-administered medications on file prior to visit. Allergies   Allergen Reactions    Pcn [Penicillins] Other (comments)     As a child; tolerates Keflex       Family History   Problem Relation Age of Onset    Diabetes Father     Hypertension Father     Heart Attack Father     High Cholesterol Father     Diabetes Mother     High Cholesterol Mother      Social History     Socioeconomic History    Marital status:      Spouse name: Not on file    Number of children: Not on file    Years of education: Not on file    Highest education level: Not on file   Tobacco Use    Smoking status: Current Every Day Smoker     Packs/day: 1.50     Years: 18.00     Pack years: 27.00     Types: Cigarettes    Smokeless tobacco: Never Used   Substance and Sexual Activity    Alcohol use: Yes     Comment: occasionally    Drug use: No    Sexual activity: Yes     Partners: Male     Birth control/protection: Condom             Review of Systems   Constitutional: Negative for chills, diaphoresis, fever, malaise/fatigue and weight loss.    Eyes: Negative for blurred vision, double vision, pain and redness. Respiratory: Negative for cough, shortness of breath and wheezing. Cardiovascular: Negative for chest pain, palpitations, orthopnea, claudication, leg swelling and PND. Skin: Negative for itching and rash. Neurological: Negative for dizziness, tingling, tremors, sensory change, speech change, focal weakness, seizures, loss of consciousness, weakness and headaches. Results for orders placed or performed in visit on 00/81/19   METABOLIC PANEL, COMPREHENSIVE   Result Value Ref Range    Glucose 126 (H) 65 - 99 mg/dL    BUN 9 6 - 24 mg/dL    Creatinine 0.72 0.57 - 1.00 mg/dL    GFR est non- >59 mL/min/1.73    GFR est  >59 mL/min/1.73    BUN/Creatinine ratio 13 9 - 23    Sodium 138 134 - 144 mmol/L    Potassium 4.1 3.5 - 5.2 mmol/L    Chloride 103 96 - 106 mmol/L    CO2 16 (L) 20 - 29 mmol/L    Calcium 9.4 8.7 - 10.2 mg/dL    Protein, total 7.4 6.0 - 8.5 g/dL    Albumin 4.8 3.5 - 5.5 g/dL    GLOBULIN, TOTAL 2.6 1.5 - 4.5 g/dL    A-G Ratio 1.8 1.2 - 2.2    Bilirubin, total 0.2 0.0 - 1.2 mg/dL    Alk.  phosphatase 77 39 - 117 IU/L    AST (SGOT) 14 0 - 40 IU/L    ALT (SGPT) 13 0 - 32 IU/L   CBC W/O DIFF   Result Value Ref Range    WBC 12.9 (H) 3.4 - 10.8 x10E3/uL    RBC 4.66 3.77 - 5.28 x10E6/uL    HGB 14.8 11.1 - 15.9 g/dL    HCT 41.6 34.0 - 46.6 %    MCV 89 79 - 97 fL    MCH 31.8 26.6 - 33.0 pg    MCHC 35.6 31.5 - 35.7 g/dL    RDW 12.2 (L) 12.3 - 15.4 %    PLATELET 752 817 - 816 x10E3/uL   LITHIUM   Result Value Ref Range    Lithium level 0.2 (L) 0.6 - 1.2 mmol/L         Physical Exam  Visit Vitals  /76   Pulse 98   Temp 98.3 °F (36.8 °C)   Resp 18   Ht 5' 4\" (1.626 m)   Wt 187 lb (84.8 kg)   SpO2 98%   BMI 32.10 kg/m²       Pt has a somewhat flat affect but not as severe as in the past, and is not agitated like she has been in the past.    Neck: supple, symmetrical, trachea midline, no adenopathy, thyroid: not enlarged, symmetric, no tenderness/mass/nodules, no carotid bruit and no JVD  Lungs: clear to auscultation bilaterally  Heart: regular rate and rhythm, S1, S2 normal, no murmur, click, rub or gallop  Neurologic: Grossly normal      ASSESSMENT and PLAN    ICD-10-CM ICD-9-CM    1. Bipolar 1 disorder, depressed, moderate (HCC) F31.32 296.52 escitalopram oxalate (LEXAPRO) 10 mg tablet      lithium carbonate 300 mg capsule   2. DDD (degenerative disc disease), cervical M50.30 722.4    3. Migraine with aura and without status migrainosus, not intractable G43.109 346.00    4. Facet arthritis of lumbar region M47.816 721.3    5. 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 Brandon Emery F98.8 314.00    6. Long-term current use of lithium Z79.899 V58.69    7. Chronic midline low back pain without sciatica M54.5 724.2     G89.29 338.29    8. Boil L02.92 680.9 cephALEXin (KEFLEX) 500 mg capsule    posterior side of right piinna     Diagnoses and all orders for this visit:    1. Bipolar 1 disorder, depressed, moderate (HCC)  -     escitalopram oxalate (LEXAPRO) 10 mg tablet; Take 1 Tab by mouth daily. -     lithium carbonate 300 mg capsule; Take 300mg  am and at supper and then 600 at bed time  Indications: bipolar depression    2. DDD (degenerative disc disease), cervical    3. Migraine with aura and without status migrainosus, not intractable    4. Facet arthritis of lumbar region    5. 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 Brandon Emery    6. Long-term current use of lithium    7. Chronic midline low back pain without sciatica    8. Boil  Comments:  posterior side of right piinna  Orders:  -     cephALEXin (KEFLEX) 500 mg capsule; Take 1 Cap by mouth four (4) times daily for 10 days. Follow-up and Dispositions    · Return in about 2 weeks (around 2/19/2020), or if LBP or arthritis pain increase, for F/U new Lith dose and restart of lexapro.        reviewed medications and side effects in detail  Please call my office if there are any questions- [phone]. Discussed expected course/resolution/complications of diagnosis in detail with patient. Medication risks/benefits/costs/interactions/alternatives discussed with patient. Pt was given an after visit summary which includes diagnoses, current medications & vitals. Pt expressed understanding with the diagnosis and plan. Patient to call if no better in 3 -4 days and prn new problems. Back exercise sheet given with instructions to do 2 sets of 10 of each twice a day laying on a flat/ firm surface. Reviewed each of 5 exercises in detail showing the diagrams/pictures on the sheet and what movement was recommended for each one. BMI is significantly elevated- in the obese range. I reviewed diet, exercise and weight control. Discussed weight control in detail, the importance of mainly decreased carbs, and for weight maintenance, exercise; discussed different diets and that it isn't as important to watch the type of foods as it is to decrease calorie intake no matter what type of diet you do, etc.     We will have her increase the lithium to a total of 1200 mg a day and have her check her level in about 2 weeks. At the same time we will start Lexapro 10 mg. I informed her that we need at least a month to see if that is working, before we make further changes. Pt denies SIHI. This document was written by Karie Francois, as dictated by Ailyn White MD.  I have reviewed and agree with the above note and have made corrections where appropriate Clarke Hwang M.D.

## 2020-02-06 RX ORDER — LITHIUM CARBONATE 300 MG/1
CAPSULE ORAL
Qty: 180 CAP | Refills: 1
Start: 2020-02-06 | End: 2020-04-20

## 2020-02-07 ENCOUNTER — TELEPHONE (OUTPATIENT)
Dept: FAMILY MEDICINE CLINIC | Age: 45
End: 2020-02-07

## 2020-02-07 DIAGNOSIS — L02.92 BOIL: ICD-10-CM

## 2020-02-07 RX ORDER — CEPHALEXIN 500 MG/1
500 CAPSULE ORAL 4 TIMES DAILY
Qty: 40 CAP | Refills: 0 | Status: SHIPPED | OUTPATIENT
Start: 2020-02-07 | End: 2020-02-17

## 2020-02-07 NOTE — TELEPHONE ENCOUNTER
----- Message from Adriana Doran sent at 2/7/2020  8:41 AM EST -----  Regarding: Dr. Mcclure Reap: [phone]  Caller's first and last name: n/a  Reason for call: PT stated that she went to the pharmacy and her antibiotics are not available. Please verify with the pt on this matter.   Callback required yes/no and why: yes  Best contact number(s): [phone]  Details to clarify the request: n/a

## 2020-02-17 DIAGNOSIS — F98.8 ADD (ATTENTION DEFICIT DISORDER) WITHOUT HYPERACTIVITY: Chronic | ICD-10-CM

## 2020-02-17 RX ORDER — DEXTROAMPHETAMINE SACCHARATE, AMPHETAMINE ASPARTATE, DEXTROAMPHETAMINE SULFATE AND AMPHETAMINE SULFATE 2.5; 2.5; 2.5; 2.5 MG/1; MG/1; MG/1; MG/1
10 TABLET ORAL SEE ADMIN INSTRUCTIONS
Qty: 120 TAB | Refills: 0 | Status: SHIPPED | OUTPATIENT
Start: 2020-02-17 | End: 2020-03-19 | Stop reason: SDUPTHER

## 2020-02-17 NOTE — TELEPHONE ENCOUNTER
Last refill 01/08/2020 per   PCP: Theodore Orellana MD    Last appt: 2/5/2020  Future Appointments   Date Time Provider Lucia Pelaez   5/6/2020  3:45 PM Theodore Orellana MD [facility]       Requested Prescriptions     Pending Prescriptions Disp Refills    dextroamphetamine-amphetamine (ADDERALL) 10 mg tablet 120 Tab 0     Sig: Take 1 Tab by mouth See Admin Instructions for 30 days.  TAKE 2 TABS PO Q AM AND 1-2 TABS IN AFTERNOON

## 2020-02-18 DIAGNOSIS — F41.8 DEPRESSION WITH ANXIETY: ICD-10-CM
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
## 2020-02-19 DIAGNOSIS — F41.8 DEPRESSION WITH ANXIETY: ICD-10-CM

## 2020-02-19 RX ORDER — ALPRAZOLAM 0.25 MG/1
TABLET ORAL
Qty: 30 TAB | Refills: 1 | Status: CANCELLED | OUTPATIENT
Start: 2020-02-19

## 2020-02-20 RX ORDER — ALPRAZOLAM 0.25 MG/1
TABLET ORAL
Qty: 30 TAB | Refills: 1 | OUTPATIENT
Start: 2020-02-20

## 2020-02-20 NOTE — TELEPHONE ENCOUNTER
[facility] Nurse Pool   Phone Number: [phone]             Tj Rivero,     I told Dr. Pardeep Maxwell that I would be in on Wed the 26th to get my lithium levels checked, because that's when I have my therapist appointment scheduled and I will be on that side of town. I thought he increased my dosage a few months ago?  According to [facility] he changed the prescription and started prescribing them for 15 days.      Thanks,   Alberto Pereira

## 2020-02-20 NOTE — TELEPHONE ENCOUNTER
First of all she is due a lithium level- we increased dose 2 weeks ago- ( lab only)   She was using #30 in a month 4-5 mths ago and more recently #30 is lasting 20-25 days. She has #30 w 1R on both 12/30 and 1/24- I don't think she ever got the 1/24- it was sent electronically, I believe- check with store to see where her most recent refill came from- 12/30 Rx or the 1/24 Rx. Thanks!

## 2020-02-21 NOTE — TELEPHONE ENCOUNTER
I recognized the possible increase need of xanax with the increased anxiety for which we restarted Lexapro as well. I did not recommend she increase to a particular level, but as always said to use only if she absolutely has to. Also, I expect this to decrease as the lexapro starts to work. If it is already helping, she needs to start cutting back; if not, it should be in the next 2 weeks and we should see the Xanax being filled less often- if she cannot do this on her own, I will need to start decreasing the amount for her.

## 2020-02-25 DIAGNOSIS — M47.816 FACET ARTHRITIS OF LUMBAR REGION: ICD-10-CM

## 2020-02-25 DIAGNOSIS — G43.109 MIGRAINE WITH AURA AND WITHOUT STATUS MIGRAINOSUS, NOT INTRACTABLE: ICD-10-CM

## 2020-02-25 DIAGNOSIS — M50.30 DDD (DEGENERATIVE DISC DISEASE), CERVICAL: ICD-10-CM

## 2020-02-25 NOTE — TELEPHONE ENCOUNTER
Last refill 01/27/20 per   PCP: Makenzie Woodward MD    Last appt: 2/5/2020  Future Appointments   Date Time Provider Lucia Pelaez   5/6/2020  3:45 PM Makenzie Woodward  [facility]       Requested Prescriptions     Pending Prescriptions Disp Refills    oxyCODONE-acetaminophen (PERCOCET 10)  mg per tablet 50 Tab 0     Sig: Take 1 Tab by mouth two (2) times daily as needed for Pain for up to 30 days. Max Daily Amount: 2 Tabs.  50 is a 30 day supply

## 2020-02-26 ENCOUNTER — TELEPHONE (OUTPATIENT)
Dept: FAMILY MEDICINE CLINIC | Age: 45
End: 2020-02-26

## 2020-02-26 DIAGNOSIS — F41.8 DEPRESSION WITH ANXIETY: ICD-10-CM

## 2020-02-26 DIAGNOSIS — F31.32 BIPOLAR 1 DISORDER, DEPRESSED, MODERATE (HCC): ICD-10-CM

## 2020-02-26 RX ORDER — OXYCODONE AND ACETAMINOPHEN 10; 325 MG/1; MG/1
1 TABLET ORAL
Qty: 50 TAB | Refills: 0 | Status: SHIPPED | OUTPATIENT
Start: 2020-02-26 | End: 2020-03-26 | Stop reason: SDUPTHER

## 2020-02-26 RX ORDER — ESCITALOPRAM OXALATE 20 MG/1
20 TABLET ORAL DAILY
Qty: 90 TAB | Refills: 0 | Status: SHIPPED | OUTPATIENT
Start: 2020-02-26 | End: 2020-06-01

## 2020-02-26 NOTE — TELEPHONE ENCOUNTER
Therapist whom she saw today wants her lexapro increased to 20mg due to her depression and anxiety  Still needs on xanax- got 2-6-20 for 30 taking 1 bid needs enough for 30 days

## 2020-02-27 LAB — LITHIUM SERPL-SCNC: 0.7 MMOL/L (ref 0.6–1.2)

## 2020-02-27 RX ORDER — ALPRAZOLAM 0.25 MG/1
TABLET ORAL
Qty: 60 TAB | Refills: 1 | Status: SHIPPED | OUTPATIENT
Start: 2020-02-27 | End: 2020-04-22

## 2020-03-02 ENCOUNTER — OFFICE VISIT (OUTPATIENT)
Dept: FAMILY MEDICINE CLINIC | Age: 45
End: 2020-03-02

## 2020-03-02 VITALS
HEART RATE: 97 BPM | OXYGEN SATURATION: 98 % | RESPIRATION RATE: 16 BRPM | TEMPERATURE: 97.6 F | SYSTOLIC BLOOD PRESSURE: 122 MMHG | BODY MASS INDEX: 32.78 KG/M2 | HEIGHT: 64 IN | DIASTOLIC BLOOD PRESSURE: 72 MMHG | WEIGHT: 192 LBS

## 2020-03-02 DIAGNOSIS — Z86.69 HISTORY OF MIGRAINE HEADACHES: ICD-10-CM

## 2020-03-02 DIAGNOSIS — F51.04 PSYCHOPHYSIOLOGICAL INSOMNIA: ICD-10-CM

## 2020-03-02 DIAGNOSIS — G89.29 CHRONIC MIDLINE LOW BACK PAIN WITHOUT SCIATICA: ICD-10-CM

## 2020-03-02 DIAGNOSIS — F98.8 ADD (ATTENTION DEFICIT DISORDER) WITHOUT HYPERACTIVITY: ICD-10-CM

## 2020-03-02 DIAGNOSIS — F41.8 DEPRESSION WITH ANXIETY: ICD-10-CM

## 2020-03-02 DIAGNOSIS — F31.32 BIPOLAR 1 DISORDER, DEPRESSED, MODERATE (HCC): ICD-10-CM

## 2020-03-02 DIAGNOSIS — F17.210 CIGARETTE SMOKER MOTIVATED TO QUIT: ICD-10-CM

## 2020-03-02 DIAGNOSIS — M54.50 SEVERE LOW BACK PAIN: Primary | ICD-10-CM

## 2020-03-02 DIAGNOSIS — M54.50 CHRONIC MIDLINE LOW BACK PAIN WITHOUT SCIATICA: ICD-10-CM

## 2020-03-02 DIAGNOSIS — M51.36 DDD (DEGENERATIVE DISC DISEASE), LUMBAR: ICD-10-CM

## 2020-03-02 DIAGNOSIS — F42.9 OBSESSIVE-COMPULSIVE DISORDER, UNSPECIFIED TYPE: ICD-10-CM

## 2020-03-02 DIAGNOSIS — M47.816 FACET ARTHRITIS OF LUMBAR REGION: ICD-10-CM
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
## 2020-03-02 NOTE — PROGRESS NOTES
Chief Complaint   Patient presents with    Depression    Back Pain     wants MRI   1. Have you been to the ER, urgent care clinic since your last visit? Hospitalized since your last visit? No    2. Have you seen or consulted any other health care providers outside of the [address] [place] [address] since your last visit? Include any pap smears or colon screening.  No

## 2020-03-06 RX ORDER — ZOLPIDEM TARTRATE 10 MG/1
TABLET ORAL
Qty: 60 TAB | Refills: 3 | Status: SHIPPED | OUTPATIENT
Start: 2020-03-06 | End: 2020-06-28

## 2020-03-19 DIAGNOSIS — F98.8 ADD (ATTENTION DEFICIT DISORDER) WITHOUT HYPERACTIVITY: Chronic | ICD-10-CM

## 2020-03-20 RX ORDER — DEXTROAMPHETAMINE SACCHARATE, AMPHETAMINE ASPARTATE, DEXTROAMPHETAMINE SULFATE AND AMPHETAMINE SULFATE 2.5; 2.5; 2.5; 2.5 MG/1; MG/1; MG/1; MG/1
10 TABLET ORAL SEE ADMIN INSTRUCTIONS
Qty: 120 TAB | Refills: 0 | Status: SHIPPED | OUTPATIENT
Start: 2020-03-20 | End: 2020-04-21 | Stop reason: SDUPTHER

## 2020-03-23 ENCOUNTER — TELEPHONE (OUTPATIENT)
Dept: FAMILY MEDICINE CLINIC | Age: 45
End: 2020-03-23

## 2020-03-23 NOTE — TELEPHONE ENCOUNTER
----- Message from [place] sent at 3/23/2020 10:51 AM EDT -----  Regarding: Dr. Tami Donahue first and last name: Dina with [facility] authorization dept    Reason for call: Zach rider MD ordered a CT of the pts lumbar and spine without contrast but pts insurance denied it.  MD is able to do a peer to peer to get it authorized     Callback required yes/no and why: yes    Best contact number(s): [phone] opt 4    Details to clarify the request: Pt is scheduled to have CT done on 3/25 at 12:30pm. Case number [id number]

## 2020-03-24 ENCOUNTER — TELEPHONE (OUTPATIENT)
Dept: FAMILY MEDICINE CLINIC | Age: 45
End: 2020-03-24

## 2020-03-26 DIAGNOSIS — G43.109 MIGRAINE WITH AURA AND WITHOUT STATUS MIGRAINOSUS, NOT INTRACTABLE: ICD-10-CM

## 2020-03-26 DIAGNOSIS — M47.816 FACET ARTHRITIS OF LUMBAR REGION: ICD-10-CM

## 2020-03-26 DIAGNOSIS — M50.30 DDD (DEGENERATIVE DISC DISEASE), CERVICAL: ICD-10-CM

## 2020-03-26 RX ORDER — OXYCODONE AND ACETAMINOPHEN 10; 325 MG/1; MG/1
1 TABLET ORAL
Qty: 50 TAB | Refills: 0 | Status: SHIPPED | OUTPATIENT
Start: 2020-03-26 | End: 2020-04-22 | Stop reason: SDUPTHER

## 2020-03-26 NOTE — TELEPHONE ENCOUNTER
Last refill 02/26/20 per   PCP: Howard Bates MD    Last appt: 3/2/2020  Future Appointments   Date Time Provider Lucia Pelaez   4/1/2020 10:30 AM [facility]   5/6/2020  3:45 PM Howard Bates MD [facility]       Requested Prescriptions     Pending Prescriptions Disp Refills    oxyCODONE-acetaminophen (PERCOCET 10)  mg per tablet 50 Tab 0     Sig: Take 1 Tab by mouth two (2) times daily as needed for Pain for up to 30 days. Max Daily Amount: 2 Tabs.  50 is a 30 day supply

## 2020-04-01 ENCOUNTER — HOSPITAL ENCOUNTER (OUTPATIENT)
Dept: CT IMAGING | Age: 45
Discharge: HOME | End: 2020-04-01
Attending: FAMILY MEDICINE
Payer: COMMERCIAL

## 2020-04-01 DIAGNOSIS — G89.29 CHRONIC MIDLINE LOW BACK PAIN WITHOUT SCIATICA: ICD-10-CM

## 2020-04-01 DIAGNOSIS — M54.50 CHRONIC MIDLINE LOW BACK PAIN WITHOUT SCIATICA: ICD-10-CM

## 2020-04-01 DIAGNOSIS — M51.36 DDD (DEGENERATIVE DISC DISEASE), LUMBAR: ICD-10-CM

## 2020-04-01 DIAGNOSIS — M54.50 SEVERE LOW BACK PAIN: ICD-10-CM

## 2020-04-01 PROCEDURE — 72131 CT LUMBAR SPINE W/O DYE: CPT

## 2020-04-20 DIAGNOSIS — F31.32 BIPOLAR 1 DISORDER, DEPRESSED, MODERATE (HCC): ICD-10-CM

## 2020-04-20 RX ORDER — LITHIUM CARBONATE 300 MG/1
CAPSULE ORAL
Qty: 270 CAP | Refills: 1 | Status: SHIPPED | OUTPATIENT
Start: 2020-04-20 | End: 2020-12-28 | Stop reason: SDUPTHER

## 2020-04-21 DIAGNOSIS — F98.8 ADD (ATTENTION DEFICIT DISORDER) WITHOUT HYPERACTIVITY: Chronic | ICD-10-CM

## 2020-04-21 RX ORDER — DEXTROAMPHETAMINE SACCHARATE, AMPHETAMINE ASPARTATE, DEXTROAMPHETAMINE SULFATE AND AMPHETAMINE SULFATE 2.5; 2.5; 2.5; 2.5 MG/1; MG/1; MG/1; MG/1
TABLET ORAL
Qty: 120 TAB | Refills: 0 | Status: SHIPPED | OUTPATIENT
Start: 2020-04-21 | End: 2020-05-22 | Stop reason: SDUPTHER

## 2020-04-21 NOTE — TELEPHONE ENCOUNTER
Last refill 03/20/20 per   PCP: Marilee Thomas MD    Last appt: 3/2/2020  Future Appointments   Date Time Provider Lucia Pelaez   5/6/2020  3:45 PM Marilee Thomas MD [facility]       Requested Prescriptions     Pending Prescriptions Disp Refills    dextroamphetamine-amphetamine (ADDERALL) 10 mg tablet 120 Tab 0     Sig: Take 1 Tab by mouth See Admin Instructions for 30 days.  TAKE 2 TABS PO Q AM AND 1-2 TABS IN AFTERNOON

## 2020-04-22 DIAGNOSIS — G43.109 MIGRAINE WITH AURA AND WITHOUT STATUS MIGRAINOSUS, NOT INTRACTABLE: ICD-10-CM

## 2020-04-22 DIAGNOSIS — F41.8 DEPRESSION WITH ANXIETY: ICD-10-CM

## 2020-04-22 DIAGNOSIS — M50.30 DDD (DEGENERATIVE DISC DISEASE), CERVICAL: ICD-10-CM

## 2020-04-22 DIAGNOSIS — M47.816 FACET ARTHRITIS OF LUMBAR REGION: ICD-10-CM

## 2020-04-22 RX ORDER — OXYCODONE AND ACETAMINOPHEN 10; 325 MG/1; MG/1
1 TABLET ORAL
Qty: 50 TAB | Refills: 0 | Status: SHIPPED | OUTPATIENT
Start: 2020-04-22 | End: 2020-05-22 | Stop reason: SDUPTHER

## 2020-04-22 RX ORDER — ALPRAZOLAM 0.25 MG/1
TABLET ORAL
Qty: 60 TAB | Refills: 1 | Status: SHIPPED | OUTPATIENT
Start: 2020-04-22 | End: 2020-06-17

## 2020-04-22 NOTE — TELEPHONE ENCOUNTER
Last refill 03/26/20 per   PCP: Jonathan Tellez MD    Last appt: 3/2/2020  Future Appointments   Date Time Provider Lucia Pelaez   5/6/2020  3:45 PM Jonathan Tellez  [facility]       Requested Prescriptions     Pending Prescriptions Disp Refills    oxyCODONE-acetaminophen (PERCOCET 10)  mg per tablet 50 Tab 0     Sig: Take 1 Tab by mouth two (2) times daily as needed for Pain for up to 30 days. Max Daily Amount: 2 Tabs.  50 is a 30 day supply

## 2020-05-08 ENCOUNTER — VIRTUAL VISIT (OUTPATIENT)
Dept: FAMILY MEDICINE CLINIC | Age: 45
End: 2020-05-08

## 2020-05-08 DIAGNOSIS — F41.8 DEPRESSION WITH ANXIETY: ICD-10-CM

## 2020-05-08 DIAGNOSIS — F51.04 PSYCHOPHYSIOLOGICAL INSOMNIA: ICD-10-CM

## 2020-05-08 DIAGNOSIS — Z86.69 HISTORY OF MIGRAINE HEADACHES: ICD-10-CM

## 2020-05-08 DIAGNOSIS — F42.9 OBSESSIVE-COMPULSIVE DISORDER, UNSPECIFIED TYPE: ICD-10-CM

## 2020-05-08 DIAGNOSIS — Z79.899 LITHIUM USE: ICD-10-CM

## 2020-05-08 DIAGNOSIS — F98.8 ADD (ATTENTION DEFICIT DISORDER) WITHOUT HYPERACTIVITY: Primary | Chronic | ICD-10-CM

## 2020-05-08 DIAGNOSIS — F31.32 BIPOLAR DISORDER WITH MODERATE DEPRESSION (HCC): ICD-10-CM
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
## 2020-05-08 NOTE — PROGRESS NOTES
Chief Complaint   Patient presents with    Medication Refill    Depression   1. Have you been to the ER, urgent care clinic since your last visit? Hospitalized since your last visit? No    2. Have you seen or consulted any other health care providers outside of the [address] [place] [address] since your last visit? Include any pap smears or colon screening.  No

## 2020-05-22 DIAGNOSIS — F98.8 ADD (ATTENTION DEFICIT DISORDER) WITHOUT HYPERACTIVITY: Chronic | ICD-10-CM

## 2020-05-22 DIAGNOSIS — M47.816 FACET ARTHRITIS OF LUMBAR REGION: ICD-10-CM

## 2020-05-22 DIAGNOSIS — M50.30 DDD (DEGENERATIVE DISC DISEASE), CERVICAL: ICD-10-CM

## 2020-05-22 DIAGNOSIS — G43.109 MIGRAINE WITH AURA AND WITHOUT STATUS MIGRAINOSUS, NOT INTRACTABLE: ICD-10-CM

## 2020-05-22 RX ORDER — OXYCODONE AND ACETAMINOPHEN 10; 325 MG/1; MG/1
1 TABLET ORAL
Qty: 50 TAB | Refills: 0 | Status: SHIPPED | OUTPATIENT
Start: 2020-05-22 | End: 2020-06-22 | Stop reason: SDUPTHER

## 2020-05-22 RX ORDER — DEXTROAMPHETAMINE SACCHARATE, AMPHETAMINE ASPARTATE, DEXTROAMPHETAMINE SULFATE AND AMPHETAMINE SULFATE 2.5; 2.5; 2.5; 2.5 MG/1; MG/1; MG/1; MG/1
TABLET ORAL
Qty: 120 TAB | Refills: 0 | Status: SHIPPED | OUTPATIENT
Start: 2020-05-22 | End: 2020-06-22 | Stop reason: SDUPTHER

## 2020-05-31 DIAGNOSIS — F31.32 BIPOLAR 1 DISORDER, DEPRESSED, MODERATE (HCC): ICD-10-CM

## 2020-06-01 RX ORDER — ESCITALOPRAM OXALATE 20 MG/1
TABLET ORAL
Qty: 90 TAB | Refills: 0 | Status: SHIPPED | OUTPATIENT
Start: 2020-06-01 | End: 2020-09-11 | Stop reason: SDUPTHER

## 2020-06-16 DIAGNOSIS — F41.8 DEPRESSION WITH ANXIETY: ICD-10-CM

## 2020-06-16 NOTE — TELEPHONE ENCOUNTER
Shouldn't we be giving a refill at 30 days and not before-  Initially 4/22, then 5/22, 6/21 ?( we are @ 6/16 5 days early.)  Thanks!

## 2020-06-16 NOTE — TELEPHONE ENCOUNTER
Last refill 05/20/20 per   PCP: Song Paniagua MD    Last appt: 5/8/2020  No future appointments.     Requested Prescriptions     Pending Prescriptions Disp Refills    ALPRAZolam (XANAX) 0.25 mg tablet [Pharmacy Med Name: ALPRAZOLAM 0.25 MG TABLET] 60 Tab 1     Sig: TAKE 1/2 - 1 TABLET BY MOUTH TWICE DAILY AS NEEDED FOR ANXIETY

## 2020-06-17 RX ORDER — ALPRAZOLAM 0.25 MG/1
TABLET ORAL
Qty: 60 TAB | Refills: 1 | Status: SHIPPED | OUTPATIENT
Start: 2020-06-17 | End: 2020-08-11

## 2020-06-22 DIAGNOSIS — F98.8 ADD (ATTENTION DEFICIT DISORDER) WITHOUT HYPERACTIVITY: Chronic | ICD-10-CM

## 2020-06-22 DIAGNOSIS — G43.109 MIGRAINE WITH AURA AND WITHOUT STATUS MIGRAINOSUS, NOT INTRACTABLE: ICD-10-CM

## 2020-06-22 DIAGNOSIS — M50.30 DDD (DEGENERATIVE DISC DISEASE), CERVICAL: ICD-10-CM

## 2020-06-22 DIAGNOSIS — M47.816 FACET ARTHRITIS OF LUMBAR REGION: ICD-10-CM

## 2020-06-22 RX ORDER — OXYCODONE AND ACETAMINOPHEN 10; 325 MG/1; MG/1
1 TABLET ORAL
Qty: 50 TAB | Refills: 0 | Status: SHIPPED | OUTPATIENT
Start: 2020-06-22 | End: 2020-07-19 | Stop reason: SDUPTHER

## 2020-06-22 RX ORDER — DEXTROAMPHETAMINE SACCHARATE, AMPHETAMINE ASPARTATE, DEXTROAMPHETAMINE SULFATE AND AMPHETAMINE SULFATE 2.5; 2.5; 2.5; 2.5 MG/1; MG/1; MG/1; MG/1
TABLET ORAL
Qty: 120 TAB | Refills: 0 | Status: SHIPPED | OUTPATIENT
Start: 2020-06-22 | End: 2020-07-19 | Stop reason: SDUPTHER

## 2020-06-22 NOTE — TELEPHONE ENCOUNTER
Last refill 05/22/20 per  PCP: Aida Hernandez MD 
 
Last appt: 5/8/2020 No future appointments. Requested Prescriptions Pending Prescriptions Disp Refills  dextroamphetamine-amphetamine (ADDERALL) 10 mg tablet 120 Tab 0 Sig: TAKE 2 TABS PO Q AM AND 1-2 TABS IN AFTERNOON  
 oxyCODONE-acetaminophen (PERCOCET 10)  mg per tablet 50 Tab 0 Sig: Take 1 Tab by mouth two (2) times daily as needed for Pain for up to 30 days. Max Daily Amount: 2 Tabs. 50 is a 30 day supply

## 2020-06-28 DIAGNOSIS — F51.04 PSYCHOPHYSIOLOGICAL INSOMNIA: ICD-10-CM

## 2020-06-28 RX ORDER — ZOLPIDEM TARTRATE 10 MG/1
TABLET ORAL
Qty: 60 TAB | Refills: 3 | Status: SHIPPED | OUTPATIENT
Start: 2020-06-28 | End: 2020-10-18

## 2020-07-19 DIAGNOSIS — F98.8 ADD (ATTENTION DEFICIT DISORDER) WITHOUT HYPERACTIVITY: Chronic | ICD-10-CM

## 2020-07-19 DIAGNOSIS — M47.816 FACET ARTHRITIS OF LUMBAR REGION: ICD-10-CM

## 2020-07-19 DIAGNOSIS — G43.109 MIGRAINE WITH AURA AND WITHOUT STATUS MIGRAINOSUS, NOT INTRACTABLE: ICD-10-CM

## 2020-07-19 DIAGNOSIS — M50.30 DDD (DEGENERATIVE DISC DISEASE), CERVICAL: ICD-10-CM

## 2020-07-20 DIAGNOSIS — M47.816 FACET ARTHRITIS OF LUMBAR REGION: ICD-10-CM

## 2020-07-20 DIAGNOSIS — G43.109 MIGRAINE WITH AURA AND WITHOUT STATUS MIGRAINOSUS, NOT INTRACTABLE: ICD-10-CM

## 2020-07-20 DIAGNOSIS — M50.30 DDD (DEGENERATIVE DISC DISEASE), CERVICAL: ICD-10-CM

## 2020-07-20 DIAGNOSIS — F98.8 ADD (ATTENTION DEFICIT DISORDER) WITHOUT HYPERACTIVITY: Chronic | ICD-10-CM

## 2020-07-20 RX ORDER — OXYCODONE AND ACETAMINOPHEN 10; 325 MG/1; MG/1
1 TABLET ORAL
Qty: 50 TAB | Refills: 0 | Status: CANCELLED | OUTPATIENT
Start: 2020-07-20 | End: 2020-08-19

## 2020-07-20 RX ORDER — DEXTROAMPHETAMINE SACCHARATE, AMPHETAMINE ASPARTATE, DEXTROAMPHETAMINE SULFATE AND AMPHETAMINE SULFATE 2.5; 2.5; 2.5; 2.5 MG/1; MG/1; MG/1; MG/1
TABLET ORAL
Qty: 120 TAB | Refills: 0 | Status: CANCELLED | OUTPATIENT
Start: 2020-07-20

## 2020-07-20 NOTE — TELEPHONE ENCOUNTER
Last refill 06/22/20 per   PCP: Kalee Rosas MD    Last appt: 5/8/2020  No future appointments. Requested Prescriptions     Pending Prescriptions Disp Refills    dextroamphetamine-amphetamine (ADDERALL) 10 mg tablet 120 Tab 0     Sig: TAKE 2 TABS PO Q AM AND 1-2 TABS IN AFTERNOON    oxyCODONE-acetaminophen (PERCOCET 10)  mg per tablet 50 Tab 0     Sig: Take 1 Tab by mouth two (2) times daily as needed for Pain for up to 30 days. Max Daily Amount: 2 Tabs.  50 is a 30 day supply

## 2020-07-21 RX ORDER — OXYCODONE AND ACETAMINOPHEN 10; 325 MG/1; MG/1
1 TABLET ORAL
Qty: 50 TAB | Refills: 0 | Status: SHIPPED | OUTPATIENT
Start: 2020-07-21 | End: 2020-08-20 | Stop reason: SDUPTHER

## 2020-07-21 RX ORDER — DEXTROAMPHETAMINE SACCHARATE, AMPHETAMINE ASPARTATE, DEXTROAMPHETAMINE SULFATE AND AMPHETAMINE SULFATE 2.5; 2.5; 2.5; 2.5 MG/1; MG/1; MG/1; MG/1
TABLET ORAL
Qty: 120 TAB | Refills: 0 | Status: SHIPPED | OUTPATIENT
Start: 2020-07-21 | End: 2020-08-20 | Stop reason: SDUPTHER

## 2020-08-11 DIAGNOSIS — F41.8 DEPRESSION WITH ANXIETY: ICD-10-CM

## 2020-08-11 NOTE — TELEPHONE ENCOUNTER
PCP: Joanne Haile MD  Last refill 07/15/2020 per   Last appt: 5/8/2020  No future appointments.     Requested Prescriptions     Pending Prescriptions Disp Refills    ALPRAZolam (XANAX) 0.25 mg tablet [Pharmacy Med Name: ALPRAZOLAM 0.25 MG TABLET] 60 Tab 1     Sig: TAKE 1/2 - 1 TABLET BY MOUTH TWICE DAILY AS NEEDED FOR ANXIETY

## 2020-08-12 RX ORDER — ALPRAZOLAM 0.25 MG/1
TABLET ORAL
Qty: 60 TAB | Refills: 0 | Status: SHIPPED | OUTPATIENT
Start: 2020-08-12 | End: 2020-09-08

## 2020-08-12 NOTE — TELEPHONE ENCOUNTER
Last few Rxs- 5/20 and next was to be 6/19, then 7/19 NOT 7/15 so Rx was refilled early. Will OK this time but no early Rxs in future.

## 2020-08-12 NOTE — TELEPHONE ENCOUNTER
Patient states last refill was 7/15 and thought this was every 30 days. Is taking lithium as directed.  States nephew  and has to go out of state this week and wont be back for a week

## 2020-08-20 DIAGNOSIS — G43.109 MIGRAINE WITH AURA AND WITHOUT STATUS MIGRAINOSUS, NOT INTRACTABLE: ICD-10-CM

## 2020-08-20 DIAGNOSIS — M47.816 FACET ARTHRITIS OF LUMBAR REGION: ICD-10-CM

## 2020-08-20 DIAGNOSIS — F98.8 ADD (ATTENTION DEFICIT DISORDER) WITHOUT HYPERACTIVITY: Chronic | ICD-10-CM

## 2020-08-20 DIAGNOSIS — M50.30 DDD (DEGENERATIVE DISC DISEASE), CERVICAL: ICD-10-CM

## 2020-08-20 NOTE — TELEPHONE ENCOUNTER
PCP: Ruby Meyers MD  Last refill 07/21/20 per   Last appt: 5/8/2020  Future Appointments   Date Time Provider Lucia Pelaez   8/27/2020 11:30 AM Ruby Meyers MD [facility]       Requested Prescriptions     Pending Prescriptions Disp Refills    dextroamphetamine-amphetamine (ADDERALL) 10 mg tablet 120 Tab 0     Sig: TAKE 2 TABS PO Q AM AND 1-2 TABS IN AFTERNOON    oxyCODONE-acetaminophen (PERCOCET 10)  mg per tablet 50 Tab 0     Sig: Take 1 Tab by mouth two (2) times daily as needed for Pain for up to 30 days. Max Daily Amount: 2 Tabs.  50 is a 30 day supply; need visit before next refill

## 2020-08-21 DIAGNOSIS — M47.816 FACET ARTHRITIS OF LUMBAR REGION: ICD-10-CM

## 2020-08-21 DIAGNOSIS — F98.8 ADD (ATTENTION DEFICIT DISORDER) WITHOUT HYPERACTIVITY: Chronic | ICD-10-CM

## 2020-08-21 DIAGNOSIS — G43.109 MIGRAINE WITH AURA AND WITHOUT STATUS MIGRAINOSUS, NOT INTRACTABLE: ICD-10-CM

## 2020-08-21 DIAGNOSIS — M50.30 DDD (DEGENERATIVE DISC DISEASE), CERVICAL: ICD-10-CM

## 2020-08-21 RX ORDER — DEXTROAMPHETAMINE SACCHARATE, AMPHETAMINE ASPARTATE, DEXTROAMPHETAMINE SULFATE AND AMPHETAMINE SULFATE 2.5; 2.5; 2.5; 2.5 MG/1; MG/1; MG/1; MG/1
TABLET ORAL
Qty: 120 TAB | Refills: 0 | Status: SHIPPED | OUTPATIENT
Start: 2020-08-21 | End: 2020-08-27 | Stop reason: SDUPTHER

## 2020-08-21 RX ORDER — DEXTROAMPHETAMINE SACCHARATE, AMPHETAMINE ASPARTATE, DEXTROAMPHETAMINE SULFATE AND AMPHETAMINE SULFATE 2.5; 2.5; 2.5; 2.5 MG/1; MG/1; MG/1; MG/1
TABLET ORAL
Qty: 120 TAB | Refills: 0 | Status: CANCELLED | OUTPATIENT
Start: 2020-08-21

## 2020-08-21 RX ORDER — OXYCODONE AND ACETAMINOPHEN 10; 325 MG/1; MG/1
1 TABLET ORAL
Qty: 50 TAB | Refills: 0 | Status: SHIPPED | OUTPATIENT
Start: 2020-08-21 | End: 2020-08-27 | Stop reason: SDUPTHER

## 2020-08-21 RX ORDER — OXYCODONE AND ACETAMINOPHEN 10; 325 MG/1; MG/1
1 TABLET ORAL
Qty: 50 TAB | Refills: 0 | Status: CANCELLED | OUTPATIENT
Start: 2020-08-21 | End: 2020-09-20

## 2020-08-27 ENCOUNTER — TELEPHONE (OUTPATIENT)
Dept: FAMILY MEDICINE CLINIC | Age: 45
End: 2020-08-27

## 2020-08-27 ENCOUNTER — VIRTUAL VISIT (OUTPATIENT)
Dept: FAMILY MEDICINE CLINIC | Age: 45
End: 2020-08-27
Payer: COMMERCIAL

## 2020-08-27 DIAGNOSIS — Z86.69 HISTORY OF MIGRAINE HEADACHES: ICD-10-CM

## 2020-08-27 DIAGNOSIS — G43.109 MIGRAINE WITH AURA AND WITHOUT STATUS MIGRAINOSUS, NOT INTRACTABLE: ICD-10-CM

## 2020-08-27 DIAGNOSIS — F31.32 BIPOLAR 1 DISORDER, DEPRESSED, MODERATE (HCC): ICD-10-CM

## 2020-08-27 DIAGNOSIS — M47.816 FACET ARTHRITIS OF LUMBAR REGION: ICD-10-CM

## 2020-08-27 DIAGNOSIS — F98.8 ADD (ATTENTION DEFICIT DISORDER) WITHOUT HYPERACTIVITY: Chronic | ICD-10-CM

## 2020-08-27 DIAGNOSIS — F31.32 BIPOLAR DISORDER WITH MODERATE DEPRESSION (HCC): Primary | ICD-10-CM

## 2020-08-27 DIAGNOSIS — M50.30 DDD (DEGENERATIVE DISC DISEASE), CERVICAL: ICD-10-CM

## 2020-08-27 DIAGNOSIS — Z79.899 MEDICATION MANAGEMENT: ICD-10-CM

## 2020-08-27 DIAGNOSIS — F51.04 PSYCHOPHYSIOLOGICAL INSOMNIA: ICD-10-CM

## 2020-08-27 DIAGNOSIS — F41.8 DEPRESSION WITH ANXIETY: ICD-10-CM

## 2020-08-27 DIAGNOSIS — F42.2 MIXED OBSESSIONAL THOUGHTS AND ACTS: ICD-10-CM

## 2020-08-27 DIAGNOSIS — N61.0 ACUTE MASTITIS OF LEFT BREAST: ICD-10-CM

## 2020-08-27 DIAGNOSIS — M77.12 LEFT LATERAL EPICONDYLITIS: ICD-10-CM

## 2020-08-27 DIAGNOSIS — Z79.899 LITHIUM USE: ICD-10-CM

## 2020-08-27 PROCEDURE — 99215 OFFICE O/P EST HI 40 MIN: CPT | Performed by: FAMILY MEDICINE

## 2020-08-27 RX ORDER — OXYCODONE AND ACETAMINOPHEN 10; 325 MG/1; MG/1
1 TABLET ORAL
Qty: 50 TAB | Refills: 0 | Status: SHIPPED | OUTPATIENT
Start: 2020-10-20 | End: 2020-11-20 | Stop reason: SDUPTHER

## 2020-08-27 RX ORDER — DICLOFENAC SODIUM 75 MG/1
75 TABLET, DELAYED RELEASE ORAL 2 TIMES DAILY
Qty: 60 TAB | Refills: 1 | Status: SHIPPED | OUTPATIENT
Start: 2020-08-27 | End: 2021-06-21 | Stop reason: ALTCHOICE

## 2020-08-27 RX ORDER — OXYCODONE AND ACETAMINOPHEN 10; 325 MG/1; MG/1
1 TABLET ORAL
Qty: 50 TAB | Refills: 0 | Status: SHIPPED | OUTPATIENT
Start: 2020-09-20 | End: 2020-10-20

## 2020-08-27 RX ORDER — DEXTROAMPHETAMINE SACCHARATE, AMPHETAMINE ASPARTATE, DEXTROAMPHETAMINE SULFATE AND AMPHETAMINE SULFATE 2.5; 2.5; 2.5; 2.5 MG/1; MG/1; MG/1; MG/1
TABLET ORAL
Qty: 120 TAB | Refills: 0 | Status: SHIPPED | OUTPATIENT
Start: 2020-09-20 | End: 2021-06-21 | Stop reason: SDUPTHER

## 2020-08-27 RX ORDER — FLUOXETINE HYDROCHLORIDE 20 MG/1
20 CAPSULE ORAL DAILY
Qty: 90 CAP | Refills: 1 | Status: SHIPPED | OUTPATIENT
Start: 2020-08-27 | End: 2021-02-07 | Stop reason: ALTCHOICE

## 2020-08-27 RX ORDER — DEXTROAMPHETAMINE SACCHARATE, AMPHETAMINE ASPARTATE, DEXTROAMPHETAMINE SULFATE AND AMPHETAMINE SULFATE 2.5; 2.5; 2.5; 2.5 MG/1; MG/1; MG/1; MG/1
TABLET ORAL
Qty: 120 TAB | Refills: 0 | Status: SHIPPED | OUTPATIENT
Start: 2020-10-20 | End: 2020-11-20 | Stop reason: SDUPTHER

## 2020-08-27 RX ORDER — CEPHALEXIN 500 MG/1
500 CAPSULE ORAL 4 TIMES DAILY
Qty: 40 CAP | Refills: 0 | Status: SHIPPED | OUTPATIENT
Start: 2020-08-27 | End: 2020-09-06

## 2020-08-27 NOTE — PROGRESS NOTES
HISTORY OF PRESENT ILLNESS  HPI  Adriane Nascimento a 41 y.o. Female with a history of migraine with aura, bilateral CTS, cervical DDD, facet arthritis of lumbar region, ADD, depression with anxiety, insomnia, OCD and bipolar disorder. Pt is seen today through virtual visit. She presents today for a f/u for her anxiety, depression, and HA and c/o left  breast pain and left elbow pain. She requests a refill for her anxiety medications. Pt reports she has been greatly depressed due to the COVID-19 pandemic and to her nephew passing away from [place] [address]. She states she is still taking her medications as prescribed( had therapeutic lithium level on this regimen). She is taking her lithium as directed 1 in the morning and 2 at night. She admits that she has used Prozac in the past. Pt says that she has not seen her counselor as the office has been closed , but is in the process of setting up a virtual visit. Denies suicidal or homicidal ideations. Pt asserts she has had right arm pain for the past 2 months. She points to her elbow at [facility] and described it as radiating down her forearm and aggravated mainly with grasping and dorsiflexion of the wrist.. Pt notes that she has not tried diclofenac in the past.    She remarks that she has some discomfort in her left nipple for two weeks. Pt adds that pus will excrete when she palpates the area. Pt affirms she has taken Keflex in the past with no complications even thugh she is allergic to PCN. She requests a refill of her Percocet and notes that she runs out every month( toold there will be no increase and she will need to make it last the entire month or will have to do without). Pt denies unusual SOB, chest pain, and any recent ER visits or hospitalizations.            Bryce Martins, who was evaluated through a synchronous (real-time) audio-video encounter, and/or her healthcare decision maker, is aware that it is a billable service, with coverage as determined by her insurance carrier. She provided verbal consent to proceed: Yes, and patient identification was verified. It was conducted pursuant to the emergency declaration under the [facility], [address] [place] [address] authority and the [employer] Act. A caregiver was present when appropriate. Ability to conduct physical exam was limited. I was in the office. The patient was at home. 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 Gera Magdaleno 8/25/2016    Asthma     last attack 2 months ago    Bilateral carpal tunnel syndrome- R>>L 9/25/2016    Bipolar disorder with moderate depression ([facility] [address] 6/28/2017    Depression with anxiety 3/2/2010    Facet arthritis of lumbar region 12/3/2017    History of migraine headaches 11/25/2018    Insomnia     Lithium use 6/28/2017    Migraine 02/20/2010    Psychiatric disorder     Depression, anxiety    Psychophysiological insomnia 2/23/2016     Past Surgical History:   Procedure Laterality Date    HX APPENDECTOMY  1996    HX BREAST REDUCTION  2002    HX ORTHOPAEDIC  2003    cervical disc    HX ORTHOPAEDIC      second right hand digit fx with pins index     Current Outpatient Medications on File Prior to Visit   Medication Sig Dispense Refill    ALPRAZolam (XANAX) 0.25 mg tablet TAKE 1/2 - 1 TABLET BY MOUTH TWICE DAILY AS NEEDED FOR ANXIETY 60 Tab 0    zolpidem (AMBIEN) 10 mg tablet TAKE 2 TABLETS BY MOUTH EVERY DAY AT BEDTIME AS NEEDED FOR INSOMNIA 60 Tab 3    escitalopram oxalate (LEXAPRO) 20 mg tablet TAKE 1 TABLET BY MOUTH EVERY DAY 90 Tab 0    lithium carbonate 300 mg capsule TAKE 1 CAPSULE BY MOUTH EVERY MORNING AND 2 CAPS IN THE EVENINGS 270 Cap 1    lidocaine (LIDODERM) 5 % APPLY PATCH TO THE AFFECTED AREA FOR 12 HOURS A DAY AND REMOVE FOR 12 HOURS A DAY.  15 Patch 3    [DISCONTINUED] dextroamphetamine-amphetamine (ADDERALL) 10 mg tablet TAKE 2 TABS PO Q AM AND 1-2 TABS IN AFTERNOON 120 Tab 0    [DISCONTINUED] oxyCODONE-acetaminophen (PERCOCET 10)  mg per tablet Take 1 Tab by mouth two (2) times daily as needed for Pain for up to 30 days. Max Daily Amount: 2 Tabs. 50 is a 30 day supply; need visit before next refill 50 Tab 0    naloxone (NARCAN) 4 mg/actuation nasal spray Use 1 spray intranasally, then discard. Repeat with new spray every 2 min as needed for opioid overdose symptoms, alternating nostrils. 1 Each 3     No current facility-administered medications on file prior to visit. Allergies   Allergen Reactions    Pcn [Penicillins] Other (comments)     As a child; tolerates Keflex       Family History   Problem Relation Age of Onset    Diabetes Father     Hypertension Father     Heart Attack Father     High Cholesterol Father     Diabetes Mother     High Cholesterol Mother      Social History     Socioeconomic History    Marital status:      Spouse name: Not on file    Number of children: Not on file    Years of education: Not on file    Highest education level: Not on file   Tobacco Use    Smoking status: Current Every Day Smoker     Packs/day: 1.50     Years: 18.00     Pack years: 27.00     Types: Cigarettes    Smokeless tobacco: Never Used   Substance and Sexual Activity    Alcohol use: Yes     Comment: occasionally    Drug use: No    Sexual activity: Yes     Partners: Male     Birth control/protection: Condom             Review of Systems   Constitutional: Negative for chills, diaphoresis, fever, malaise/fatigue and weight loss. Eyes: Negative for blurred vision, double vision, pain and redness. Respiratory: Negative for cough, shortness of breath and wheezing. Cardiovascular: Negative for chest pain, palpitations, orthopnea, claudication, leg swelling and PND. Skin: Negative for itching and rash.    Neurological: Negative for dizziness, tingling, tremors, sensory change, speech change, focal weakness, seizures, loss of consciousness, weakness and headaches. Results for orders placed or performed in visit on 12/30/19   LITHIUM   Result Value Ref Range    Lithium level 0.7 0.6 - 1.2 mmol/L             Physical Exam  There were no vitals taken for this visit. General: alert, cooperative, no distress   Mental  status: normal mood, behavior, speech, dress, motor activity, and thought processes, able to follow commands   HENT: NCAT   Neck: no visualized mass   Resp: no respiratory distress   Neuro: no gross deficits   Skin: no discoloration or lesions of concern on visible areas   Psychiatric: Sad facies, no smiles, no evidence of hallucinations             ASSESSMENT and PLAN    ICD-10-CM ICD-9-CM    1. Bipolar disorder with moderate depression ([facility] [address]  F31.32 296.52     recent worsening of depression/ grieving loss of 28 yo cousin   2. Medication management  Z79.899 V58.69 PAIN MGMT PANEL, URINE W/RFLX CONFIRM   3. Acute mastitis of left breast  N61.0 611.0    4. 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 Severiano Rocha  F98.8 314.00 dextroamphetamine-amphetamine (ADDERALL) 10 mg tablet      dextroamphetamine-amphetamine (ADDERALL) 10 mg tablet   5. DDD (degenerative disc disease), cervical  M50.30 722.4 oxyCODONE-acetaminophen (PERCOCET 10)  mg per tablet      oxyCODONE-acetaminophen (PERCOCET 10)  mg per tablet   6. Migraine with aura and without status migrainosus, not intractable  G43.109 346.00 oxyCODONE-acetaminophen (PERCOCET 10)  mg per tablet      oxyCODONE-acetaminophen (PERCOCET 10)  mg per tablet   7. Facet arthritis of lumbar region  M47.816 721.3 oxyCODONE-acetaminophen (PERCOCET 10)  mg per tablet      oxyCODONE-acetaminophen (PERCOCET 10)  mg per tablet   8. Psychophysiological insomnia  F51.04 307.42    9. Mixed obsessional thoughts and acts  F42.2 300.3    10. Lithium use  Z79.899 V58.69    11. History of migraine headaches  Z86.69 V12.49    12. Depression with anxiety  F41.8 300.4 FLUoxetine (PROzac) 20 mg capsule   13. Left lateral epicondylitis  M77.12 726.32    14. Bipolar 1 disorder, depressed, moderate (HCC)  F31.32 296.52      Diagnoses and all orders for this visit:    1. Bipolar disorder with moderate depression ([address]  Comments:  recent worsening of depression/ grieving loss of 30 yo cousin    2. Medication management  -     PAIN MGMT PANEL, URINE W/RFLX CONFIRM    3. Acute mastitis of left breast    4. 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 Shereen Marie  -     dextroamphetamine-amphetamine (ADDERALL) 10 mg tablet; TAKE 2 TABS PO Q AM AND 1-2 TABS IN AFTERNOON  -     dextroamphetamine-amphetamine (ADDERALL) 10 mg tablet; TAKE 2 TABS PO Q AM AND 1-2 TABS IN AFTERNOON    5. DDD (degenerative disc disease), cervical  -     oxyCODONE-acetaminophen (PERCOCET 10)  mg per tablet; Take 1 Tab by mouth two (2) times daily as needed for Pain for up to 30 days. Max Daily Amount: 2 Tabs. 50 is a 30 day supply; need visit before next refill  -     oxyCODONE-acetaminophen (PERCOCET 10)  mg per tablet; Take 1 Tab by mouth two (2) times daily as needed for Pain for up to 30 days. Max Daily Amount: 2 Tabs. 50 is a 30 day supply; need visit before next refill    6. Migraine with aura and without status migrainosus, not intractable  -     oxyCODONE-acetaminophen (PERCOCET 10)  mg per tablet; Take 1 Tab by mouth two (2) times daily as needed for Pain for up to 30 days. Max Daily Amount: 2 Tabs. 50 is a 30 day supply; need visit before next refill  -     oxyCODONE-acetaminophen (PERCOCET 10)  mg per tablet; Take 1 Tab by mouth two (2) times daily as needed for Pain for up to 30 days. Max Daily Amount: 2 Tabs. 50 is a 30 day supply; need visit before next refill    7.  Facet arthritis of lumbar region  - oxyCODONE-acetaminophen (PERCOCET 10)  mg per tablet; Take 1 Tab by mouth two (2) times daily as needed for Pain for up to 30 days. Max Daily Amount: 2 Tabs. 50 is a 30 day supply; need visit before next refill  -     oxyCODONE-acetaminophen (PERCOCET 10)  mg per tablet; Take 1 Tab by mouth two (2) times daily as needed for Pain for up to 30 days. Max Daily Amount: 2 Tabs. 50 is a 30 day supply; need visit before next refill    8. Psychophysiological insomnia    9. Mixed obsessional thoughts and acts    10. Lithium use    11. History of migraine headaches    12. Depression with anxiety  -     FLUoxetine (PROzac) 20 mg capsule; Take 1 Cap by mouth daily. Indications: depression associated with bipolar disorder, adjunct treatment    13. Left lateral epicondylitis    14. Bipolar 1 disorder, depressed, moderate (HCC)    Other orders  -     cephALEXin (KEFLEX) 500 mg capsule; Take 1 Cap by mouth four (4) times daily for 10 days. -     diclofenac EC (VOLTAREN) 75 mg EC tablet; Take 1 Tab by mouth two (2) times a day. reviewed medications and side effects in detail  Please call my office if there are any questions- [phone]. Discussed expected course/resolution/complications of diagnosis in detail with patient. Medication risks/benefits/costs/interactions/alternatives discussed with patient. Pt was given an after visit summary which includes diagnoses, current medications & vitals. Pt expressed understanding with the diagnosis and plan. Patient to call if no better in 3 -4 days and prn new problems. BMI is significantly elevated- in the obese range. I reviewed diet, exercise and weight control.  Discussed weight control in detail, the importance of mainly decreased carbs, and for weight maintenance, exercise; discussed different diets and that it isn't as important to watch the type of foods as it is to decrease calorie intake no matter what type of diet you do, etc.     Total 45 minutes,60 % counseling re: Regular exercise is very important to your health; it helps mentally, physically, socially; it prevents injuries if done properly. Exercise, even as simple as walking 20-30 minutes daily has major benefits to your health even though your \"numbers\" are the same in the lab. See if you can add this into your daily regimen and after a few months it will become a regular habit-\"just something you do,\" like brushing your teeth. A combination of aerobic exercise and strengthening and stretching is felt to be the best for you, so this should be your ultimate goal.   This can be done in the privacy of your home or in a group setting as at the gym  Some prefer having a , others prefer to do exercise in groups or individually. Do what \"works\" for you. You need to make it simple and \"fun,\" or you most likely will not continue it. Recommended a weekly \"heart check. \" I went into detail how to do this. Also, discussed symptoms of concern that were noted today in the note above, treatment options( including doing nothing), when to follow up before recommended time frame. Also, answered all questions. Because her depression is worse related to death of a family member in addition to COVID-19 stress, I suggested she look into counseling and we will start a new medication to add to what she is already taking. It will most likely be Effexor or Prozac to be determined after a chart review:     From 2/23/2016 note: She was placed on Prozac by another provider before 6/2013 which is the first notation of it in the chart- she never increased it to a dose higher than this. Unless she didn't tolerate the higher dose, this is an option in the future. ( Taking Prozac for over 2.5 yrs it must have been helping some or she wouldn't have taken it and continued to refill it.)      For her left breast/nipple infection, I recommended heat and gave her a Rx of Keflex.      For her right elbow tendonitis, I recommended some stretching( demonstrated on video today), avoiding certain lifting activity, and using diclofenac. After her stretching, she should use ice and if she is not seeing improvement over the next couple of weeks, f/u here. This document was written by Cat Garcia, as dictated by Maicol Domínguez MD.  I have reviewed and agree with the above note and have made corrections where appropriate Clarke Connor M.D.

## 2020-08-27 NOTE — PROGRESS NOTES
Chief Complaint   Patient presents with    Anxiety     refill    Elbow Pain     right elbow pain for 2 months    Breast pain     under nipple can express pus, had pain for 2 weeks, no redness left side    Depression    Headache     refill percocet 50 doesn't last a month due to pain   1. Have you been to the ER, urgent care clinic since your last visit? Hospitalized since your last visit?no    2. Have you seen or consulted any other health care providers outside of the [address] [place] [address] since your last visit? Include any pap smears or colon screening.  No

## 2020-08-27 NOTE — TELEPHONE ENCOUNTER
Pt missed her virtual appt with DR. Brinda Rubinstein and would like to speak with Belén.          [facility]#[phone]

## 2020-09-08 DIAGNOSIS — F41.8 DEPRESSION WITH ANXIETY: ICD-10-CM

## 2020-09-08 NOTE — TELEPHONE ENCOUNTER
Last refill 08/12/20 per  PCP: Felix Sampson MD 
 
Last appt: 5/8/2020 Future Appointments Date Time Provider Lucia Pelaez 11/4/2020 10:15 AM Felix Sampson MD PA[facility] BS AMB Requested Prescriptions Pending Prescriptions Disp Refills  ALPRAZolam (XANAX) 0.25 mg tablet [Pharmacy Med Name: ALPRAZOLAM 0.25 MG TABLET] 60 Tab 0 Sig: TAKE 1/2 - 1 TABLET BY MOUTH TWICE DAILY AS NEEDED FOR ANXIETY

## 2020-09-09 RX ORDER — ALPRAZOLAM 0.25 MG/1
TABLET ORAL
Qty: 60 TAB | Refills: 1 | Status: SHIPPED | OUTPATIENT
Start: 2020-09-11 | End: 2020-11-03 | Stop reason: SDUPTHER

## 2020-09-11 DIAGNOSIS — F31.32 BIPOLAR 1 DISORDER, DEPRESSED, MODERATE (HCC): ICD-10-CM

## 2020-09-11 NOTE — TELEPHONE ENCOUNTER
Last Visit: VV 8/27/20  MD Pearletha Osler  Next Appointment: 11/4/20  MD Pearletha Osler  Previous Refill Encounter(s): 6/1/20  90      Requested Prescriptions     Pending Prescriptions Disp Refills    escitalopram oxalate (LEXAPRO) 20 mg tablet 90 Tab 1     Sig: Take 1 Tab by mouth daily.

## 2020-09-12 RX ORDER — ESCITALOPRAM OXALATE 20 MG/1
20 TABLET ORAL DAILY
Qty: 90 TAB | Refills: 1 | Status: SHIPPED | OUTPATIENT
Start: 2020-09-12 | End: 2021-04-23 | Stop reason: SDUPTHER

## 2020-10-12 ENCOUNTER — TELEPHONE (OUTPATIENT)
Dept: FAMILY MEDICINE CLINIC | Age: 45
End: 2020-10-12

## 2020-10-12 NOTE — TELEPHONE ENCOUNTER
----- Message from Jodee Ruggiero sent at 10/9/2020  7:19 AM EDT -----  Regarding: Dr. Kenny Ceja  Appointment not available    Caller's first and last name and relationship to patient (if not the patient):      Best contact number:[phone]      Preferred date and time:anytime today       Scheduled appointment date and time:none      Reason for appointment:depression and anxiety       Details to clarify the request:      Jodee Ruggiero

## 2020-10-12 NOTE — TELEPHONE ENCOUNTER
Returned call to patient.  No answer left message on name confirmed voicemail that I was returning her call regarding scheduling an appointment

## 2020-10-13 NOTE — TELEPHONE ENCOUNTER
Call placed to patient. Name and  verified. Patient requested appt for her depression anxiety and elbow pain.  Scheduled patient for VV with PCP 10/16/20 at 1600

## 2020-10-16 ENCOUNTER — VIRTUAL VISIT (OUTPATIENT)
Dept: FAMILY MEDICINE CLINIC | Age: 45
End: 2020-10-16

## 2020-10-17 DIAGNOSIS — F51.04 PSYCHOPHYSIOLOGICAL INSOMNIA: ICD-10-CM

## 2020-10-17 RX ORDER — ZOLPIDEM TARTRATE 10 MG/1
TABLET ORAL
Qty: 60 TAB | Refills: 3 | Status: CANCELLED | OUTPATIENT
Start: 2020-10-17

## 2020-10-18 RX ORDER — ZOLPIDEM TARTRATE 10 MG/1
TABLET ORAL
Qty: 60 TAB | Refills: 0 | Status: SHIPPED | OUTPATIENT
Start: 2020-10-18 | End: 2020-11-19 | Stop reason: SDUPTHER

## 2020-10-20 ENCOUNTER — VIRTUAL VISIT (OUTPATIENT)
Dept: FAMILY MEDICINE CLINIC | Age: 45
End: 2020-10-20
Payer: COMMERCIAL

## 2020-10-20 ENCOUNTER — TELEPHONE (OUTPATIENT)
Dept: FAMILY MEDICINE CLINIC | Age: 45
End: 2020-10-20

## 2020-10-20 DIAGNOSIS — M50.30 DDD (DEGENERATIVE DISC DISEASE), CERVICAL: ICD-10-CM

## 2020-10-20 DIAGNOSIS — N92.1 MENORRHAGIA WITH IRREGULAR CYCLE: ICD-10-CM

## 2020-10-20 DIAGNOSIS — R61 UNEXPLAINED NIGHT SWEATS: ICD-10-CM

## 2020-10-20 DIAGNOSIS — F98.8 ADD (ATTENTION DEFICIT DISORDER) WITHOUT HYPERACTIVITY: Chronic | ICD-10-CM

## 2020-10-20 DIAGNOSIS — F42.2 MIXED OBSESSIONAL THOUGHTS AND ACTS: ICD-10-CM

## 2020-10-20 DIAGNOSIS — F41.8 DEPRESSION WITH ANXIETY: ICD-10-CM

## 2020-10-20 DIAGNOSIS — F98.8 ADD (ATTENTION DEFICIT DISORDER) WITHOUT HYPERACTIVITY: ICD-10-CM

## 2020-10-20 DIAGNOSIS — G43.109 MIGRAINE WITH AURA AND WITHOUT STATUS MIGRAINOSUS, NOT INTRACTABLE: ICD-10-CM

## 2020-10-20 DIAGNOSIS — M47.816 FACET ARTHRITIS OF LUMBAR REGION: ICD-10-CM

## 2020-10-20 DIAGNOSIS — F31.32 BIPOLAR DISORDER WITH MODERATE DEPRESSION (HCC): Primary | ICD-10-CM

## 2020-10-20 DIAGNOSIS — M77.11 RIGHT LATERAL EPICONDYLITIS: ICD-10-CM

## 2020-10-20 PROCEDURE — 99215 OFFICE O/P EST HI 40 MIN: CPT | Performed by: FAMILY MEDICINE

## 2020-10-20 RX ORDER — OXYCODONE AND ACETAMINOPHEN 10; 325 MG/1; MG/1
1 TABLET ORAL
Qty: 50 TAB | Refills: 0 | Status: CANCELLED | OUTPATIENT
Start: 2020-10-20 | End: 2020-11-19

## 2020-10-20 RX ORDER — DEXTROAMPHETAMINE SACCHARATE, AMPHETAMINE ASPARTATE, DEXTROAMPHETAMINE SULFATE AND AMPHETAMINE SULFATE 2.5; 2.5; 2.5; 2.5 MG/1; MG/1; MG/1; MG/1
TABLET ORAL
Qty: 120 TAB | Refills: 0 | Status: CANCELLED | OUTPATIENT
Start: 2020-10-20

## 2020-10-20 NOTE — TELEPHONE ENCOUNTER
----- Message from Melida Marie sent at 10/20/2020 10:30 AM EDT -----  Regarding: DR Ivan Mayo: [phone]  Medication Refill    Caller (if not patient):      Relationship of caller (if not patient):      Best contact number(s):[phone]      Name of medication and dosage if known: Adderall 10 mg ,Percocet 10 mg      Is patient out of this medication (yes/no):yes      Pharmacy name[facility]    Pharmacy listed in chart? (yes/no):yes  Pharmacy phone number:      Details to clarify the request:      Melida Marie

## 2020-10-20 NOTE — TELEPHONE ENCOUNTER
Medications refilled during vv apt with Dr. Alena Phelan today. No further action needed at this time.

## 2020-10-20 NOTE — PROGRESS NOTES
Chief Complaint   Patient presents with    Anxiety    Depression    Arm Pain     right pain , symptoms occuring for 2 months.  right bruise underneath , difficulty turning on light switch   pain level at a 8 , varies . Patient is right handed so uses arm constantly     Sweats     having concerns about menstrual cycle coming more frequent, inquiring of having labs ( menapause ).  Medication Evaluation     Patient states medication prescribed for arm pain did not relieve pain . Medication :  Prozac- patient is not taking, unaware of it b eing prescribed.; medication prescribed on 8/27/2020      1. Have you been to the ER, urgent care clinic since your last visit? Hospitalized since your last visit? No    2. Have you seen or consulted any other health care providers outside of the [address] [place] [address] since your last visit? Include any pap smears or colon screening.  No

## 2020-10-20 NOTE — PROGRESS NOTES
HISTORY OF PRESENT ILLNESS  HPI  Thai Sorto a 41 y.o. Female with a history of migraine with aura, bilateral CTS, cervical DDD, facet arthritis of lumbar region, ADD, depression with anxiety, insomnia, OCD and bipolar disorder. Pt is seen today for a medication evaluation, for a f/u for anxiety, depression, and c/o arm pain and sweats. Pt says that her anxiety and depression is \"up and down\". She says that she is taking lithium and Lexapro. Denies suicidal or homicidal ideations. Pt reports her right arm pain has not improved with the diclofenac she takes BID. She describes elbow pain to radiate through the forearm into the elbow. She adds that \"looking at the arm in the light appears to have some bruising\". Pt affirms pain is aggravated by stretching. Pt requires lab work to evaluate her menopause as her menstrual cycles have become for frequent. Pt denies unusual SOB, chest pain, and any recent ER visits or hospitalizations. Emiron Michael, who was evaluated through a synchronous (real-time) audio-video encounter, and/or her healthcare decision maker, is aware that it is a billable service, with coverage as determined by her insurance carrier. She provided verbal consent to proceed: Yes, and patient identification was verified. It was conducted pursuant to the emergency declaration under the [address] [place] [address] authority and the [employer] Act. A caregiver was present when appropriate. Ability to conduct physical exam was limited. I was in the office. The patient was at home.           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 Will Bowman 8/25/2016    Asthma     last attack 2 months ago    Bilateral carpal tunnel syndrome- R>>L 9/25/2016    Bipolar disorder with moderate depression ([facility] [address] 6/28/2017    Depression with anxiety 3/2/2010    Facet arthritis of lumbar region 12/3/2017    History of migraine headaches 11/25/2018    Insomnia     Lithium use 6/28/2017    Migraine 02/20/2010    Psychiatric disorder     Depression, anxiety    Psychophysiological insomnia 2/23/2016     Past Surgical History:   Procedure Laterality Date    HX APPENDECTOMY  1996    HX BREAST REDUCTION  2002    HX ORTHOPAEDIC  2003    cervical disc    HX ORTHOPAEDIC      second right hand digit fx with pins index     Current Outpatient Medications on File Prior to Visit   Medication Sig Dispense Refill    zolpidem (AMBIEN) 10 mg tablet TAKE 2 TABLETS BY MOUTH EVERY DAY AT BEDTIME AS NEEDED FOR INSOMNIA 60 Tab 0    escitalopram oxalate (LEXAPRO) 20 mg tablet Take 1 Tab by mouth daily. 90 Tab 1    ALPRAZolam (XANAX) 0.25 mg tablet TAKE 1/2 - 1 TABLET BY MOUTH TWICE DAILY AS NEEDED FOR ANXIETY 60 Tab 1    dextroamphetamine-amphetamine (ADDERALL) 10 mg tablet TAKE 2 TABS PO Q AM AND 1-2 TABS IN AFTERNOON 120 Tab 0    oxyCODONE-acetaminophen (PERCOCET 10)  mg per tablet Take 1 Tab by mouth two (2) times daily as needed for Pain for up to 30 days. Max Daily Amount: 2 Tabs. 50 is a 30 day supply; need visit before next refill 50 Tab 0    dextroamphetamine-amphetamine (ADDERALL) 10 mg tablet TAKE 2 TABS PO Q AM AND 1-2 TABS IN AFTERNOON 120 Tab 0    oxyCODONE-acetaminophen (PERCOCET 10)  mg per tablet Take 1 Tab by mouth two (2) times daily as needed for Pain for up to 30 days. Max Daily Amount: 2 Tabs. 50 is a 30 day supply; need visit before next refill 50 Tab 0    lithium carbonate 300 mg capsule TAKE 1 CAPSULE BY MOUTH EVERY MORNING AND 2 CAPS IN THE EVENINGS 270 Cap 1    naloxone (NARCAN) 4 mg/actuation nasal spray Use 1 spray intranasally, then discard. Repeat with new spray every 2 min as needed for opioid overdose symptoms, alternating nostrils.  1 Each 3    diclofenac EC (VOLTAREN) 75 mg EC tablet Take 1 Tab by mouth two (2) times a day. 60 Tab 1    FLUoxetine (PROzac) 20 mg capsule Take 1 Cap by mouth daily. Indications: depression associated with bipolar disorder, adjunct treatment 90 Cap 1    lidocaine (LIDODERM) 5 % APPLY PATCH TO THE AFFECTED AREA FOR 12 HOURS A DAY AND REMOVE FOR 12 HOURS A DAY. 15 Patch 3     No current facility-administered medications on file prior to visit. Allergies   Allergen Reactions    Pcn [Penicillins] Other (comments)     As a child; tolerates Keflex       Family History   Problem Relation Age of Onset    Diabetes Father     Hypertension Father     Heart Attack Father     High Cholesterol Father     Diabetes Mother     High Cholesterol Mother      Social History     Socioeconomic History    Marital status:      Spouse name: Not on file    Number of children: Not on file    Years of education: Not on file    Highest education level: Not on file   Tobacco Use    Smoking status: Current Every Day Smoker     Packs/day: 1.50     Years: 18.00     Pack years: 27.00     Types: Cigarettes    Smokeless tobacco: Never Used   Substance and Sexual Activity    Alcohol use: Yes     Comment: occasionally    Drug use: No    Sexual activity: Yes     Partners: Male     Birth control/protection: Condom             Review of Systems   Constitutional: Negative for chills, diaphoresis, fever, malaise/fatigue and weight loss. Eyes: Negative for blurred vision, double vision, pain and redness. Respiratory: Negative for cough, shortness of breath and wheezing. Cardiovascular: Negative for chest pain, palpitations, orthopnea, claudication, leg swelling and PND. Skin: Negative for itching and rash. Neurological: Negative for dizziness, tingling, tremors, sensory change, speech change, focal weakness, seizures, loss of consciousness, weakness and headaches.      Results for orders placed or performed in visit on 12/30/19   LITHIUM   Result Value Ref Range    Lithium level 0.7 0.6 - 1.2 mmol/L             Physical Exam  There were no vitals taken for this visit. General: alert, cooperative, no distress   Mental  status: normal mood, behavior, speech, dress, motor activity, and thought processes, able to follow commands   HENT: NCAT   Neck: no visualized mass   Resp: no respiratory distress   Neuro: no gross deficits   Skin: no discoloration or lesions of concern on visible areas   Psychiatric: normal affect, consistent with stated mood, no evidence of hallucinations         ASSESSMENT and PLAN    ICD-10-CM ICD-9-CM    1. Menorrhagia with irregular cycle  N92.1 626.2 FSH AND LH      CBC WITH AUTOMATED DIFF      CBC WITH AUTOMATED DIFF      TSH 3RD GENERATION   2. Right lateral epicondylitis  M77.11 726.32    3. Unexplained night sweats  R61 780.8 FSH AND LH      TSH 3RD GENERATION      CBC WITH AUTOMATED DIFF      CBC WITH AUTOMATED DIFF      TSH 3RD GENERATION   4. Bipolar disorder with moderate depression ([facility] [address]  F31.32 296.52 LITHIUM   5. Depression with anxiety  F41.8 300.4    6. 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 Lauren Morrissey  F98.8 314.00    7. Mixed obsessional thoughts and acts  F42.2 300.3      Diagnoses and all orders for this visit:    1. Menorrhagia with irregular cycle  -     FSH AND LH  -     CBC WITH AUTOMATED DIFF  -     CBC WITH AUTOMATED DIFF  -     TSH 3RD GENERATION    2. Right lateral epicondylitis    3. Unexplained night sweats  -     FSH AND LH  -     TSH 3RD GENERATION  -     CBC WITH AUTOMATED DIFF  -     CBC WITH AUTOMATED DIFF  -     TSH 3RD GENERATION    4. Bipolar disorder with moderate depression ([facility])  -     LITHIUM    5. Depression with anxiety    6. 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 Lauren Morrissey    7. Mixed obsessional thoughts and acts      Follow-up and Dispositions    · Return if right lateral epicondylitis, irregular menses or hot flashes/ sweats don't improve. reviewed medications and side effects in detail  Please call my office if there are any questions- [phone]. Discussed expected course/resolution/complications of diagnosis in detail with patient. Medication risks/benefits/costs/interactions/alternatives discussed with patient. Pt was given an after visit summary which includes diagnoses, current medications & vitals. Pt expressed understanding with the diagnosis and plan. Patient to call if no better in 3 -4 days and prn new problems. BMI is significantly elevated- in the obese range. I reviewed diet, exercise and weight control. Discussed weight control in detail, the importance of mainly decreased carbs, and for weight maintenance, exercise; discussed different diets and that it isn't as important to watch the type of foods as it is to decrease calorie intake no matter what type of diet you do, etc.       Total 35 minutes,60 % counseling re: Recommended a weekly \"heart check. \" I went into detail how to do this. Regular exercise is very important to your health; it helps mentally, physically, socially; it prevents injuries if done properly. Exercise, even as simple as walking 20-30 minutes daily has major benefits to your health even though your \"numbers\" are the same in the lab. See if you can add this into your daily regimen and after a few months it will become a regular habit-\"just something you do,\" like brushing your teeth. A combination of aerobic exercise and strengthening and stretching is felt to be the best for you, so this should be your ultimate goal.   This can be done in the privacy of your home or in a group setting as at the gym  Some prefer having a , others prefer to do exercise in groups or individually. Do what \"works\" for you. You need to make it simple and \"fun,\" or you most likely will not continue it.  Also, discussed symptoms of concern that were noted today in the note above, treatment options( including doing nothing), when to follow up before recommended time frame. Also, answered all questions. She points to the area typical of tennis elbow on the right, so we will have her see Dr. Marcy Redman for further evaluation. If she cannot get in with Dr. Marcy Redman in a week, she should call back to get another anti-inflammatory. In the meantime, she should continue her icing and stretching. Because she is having hot flashes and irregular bleeding, we will check for signs of menopause. Because of her depression not being controlled well, we will check her lithium level to see if it low. It was barely therapeutic in February. She states she is taking lithium and Lexapro as directed and does not miss doses. If her lithium is normal, we will not adjust the dose but add fluoxetine to her regimen. This document was written by Merlin Skillern, as dictated by Natalie Hooker MD.  I have reviewed and agree with the above note and have made corrections where appropriate Clarke Lunsford M.D.

## 2020-10-20 NOTE — TELEPHONE ENCOUNTER
Duplicate request - Ambien 10 mg #60 was sent to [facility] on 10/18/2020. Have patient contact pharmacy.      Requested Prescriptions     Pending Prescriptions Disp Refills    zolpidem (AMBIEN) 10 mg tablet 60 Tab 3

## 2020-11-03 ENCOUNTER — TELEPHONE (OUTPATIENT)
Dept: FAMILY MEDICINE CLINIC | Age: 45
End: 2020-11-03

## 2020-11-03 DIAGNOSIS — F41.8 DEPRESSION WITH ANXIETY: ICD-10-CM

## 2020-11-03 NOTE — TELEPHONE ENCOUNTER
Pt. Called stating she had an appointment a week and a half ago but she has a f/up appt for her med tomorrow 11/4. Lisandra Leblanc she wants to know if she can cancel her appt for tomorrow since she got seen a week and a half ago.       PT would like a call back today reg er appt for tomorrow      BCB# [phone]

## 2020-11-03 NOTE — TELEPHONE ENCOUNTER
Outgoing call to patient. Left message that appointment cancelled for 11/04/2020, since seen recently.  If any other questions can give the office a call at [phone]

## 2020-11-04 RX ORDER — ALPRAZOLAM 0.25 MG/1
TABLET ORAL
Qty: 60 TAB | Refills: 1 | Status: SHIPPED | OUTPATIENT
Start: 2020-11-10 | End: 2020-12-28 | Stop reason: SDUPTHER

## 2020-11-12 DIAGNOSIS — F51.04 PSYCHOPHYSIOLOGICAL INSOMNIA: ICD-10-CM

## 2020-11-18 RX ORDER — ZOLPIDEM TARTRATE 10 MG/1
TABLET ORAL
Qty: 60 TAB | Refills: 0 | OUTPATIENT
Start: 2020-11-18

## 2020-11-19 ENCOUNTER — TELEPHONE (OUTPATIENT)
Dept: FAMILY MEDICINE CLINIC | Age: 45
End: 2020-11-19

## 2020-11-19 DIAGNOSIS — F51.04 PSYCHOPHYSIOLOGICAL INSOMNIA: ICD-10-CM

## 2020-11-20 DIAGNOSIS — F98.8 ADD (ATTENTION DEFICIT DISORDER) WITHOUT HYPERACTIVITY: Chronic | ICD-10-CM

## 2020-11-20 DIAGNOSIS — M47.816 FACET ARTHRITIS OF LUMBAR REGION: ICD-10-CM

## 2020-11-20 DIAGNOSIS — G43.109 MIGRAINE WITH AURA AND WITHOUT STATUS MIGRAINOSUS, NOT INTRACTABLE: ICD-10-CM

## 2020-11-20 DIAGNOSIS — M50.30 DDD (DEGENERATIVE DISC DISEASE), CERVICAL: ICD-10-CM

## 2020-11-20 RX ORDER — OXYCODONE AND ACETAMINOPHEN 10; 325 MG/1; MG/1
1 TABLET ORAL
Qty: 50 TAB | Refills: 0 | Status: SHIPPED | OUTPATIENT
Start: 2020-11-20 | End: 2020-12-19 | Stop reason: SDUPTHER

## 2020-11-20 RX ORDER — DEXTROAMPHETAMINE SACCHARATE, AMPHETAMINE ASPARTATE, DEXTROAMPHETAMINE SULFATE AND AMPHETAMINE SULFATE 2.5; 2.5; 2.5; 2.5 MG/1; MG/1; MG/1; MG/1
TABLET ORAL
Qty: 120 TAB | Refills: 0 | Status: SHIPPED | OUTPATIENT
Start: 2020-11-20 | End: 2020-12-19 | Stop reason: SDUPTHER

## 2020-11-20 RX ORDER — ZOLPIDEM TARTRATE 10 MG/1
TABLET ORAL
Qty: 60 TAB | Refills: 2 | Status: SHIPPED | OUTPATIENT
Start: 2020-11-20 | End: 2021-02-09 | Stop reason: SDUPTHER

## 2020-12-09 ENCOUNTER — HOSPITAL ENCOUNTER (EMERGENCY)
Age: 45
Discharge: HOME | End: 2020-12-09
Attending: EMERGENCY MEDICINE
Payer: COMMERCIAL

## 2020-12-09 VITALS
TEMPERATURE: 98.8 F | HEART RATE: 105 BPM | WEIGHT: 201.94 LBS | DIASTOLIC BLOOD PRESSURE: 78 MMHG | RESPIRATION RATE: 14 BRPM | OXYGEN SATURATION: 99 % | HEIGHT: 65 IN | SYSTOLIC BLOOD PRESSURE: 138 MMHG | BODY MASS INDEX: 33.65 KG/M2

## 2020-12-09 DIAGNOSIS — N61.1 LEFT BREAST ABSCESS: Primary | ICD-10-CM

## 2020-12-09 DIAGNOSIS — N61.0 CELLULITIS OF LEFT BREAST: ICD-10-CM

## 2020-12-09 LAB
ANION GAP SERPL CALC-SCNC: 3 MMOL/L (ref 5–15)
BASOPHILS # BLD: 0.1 K/UL (ref 0–0.1)
BASOPHILS NFR BLD: 0 % (ref 0–1)
BUN SERPL-MCNC: 9 MG/DL (ref 6–20)
BUN/CREAT SERPL: 15 (ref 12–20)
CALCIUM SERPL-MCNC: 8.9 MG/DL (ref 8.5–10.1)
CHLORIDE SERPL-SCNC: 106 MMOL/L (ref 97–108)
CO2 SERPL-SCNC: 28 MMOL/L (ref 21–32)
CREAT SERPL-MCNC: 0.62 MG/DL (ref 0.55–1.02)
DIFFERENTIAL METHOD BLD: ABNORMAL
EOSINOPHIL # BLD: 0.1 K/UL (ref 0–0.4)
EOSINOPHIL NFR BLD: 1 % (ref 0–7)
ERYTHROCYTE [DISTWIDTH] IN BLOOD BY AUTOMATED COUNT: 12.8 % (ref 11.5–14.5)
GLUCOSE SERPL-MCNC: 103 MG/DL (ref 65–100)
HCT VFR BLD AUTO: 40.3 % (ref 35–47)
HGB BLD-MCNC: 13 G/DL (ref 11.5–16)
IMM GRANULOCYTES # BLD AUTO: 0.1 K/UL (ref 0–0.04)
IMM GRANULOCYTES NFR BLD AUTO: 0 % (ref 0–0.5)
LYMPHOCYTES # BLD: 2.6 K/UL (ref 0.8–3.5)
LYMPHOCYTES NFR BLD: 22 % (ref 12–49)
MCH RBC QN AUTO: 30.7 PG (ref 26–34)
MCHC RBC AUTO-ENTMCNC: 32.3 G/DL (ref 30–36.5)
MCV RBC AUTO: 95.3 FL (ref 80–99)
MONOCYTES # BLD: 0.7 K/UL (ref 0–1)
MONOCYTES NFR BLD: 6 % (ref 5–13)
NEUTS SEG # BLD: 8.3 K/UL (ref 1.8–8)
NEUTS SEG NFR BLD: 71 % (ref 32–75)
NRBC # BLD: 0 K/UL (ref 0–0.01)
NRBC BLD-RTO: 0 PER 100 WBC
PLATELET # BLD AUTO: 287 K/UL (ref 150–400)
PMV BLD AUTO: 10.2 FL (ref 8.9–12.9)
POTASSIUM SERPL-SCNC: 3.8 MMOL/L (ref 3.5–5.1)
RBC # BLD AUTO: 4.23 M/UL (ref 3.8–5.2)
SODIUM SERPL-SCNC: 137 MMOL/L (ref 136–145)
WBC # BLD AUTO: 11.8 K/UL (ref 3.6–11)

## 2020-12-09 PROCEDURE — 36415 COLL VENOUS BLD VENIPUNCTURE: CPT

## 2020-12-09 PROCEDURE — 99282 EMERGENCY DEPT VISIT SF MDM: CPT

## 2020-12-09 PROCEDURE — 74011000250 HC RX REV CODE- 250: Performed by: PHYSICIAN ASSISTANT

## 2020-12-09 PROCEDURE — 75810000289 HC I&D ABSCESS SIMP/COMP/MULT

## 2020-12-09 PROCEDURE — 80048 BASIC METABOLIC PNL TOTAL CA: CPT

## 2020-12-09 PROCEDURE — 85025 COMPLETE CBC W/AUTO DIFF WBC: CPT

## 2020-12-09 RX ORDER — CEPHALEXIN 500 MG/1
500 CAPSULE ORAL 4 TIMES DAILY
Qty: 40 CAP | Refills: 0 | Status: SHIPPED | OUTPATIENT
Start: 2020-12-09 | End: 2020-12-19

## 2020-12-09 RX ORDER — LIDOCAINE HYDROCHLORIDE AND EPINEPHRINE 20; 10 MG/ML; UG/ML
10 INJECTION, SOLUTION INFILTRATION; PERINEURAL
Status: COMPLETED | OUTPATIENT
Start: 2020-12-09 | End: 2020-12-09

## 2020-12-09 RX ORDER — SULFAMETHOXAZOLE AND TRIMETHOPRIM 800; 160 MG/1; MG/1
2 TABLET ORAL 2 TIMES DAILY
Qty: 40 TAB | Refills: 0 | Status: SHIPPED | OUTPATIENT
Start: 2020-12-09 | End: 2020-12-19

## 2020-12-09 RX ADMIN — LIDOCAINE HYDROCHLORIDE AND EPINEPHRINE 200 MG: 20; 10 INJECTION, SOLUTION INFILTRATION; PERINEURAL at 13:07

## 2020-12-09 NOTE — ED PROVIDER NOTES
EMERGENCY DEPARTMENT HISTORY AND PHYSICAL EXAM      Date: 12/9/2020  Patient Name: Wanda Treviño    History of Presenting Illness     Chief Complaint   Patient presents with    Breast pain     4 days ago left breast hurting really bad. thinks it is a cyst; it is red and hot to touch. started taking an old antibiotic 4 days ago. History Provided By: Patient    HPI: Wanda Treviño, 41 y.o. female with a history of ADD, bipolar with moderate depression and others as below presents ambulatory to the ED with cc of 4 to 5 days of 7 out of 10 constant, achy tenderness of the left breast that is worse with palpation. She tells me she believes she has a \"cyst\" because the skin of the left breast is red, hot and when she pushes on it pus comes out of the nipple. She tells me this is been a problem for her in the past and she has a history of abscesses and cysts of the groin, breasts and armpits. There are no other complaints, changes, or physical findings at this time. PCP: Wilfrido Carlos MD    Current Outpatient Medications   Medication Sig Dispense Refill    trimethoprim-sulfamethoxazole (BACTRIM DS, SEPTRA DS) 160-800 mg per tablet Take 2 Tabs by mouth two (2) times a day for 10 days. 40 Tab 0    cephALEXin (Keflex) 500 mg capsule Take 1 Cap by mouth four (4) times daily for 10 days. 40 Cap 0    zolpidem (AMBIEN) 10 mg tablet TAKE 2 TABLETS BY MOUTH EVERY DAY AT BEDTIME AS NEEDED FOR INSOMNIA 60 Tab 2    dextroamphetamine-amphetamine (ADDERALL) 10 mg tablet TAKE 2 TABS PO Q AM AND 1-2 TABS IN AFTERNOON 120 Tab 0    oxyCODONE-acetaminophen (PERCOCET 10)  mg per tablet Take 1 Tab by mouth two (2) times daily as needed for Pain for up to 30 days. Max Daily Amount: 2 Tabs. 50 is a 30 day supply 50 Tab 0    ALPRAZolam (XANAX) 0.25 mg tablet TAKE 1/2 - 1 TABLET BY MOUTH TWICE DAILY AS NEEDED FOR ANXIETY 60 Tab 1    escitalopram oxalate (LEXAPRO) 20 mg tablet Take 1 Tab by mouth daily.  90 Tab 1    dextroamphetamine-amphetamine (ADDERALL) 10 mg tablet TAKE 2 TABS PO Q AM AND 1-2 TABS IN AFTERNOON 120 Tab 0    diclofenac EC (VOLTAREN) 75 mg EC tablet Take 1 Tab by mouth two (2) times a day. 60 Tab 1    FLUoxetine (PROzac) 20 mg capsule Take 1 Cap by mouth daily. Indications: depression associated with bipolar disorder, adjunct treatment 90 Cap 1    lithium carbonate 300 mg capsule TAKE 1 CAPSULE BY MOUTH EVERY MORNING AND 2 CAPS IN THE EVENINGS 270 Cap 1    lidocaine (LIDODERM) 5 % APPLY PATCH TO THE AFFECTED AREA FOR 12 HOURS A DAY AND REMOVE FOR 12 HOURS A DAY. 15 Patch 3    naloxone (NARCAN) 4 mg/actuation nasal spray Use 1 spray intranasally, then discard. Repeat with new spray every 2 min as needed for opioid overdose symptoms, alternating nostrils.  1 Each 3     Past History     Past Medical History:  Past Medical History:   Diagnosis Date    ADD (attention deficit disorder) without hyperactivity- neuropsych testing + ADD -Dr. Alessandro Allen 8/25/2016    Asthma     last attack 2 months ago    Bilateral carpal tunnel syndrome- R>>L 9/25/2016    Bipolar disorder with moderate depression ([facility] [address] 6/28/2017    Depression with anxiety 3/2/2010    Facet arthritis of lumbar region 12/3/2017    History of migraine headaches 11/25/2018    Insomnia     Lithium use 6/28/2017    Migraine 02/20/2010    Psychiatric disorder     Depression, anxiety    Psychophysiological insomnia 2/23/2016       Past Surgical History:  Past Surgical History:   Procedure Laterality Date    HX APPENDECTOMY  1996    HX BREAST REDUCTION  2002    HX ORTHOPAEDIC  2003    cervical disc    HX ORTHOPAEDIC      second right hand digit fx with pins index       Family History:  Family History   Problem Relation Age of Onset    Diabetes Father     Hypertension Father     Heart Attack Father     High Cholesterol Father     Diabetes Mother     High Cholesterol Mother        Social History:  Social History     Tobacco Use    Smoking status: Current Every Day Smoker     Packs/day: 1.50     Years: 18.00     Pack years: 27.00     Types: Cigarettes    Smokeless tobacco: Never Used   Substance Use Topics    Alcohol use: Yes     Comment: occasionally    Drug use: No       Allergies: Allergies   Allergen Reactions    Pcn [Penicillins] Other (comments)     As a child; tolerates Keflex       Review of Systems   Review of Systems   Constitutional: Negative for fatigue and fever. HENT: Negative for ear pain and sore throat. Eyes: Negative for pain, redness and visual disturbance. Respiratory: Negative for cough and shortness of breath. Cardiovascular: Negative for chest pain and palpitations. Gastrointestinal: Negative for abdominal pain, nausea and vomiting. Genitourinary: Negative for dysuria, frequency and urgency. Musculoskeletal: Negative for back pain, gait problem, neck pain and neck stiffness. Skin: Positive for color change (Tender redness, hardness and purulent drainage of left breast). Negative for rash and wound. Neurological: Negative for dizziness, weakness, light-headedness, numbness and headaches. Physical Exam   Physical Exam  Vitals signs and nursing note reviewed. Constitutional:       General: She is not in acute distress. Appearance: She is well-developed. She is not toxic-appearing. HENT:      Head: Normocephalic and atraumatic. Jaw: No trismus. Right Ear: External ear normal.      Left Ear: External ear normal.      Nose: Nose normal.      Mouth/Throat:      Pharynx: Uvula midline. Eyes:      General: No scleral icterus. Conjunctiva/sclera: Conjunctivae normal.      Pupils: Pupils are equal, round, and reactive to light. Neck:      Musculoskeletal: Full passive range of motion without pain and normal range of motion. Cardiovascular:      Rate and Rhythm: Normal rate and regular rhythm.    Pulmonary:      Effort: Pulmonary effort is normal. No tachypnea, accessory muscle usage or respiratory distress. Breath sounds: No decreased breath sounds or wheezing. Chest:      Breasts:         Left: Tenderness present. Comments:   LEFT BREAST:  There is symmetric bilateral scarring underneath both breasts the reflect a breast reduction surgery  There are some surgical changes to the skin of both areola and nipples that reflect a breast reduction surgery  There is redness and tenderness of the left breast just medial to the areola. Abdominal:      Palpations: Abdomen is soft. Tenderness: There is no abdominal tenderness. Musculoskeletal: Normal range of motion. Skin:     Findings: No rash. Neurological:      Mental Status: She is alert and oriented to person, place, and time. She is not disoriented. GCS: GCS eye subscore is 4. GCS verbal subscore is 5. GCS motor subscore is 6. Cranial Nerves: No cranial nerve deficit. Psychiatric:         Speech: Speech normal.       Diagnostic Study Results     Labs -     Recent Results (from the past 12 hour(s))   CBC WITH AUTOMATED DIFF    Collection Time: 12/09/20 11:32 AM   Result Value Ref Range    WBC 11.8 (H) 3.6 - 11.0 K/uL    RBC 4.23 3.80 - 5.20 M/uL    HGB 13.0 11.5 - 16.0 g/dL    HCT 40.3 35.0 - 47.0 %    MCV 95.3 80.0 - 99.0 FL    MCH 30.7 26.0 - 34.0 PG    MCHC 32.3 30.0 - 36.5 g/dL    RDW 12.8 11.5 - 14.5 %    PLATELET 871 564 - 089 K/uL    MPV 10.2 8.9 - 12.9 FL    NRBC 0.0 0  WBC    ABSOLUTE NRBC 0.00 0.00 - 0.01 K/uL    NEUTROPHILS 71 32 - 75 %    LYMPHOCYTES 22 12 - 49 %    MONOCYTES 6 5 - 13 %    EOSINOPHILS 1 0 - 7 %    BASOPHILS 0 0 - 1 %    IMMATURE GRANULOCYTES 0 0.0 - 0.5 %    ABS. NEUTROPHILS 8.3 (H) 1.8 - 8.0 K/UL    ABS. LYMPHOCYTES 2.6 0.8 - 3.5 K/UL    ABS. MONOCYTES 0.7 0.0 - 1.0 K/UL    ABS. EOSINOPHILS 0.1 0.0 - 0.4 K/UL    ABS. BASOPHILS 0.1 0.0 - 0.1 K/UL    ABS. IMM.  GRANS. 0.1 (H) 0.00 - 0.04 K/UL    DF AUTOMATED     METABOLIC PANEL, BASIC    Collection Time: 12/09/20 11:32 AM Result Value Ref Range    Sodium 137 136 - 145 mmol/L    Potassium 3.8 3.5 - 5.1 mmol/L    Chloride 106 97 - 108 mmol/L    CO2 28 21 - 32 mmol/L    Anion gap 3 (L) 5 - 15 mmol/L    Glucose 103 (H) 65 - 100 mg/dL    BUN 9 6 - 20 MG/DL    Creatinine 0.62 0.55 - 1.02 MG/DL    BUN/Creatinine ratio 15 12 - 20      GFR est AA >60 >60 ml/min/1.73m2    GFR est non-AA >60 >60 ml/min/1.73m2    Calcium 8.9 8.5 - 10.1 MG/DL       Radiologic Studies -   No orders to display     CT Results  (Last 48 hours)    None        CXR Results  (Last 48 hours)    None        Medical Decision Making   I am the first provider for this patient. I reviewed the vital signs, available nursing notes, past medical history, past surgical history, family history and social history. Vital Signs-Reviewed the patient's vital signs. Patient Vitals for the past 12 hrs:   Temp Pulse Resp BP SpO2   12/09/20 1119 98.8 °F (37.1 °C) (!) 105 14 138/78 99 %       Pulse Oximetry Analysis - 99% on RA    Records Reviewed: Nursing Notes, Old Medical Records, Previous Radiology Studies, Previous Laboratory Studies and     Provider Notes (Medical Decision Making):   DDx: Abscess, cellulitis    Given presentation, past medical history and history of recurrence, presentation is not consistent with a neoplastic process. I&D as below with moderate amount of purulent drainage. Will place on double strength Septra, Keflex and refer to breast surgery. Return precautions. Procedure Note - Incision and Drainage:   1:38 PM  Performed by: Michelle Hogan PA-C  Complexity: Complex  Skin prepped with Betadine. Sterile field established. Anesthesia achieved using a local infiltration of 4 mL lidocaine 2% with epinephrine. Abscess to left breast was incised with # 11 blade, and a moderate amount of purulent drainage was expressed after disruption of loculations. Wound probed and irrigated. Area was packed using 1/2 inch iodoform gauze.   Sterile dressing applied. Estimated blood loss: Less than 1 mL  The procedure took 16-30 minutes, and pt tolerated moderately. ED Course:   Initial assessment performed. The patients presenting problems have been discussed, and they are in agreement with the care plan formulated and outlined with them. I have encouraged them to ask questions as they arise throughout their visit. Disposition:  Discharge    PLAN:  1. Discharge Medication List as of 12/9/2020  1:41 PM      START taking these medications    Details   trimethoprim-sulfamethoxazole (BACTRIM DS, SEPTRA DS) 160-800 mg per tablet Take 2 Tabs by mouth two (2) times a day for 10 days. , Normal, Disp-40 Tab,R-0      cephALEXin (Keflex) 500 mg capsule Take 1 Cap by mouth four (4) times daily for 10 days. , Normal, Disp-40 Cap,R-0         CONTINUE these medications which have NOT CHANGED    Details   zolpidem (AMBIEN) 10 mg tablet TAKE 2 TABLETS BY MOUTH EVERY DAY AT BEDTIME AS NEEDED FOR INSOMNIA, Normal, Disp-60 Tab,R-2May refill no sooner than 30 days. !! dextroamphetamine-amphetamine (ADDERALL) 10 mg tablet TAKE 2 TABS PO Q AM AND 1-2 TABS IN AFTERNOON, Normal, Disp-120 Tab,R-0,DAWMay fill 11/20/2020      oxyCODONE-acetaminophen (PERCOCET 10)  mg per tablet Take 1 Tab by mouth two (2) times daily as needed for Pain for up to 30 days. Max Daily Amount: 2 Tabs. 50 is a 30 day supply, Normal, Disp-50 Tab,R-0May fill 11/20/2020      ALPRAZolam (XANAX) 0.25 mg tablet TAKE 1/2 - 1 TABLET BY MOUTH TWICE DAILY AS NEEDED FOR ANXIETY, Normal, Disp-60 Tab,R-1May fill 11/10/2020,  May refill no sooner than 30 days. escitalopram oxalate (LEXAPRO) 20 mg tablet Take 1 Tab by mouth daily. , Normal, Disp-90 Tab,R-1      !! dextroamphetamine-amphetamine (ADDERALL) 10 mg tablet TAKE 2 TABS PO Q AM AND 1-2 TABS IN AFTERNOON, Normal, Disp-120 Tab,R-0,DAWMay fill 09/20/2020      diclofenac EC (VOLTAREN) 75 mg EC tablet Take 1 Tab by mouth two (2) times a day. , Normal, Disp-60 Tab,R-1      FLUoxetine (PROzac) 20 mg capsule Take 1 Cap by mouth daily. Indications: depression associated with bipolar disorder, adjunct treatment, Normal, Disp-90 Cap,R-1      lithium carbonate 300 mg capsule TAKE 1 CAPSULE BY MOUTH EVERY MORNING AND 2 CAPS IN THE EVENINGS, Normal, Disp-270 Cap, R-1      lidocaine (LIDODERM) 5 % APPLY PATCH TO THE AFFECTED AREA FOR 12 HOURS A DAY AND REMOVE FOR 12 HOURS A DAY., Normal, Disp-15 Patch, R-3      naloxone (NARCAN) 4 mg/actuation nasal spray Use 1 spray intranasally, then discard. Repeat with new spray every 2 min as needed for opioid overdose symptoms, alternating nostrils. , Normal, Disp-1 Each, R-3       !! - Potential duplicate medications found. Please discuss with provider. 2.   Follow-up Information     Follow up With Specialties Details Why Contact Info    [facility] Schedule an appointment as soon as possible for a visit [facility]: follow up in 2 days for a wound check [address] [place] [address]  [phone]        Return to ED if worse     Diagnosis     Clinical Impression:   1. Left breast abscess    2.  Cellulitis of left breast

## 2020-12-09 NOTE — ED NOTES
Pt would like to have a Lithium level drawn if possible her doctor wanted her to have this checked, as well as \"if I'm menopausal\"

## 2020-12-09 NOTE — ED NOTES
Assumed care of pt from triage. Pt is A&O x 4. Pt reports CC of left sided breast pain. Pt reports that her breast is red and swollen and when she squeezes her breast pus comes out. Pt says this has happened once before taken antibiotics and it went away. Pt reports that this time it is much worse. RENITA Marx at bedside evaluating pt.     2105: RENITA Mcallister at bedside to perform procedure. This RN at bedside to assist if needed. 1344: Pt discharged by RENITA Mcallister. Pt provided with discharge instructions Rx and instructions on follow up care. Pt out of ED ambulatory.

## 2020-12-19 DIAGNOSIS — M50.30 DDD (DEGENERATIVE DISC DISEASE), CERVICAL: ICD-10-CM

## 2020-12-19 DIAGNOSIS — G43.109 MIGRAINE WITH AURA AND WITHOUT STATUS MIGRAINOSUS, NOT INTRACTABLE: ICD-10-CM

## 2020-12-19 DIAGNOSIS — F98.8 ADD (ATTENTION DEFICIT DISORDER) WITHOUT HYPERACTIVITY: Chronic | ICD-10-CM

## 2020-12-19 DIAGNOSIS — M47.816 FACET ARTHRITIS OF LUMBAR REGION: ICD-10-CM

## 2020-12-21 RX ORDER — DEXTROAMPHETAMINE SACCHARATE, AMPHETAMINE ASPARTATE, DEXTROAMPHETAMINE SULFATE AND AMPHETAMINE SULFATE 2.5; 2.5; 2.5; 2.5 MG/1; MG/1; MG/1; MG/1
TABLET ORAL
Qty: 120 TAB | Refills: 0 | Status: SHIPPED | OUTPATIENT
Start: 2020-12-21 | End: 2021-01-20 | Stop reason: SDUPTHER

## 2020-12-21 RX ORDER — OXYCODONE AND ACETAMINOPHEN 10; 325 MG/1; MG/1
1 TABLET ORAL
Qty: 50 TAB | Refills: 0 | Status: SHIPPED | OUTPATIENT
Start: 2020-12-21 | End: 2021-01-20 | Stop reason: SDUPTHER

## 2020-12-28 DIAGNOSIS — F31.32 BIPOLAR 1 DISORDER, DEPRESSED, MODERATE (HCC): ICD-10-CM

## 2020-12-28 DIAGNOSIS — F41.8 DEPRESSION WITH ANXIETY: ICD-10-CM

## 2021-01-03 RX ORDER — ALPRAZOLAM 0.25 MG/1
TABLET ORAL
Qty: 60 TAB | Refills: 1 | Status: SHIPPED | OUTPATIENT
Start: 2021-01-03 | End: 2021-02-25 | Stop reason: SDUPTHER

## 2021-01-03 RX ORDER — LITHIUM CARBONATE 300 MG/1
CAPSULE ORAL
Qty: 270 CAP | Refills: 1 | Status: SHIPPED | OUTPATIENT
Start: 2021-01-03 | End: 2021-10-14

## 2021-01-20 DIAGNOSIS — M50.30 DDD (DEGENERATIVE DISC DISEASE), CERVICAL: ICD-10-CM

## 2021-01-20 DIAGNOSIS — M47.816 FACET ARTHRITIS OF LUMBAR REGION: ICD-10-CM

## 2021-01-20 DIAGNOSIS — F98.8 ADD (ATTENTION DEFICIT DISORDER) WITHOUT HYPERACTIVITY: Chronic | ICD-10-CM

## 2021-01-20 DIAGNOSIS — G43.109 MIGRAINE WITH AURA AND WITHOUT STATUS MIGRAINOSUS, NOT INTRACTABLE: ICD-10-CM

## 2021-01-20 RX ORDER — DEXTROAMPHETAMINE SACCHARATE, AMPHETAMINE ASPARTATE, DEXTROAMPHETAMINE SULFATE AND AMPHETAMINE SULFATE 2.5; 2.5; 2.5; 2.5 MG/1; MG/1; MG/1; MG/1
TABLET ORAL
Qty: 120 TAB | Refills: 0 | Status: SHIPPED | OUTPATIENT
Start: 2021-01-20 | End: 2021-02-19 | Stop reason: SDUPTHER

## 2021-01-20 RX ORDER — OXYCODONE AND ACETAMINOPHEN 10; 325 MG/1; MG/1
1 TABLET ORAL
Qty: 50 TAB | Refills: 0 | Status: SHIPPED | OUTPATIENT
Start: 2021-01-20 | End: 2021-02-05 | Stop reason: DRUGHIGH

## 2021-02-05 ENCOUNTER — VIRTUAL VISIT (OUTPATIENT)
Dept: FAMILY MEDICINE CLINIC | Age: 46
End: 2021-02-05
Payer: COMMERCIAL

## 2021-02-05 DIAGNOSIS — M47.816 FACET ARTHRITIS OF LUMBAR REGION: ICD-10-CM

## 2021-02-05 DIAGNOSIS — N61.1 BREAST ABSCESS OF FEMALE: ICD-10-CM

## 2021-02-05 DIAGNOSIS — R63.5 WEIGHT GAIN, ABNORMAL: ICD-10-CM

## 2021-02-05 DIAGNOSIS — M50.30 DDD (DEGENERATIVE DISC DISEASE), CERVICAL: ICD-10-CM

## 2021-02-05 DIAGNOSIS — G43.109 MIGRAINE WITH AURA AND WITHOUT STATUS MIGRAINOSUS, NOT INTRACTABLE: Primary | ICD-10-CM

## 2021-02-05 DIAGNOSIS — R23.2 HOT FLASHES: ICD-10-CM

## 2021-02-05 DIAGNOSIS — Z79.899 MEDICATION MANAGEMENT: ICD-10-CM

## 2021-02-05 DIAGNOSIS — F11.90 OPIATE USE: ICD-10-CM

## 2021-02-05 DIAGNOSIS — Z79.899 LITHIUM USE: ICD-10-CM

## 2021-02-05 DIAGNOSIS — M54.50 SEVERE LOW BACK PAIN: ICD-10-CM

## 2021-02-05 PROCEDURE — 99214 OFFICE O/P EST MOD 30 MIN: CPT | Performed by: FAMILY MEDICINE

## 2021-02-05 RX ORDER — CEPHALEXIN 500 MG/1
500 CAPSULE ORAL 4 TIMES DAILY
Qty: 40 CAP | Refills: 0 | Status: SHIPPED | OUTPATIENT
Start: 2021-02-05 | End: 2021-02-15

## 2021-02-05 RX ORDER — OXYCODONE AND ACETAMINOPHEN 5; 325 MG/1; MG/1
1-2 TABLET ORAL
Qty: 100 TAB | Refills: 0 | Status: SHIPPED | OUTPATIENT
Start: 2021-02-19 | End: 2021-02-19 | Stop reason: SDUPTHER

## 2021-02-05 NOTE — PROGRESS NOTES
HISTORY OF PRESENT ILLNESS. HPI  Wenceslao Carroll a 14 K. o. female with a history of migraine with aura, bilateral CTS, cervical DDD, facet arthritis of lumbar region, ADD, depression with anxiety, insomnia, OCD and bipolar disorder. Pt is seen through virtual visit today for f/u of these health problems. She explains that she takes her Xanax 0.25 mg up to 4 a day prn for her panic attacks. She adds that she is taking her Percocet for her migraines, neck pain, shoulder pain, back pain, and elbow pain. Pt affirms that she has used many preventative and treatment medications for her migraines. Pt confirms that she has not seen her neurologist for this in some time. She asks if she could get 100 Percocet 5 mg as opposed to her 50 Percocet 10 mg to better space out her doses. Pt reports that she went to [facility] ER on 12/09/2020 due to an abscess of her left areola. She says that she was given Keflex and Bactrim with relief but this seems to keep coming back. She inquires about her lab work relating to her menopause. Pt remarks that she has gained 35 lbs and would like to know what strategies she can take to help lose weight. Pt denies unusual SOB, chest pain, and any recent ER visits or hospitalizations. Torres Azar, who was evaluated through a synchronous (real-time) audio-video encounter, and/or her healthcare decision maker, is aware that it is a billable service, with coverage as determined by her insurance carrier. She provided verbal consent to proceed: Yes, and patient identification was verified. It was conducted pursuant to the emergency declaration under the [facility], [address] [place] [address] authority and the "1,[employer],3 [employer]" and [employer] Act. A caregiver was present when appropriate. Ability to conduct physical exam was limited. I was in the office. The patient was at home.           Past Medical History:   Diagnosis Date  ADD (attention deficit disorder) without hyperactivity- neuropsych testing + ADD -Dr. Sushma Chávez 8/25/2016    Asthma     last attack 2 months ago    Bilateral carpal tunnel syndrome- R>>L 9/25/2016    Bipolar disorder with moderate depression ([facility] [address] 6/28/2017    Depression with anxiety 3/2/2010    Facet arthritis of lumbar region 12/3/2017    History of migraine headaches 11/25/2018    Insomnia     Lithium use 6/28/2017    Migraine 02/20/2010    Psychiatric disorder     Depression, anxiety    Psychophysiological insomnia 2/23/2016     Past Surgical History:   Procedure Laterality Date    HX APPENDECTOMY  1996    HX BREAST REDUCTION  2002    HX ORTHOPAEDIC  2003    cervical disc    HX ORTHOPAEDIC      second right hand digit fx with pins index     Current Outpatient Medications on File Prior to Visit   Medication Sig Dispense Refill    dextroamphetamine-amphetamine (ADDERALL) 10 mg tablet TAKE 2 TABS PO Q AM AND 1-2 TABS IN AFTERNOON 120 Tab 0    lithium carbonate 300 mg capsule TAKE 1 CAPSULE BY MOUTH EVERY MORNING AND 2 CAPS IN THE EVENINGS 270 Cap 1    ALPRAZolam (XANAX) 0.25 mg tablet TAKE 1/2 - 1 TABLET BY MOUTH TWICE DAILY AS NEEDED FOR ANXIETY 60 Tab 1    zolpidem (AMBIEN) 10 mg tablet TAKE 2 TABLETS BY MOUTH EVERY DAY AT BEDTIME AS NEEDED FOR INSOMNIA 60 Tab 2    escitalopram oxalate (LEXAPRO) 20 mg tablet Take 1 Tab by mouth daily. 90 Tab 1    dextroamphetamine-amphetamine (ADDERALL) 10 mg tablet TAKE 2 TABS PO Q AM AND 1-2 TABS IN AFTERNOON 120 Tab 0    diclofenac EC (VOLTAREN) 75 mg EC tablet Take 1 Tab by mouth two (2) times a day. 60 Tab 1    lidocaine (LIDODERM) 5 % APPLY PATCH TO THE AFFECTED AREA FOR 12 HOURS A DAY AND REMOVE FOR 12 HOURS A DAY. 15 Patch 3    [DISCONTINUED] FLUoxetine (PROzac) 20 mg capsule Take 1 Cap by mouth daily.  Indications: depression associated with bipolar disorder, adjunct treatment 90 Cap 1    naloxone (NARCAN) 4 mg/actuation nasal spray Use 1 spray intranasally, then discard. Repeat with new spray every 2 min as needed for opioid overdose symptoms, alternating nostrils. 1 Each 3     No current facility-administered medications on file prior to visit. Allergies   Allergen Reactions    Pcn [Penicillins] Other (comments)     As a child; tolerates Keflex       Family History   Problem Relation Age of Onset    Diabetes Father     Hypertension Father     Heart Attack Father     High Cholesterol Father     Diabetes Mother     High Cholesterol Mother      Social History     Socioeconomic History    Marital status:      Spouse name: Not on file    Number of children: Not on file    Years of education: Not on file    Highest education level: Not on file   Tobacco Use    Smoking status: Current Every Day Smoker     Packs/day: 1.50     Years: 18.00     Pack years: 27.00     Types: Cigarettes    Smokeless tobacco: Never Used   Substance and Sexual Activity    Alcohol use: Yes     Comment: occasionally    Drug use: No    Sexual activity: Yes     Partners: Male     Birth control/protection: Condom             Review of Systems   Constitutional: Negative for chills, diaphoresis, fever, malaise/fatigue and weight loss. Eyes: Negative for blurred vision, double vision, pain and redness. Respiratory: Negative for cough, shortness of breath and wheezing. Cardiovascular: Negative for chest pain, palpitations, orthopnea, claudication, leg swelling and PND. Skin: Negative for itching and rash. Neurological: Negative for dizziness, tingling, tremors, sensory change, speech change, focal weakness, seizures, loss of consciousness, weakness and headaches.      Results for orders placed or performed during the hospital encounter of 12/09/20   CBC WITH AUTOMATED DIFF   Result Value Ref Range    WBC 11.8 (H) 3.6 - 11.0 K/uL    RBC 4.23 3.80 - 5.20 M/uL    HGB 13.0 11.5 - 16.0 g/dL    HCT 40.3 35.0 - 47.0 %    MCV 95.3 80.0 - 99.0 FL    MCH 30.7 26.0 - 34.0 PG    MCHC 32.3 30.0 - 36.5 g/dL    RDW 12.8 11.5 - 14.5 %    PLATELET 766 215 - 284 K/uL    MPV 10.2 8.9 - 12.9 FL    NRBC 0.0 0  WBC    ABSOLUTE NRBC 0.00 0.00 - 0.01 K/uL    NEUTROPHILS 71 32 - 75 %    LYMPHOCYTES 22 12 - 49 %    MONOCYTES 6 5 - 13 %    EOSINOPHILS 1 0 - 7 %    BASOPHILS 0 0 - 1 %    IMMATURE GRANULOCYTES 0 0.0 - 0.5 %    ABS. NEUTROPHILS 8.3 (H) 1.8 - 8.0 K/UL    ABS. LYMPHOCYTES 2.6 0.8 - 3.5 K/UL    ABS. MONOCYTES 0.7 0.0 - 1.0 K/UL    ABS. EOSINOPHILS 0.1 0.0 - 0.4 K/UL    ABS. BASOPHILS 0.1 0.0 - 0.1 K/UL    ABS. IMM. GRANS. 0.1 (H) 0.00 - 0.04 K/UL    DF AUTOMATED     METABOLIC PANEL, BASIC   Result Value Ref Range    Sodium 137 136 - 145 mmol/L    Potassium 3.8 3.5 - 5.1 mmol/L    Chloride 106 97 - 108 mmol/L    CO2 28 21 - 32 mmol/L    Anion gap 3 (L) 5 - 15 mmol/L    Glucose 103 (H) 65 - 100 mg/dL    BUN 9 6 - 20 MG/DL    Creatinine 0.62 0.55 - 1.02 MG/DL    BUN/Creatinine ratio 15 12 - 20      GFR est AA >60 >60 ml/min/1.73m2    GFR est non-AA >60 >60 ml/min/1.73m2    Calcium 8.9 8.5 - 10.1 MG/DL             Physical Exam  There were no vitals taken for this visit. General: alert, cooperative, no distress   Mental  status: normal mood, behavior, speech, dress, motor activity, and thought processes, able to follow commands   HENT: NCAT   Neck: no visualized mass   Resp: no respiratory distress   Neuro: no gross deficits   Skin: no discoloration or lesions of concern on visible areas   Psychiatric: normal affect, consistent with stated mood, no evidence of hallucinations           ASSESSMENT and PLAN    ICD-10-CM ICD-9-CM    1. Migraine with aura and without status migrainosus, not intractable  G43.109 346.00    2. Lithium use  Z79.899 V58.69 LITHIUM      METABOLIC PANEL, COMPREHENSIVE   3. Weight gain, abnormal  R63.5 783.1 TSH 3RD GENERATION      METABOLIC PANEL, COMPREHENSIVE   4. Hot flashes  P24.1 554.56 FOLLICLE STIMULATING HORMONE   5.  Severe low back pain  M54.5 724.2 PAIN MGMT PANEL, URINE W/RFLX CONFIRM      oxyCODONE-acetaminophen (PERCOCET) 5-325 mg per tablet   6. Medication management  Z79.899 V58.69 PAIN MGMT PANEL, URINE W/RFLX CONFIRM      oxyCODONE-acetaminophen (PERCOCET) 5-325 mg per tablet   7. Opiate use  F11.90 305.50 PAIN MGMT PANEL, URINE W/RFLX CONFIRM      oxyCODONE-acetaminophen (PERCOCET) 5-325 mg per tablet   8. DDD (degenerative disc disease), cervical  M50.30 722.4 oxyCODONE-acetaminophen (PERCOCET) 5-325 mg per tablet   9. Facet arthritis of lumbar region  M47.816 721.3 oxyCODONE-acetaminophen (PERCOCET) 5-325 mg per tablet   10. Breast abscess of female  N61.1 611.0 cephALEXin (KEFLEX) 500 mg capsule     Diagnoses and all orders for this visit:    1. Migraine with aura and without status migrainosus, not intractable    2. Lithium use  -     LITHIUM; Future  -     METABOLIC PANEL, COMPREHENSIVE; Future    3. Weight gain, abnormal  -     TSH 3RD GENERATION; Future  -     METABOLIC PANEL, COMPREHENSIVE; Future    4. Hot flashes  -     FOLLICLE STIMULATING HORMONE; Future    5. Severe low back pain  -     PAIN MGMT PANEL, URINE W/RFLX CONFIRM; Future  -     oxyCODONE-acetaminophen (PERCOCET) 5-325 mg per tablet; Take 1-2 Tabs by mouth every eight (8) hours as needed for Pain for up to 30 days. Max Daily Amount: 6 Tabs. 6. Medication management  -     PAIN MGMT PANEL, URINE W/RFLX CONFIRM; Future  -     oxyCODONE-acetaminophen (PERCOCET) 5-325 mg per tablet; Take 1-2 Tabs by mouth every eight (8) hours as needed for Pain for up to 30 days. Max Daily Amount: 6 Tabs. 7. Opiate use  -     PAIN MGMT PANEL, URINE W/RFLX CONFIRM; Future  -     oxyCODONE-acetaminophen (PERCOCET) 5-325 mg per tablet; Take 1-2 Tabs by mouth every eight (8) hours as needed for Pain for up to 30 days. Max Daily Amount: 6 Tabs. 8. DDD (degenerative disc disease), cervical  -     oxyCODONE-acetaminophen (PERCOCET) 5-325 mg per tablet;  Take 1-2 Tabs by mouth every eight (8) hours as needed for Pain for up to 30 days. Max Daily Amount: 6 Tabs. 9. Facet arthritis of lumbar region  -     oxyCODONE-acetaminophen (PERCOCET) 5-325 mg per tablet; Take 1-2 Tabs by mouth every eight (8) hours as needed for Pain for up to 30 days. Max Daily Amount: 6 Tabs. 10. Breast abscess of female  -     cephALEXin (KEFLEX) 500 mg capsule; Take 1 Cap by mouth four (4) times daily for 10 days. Follow-up and Dispositions    · Return in about 3 months (around 5/5/2021), or if symptoms worsen or fail to improve, for 3 month F/U chronic controlled substance use. reviewed medications and side effects in detail  Please call my office if there are any questions- [phone]. Discussed expected course/resolution/complications of diagnosis in detail with patient. Medication risks/benefits/costs/interactions/alternatives discussed with patient. Pt was given an after visit summary which includes diagnoses, current medications & vitals. Pt expressed understanding with the diagnosis and plan. Patient to call if no better in 3 -4 days and prn new problems. Pt asked about weight loss. I informed her that medications are not always helpful but she can see Dr. Camilla Bell. He is can direct her as an expert in weight loss. BMI is significantly elevated- in the obese range. I reviewed diet, exercise and weight control. Discussed weight control in detail, the importance of mainly decreased carbs, and for weight maintenance, exercise; discussed different diets and that it isn't as important to watch the type of foods as it is to decrease calorie intake no matter what type of diet you do, etc.       Total 30 minutes  re:  Regular exercise is very important to your health; it helps mentally, physically, socially; it prevents injuries if done properly. Exercise, even as simple as walking 20-30 minutes daily has major benefits to your health even though your \"numbers\" are the same in the lab.  See if you can add this into your daily regimen and after a few months it will become a regular habit-\"just something you do,\" like brushing your teeth. A combination of aerobic exercise and strengthening and stretching is felt to be the best for you, so this should be your ultimate goal.   This can be done in the privacy of your home or in a group setting as at the gym  Some prefer having a , others prefer to do exercise in groups or individually. Do what \"works\" for you. You need to make it simple and \"fun,\" or you most likely will not continue it. Recommended a weekly \"heart check. \" I went into detail how to do this. Also, discussed symptoms of concern that were noted today in the note above, treatment options( including doing nothing), when to follow up before recommended time frame. Also, answered all questions. Because she finds that the oxycodone does no last long enough, she wants to try a lower dose so she can have an additional dose she can take when it wears off, so we will cut the dose back to 5 mg. I referred her to neurology, Dr. Olive Boxer, as she has not really seen anybody for her migraines. She needs to readdress that problems as it is one of the causes for her to use oxycodone. There may be preventative medications that she has not tried before as well as treatments she has not tried before that are not addictive, etc.     She has other causes to use the oxycodone and we will try to address those with non-opioid treatment. It is possible some of the preventative medications for migraine will help with the other pains as well    We will have her come in to do her lab work as it was not done when she was in the ER recently. She is due a urine drug screen and a lithium level. We switched her form oxycodone 10 to oxycodone 5 with the total mg dose the same. This Rx will not be active until 02/19/2021.         For her recurrent breast abscess that was seen back on 12/09/2020, we will give her Keflex and have her put heat on the area BID until there is no sign of infection for at least a month. This document was written by Kina Jarvis, as dictated by Alyssia Dunn MD.  I have reviewed and agree with the above note and have made corrections where appropriate Clarke Mac M.D.

## 2021-02-05 NOTE — PROGRESS NOTES
Chief Complaint   Patient presents with    Headache      wants to change dose of percocet    Anxiety     granddaughter has been in hospital    Insomnia   1. Have you been to the ER, urgent care clinic since your last visit? Hospitalized since your last visit? No    2. Have you seen or consulted any other health care providers outside of the [address] [place] [address] since your last visit? Include any pap smears or colon screening.  No

## 2021-02-09 DIAGNOSIS — F51.04 PSYCHOPHYSIOLOGICAL INSOMNIA: ICD-10-CM

## 2021-02-09 RX ORDER — ZOLPIDEM TARTRATE 10 MG/1
TABLET ORAL
Qty: 60 TAB | Refills: 2 | Status: SHIPPED | OUTPATIENT
Start: 2021-02-09 | End: 2021-04-27 | Stop reason: SDUPTHER

## 2021-02-09 NOTE — TELEPHONE ENCOUNTER
PCP: Eryn Valdez MD 
 
Last berry 02/05/21 Requested Prescriptions Pending Prescriptions Disp Refills  zolpidem (AMBIEN) 10 mg tablet 60 Tab 2   Sig: TAKE 2 TABLETS BY MOUTH EVERY DAY AT BEDTIME AS NEEDED FOR INSOMNIA

## 2021-02-19 DIAGNOSIS — F11.90 OPIATE USE: ICD-10-CM

## 2021-02-19 DIAGNOSIS — M50.30 DDD (DEGENERATIVE DISC DISEASE), CERVICAL: ICD-10-CM

## 2021-02-19 DIAGNOSIS — F98.8 ADD (ATTENTION DEFICIT DISORDER) WITHOUT HYPERACTIVITY: Chronic | ICD-10-CM

## 2021-02-19 DIAGNOSIS — Z79.899 MEDICATION MANAGEMENT: ICD-10-CM

## 2021-02-19 DIAGNOSIS — M54.50 SEVERE LOW BACK PAIN: ICD-10-CM

## 2021-02-19 DIAGNOSIS — M47.816 FACET ARTHRITIS OF LUMBAR REGION: ICD-10-CM

## 2021-02-19 RX ORDER — OXYCODONE AND ACETAMINOPHEN 5; 325 MG/1; MG/1
1-2 TABLET ORAL
Qty: 100 TAB | Refills: 0 | Status: SHIPPED | OUTPATIENT
Start: 2021-02-19 | End: 2021-03-19 | Stop reason: SDUPTHER

## 2021-02-19 RX ORDER — DEXTROAMPHETAMINE SACCHARATE, AMPHETAMINE ASPARTATE, DEXTROAMPHETAMINE SULFATE AND AMPHETAMINE SULFATE 2.5; 2.5; 2.5; 2.5 MG/1; MG/1; MG/1; MG/1
TABLET ORAL
Qty: 120 TAB | Refills: 0 | Status: SHIPPED | OUTPATIENT
Start: 2021-02-19 | End: 2021-03-19 | Stop reason: SDUPTHER

## 2021-02-25 DIAGNOSIS — F41.8 DEPRESSION WITH ANXIETY: ICD-10-CM

## 2021-02-25 RX ORDER — ALPRAZOLAM 0.25 MG/1
TABLET ORAL
Qty: 60 TAB | Refills: 2 | Status: SHIPPED | OUTPATIENT
Start: 2021-02-25 | End: 2021-05-18

## 2021-03-19 DIAGNOSIS — M54.50 SEVERE LOW BACK PAIN: ICD-10-CM

## 2021-03-19 DIAGNOSIS — Z79.899 MEDICATION MANAGEMENT: ICD-10-CM

## 2021-03-19 DIAGNOSIS — F98.8 ADD (ATTENTION DEFICIT DISORDER) WITHOUT HYPERACTIVITY: Chronic | ICD-10-CM

## 2021-03-19 DIAGNOSIS — M50.30 DDD (DEGENERATIVE DISC DISEASE), CERVICAL: ICD-10-CM

## 2021-03-19 DIAGNOSIS — M47.816 FACET ARTHRITIS OF LUMBAR REGION: ICD-10-CM

## 2021-03-19 DIAGNOSIS — F11.90 OPIATE USE: ICD-10-CM

## 2021-03-21 DIAGNOSIS — M54.50 SEVERE LOW BACK PAIN: ICD-10-CM

## 2021-03-21 DIAGNOSIS — F11.90 OPIATE USE: ICD-10-CM

## 2021-03-21 DIAGNOSIS — Z79.899 MEDICATION MANAGEMENT: ICD-10-CM

## 2021-03-21 DIAGNOSIS — M50.30 DDD (DEGENERATIVE DISC DISEASE), CERVICAL: ICD-10-CM

## 2021-03-21 DIAGNOSIS — F98.8 ADD (ATTENTION DEFICIT DISORDER) WITHOUT HYPERACTIVITY: Chronic | ICD-10-CM

## 2021-03-21 DIAGNOSIS — M47.816 FACET ARTHRITIS OF LUMBAR REGION: ICD-10-CM

## 2021-03-21 RX ORDER — DEXTROAMPHETAMINE SACCHARATE, AMPHETAMINE ASPARTATE, DEXTROAMPHETAMINE SULFATE AND AMPHETAMINE SULFATE 2.5; 2.5; 2.5; 2.5 MG/1; MG/1; MG/1; MG/1
TABLET ORAL
Qty: 120 TAB | Refills: 0 | Status: CANCELLED | OUTPATIENT
Start: 2021-03-21

## 2021-03-21 RX ORDER — OXYCODONE AND ACETAMINOPHEN 5; 325 MG/1; MG/1
1-2 TABLET ORAL
Qty: 100 TAB | Refills: 0 | Status: CANCELLED | OUTPATIENT
Start: 2021-03-21 | End: 2021-04-20

## 2021-03-22 RX ORDER — OXYCODONE AND ACETAMINOPHEN 5; 325 MG/1; MG/1
1-2 TABLET ORAL
Qty: 100 TAB | Refills: 0 | Status: SHIPPED | OUTPATIENT
Start: 2021-03-22 | End: 2021-04-21 | Stop reason: SDUPTHER

## 2021-03-22 RX ORDER — DEXTROAMPHETAMINE SACCHARATE, AMPHETAMINE ASPARTATE, DEXTROAMPHETAMINE SULFATE AND AMPHETAMINE SULFATE 2.5; 2.5; 2.5; 2.5 MG/1; MG/1; MG/1; MG/1
TABLET ORAL
Qty: 120 TAB | Refills: 0 | Status: SHIPPED | OUTPATIENT
Start: 2021-03-22 | End: 2021-04-21 | Stop reason: SDUPTHER

## 2021-04-21 DIAGNOSIS — F11.90 OPIATE USE: ICD-10-CM

## 2021-04-21 DIAGNOSIS — M54.50 SEVERE LOW BACK PAIN: ICD-10-CM

## 2021-04-21 DIAGNOSIS — M50.30 DDD (DEGENERATIVE DISC DISEASE), CERVICAL: ICD-10-CM

## 2021-04-21 DIAGNOSIS — F98.8 ADD (ATTENTION DEFICIT DISORDER) WITHOUT HYPERACTIVITY: Chronic | ICD-10-CM

## 2021-04-21 DIAGNOSIS — M47.816 FACET ARTHRITIS OF LUMBAR REGION: ICD-10-CM

## 2021-04-21 DIAGNOSIS — Z79.899 MEDICATION MANAGEMENT: ICD-10-CM

## 2021-04-21 RX ORDER — DEXTROAMPHETAMINE SACCHARATE, AMPHETAMINE ASPARTATE, DEXTROAMPHETAMINE SULFATE AND AMPHETAMINE SULFATE 2.5; 2.5; 2.5; 2.5 MG/1; MG/1; MG/1; MG/1
TABLET ORAL
Qty: 120 TAB | Refills: 0 | Status: SHIPPED | OUTPATIENT
Start: 2021-04-21 | End: 2021-05-21 | Stop reason: SDUPTHER

## 2021-04-21 RX ORDER — OXYCODONE AND ACETAMINOPHEN 5; 325 MG/1; MG/1
1-2 TABLET ORAL
Qty: 100 TAB | Refills: 0 | Status: SHIPPED | OUTPATIENT
Start: 2021-04-21 | End: 2021-05-21 | Stop reason: SDUPTHER

## 2021-04-23 DIAGNOSIS — F31.32 BIPOLAR 1 DISORDER, DEPRESSED, MODERATE (HCC): ICD-10-CM

## 2021-04-23 RX ORDER — ESCITALOPRAM OXALATE 20 MG/1
20 TABLET ORAL DAILY
Qty: 90 TAB | Refills: 1 | Status: SHIPPED | OUTPATIENT
Start: 2021-04-23 | End: 2021-12-15

## 2021-04-23 NOTE — TELEPHONE ENCOUNTER
Last visit: 2/5/21(virtual visit)  Next visit: 5/3/21  Previous refill 9/12/20(90+1R)    Requested Prescriptions     Pending Prescriptions Disp Refills    escitalopram oxalate (LEXAPRO) 20 mg tablet 90 Tab 1     Sig: Take 1 Tab by mouth daily.

## 2021-04-27 DIAGNOSIS — F51.04 PSYCHOPHYSIOLOGICAL INSOMNIA: ICD-10-CM

## 2021-04-28 RX ORDER — ZOLPIDEM TARTRATE 10 MG/1
TABLET ORAL
Qty: 60 TAB | Refills: 2 | Status: SHIPPED | OUTPATIENT
Start: 2021-04-28 | End: 2021-07-21 | Stop reason: SDUPTHER

## 2021-05-18 DIAGNOSIS — F41.8 DEPRESSION WITH ANXIETY: ICD-10-CM

## 2021-05-18 RX ORDER — ALPRAZOLAM 0.25 MG/1
TABLET ORAL
Qty: 60 TAB | Refills: 1 | Status: SHIPPED | OUTPATIENT
Start: 2021-05-24 | End: 2021-07-14

## 2021-05-21 DIAGNOSIS — M50.30 DDD (DEGENERATIVE DISC DISEASE), CERVICAL: ICD-10-CM

## 2021-05-21 DIAGNOSIS — F11.90 OPIATE USE: ICD-10-CM

## 2021-05-21 DIAGNOSIS — Z79.899 MEDICATION MANAGEMENT: ICD-10-CM

## 2021-05-21 DIAGNOSIS — F98.8 ADD (ATTENTION DEFICIT DISORDER) WITHOUT HYPERACTIVITY: Chronic | ICD-10-CM

## 2021-05-21 DIAGNOSIS — M54.50 SEVERE LOW BACK PAIN: ICD-10-CM

## 2021-05-21 DIAGNOSIS — M47.816 FACET ARTHRITIS OF LUMBAR REGION: ICD-10-CM

## 2021-05-22 RX ORDER — DEXTROAMPHETAMINE SACCHARATE, AMPHETAMINE ASPARTATE, DEXTROAMPHETAMINE SULFATE AND AMPHETAMINE SULFATE 2.5; 2.5; 2.5; 2.5 MG/1; MG/1; MG/1; MG/1
TABLET ORAL
Qty: 120 TABLET | Refills: 0 | Status: SHIPPED | OUTPATIENT
Start: 2021-05-22 | End: 2021-06-21 | Stop reason: SDUPTHER

## 2021-05-22 RX ORDER — OXYCODONE AND ACETAMINOPHEN 5; 325 MG/1; MG/1
1-2 TABLET ORAL
Qty: 50 TABLET | Refills: 0 | Status: SHIPPED | OUTPATIENT
Start: 2021-05-22 | End: 2021-06-04 | Stop reason: CLARIF

## 2021-05-23 ENCOUNTER — PATIENT MESSAGE (OUTPATIENT)
Dept: FAMILY MEDICINE CLINIC | Age: 46
End: 2021-05-23

## 2021-05-23 DIAGNOSIS — M54.50 SEVERE LOW BACK PAIN: ICD-10-CM

## 2021-05-23 DIAGNOSIS — F11.90 OPIATE USE: ICD-10-CM

## 2021-05-23 DIAGNOSIS — Z79.899 MEDICATION MANAGEMENT: ICD-10-CM

## 2021-05-23 DIAGNOSIS — M50.30 DDD (DEGENERATIVE DISC DISEASE), CERVICAL: ICD-10-CM

## 2021-05-23 DIAGNOSIS — M47.816 FACET ARTHRITIS OF LUMBAR REGION: ICD-10-CM

## 2021-06-03 DIAGNOSIS — M50.30 DDD (DEGENERATIVE DISC DISEASE), CERVICAL: ICD-10-CM

## 2021-06-03 DIAGNOSIS — M54.50 SEVERE LOW BACK PAIN: ICD-10-CM

## 2021-06-03 DIAGNOSIS — F11.90 OPIATE USE: ICD-10-CM

## 2021-06-03 DIAGNOSIS — M47.816 FACET ARTHRITIS OF LUMBAR REGION: ICD-10-CM

## 2021-06-03 DIAGNOSIS — Z79.899 MEDICATION MANAGEMENT: ICD-10-CM

## 2021-06-03 RX ORDER — OXYCODONE AND ACETAMINOPHEN 5; 325 MG/1; MG/1
1-2 TABLET ORAL
Qty: 100 TABLET | Refills: 0 | OUTPATIENT
Start: 2021-06-03 | End: 2021-07-03

## 2021-06-03 NOTE — TELEPHONE ENCOUNTER
----- Message from Rere Kent sent at 6/2/2021 11:10 AM EDT -----  Regarding: Prescription Question  Contact: [phone]  Laz Melissa,     I know you and Dr. Cal Nugent were out of the office; I was wondering if you got my message about my prescription?     Ky Garcia

## 2021-06-04 RX ORDER — OXYCODONE AND ACETAMINOPHEN 5; 325 MG/1; MG/1
1-2 TABLET ORAL
Qty: 100 TABLET | Refills: 0 | Status: SHIPPED | OUTPATIENT
Start: 2021-06-04 | End: 2021-06-21 | Stop reason: SDUPTHER

## 2021-06-04 NOTE — TELEPHONE ENCOUNTER
From: Jose Nowak  To: Amparo Wheeler MD  Sent: 5/23/2021 10:55 AM EDT  Subject: Prescription Question    Good Morning Belén,     I know youre not in the office today but it looks like another doctor must have approved my refill request.     I picked them up today and apparently my Percocet is for only 48 when Dr. Juana Nasciemnto changed it to 100, except for a lesser mg. Can you convey this message to Dr. Juana Nascimento please and have it corrected.      Thanks,     Margarito Flores

## 2021-06-14 ENCOUNTER — TELEPHONE (OUTPATIENT)
Dept: FAMILY MEDICINE CLINIC | Age: 46
End: 2021-06-14

## 2021-06-14 NOTE — TELEPHONE ENCOUNTER
Mom has cancer so she has to go see her in [place] frequently   Wanted to know if she can get in sooner  No appt open

## 2021-06-14 NOTE — TELEPHONE ENCOUNTER
----- Message from Roxanna Suggs sent at 6/14/2021  7:01 AM EDT -----  Regarding: Non-Urgent Medical Question  Contact: [phone]  Good Morning Belén,     I hope you had a good weekend! Can you please give me a call when you get in this morning?     Sincerely,    Marisol Boateng

## 2021-06-21 ENCOUNTER — OFFICE VISIT (OUTPATIENT)
Dept: FAMILY MEDICINE CLINIC | Age: 46
End: 2021-06-21
Payer: COMMERCIAL

## 2021-06-21 VITALS
HEART RATE: 86 BPM | OXYGEN SATURATION: 97 % | HEIGHT: 65 IN | SYSTOLIC BLOOD PRESSURE: 132 MMHG | BODY MASS INDEX: 35.49 KG/M2 | WEIGHT: 213 LBS | DIASTOLIC BLOOD PRESSURE: 76 MMHG | RESPIRATION RATE: 15 BRPM | TEMPERATURE: 98.6 F

## 2021-06-21 DIAGNOSIS — M47.816 FACET ARTHRITIS OF LUMBAR REGION: ICD-10-CM

## 2021-06-21 DIAGNOSIS — F42.2 MIXED OBSESSIONAL THOUGHTS AND ACTS: ICD-10-CM

## 2021-06-21 DIAGNOSIS — F17.210 CIGARETTE SMOKER MOTIVATED TO QUIT: ICD-10-CM

## 2021-06-21 DIAGNOSIS — M50.30 DDD (DEGENERATIVE DISC DISEASE), CERVICAL: ICD-10-CM

## 2021-06-21 DIAGNOSIS — F51.04 PSYCHOPHYSIOLOGICAL INSOMNIA: ICD-10-CM

## 2021-06-21 DIAGNOSIS — F31.32 BIPOLAR 1 DISORDER, DEPRESSED, MODERATE (HCC): Primary | ICD-10-CM

## 2021-06-21 DIAGNOSIS — Z79.899 LONG-TERM CURRENT USE OF LITHIUM: ICD-10-CM

## 2021-06-21 DIAGNOSIS — F11.90 OPIATE USE: ICD-10-CM

## 2021-06-21 DIAGNOSIS — F98.8 ADD (ATTENTION DEFICIT DISORDER) WITHOUT HYPERACTIVITY: Chronic | ICD-10-CM

## 2021-06-21 DIAGNOSIS — Z79.899 MEDICATION MANAGEMENT: ICD-10-CM

## 2021-06-21 DIAGNOSIS — Z86.69 HISTORY OF MIGRAINE HEADACHES: ICD-10-CM

## 2021-06-21 DIAGNOSIS — F41.8 DEPRESSION WITH ANXIETY: ICD-10-CM

## 2021-06-21 DIAGNOSIS — M54.50 SEVERE LOW BACK PAIN: ICD-10-CM

## 2021-06-21 PROCEDURE — 99214 OFFICE O/P EST MOD 30 MIN: CPT | Performed by: FAMILY MEDICINE

## 2021-06-21 RX ORDER — DEXTROAMPHETAMINE SACCHARATE, AMPHETAMINE ASPARTATE, DEXTROAMPHETAMINE SULFATE AND AMPHETAMINE SULFATE 2.5; 2.5; 2.5; 2.5 MG/1; MG/1; MG/1; MG/1
TABLET ORAL
Qty: 120 TABLET | Refills: 0 | Status: SHIPPED | OUTPATIENT
Start: 2021-07-21 | End: 2021-09-20 | Stop reason: SDUPTHER

## 2021-06-21 RX ORDER — OXYCODONE AND ACETAMINOPHEN 5; 325 MG/1; MG/1
1-2 TABLET ORAL
Qty: 100 TABLET | Refills: 0 | Status: SHIPPED | OUTPATIENT
Start: 2021-09-02 | End: 2021-09-20 | Stop reason: SDUPTHER

## 2021-06-21 RX ORDER — OXYCODONE AND ACETAMINOPHEN 5; 325 MG/1; MG/1
1-2 TABLET ORAL
Qty: 100 TABLET | Refills: 0 | Status: SHIPPED | OUTPATIENT
Start: 2021-07-04 | End: 2021-09-20 | Stop reason: SDUPTHER

## 2021-06-21 RX ORDER — OXYCODONE AND ACETAMINOPHEN 5; 325 MG/1; MG/1
1-2 TABLET ORAL
Qty: 50 TABLET | Refills: 0 | Status: SHIPPED | OUTPATIENT
Start: 2021-08-03 | End: 2021-08-03 | Stop reason: SDUPTHER

## 2021-06-21 RX ORDER — DEXTROAMPHETAMINE SACCHARATE, AMPHETAMINE ASPARTATE, DEXTROAMPHETAMINE SULFATE AND AMPHETAMINE SULFATE 2.5; 2.5; 2.5; 2.5 MG/1; MG/1; MG/1; MG/1
TABLET ORAL
Qty: 120 TABLET | Refills: 0 | Status: SHIPPED | OUTPATIENT
Start: 2021-06-21 | End: 2021-09-20 | Stop reason: SDUPTHER

## 2021-06-21 RX ORDER — DEXTROAMPHETAMINE SACCHARATE, AMPHETAMINE ASPARTATE, DEXTROAMPHETAMINE SULFATE AND AMPHETAMINE SULFATE 2.5; 2.5; 2.5; 2.5 MG/1; MG/1; MG/1; MG/1
TABLET ORAL
Qty: 120 TABLET | Refills: 0 | Status: SHIPPED | OUTPATIENT
Start: 2021-08-20 | End: 2021-09-20 | Stop reason: SDUPTHER

## 2021-06-21 NOTE — LETTER
CONTROLLED SUBSTANCE MEDICATION AGREEMENT  Patient Name: Eunice Huston  Patient YOB: 1975     I understand, that controlled substance medications may be used to help better manage my symptoms and to improve my ability to function at home, work and in social settings. However, I also understand that these medications do have risks, which have been discussed with me, including possible development of physical or psychological dependence. I understand that successful treatment requires mutual trust and honesty between me and my provider. I understand and agree that following this Medication Agreement is necessary in continuing my provider-patient relationship and the success of my treatment plan. Explanation from my Provider: Benefits and Goals of Controlled Substance Medications: There are two potential goals for your treatment: (1) decreased pain and suffering (2) improved daily life functions. There are many possible treatments for your chronic condition(s). Alternatives such as physical therapy, yoga, massage, home daily exercise, meditation, relaxation techniques, injections, chiropractic manipulations, surgery, cognitive therapy, hypnosis and many medications that are not habit-forming may be used. Use of controlled substance medications may be helpful, but they are unlikely to resolve all symptoms or restore all function. Explanation from my Provider: Risks of Controlled Substance Medications:   Opioid pain medications: These medications can lead to problems such as addiction/dependence, sedation, lightheadedness/dizziness, memory issues, falls, constipation, nausea, or vomiting. They may also impair the ability to drive or operate machinery. Additionally, these medications may lower testosterone levels, leading to loss of bone strength, stamina and sex drive.   They may cause problems with breathing, sleep apnea and reduced coughing, which is especially dangerous for patients with lung disease. Overdose or dangerous interactions with alcohol and other medications may occur, leading to death. Hyperalgesia may develop, which means patients receiving opioids for the treatment of pain may become more sensitive to certain painful stimuli, and in some cases, experience pain from ordinarily non-painful stimuli. Women between the ages of 14-53 who could become pregnant should carefully weigh the risks and benefits of opioids with their physicians, as these medications increase the risk of pregnancy complications, including miscarriage,  delivery and stillbirth. It is also possible for babies to be born addicted to opioids. Opioid dependence withdrawal symptoms may include; feelings of uneasiness, increased pain, irritability, belly pain, diarrhea, sweats and goose-flesh. Testosterone replacement therapy:  Potential side effects include increased risk of stroke and heart attack, blood clots, increased blood pressure, increased cholesterol, enlarged prostate, sleep apnea, irritability/aggression and other mood disorders, and decreased fertility. Nati Barney (1975)             Page 1 of 4    Initials:_______    Benzodiazepines and non-benzodiazepine sleep medications: These medications can lead to problems such as addiction/dependence, sedation, fatigue, lightheadedness, dizziness, incoordination, falls, depression, hallucinations, and impaired judgment, memory and concentration. The ability to drive and operate machinery may also be affected. Abnormal sleep-related behaviors have been reported, including sleepwalking, driving, making telephone calls, eating, or having sex while not fully awake. These medications can suppress breathing and worsen sleep apnea, particularly when combined with alcohol or other sedating medications, potentially leading to death. Dependence withdrawal symptoms may include tremors, anxiety, hallucinations and seizures.    Stimulants:  Common adverse effects include addiction/dependence, increased blood pressure and heart rate, decreased appetite, nausea, involuntary weight loss, insomnia,  irritability, and headaches. These risks may increase when these medications are combined with other stimulants, such as caffeine pills or energy drinks, certain weight loss supplements and oral decongestants. Dependence withdrawal symptoms may include depressed mood, loss of interest, suicidal thoughts, anxiety, fatigue, appetite changes and agitation. I agree and understand that I and my prescriber have the following rights and responsibilities regarding my treatment plan:   1. MY RIGHTS:  To be informed of my treatment and medication plan. To be an active participant in my health and wellbeing. 2. MY RESPONSIBILITY AND UNDERSTANDING FOR USE OF MEDICATIONS   I will take medications at the dose and frequency as directed. For my safety, I will not increase or change how I take my medications without the recommendation of my healthcare provider.  I will actively participate in any program recommended by my provider which may improve function, including social, physical, psychological programs.  I will not take my medications with alcohol or other drugs not prescribed to me. I understand that drinking alcohol with my medications increases the chances of side effects, including reduced breathing rate and could lead to personal injury when operating machinery.  I understand that if I have a history of substance use disorders, including alcohol or other illicit drugs, that I may be at increased risk of addiction to my medications.  I agree to notify my provider immediately if I should become pregnant so that my treatment plan can be adjusted.    I agree and understand that I shall only receive controlled substance medications from the prescriber that signed this agreement unless there is written agreement among other prescribers of controlled substances outlining the responsibility of the medications being prescribed.  I understand that the if the controlled medication is not helping to achieve goals, the dosage may be tapered and no longer prescribed. 3. MY RESPONSIBILITY FOR COMMUNICATION / PRESCRIPTION RENEWALS   I agree that all controlled substance medications that I take will be prescribed only by my provider. If another healthcare provider prescribes me medication in an emergency, I will notify my provider within seventy-two (72) hours. Griffin Maldonado (1975)             Page 2 of 4    Initials:_______  [employer] I will arrange for refills at the prescribed interval ONLY during regular office hours. I will not ask for refills earlier than agreed, after-hours, on holidays or weekends. Refills may take up to 72 hours for processing and prescriptions to reach the pharmacy.  I will inform my other health care providers that I am taking these medications and of the existence of this [id number]. In the event of an emergency, I will provide the same information to the emergency department prescribers.  I will keep my provider updated on the pharmacy I am using for controlled medication prescription filling. 4. MY RESPONSIBILITY FOR PROTECTING MEDICATIONS   I will protect my prescriptions and medications. I understand that lost or misplaced prescriptions will not be replaced.  I will keep medications only for my own use and will not share them with others. I will keep all medications away from children.  I agree that if my medications are adjusted or discontinued, I will properly dispose of any remaining medications. I understand that I will be required to dispose of any remaining controlled medications as, directed by my prescriber, prior to being provided with any prescriptions for other controlled medications.   Medication drop box locations can be found at: [email or web address]  5. MY RESPONSIBILITY WITH ILLEGAL DRUGS    I will not use illegal or street drugs or another person's prescription medications not prescribed to me.  If there are identified addiction type symptoms, then referral to a program may be provided by my provider and I agree to follow through with this recommendation. 6. MY RESPONSIBILITY FOR COOPERATION WITH INVESTIGATIONS   I understand that my provider will comply with any applicable law and may discuss my use and/or possible misuse/abuse of controlled substances and alcohol, as appropriate, with any health care provider involved in my care, pharmacist, or legal authority.  I authorize my provider and pharmacy to cooperate fully with law enforcement agencies (as permitted by law) in the investigation of any possible misuse, sale, or other diversion of my controlled substances.  I agree to waive any applicable privilege or right of privacy or confidentiality with respect to these authorizations. 7. PROVIDERS RIGHT TO MONITOR FOR SAFETY: PRESCRIPTION MONITORING / DRUG TESTING   I consent to drug/toxicology screening and will submit to a drug screen upon my providers request to assure I am only taking the prescribed drugs for my safety monitoring. I understand that a drug screen is a laboratory test in which a sample of my urine, blood or saliva is checked to see what drugs I have been taking. This may entail an observed urine specimen, which means that a nurse or other health care provider may watch me provide urine, and I will cooperate if I am asked to provide an observed specimen. Devante Covarrubias (1975)             Page 3 of 4    Initials:_______  [employer] I understand that my provider will check a copy of my State Prescription Monitoring Program () Report in order to safely prescribe medications.      Pill Counts: I consent to pill counts when requested. I may be asked to bring all my prescribed controlled substance medications, in their original bottles, to all of my scheduled appointments. In addition, my provider may ask me to come to the practice at any time for a random pill count. 8. TERMINATION OF THIS AGREEMENT   For my safety, my prescriber has the right to stop prescribing controlled substance medications and may end this agreement.  Conditions that may result in termination of this agreement:  a. I do not show any improvement in pain, or my activity has not improved. b. I develop rapid tolerance or loss of improvement, as described in my treatment plan.  c. I develop significant side effects from the medication. d. My behavior is not consistent with the responsibilities outlined above, thereby causing safety concerns to continue prescribing controlled substance medications. e. I fail to follow the terms of this agreement. f. Other:____________________________     UNDERSTANDING THIS MEDICATION AGREEMENT:    I have read the above and have had all my questions answered. For chronic disease management, I know that my symptoms can be managed with many types of treatments. A chronic medication trial may be part of my treatment, but I must be an active participant in my care. Medication therapy is only one part of my symptom management plan. In some cases, there may be limited scientific evidence to support the chronic use of certain medications to improve symptoms and daily function. Furthermore, in certain circumstances, there may be scientific information that suggests that the use of chronic controlled substances may worsen my symptoms and increase my risk of unintentional death directly related to this medication therapy. I know that if my provider feels my risk from controlled medications is greater than my benefit, I will have my controlled substance medication(s) compassionately lowered or removed altogether. I further agree to allow this office to contact my HIPAA contact if there are concerns about my safety and use of the controlled medications. I have agreed to use the prescribed controlled substance medications to me as instructed by my provider and as stated in this Medication Agreement. My initial on each page and my signature below shows that I have read each page and I have had the opportunity to ask questions with answers provided by my provider.       Patient Name (Printed): _____________________________________    Patient Signature:  ______________________   Date: _____________      Prescriber Name (Printed): ___________________________________    Prescriber Signature: _____________________  Date: _____________     Gregorio Carbone (1975)             Page 4 of 4

## 2021-06-21 NOTE — PROGRESS NOTES
Chief Complaint   Patient presents with    Attention Deficit Disorder    Headache     refills    Anxiety   1. Have you been to the ER, urgent care clinic since your last visit? Hospitalized since your last visit?no    2. Have you seen or consulted any other health care providers outside of the [address] [place] [address] since your last visit? Include any pap smears or colon screening.  No

## 2021-06-21 NOTE — PROGRESS NOTES
HISTORY OF PRESENT ILLNESS  HPI  Laura Dueñas a 89 O. o. female with a history of migraine with aura, bilateral CTS, cervical DDD, facet arthritis of lumbar region, ADD, depression with anxiety, insomnia, OCD and bipolar disorder, who presents to the office today for f/u of these health problems. Pt says her mom has recently developed terminal lung CA and is receiving treatment. Pt is taking 1 tablet of her lithium 300 mg in the morning and 2 tablets at night. She rarely misses any doses. She notes that she does not leave her house due to anxiety of the COVID-19. She also has fear of the Covid vaccine. Pt denies unusual SOB, chest pain, and any recent ER visits or hospitalizations. 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 Liseth Fuchs 8/25/2016    Asthma     last attack 2 months ago    Bilateral carpal tunnel syndrome- R>>L 9/25/2016    Bipolar disorder with moderate depression ([facility] [address] 6/28/2017    Depression with anxiety 3/2/2010    Facet arthritis of lumbar region 12/3/2017    History of migraine headaches 11/25/2018    Insomnia     Lithium use 6/28/2017    Migraine 02/20/2010    Psychiatric disorder     Depression, anxiety    Psychophysiological insomnia 2/23/2016     Past Surgical History:   Procedure Laterality Date    HX APPENDECTOMY  1996    HX BREAST REDUCTION  2002    HX ORTHOPAEDIC  2003    cervical disc    HX ORTHOPAEDIC      second right hand digit fx with pins index     Current Outpatient Medications on File Prior to Visit   Medication Sig Dispense Refill    ALPRAZolam (XANAX) 0.25 mg tablet TAKE 1/2 - 1 TABLET BY MOUTH TWICE DAILY AS NEEDED FOR ANXIETY 60 Tab 1    zolpidem (AMBIEN) 10 mg tablet TAKE 2 TABLETS BY MOUTH EVERY DAY AT BEDTIME AS NEEDED FOR INSOMNIA 60 Tab 2    escitalopram oxalate (LEXAPRO) 20 mg tablet Take 1 Tab by mouth daily.  90 Tab 1    lithium carbonate 300 mg capsule TAKE 1 CAPSULE BY MOUTH EVERY MORNING AND 2 CAPS IN THE EVENINGS 270 Cap 1    lidocaine (LIDODERM) 5 % APPLY PATCH TO THE AFFECTED AREA FOR 12 HOURS A DAY AND REMOVE FOR 12 HOURS A DAY. 15 Patch 3    [DISCONTINUED] oxyCODONE-acetaminophen (PERCOCET) 5-325 mg per tablet Take 1-2 Tablets by mouth every eight (8) hours as needed for Pain for up to 30 days. Max Daily Amount: 6 Tablets. 100 Tablet 0    [DISCONTINUED] dextroamphetamine-amphetamine (ADDERALL) 10 mg tablet TAKE 2 TABS PO Q AM AND 1-2 TABS IN AFTERNOON 120 Tablet 0    [DISCONTINUED] dextroamphetamine-amphetamine (ADDERALL) 10 mg tablet TAKE 2 TABS PO Q AM AND 1-2 TABS IN AFTERNOON 120 Tab 0    [DISCONTINUED] diclofenac EC (VOLTAREN) 75 mg EC tablet Take 1 Tab by mouth two (2) times a day. (Patient not taking: Reported on 6/21/2021) 60 Tab 1    naloxone (NARCAN) 4 mg/actuation nasal spray Use 1 spray intranasally, then discard. Repeat with new spray every 2 min as needed for opioid overdose symptoms, alternating nostrils. 1 Each 3     No current facility-administered medications on file prior to visit.      Allergies   Allergen Reactions    Pcn [Penicillins] Other (comments)     As a child; tolerates Keflex       Family History   Problem Relation Age of Onset    Diabetes Father     Hypertension Father     Heart Attack Father     High Cholesterol Father     Diabetes Mother     High Cholesterol Mother      Social History     Socioeconomic History    Marital status:      Spouse name: Not on file    Number of children: Not on file    Years of education: Not on file    Highest education level: Not on file   Tobacco Use    Smoking status: Current Every Day Smoker     Packs/day: 1.50     Years: 18.00     Pack years: 27.00     Types: Cigarettes    Smokeless tobacco: Never Used   Vaping Use    Vaping Use: Never used   Substance and Sexual Activity    Alcohol use: Yes     Comment: occasionally    Drug use: No    Sexual activity: Yes Partners: Male     Birth control/protection: Condom     Social Determinants of Health     Financial Resource Strain:     Difficulty of Paying Living Expenses:    Food Insecurity:     Worried About Running Out of Food in the Last Year:     [address] in the Last Year:    Transportation Needs:     Lack of Transportation (Medical):  Lack of Transportation (Non-Medical):    Physical Activity:     Days of Exercise per Week:     Minutes of Exercise per Session:    Stress:     Feeling of Stress :    Social Connections:     Frequency of Communication with Friends and Family:     Frequency of Social Gatherings with Friends and Family:     Attends Hindu Services:     Active Member of Clubs or Organizations:     Attends Club or Organization Meetings:     Marital Status:              Review of Systems   Constitutional: Negative for chills, diaphoresis, fever, malaise/fatigue and weight loss. Eyes: Negative for blurred vision, double vision, pain and redness. Respiratory: Negative for cough, shortness of breath and wheezing. Cardiovascular: Negative for chest pain, palpitations, orthopnea, claudication, leg swelling and PND. Skin: Negative for itching and rash. Neurological: Negative for dizziness, tingling, tremors, sensory change, speech change, focal weakness, seizures, loss of consciousness, weakness and headaches.      Results for orders placed or performed during the hospital encounter of 12/09/20   CBC WITH AUTOMATED DIFF   Result Value Ref Range    WBC 11.8 (H) 3.6 - 11.0 K/uL    RBC 4.23 3.80 - 5.20 M/uL    HGB 13.0 11.5 - 16.0 g/dL    HCT 40.3 35.0 - 47.0 %    MCV 95.3 80.0 - 99.0 FL    MCH 30.7 26.0 - 34.0 PG    MCHC 32.3 30.0 - 36.5 g/dL    RDW 12.8 11.5 - 14.5 %    PLATELET 625 884 - 842 K/uL    MPV 10.2 8.9 - 12.9 FL    NRBC 0.0 0  WBC    ABSOLUTE NRBC 0.00 0.00 - 0.01 K/uL    NEUTROPHILS 71 32 - 75 %    LYMPHOCYTES 22 12 - 49 %    MONOCYTES 6 5 - 13 %    EOSINOPHILS 1 0 - 7 %    BASOPHILS 0 0 - 1 %    IMMATURE GRANULOCYTES 0 0.0 - 0.5 %    ABS. NEUTROPHILS 8.3 (H) 1.8 - 8.0 K/UL    ABS. LYMPHOCYTES 2.6 0.8 - 3.5 K/UL    ABS. MONOCYTES 0.7 0.0 - 1.0 K/UL    ABS. EOSINOPHILS 0.1 0.0 - 0.4 K/UL    ABS. BASOPHILS 0.1 0.0 - 0.1 K/UL    ABS. IMM. GRANS. 0.1 (H) 0.00 - 0.04 K/UL    DF AUTOMATED     METABOLIC PANEL, BASIC   Result Value Ref Range    Sodium 137 136 - 145 mmol/L    Potassium 3.8 3.5 - 5.1 mmol/L    Chloride 106 97 - 108 mmol/L    CO2 28 21 - 32 mmol/L    Anion gap 3 (L) 5 - 15 mmol/L    Glucose 103 (H) 65 - 100 mg/dL    BUN 9 6 - 20 MG/DL    Creatinine 0.62 0.55 - 1.02 MG/DL    BUN/Creatinine ratio 15 12 - 20      GFR est AA >60 >60 ml/min/1.73m2    GFR est non-AA >60 >60 ml/min/1.73m2    Calcium 8.9 8.5 - 10.1 MG/DL           Physical Exam  Visit Vitals  /76   Pulse 86   Temp 98.6 °F (37 °C)   Resp 15   Ht 5' 4.5\" (1.638 m)   Wt 213 lb (96.6 kg)   SpO2 97%   BMI 36.00 kg/m²         Head: Normocephalic, without obvious abnormality, atraumatic  Eyes: conjunctivae/corneas clear. PERRL, EOM's intact. Neck: supple, symmetrical, trachea midline, no adenopathy, thyroid: not enlarged, symmetric, no tenderness/mass/nodules, no carotid bruit and no JVD  Lungs: clear to auscultation bilaterally  Heart: regular rate and rhythm, S1, S2 normal, no murmur, click, rub or gallop  Extremities: extremities normal, atraumatic, no cyanosis or edema  Pulses: 2+ and symmetric  Lymph nodes: Cervical, supraclavicular, and axillary nodes normal.  Neurologic: Grossly normal        ASSESSMENT and PLAN    ICD-10-CM ICD-9-CM    1. Bipolar 1 disorder, depressed, moderate (HCC)  F31.32 296.52 LITHIUM   2. Severe low back pain  M54.5 724.2 oxyCODONE-acetaminophen (PERCOCET) 5-325 mg per tablet      oxyCODONE-acetaminophen (PERCOCET) 5-325 mg per tablet      oxyCODONE-acetaminophen (PERCOCET) 5-325 mg per tablet   3.  Medication management  Z79.899 V58.69 oxyCODONE-acetaminophen (PERCOCET) 5-325 mg per tablet      oxyCODONE-acetaminophen (PERCOCET) 5-325 mg per tablet      oxyCODONE-acetaminophen (PERCOCET) 5-325 mg per tablet      CBC W/O DIFF   4. Opiate use  F11.90 305.50 oxyCODONE-acetaminophen (PERCOCET) 5-325 mg per tablet      oxyCODONE-acetaminophen (PERCOCET) 5-325 mg per tablet      oxyCODONE-acetaminophen (PERCOCET) 5-325 mg per tablet      10-DRUG SCREEN W/CONF      METABOLIC PANEL, COMPREHENSIVE   5. DDD (degenerative disc disease), cervical  M50.30 722.4 oxyCODONE-acetaminophen (PERCOCET) 5-325 mg per tablet      oxyCODONE-acetaminophen (PERCOCET) 5-325 mg per tablet      oxyCODONE-acetaminophen (PERCOCET) 5-325 mg per tablet   6. Facet arthritis of lumbar region  M47.816 721.3 oxyCODONE-acetaminophen (PERCOCET) 5-325 mg per tablet      oxyCODONE-acetaminophen (PERCOCET) 5-325 mg per tablet      oxyCODONE-acetaminophen (PERCOCET) 5-325 mg per tablet   7. 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 Veena Bella  F98.8 314.00 dextroamphetamine-amphetamine (ADDERALL) 10 mg tablet      dextroamphetamine-amphetamine (ADDERALL) 10 mg tablet      dextroamphetamine-amphetamine (ADDERALL) 10 mg tablet      10-DRUG SCREEN W/CONF   8. Psychophysiological insomnia  F51.04 307.42    9. Depression with anxiety  F41.8 300.4    10. Mixed obsessional thoughts and acts  F42.2 300.3    11. History of migraine headaches  Z86.69 V12.49    12. Cigarette smoker motivated to quit  F17.210 305.1    13. Long-term current use of lithium  Z79.899 V58.69 CBC W/O DIFF      METABOLIC PANEL, COMPREHENSIVE     Diagnoses and all orders for this visit:    1. Bipolar 1 disorder, depressed, moderate ([address]  -     LITHIUM; Future    2. Severe low back pain  -     oxyCODONE-acetaminophen (PERCOCET) 5-325 mg per tablet; Take 1-2 Tablets by mouth every eight (8) hours as needed for Pain for up to 30 days. Max Daily Amount: 6 Tablets. -     oxyCODONE-acetaminophen (PERCOCET) 5-325 mg per tablet;  Take 1-2 Tablets by mouth every eight (8) hours as needed for Pain for up to 30 days. Max Daily Amount: 6 Tablets. -     oxyCODONE-acetaminophen (PERCOCET) 5-325 mg per tablet; Take 1-2 Tablets by mouth every eight (8) hours as needed for Pain for up to 30 days. Max Daily Amount: 6 Tablets. 3. Medication management  -     oxyCODONE-acetaminophen (PERCOCET) 5-325 mg per tablet; Take 1-2 Tablets by mouth every eight (8) hours as needed for Pain for up to 30 days. Max Daily Amount: 6 Tablets. -     oxyCODONE-acetaminophen (PERCOCET) 5-325 mg per tablet; Take 1-2 Tablets by mouth every eight (8) hours as needed for Pain for up to 30 days. Max Daily Amount: 6 Tablets. -     oxyCODONE-acetaminophen (PERCOCET) 5-325 mg per tablet; Take 1-2 Tablets by mouth every eight (8) hours as needed for Pain for up to 30 days. Max Daily Amount: 6 Tablets. -     CBC W/O DIFF; Future    4. Opiate use  -     oxyCODONE-acetaminophen (PERCOCET) 5-325 mg per tablet; Take 1-2 Tablets by mouth every eight (8) hours as needed for Pain for up to 30 days. Max Daily Amount: 6 Tablets. -     oxyCODONE-acetaminophen (PERCOCET) 5-325 mg per tablet; Take 1-2 Tablets by mouth every eight (8) hours as needed for Pain for up to 30 days. Max Daily Amount: 6 Tablets. -     oxyCODONE-acetaminophen (PERCOCET) 5-325 mg per tablet; Take 1-2 Tablets by mouth every eight (8) hours as needed for Pain for up to 30 days. Max Daily Amount: 6 Tablets. -     10-DRUG SCREEN W/CONF; Future  -     METABOLIC PANEL, COMPREHENSIVE; Future    5. DDD (degenerative disc disease), cervical  -     oxyCODONE-acetaminophen (PERCOCET) 5-325 mg per tablet; Take 1-2 Tablets by mouth every eight (8) hours as needed for Pain for up to 30 days. Max Daily Amount: 6 Tablets. -     oxyCODONE-acetaminophen (PERCOCET) 5-325 mg per tablet; Take 1-2 Tablets by mouth every eight (8) hours as needed for Pain for up to 30 days. Max Daily Amount: 6 Tablets.   -     oxyCODONE-acetaminophen (PERCOCET) 5-325 mg per tablet; Take 1-2 Tablets by mouth every eight (8) hours as needed for Pain for up to 30 days. Max Daily Amount: 6 Tablets. 6. Facet arthritis of lumbar region  -     oxyCODONE-acetaminophen (PERCOCET) 5-325 mg per tablet; Take 1-2 Tablets by mouth every eight (8) hours as needed for Pain for up to 30 days. Max Daily Amount: 6 Tablets. -     oxyCODONE-acetaminophen (PERCOCET) 5-325 mg per tablet; Take 1-2 Tablets by mouth every eight (8) hours as needed for Pain for up to 30 days. Max Daily Amount: 6 Tablets. -     oxyCODONE-acetaminophen (PERCOCET) 5-325 mg per tablet; Take 1-2 Tablets by mouth every eight (8) hours as needed for Pain for up to 30 days. Max Daily Amount: 6 Tablets. 7. 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 Severiano Rocha  -     dextroamphetamine-amphetamine (ADDERALL) 10 mg tablet; TAKE 2 TABS PO Q AM AND 1-2 TABS IN AFTERNOON  -     dextroamphetamine-amphetamine (ADDERALL) 10 mg tablet; TAKE 2 TABS PO Q AM AND 1-2 TABS IN AFTERNOON  -     dextroamphetamine-amphetamine (ADDERALL) 10 mg tablet; TAKE 2 TABS PO Q AM AND 1-2 TABS IN AFTERNOON  -     10-DRUG SCREEN W/CONF; Future    8. Psychophysiological insomnia    9. Depression with anxiety    10. Mixed obsessional thoughts and acts    11. History of migraine headaches    12. Cigarette smoker motivated to quit    13. Long-term current use of lithium  -     CBC W/O DIFF; Future  -     METABOLIC PANEL, COMPREHENSIVE; Future      Follow-up and Dispositions    · Return in about 3 months (around 9/21/2021) for ADD and chronic opioid use 3 month follow up, F/U anxiety, insomnia, osteoarthritis, low back pain. Follow-up and Disposition History        reviewed medications and side effects in detail  Please call my office if there are any questions- [phone]. Discussed expected course/resolution/complications of diagnosis in detail with patient.    Medication risks/benefits/costs/interactions/alternatives discussed with patient. Pt was given an after visit summary which includes diagnoses, current medications & vitals. Pt expressed understanding with the diagnosis and plan. Patient to call if no better in 3 -4 days and prn new problems. BMI is significantly elevated- in the obese range. I reviewed diet, exercise and weight control. Discussed weight control in detail, the importance of mainly decreased carbs, and for weight maintenance, exercise; discussed different diets and that it isn't as important to watch the type of foods as it is to decrease calorie intake no matter what type of diet you do, etc.       Total 30 minutes  re: Recommended a weekly \"heart check. \" I went into detail how to do this. Regular exercise is very important to your health; it helps mentally, physically, socially; it prevents injuries if done properly. Exercise, even as simple as walking 20-30 minutes daily has major benefits to your health even though your \"numbers\" are the same in the lab. See if you can add this into your daily regimen and after a few months it will become a regular habit-\"just something you do,\" like brushing your teeth. A combination of aerobic exercise and strengthening and stretching is felt to be the best for you, so this should be your ultimate goal.   This can be done in the privacy of your home or in a group setting as at the gym  Some prefer having a , others prefer to do exercise in groups or individually. Do what \"works\" for you. You need to make it simple and \"fun,\" or you most likely will not continue it. Also, discussed symptoms of concern that were noted today in the note above, treatment options( including doing nothing), when to follow up before recommended time frame. Also, answered all questions. She notes that she is dealing with a lot of stress due to her mom's terminal CA.  We talked about lung CA being in her family, that she continues to smoke, and the importance of stopping smoking. We will have her come back to do her lab work to check her lithium and electrolytes. Controlled substance agreement signed. She is due a urine drug screen which we will have her do as well. Long discussion about chronic benzodiazepine use, the risks of addiction, tolerance , overdose,etc. The national spotlight on this problem and the need for a controlled substance agreement in order to continue receiving these, etc.  Continue to do the non-medication things that reduce anxiety such as adequate sleep, avoiding high impact activities, but at the same time staying active and avoiding sedentary activity. In addition, the patient was counseled today on the potential risks of benzodiazepine use including but not limited to death if not taken as prescribed or if taken in conjunction with other sedative, hypnotic, or pain medications, and the need to refrain from any recreational drugs and/or alcohol. Long discussion about chronic narcotic use, the risks of addiction, tolerance , overdose,etc. The national spotlight on this problem and the need for a narcotic agreement in order to continue receiving these, etc.  Continue to do the non-medication things that reduce pain such as adequate sleep, avoiding high impact activities, but at the same time staying active and avoiding sedentary activity. In addition, the patient was counseled today on the potential risks of opiate use including but not limited to death if not taken as prescribed or if taken in conjunction with other sedative, hypnotic, or other pain medications, and the need to refrain from any recreational drugs and/or alcohol. Further, we discussed the rationale and potential benefits of an opiate support regimen for the management of chronic non-malignant pain conditions.     She has not increased her use of this medication , finds that she still needs it and has pain return when she tries to taper it. No apparent tolerance or any additional side effects from the medication either. Discussed her anxiety about leaving the house and it appears most of it is due to fear of the virus and fear of getting the vaccine. I reassured her that the vaccine is safe and that she should get it then she will not have to worry about the virus. All the data points to the vaccine to being safe. She did agree to get the vaccine here in the office if we are able to get it to her. We will let her know if that becomes a reality or not. This document was written by Emma Fairbanks, as dictated by Nelson Gagnon MD.  I have reviewed and agree with the above note and have made corrections where appropriate Clarke Galloway M.D.

## 2021-07-14 DIAGNOSIS — F41.8 DEPRESSION WITH ANXIETY: ICD-10-CM

## 2021-07-14 RX ORDER — ALPRAZOLAM 0.25 MG/1
TABLET ORAL
Qty: 60 TABLET | Refills: 1 | Status: SHIPPED | OUTPATIENT
Start: 2021-07-23 | End: 2021-09-10

## 2021-07-21 DIAGNOSIS — F51.04 PSYCHOPHYSIOLOGICAL INSOMNIA: ICD-10-CM

## 2021-07-22 RX ORDER — ZOLPIDEM TARTRATE 10 MG/1
TABLET ORAL
Qty: 60 TABLET | Refills: 2 | Status: SHIPPED | OUTPATIENT
Start: 2021-07-22 | End: 2021-10-11 | Stop reason: SDUPTHER

## 2021-08-03 DIAGNOSIS — F11.90 OPIATE USE: ICD-10-CM

## 2021-08-03 DIAGNOSIS — M54.50 SEVERE LOW BACK PAIN: ICD-10-CM

## 2021-08-03 DIAGNOSIS — M47.816 FACET ARTHRITIS OF LUMBAR REGION: ICD-10-CM

## 2021-08-03 DIAGNOSIS — Z79.899 MEDICATION MANAGEMENT: ICD-10-CM

## 2021-08-03 DIAGNOSIS — M50.30 DDD (DEGENERATIVE DISC DISEASE), CERVICAL: ICD-10-CM

## 2021-08-03 RX ORDER — OXYCODONE AND ACETAMINOPHEN 5; 325 MG/1; MG/1
1-2 TABLET ORAL
Qty: 100 TABLET | Refills: 0 | Status: SHIPPED | OUTPATIENT
Start: 2021-08-03 | End: 2021-09-20 | Stop reason: SDUPTHER

## 2021-08-03 NOTE — TELEPHONE ENCOUNTER
----- Message from Tristan Reynoso sent at 8/3/2021  8:12 AM EDT -----  Regarding: Prescription Question  Contact: [phone]  Santiago Vick,     When Dr. Jung Tyson sent over my refill for this month, it is only for 50 tablets and its should be 100 (Percocet). Can you have this corrected and resent to the Pharmacy before they fill it for the incorrect amount?      Thanks,   James Goyal

## 2021-08-03 NOTE — TELEPHONE ENCOUNTER
The Pt returning call from Nurse Melissa. Pt stated she need a correction made to her refill. Please call back when available, anytime is fine.

## 2021-08-11 ENCOUNTER — TELEPHONE (OUTPATIENT)
Dept: FAMILY MEDICINE CLINIC | Age: 46
End: 2021-08-11

## 2021-08-11 NOTE — TELEPHONE ENCOUNTER
Chief Complaint   Patient presents with    Prior Auth     oxyCODONE-Acetaminophen 5-325MG tablets:  PA Case: [id number], Status: Approved, Coverage Starts on: 8/11/2021 12:00:00 AM, Coverage Ends on: 2/7/2022 12:00:00 AM.     [facility] pharmacy was faxed approval notification at [phone] with confirmation received.   Dany Suggs LPN

## 2021-09-09 DIAGNOSIS — F11.90 OPIATE USE: ICD-10-CM

## 2021-09-09 DIAGNOSIS — M54.50 SEVERE LOW BACK PAIN: ICD-10-CM

## 2021-09-09 DIAGNOSIS — Z79.899 MEDICATION MANAGEMENT: ICD-10-CM

## 2021-09-09 DIAGNOSIS — M50.30 DDD (DEGENERATIVE DISC DISEASE), CERVICAL: ICD-10-CM

## 2021-09-09 DIAGNOSIS — M47.816 FACET ARTHRITIS OF LUMBAR REGION: ICD-10-CM

## 2021-09-09 DIAGNOSIS — F41.8 DEPRESSION WITH ANXIETY: ICD-10-CM

## 2021-09-09 DIAGNOSIS — F51.04 PSYCHOPHYSIOLOGICAL INSOMNIA: ICD-10-CM

## 2021-09-10 RX ORDER — ALPRAZOLAM 0.25 MG/1
TABLET ORAL
Qty: 60 TABLET | Refills: 1 | OUTPATIENT
Start: 2021-09-10

## 2021-09-10 RX ORDER — ZOLPIDEM TARTRATE 10 MG/1
TABLET ORAL
Qty: 60 TABLET | Refills: 2 | OUTPATIENT
Start: 2021-09-10

## 2021-09-10 RX ORDER — ALPRAZOLAM 0.25 MG/1
TABLET ORAL
Qty: 60 TABLET | Refills: 1 | Status: SHIPPED | OUTPATIENT
Start: 2021-09-10 | End: 2021-11-11

## 2021-09-10 RX ORDER — OXYCODONE AND ACETAMINOPHEN 5; 325 MG/1; MG/1
1-2 TABLET ORAL
Qty: 100 TABLET | Refills: 0 | OUTPATIENT
Start: 2021-09-10 | End: 2021-10-10

## 2021-09-20 ENCOUNTER — VIRTUAL VISIT (OUTPATIENT)
Dept: FAMILY MEDICINE CLINIC | Age: 46
End: 2021-09-20
Payer: COMMERCIAL

## 2021-09-20 DIAGNOSIS — Z79.899 MEDICATION MANAGEMENT: ICD-10-CM

## 2021-09-20 DIAGNOSIS — M50.30 DDD (DEGENERATIVE DISC DISEASE), CERVICAL: ICD-10-CM

## 2021-09-20 DIAGNOSIS — F11.90 OPIATE USE: ICD-10-CM

## 2021-09-20 DIAGNOSIS — F31.32 BIPOLAR 1 DISORDER, DEPRESSED, MODERATE (HCC): ICD-10-CM

## 2021-09-20 DIAGNOSIS — F41.8 DEPRESSION WITH ANXIETY: ICD-10-CM

## 2021-09-20 DIAGNOSIS — M47.816 FACET ARTHRITIS OF LUMBAR REGION: ICD-10-CM

## 2021-09-20 DIAGNOSIS — M54.50 SEVERE LOW BACK PAIN: Primary | ICD-10-CM

## 2021-09-20 DIAGNOSIS — F42.2 MIXED OBSESSIONAL THOUGHTS AND ACTS: ICD-10-CM

## 2021-09-20 DIAGNOSIS — F98.8 ADD (ATTENTION DEFICIT DISORDER) WITHOUT HYPERACTIVITY: Chronic | ICD-10-CM

## 2021-09-20 DIAGNOSIS — Z86.69 HISTORY OF MIGRAINE HEADACHES: ICD-10-CM

## 2021-09-20 DIAGNOSIS — F42.9 OBSESSIVE-COMPULSIVE DISORDER, UNSPECIFIED TYPE: ICD-10-CM

## 2021-09-20 DIAGNOSIS — M51.36 DDD (DEGENERATIVE DISC DISEASE), LUMBAR: ICD-10-CM

## 2021-09-20 PROCEDURE — 99214 OFFICE O/P EST MOD 30 MIN: CPT | Performed by: FAMILY MEDICINE

## 2021-09-20 NOTE — PROGRESS NOTES
Chief Complaint   Patient presents with    Pain (Chronic)    Attention Deficit Disorder    Skin Problem     lump on face wants to see derm   1. \"Have you been to the ER, urgent care clinic since your last visit? Hospitalized since your last visit? \" No    2. \"Have you seen or consulted any other health care providers outside of the [address] [place] [address] since your last visit? \" No     3. For patients over 45: Has the patient had a colonoscopy? No     If the patient is female:    4. For patients over 40: Has the patient had a mammogram? Yes,  satisfied with blue hyperlink    5. For patients over 21: Has the patient had a pap smear?  No

## 2021-09-20 NOTE — PROGRESS NOTES
HISTORY OF PRESENT ILLNESS  LISBETH Mcgrath a 39 E. o. female with a history of migraine with aura, bilateral CTS, cervical DDD, facet arthritis of lumbar region, ADD, depression with anxiety, insomnia, OCD and bipolar disorder. Pt is seen through virtual visit today c/o a lump on her face and for f/u of these health problems. Pt received her first dose of the Pfizer COVID-19 vaccine. She requests a referral to a dermatologist to evaluate twos lump she on her face, one on her right temple and another on her right cheek. She has had this lump for less than 5 years. Pt denies unusual SOB, chest pain, and any recent ER visits or hospitalizations. Lili Adjutant, who was evaluated through a synchronous (real-time) audio-video encounter, and/or her healthcare decision maker, is aware that it is a billable service, with coverage as determined by her insurance carrier. She provided verbal consent to proceed: Yes, and patient identification was verified. This visit was conducted pursuant to the emergency declaration under the [address] [place] [address] authority and the [employer]. A caregiver was present when appropriate. Ability to conduct physical exam was limited. The patient was located in a state where the provider was credentialed to provide care.      --Remigio Hanna on 9/30/2021 at 4:26 PM              Past Medical History:   Diagnosis Date    ADD (attention deficit disorder) without hyperactivity- neuropsych testing + ADD -Dr. Anju Garcia 8/25/2016    Asthma     last attack 2 months ago    Bilateral carpal tunnel syndrome- R>>L 9/25/2016    Bipolar disorder with moderate depression ([facility] [address] 6/28/2017    Depression with anxiety 3/2/2010    Facet arthritis of lumbar region 12/3/2017    History of migraine headaches 11/25/2018    Insomnia     Lithium use 6/28/2017    Migraine 02/20/2010    Psychiatric disorder     Depression, anxiety    Psychophysiological insomnia 2/23/2016     Past Surgical History:   Procedure Laterality Date    HX APPENDECTOMY  1996    HX BREAST REDUCTION  2002    HX ORTHOPAEDIC  2003    cervical disc    HX ORTHOPAEDIC      second right hand digit fx with pins index     Current Outpatient Medications on File Prior to Visit   Medication Sig Dispense Refill    ALPRAZolam (XANAX) 0.25 mg tablet TAKE 1/2 - 1 TABLET BY MOUTH TWICE DAILY AS NEEDED FOR ANXIETY 60 Tablet 1    zolpidem (AMBIEN) 10 mg tablet TAKE 2 TABLETS BY MOUTH EVERY DAY AT BEDTIME AS NEEDED FOR INSOMNIA 60 Tablet 2    escitalopram oxalate (LEXAPRO) 20 mg tablet Take 1 Tab by mouth daily. 90 Tab 1    lithium carbonate 300 mg capsule TAKE 1 CAPSULE BY MOUTH EVERY MORNING AND 2 CAPS IN THE EVENINGS 270 Cap 1    lidocaine (LIDODERM) 5 % APPLY PATCH TO THE AFFECTED AREA FOR 12 HOURS A DAY AND REMOVE FOR 12 HOURS A DAY. 15 Patch 3    naloxone (NARCAN) 4 mg/actuation nasal spray Use 1 spray intranasally, then discard. Repeat with new spray every 2 min as needed for opioid overdose symptoms, alternating nostrils. (Patient not taking: Reported on 9/20/2021) 1 Each 3     No current facility-administered medications on file prior to visit.      Allergies   Allergen Reactions    Pcn [Penicillins] Other (comments)     As a child; tolerates Keflex       Family History   Problem Relation Age of Onset    Diabetes Father     Hypertension Father     Heart Attack Father     High Cholesterol Father     Diabetes Mother     High Cholesterol Mother      Social History     Socioeconomic History    Marital status:      Spouse name: Not on file    Number of children: Not on file    Years of education: Not on file    Highest education level: Not on file   Tobacco Use    Smoking status: Current Every Day Smoker     Packs/day: 1.50     Years: 18.00     Pack years: 27.00     Types: Cigarettes    Smokeless tobacco: Never Used Vaping Use    Vaping Use: Never used   Substance and Sexual Activity    Alcohol use: Yes     Comment: occasionally    Drug use: No    Sexual activity: Yes     Partners: Male     Birth control/protection: Condom     Social Determinants of Health     Financial Resource Strain:     Difficulty of Paying Living Expenses:    Food Insecurity:     Worried About Running Out of Food in the Last Year:     [address] in the Last Year:    Transportation Needs:     Lack of Transportation (Medical):  Lack of Transportation (Non-Medical):    Physical Activity:     Days of Exercise per Week:     Minutes of Exercise per Session:    Stress:     Feeling of Stress :    Social Connections:     Frequency of Communication with Friends and Family:     Frequency of Social Gatherings with Friends and Family:     Attends Faith Services:     Active Member of Clubs or Organizations:     Attends Club or Organization Meetings:     Marital Status:              Review of Systems   Constitutional: Negative for chills, diaphoresis, fever, malaise/fatigue and weight loss. Eyes: Negative for blurred vision, double vision, pain and redness. Respiratory: Negative for cough, shortness of breath and wheezing. Cardiovascular: Negative for chest pain, palpitations, orthopnea, claudication, leg swelling and PND. Skin: Negative for itching and rash. Neurological: Negative for dizziness, tingling, tremors, sensory change, speech change, focal weakness, seizures, loss of consciousness, weakness and headaches.      Results for orders placed or performed during the hospital encounter of 12/09/20   CBC WITH AUTOMATED DIFF   Result Value Ref Range    WBC 11.8 (H) 3.6 - 11.0 K/uL    RBC 4.23 3.80 - 5.20 M/uL    HGB 13.0 11.5 - 16.0 g/dL    HCT 40.3 35.0 - 47.0 %    MCV 95.3 80.0 - 99.0 FL    MCH 30.7 26.0 - 34.0 PG    MCHC 32.3 30.0 - 36.5 g/dL    RDW 12.8 11.5 - 14.5 %    PLATELET 795 220 - 874 K/uL    MPV 10.2 8.9 - 12.9 FL NRBC 0.0 0  WBC    ABSOLUTE NRBC 0.00 0.00 - 0.01 K/uL    NEUTROPHILS 71 32 - 75 %    LYMPHOCYTES 22 12 - 49 %    MONOCYTES 6 5 - 13 %    EOSINOPHILS 1 0 - 7 %    BASOPHILS 0 0 - 1 %    IMMATURE GRANULOCYTES 0 0.0 - 0.5 %    ABS. NEUTROPHILS 8.3 (H) 1.8 - 8.0 K/UL    ABS. LYMPHOCYTES 2.6 0.8 - 3.5 K/UL    ABS. MONOCYTES 0.7 0.0 - 1.0 K/UL    ABS. EOSINOPHILS 0.1 0.0 - 0.4 K/UL    ABS. BASOPHILS 0.1 0.0 - 0.1 K/UL    ABS. IMM. GRANS. 0.1 (H) 0.00 - 0.04 K/UL    DF AUTOMATED     METABOLIC PANEL, BASIC   Result Value Ref Range    Sodium 137 136 - 145 mmol/L    Potassium 3.8 3.5 - 5.1 mmol/L    Chloride 106 97 - 108 mmol/L    CO2 28 21 - 32 mmol/L    Anion gap 3 (L) 5 - 15 mmol/L    Glucose 103 (H) 65 - 100 mg/dL    BUN 9 6 - 20 MG/DL    Creatinine 0.62 0.55 - 1.02 MG/DL    BUN/Creatinine ratio 15 12 - 20      GFR est AA >60 >60 ml/min/1.73m2    GFR est non-AA >60 >60 ml/min/1.73m2    Calcium 8.9 8.5 - 10.1 MG/DL           Physical Exam  There were no vitals taken for this visit. She has an obviously at least 1.5-2 cm lump in the right temple area with normal overlying skin. General: alert, cooperative, no distress   Mental  status: normal mood, behavior, speech, dress, motor activity, and thought processes, able to follow commands   HENT: NCAT   Neck: no visualized mass   Resp: no respiratory distress   Neuro: no gross deficits   Skin: no discoloration or lesions of concern on visible areas   Psychiatric: normal affect, consistent with stated mood, no evidence of hallucinations             ASSESSMENT and PLAN    ICD-10-CM ICD-9-CM    1. Severe low back pain  M54.5 724.2 oxyCODONE-acetaminophen (PERCOCET) 5-325 mg per tablet      oxyCODONE-acetaminophen (PERCOCET) 5-325 mg per tablet      oxyCODONE-acetaminophen (PERCOCET) 5-325 mg per tablet   2.  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 Rakesh Wood  F98.8 314.00 dextroamphetamine-amphetamine (ADDERALL) 10 mg tablet      dextroamphetamine-amphetamine (ADDERALL) 10 mg tablet      dextroamphetamine-amphetamine (ADDERALL) 10 mg tablet   3. Medication management  Z79.899 V58.69 oxyCODONE-acetaminophen (PERCOCET) 5-325 mg per tablet      oxyCODONE-acetaminophen (PERCOCET) 5-325 mg per tablet      oxyCODONE-acetaminophen (PERCOCET) 5-325 mg per tablet   4. Opiate use  F11.90 305.50 oxyCODONE-acetaminophen (PERCOCET) 5-325 mg per tablet      oxyCODONE-acetaminophen (PERCOCET) 5-325 mg per tablet      oxyCODONE-acetaminophen (PERCOCET) 5-325 mg per tablet   5. DDD (degenerative disc disease), cervical  M50.30 722.4 oxyCODONE-acetaminophen (PERCOCET) 5-325 mg per tablet      oxyCODONE-acetaminophen (PERCOCET) 5-325 mg per tablet      oxyCODONE-acetaminophen (PERCOCET) 5-325 mg per tablet   6. Facet arthritis of lumbar region  M47.816 721.3 oxyCODONE-acetaminophen (PERCOCET) 5-325 mg per tablet      oxyCODONE-acetaminophen (PERCOCET) 5-325 mg per tablet      oxyCODONE-acetaminophen (PERCOCET) 5-325 mg per tablet   7. Bipolar 1 disorder, depressed, moderate (HCC)  F31.32 296.52    8. Mixed obsessional thoughts and acts  F42.2 300.3    9. History of migraine headaches  Z86.69 V12.49    10. Depression with anxiety  F41.8 300.4    11. Obsessive-compulsive disorder, unspecified type  F42.9 300.3    12. DDD (degenerative disc disease), lumbar  M51.36 722.52      Diagnoses and all orders for this visit:    1. Severe low back pain  -     oxyCODONE-acetaminophen (PERCOCET) 5-325 mg per tablet; Take 1-2 Tablets by mouth every eight (8) hours as needed for Pain for up to 30 days. Max Daily Amount: 6 Tablets. -     oxyCODONE-acetaminophen (PERCOCET) 5-325 mg per tablet; Take 1-2 Tablets by mouth every eight (8) hours as needed for Pain for up to 30 days. Max Daily Amount: 6 Tablets. -     oxyCODONE-acetaminophen (PERCOCET) 5-325 mg per tablet; Take 1-2 Tablets by mouth every eight (8) hours as needed for Pain for up to 30 days.  Max Daily Amount: 6 Tablets. 2. 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 Martinez Risk  -     dextroamphetamine-amphetamine (ADDERALL) 10 mg tablet; TAKE 2 TABS PO Q AM AND 1-2 TABS IN AFTERNOON  -     dextroamphetamine-amphetamine (ADDERALL) 10 mg tablet; TAKE 2 TABS PO Q AM AND 1-2 TABS IN AFTERNOON  -     dextroamphetamine-amphetamine (ADDERALL) 10 mg tablet; TAKE 2 TABS PO Q AM AND 1-2 TABS IN AFTERNOON    3. Medication management  -     oxyCODONE-acetaminophen (PERCOCET) 5-325 mg per tablet; Take 1-2 Tablets by mouth every eight (8) hours as needed for Pain for up to 30 days. Max Daily Amount: 6 Tablets. -     oxyCODONE-acetaminophen (PERCOCET) 5-325 mg per tablet; Take 1-2 Tablets by mouth every eight (8) hours as needed for Pain for up to 30 days. Max Daily Amount: 6 Tablets. -     oxyCODONE-acetaminophen (PERCOCET) 5-325 mg per tablet; Take 1-2 Tablets by mouth every eight (8) hours as needed for Pain for up to 30 days. Max Daily Amount: 6 Tablets. 4. Opiate use  -     oxyCODONE-acetaminophen (PERCOCET) 5-325 mg per tablet; Take 1-2 Tablets by mouth every eight (8) hours as needed for Pain for up to 30 days. Max Daily Amount: 6 Tablets. -     oxyCODONE-acetaminophen (PERCOCET) 5-325 mg per tablet; Take 1-2 Tablets by mouth every eight (8) hours as needed for Pain for up to 30 days. Max Daily Amount: 6 Tablets. -     oxyCODONE-acetaminophen (PERCOCET) 5-325 mg per tablet; Take 1-2 Tablets by mouth every eight (8) hours as needed for Pain for up to 30 days. Max Daily Amount: 6 Tablets. 5. DDD (degenerative disc disease), cervical  -     oxyCODONE-acetaminophen (PERCOCET) 5-325 mg per tablet; Take 1-2 Tablets by mouth every eight (8) hours as needed for Pain for up to 30 days. Max Daily Amount: 6 Tablets. -     oxyCODONE-acetaminophen (PERCOCET) 5-325 mg per tablet; Take 1-2 Tablets by mouth every eight (8) hours as needed for Pain for up to 30 days.  Max Daily Amount: 6 Tablets. -     oxyCODONE-acetaminophen (PERCOCET) 5-325 mg per tablet; Take 1-2 Tablets by mouth every eight (8) hours as needed for Pain for up to 30 days. Max Daily Amount: 6 Tablets. 6. Facet arthritis of lumbar region  -     oxyCODONE-acetaminophen (PERCOCET) 5-325 mg per tablet; Take 1-2 Tablets by mouth every eight (8) hours as needed for Pain for up to 30 days. Max Daily Amount: 6 Tablets. -     oxyCODONE-acetaminophen (PERCOCET) 5-325 mg per tablet; Take 1-2 Tablets by mouth every eight (8) hours as needed for Pain for up to 30 days. Max Daily Amount: 6 Tablets. -     oxyCODONE-acetaminophen (PERCOCET) 5-325 mg per tablet; Take 1-2 Tablets by mouth every eight (8) hours as needed for Pain for up to 30 days. Max Daily Amount: 6 Tablets. 7. Bipolar 1 disorder, depressed, moderate ([address]    8. Mixed obsessional thoughts and acts    9. History of migraine headaches    10. Depression with anxiety    11. Obsessive-compulsive disorder, unspecified type    12. DDD (degenerative disc disease), lumbar      Follow-up and Dispositions    · Return in about 3 months (around 12/20/2021), or if symptoms worsen or fail to improve, for 3 month F/U chronic controlled substance use, ADD 3 month follow up, anxiety/depression. reviewed medications and side effects in detail  Please call my office if there are any questions- [phone]. Discussed expected course/resolution/complications of diagnosis in detail with patient. Medication risks/benefits/costs/interactions/alternatives discussed with patient. Pt was given an after visit summary which includes diagnoses, current medications & vitals. Pt expressed understanding with the diagnosis and plan. Patient to call if no better in 3 -4 days and prn new problems. BMI is significantly elevated- in the obese range. I reviewed diet, exercise and weight control.  Discussed weight control in detail, the importance of mainly decreased carbs, and for weight maintenance, exercise; discussed different diets and that it isn't as important to watch the type of foods as it is to decrease calorie intake no matter what type of diet you do, etc.       Total 30 minutes  re:  Regular exercise is very important to your health; it helps mentally, physically, socially; it prevents injuries if done properly. Exercise, even as simple as walking 20-30 minutes daily has major benefits to your health even though your \"numbers\" are the same in the lab. See if you can add this into your daily regimen and after a few months it will become a regular habit-\"just something you do,\" like brushing your teeth. A combination of aerobic exercise and strengthening and stretching is felt to be the best for you, so this should be your ultimate goal.   This can be done in the privacy of your home or in a group setting as at the gym  Some prefer having a , others prefer to do exercise in groups or individually. Do what \"works\" for you. You need to make it simple and \"fun,\" or you most likely will not continue it. Recommended a weekly \"heart check. \" I went into detail how to do this. Also, discussed symptoms of concern that were noted today in the note above, treatment options( including doing nothing), when to follow up before recommended time frame. Also, answered all questions. Pt continues to do well on her oxycodone and Adderall without changing the dosing of that. She has two cysts on the right side of her face, one in the temple area and one a little bit lower over the zygomatic area    I referred pt to dermatologist and she needs to ask questions about what type of doctor would be best to do this with the least scarring. Pt is aware she is past due for urine drug screen and blood work. She will come in for this in the near future. Controlled substance agreement discussed, agreed to and signed by pt today; copy placed in the chart.       checked and found to have no suspicious activity. Long discussion about chronic narcotic use, the risks of addiction, tolerance , overdose,etc. The national spotlight on this problem and the need for a narcotic agreement in order to continue receiving these, etc.  Continue to do the non-medication things that reduce pain such as adequate sleep, avoiding high impact activities, but at the same time staying active and avoiding sedentary activity. In addition, the patient was counseled today on the potential risks of opiate use including but not limited to death if not taken as prescribed or if taken in conjunction with other sedative, hypnotic, or other pain medications, and the need to refrain from any recreational drugs and/or alcohol. Further, we discussed the rationale and potential benefits of an opiate support regimen for the management of chronic non-malignant pain conditions. This document was written by Stephen Hobbs, as dictated by Bhargavi Juárez MD.  I have reviewed and agree with the above note and have made corrections where appropriate Clarke Cisneros M.D.

## 2021-09-21 ENCOUNTER — PATIENT MESSAGE (OUTPATIENT)
Dept: FAMILY MEDICINE CLINIC | Age: 46
End: 2021-09-21

## 2021-09-21 RX ORDER — DEXTROAMPHETAMINE SACCHARATE, AMPHETAMINE ASPARTATE, DEXTROAMPHETAMINE SULFATE AND AMPHETAMINE SULFATE 2.5; 2.5; 2.5; 2.5 MG/1; MG/1; MG/1; MG/1
TABLET ORAL
Qty: 120 TABLET | Refills: 0 | Status: SHIPPED | OUTPATIENT
Start: 2021-11-19 | End: 2022-01-21 | Stop reason: SDUPTHER

## 2021-09-21 RX ORDER — DEXTROAMPHETAMINE SACCHARATE, AMPHETAMINE ASPARTATE, DEXTROAMPHETAMINE SULFATE AND AMPHETAMINE SULFATE 2.5; 2.5; 2.5; 2.5 MG/1; MG/1; MG/1; MG/1
TABLET ORAL
Qty: 120 TABLET | Refills: 0 | Status: SHIPPED | OUTPATIENT
Start: 2021-10-20 | End: 2022-01-20 | Stop reason: SDUPTHER

## 2021-09-21 RX ORDER — DEXTROAMPHETAMINE SACCHARATE, AMPHETAMINE ASPARTATE, DEXTROAMPHETAMINE SULFATE AND AMPHETAMINE SULFATE 2.5; 2.5; 2.5; 2.5 MG/1; MG/1; MG/1; MG/1
TABLET ORAL
Qty: 120 TABLET | Refills: 0 | Status: SHIPPED | OUTPATIENT
Start: 2021-09-21 | End: 2021-12-23 | Stop reason: SDUPTHER

## 2021-09-21 RX ORDER — OXYCODONE AND ACETAMINOPHEN 5; 325 MG/1; MG/1
1-2 TABLET ORAL
Qty: 100 TABLET | Refills: 0 | Status: SHIPPED | OUTPATIENT
Start: 2021-12-09 | End: 2021-12-27 | Stop reason: SDUPTHER

## 2021-09-21 RX ORDER — OXYCODONE AND ACETAMINOPHEN 5; 325 MG/1; MG/1
1-2 TABLET ORAL
Qty: 100 TABLET | Refills: 0 | Status: SHIPPED | OUTPATIENT
Start: 2021-10-10 | End: 2021-12-27 | Stop reason: SDUPTHER

## 2021-09-21 RX ORDER — OXYCODONE AND ACETAMINOPHEN 5; 325 MG/1; MG/1
1-2 TABLET ORAL
Qty: 100 TABLET | Refills: 0 | Status: SHIPPED | OUTPATIENT
Start: 2021-11-09 | End: 2021-12-27 | Stop reason: SDUPTHER

## 2021-10-11 DIAGNOSIS — F51.04 PSYCHOPHYSIOLOGICAL INSOMNIA: ICD-10-CM

## 2021-10-11 RX ORDER — ZOLPIDEM TARTRATE 10 MG/1
TABLET ORAL
Qty: 60 TABLET | Refills: 2 | Status: SHIPPED | OUTPATIENT
Start: 2021-10-19 | End: 2022-01-06 | Stop reason: SDUPTHER

## 2021-10-14 ENCOUNTER — PATIENT MESSAGE (OUTPATIENT)
Dept: FAMILY MEDICINE CLINIC | Age: 46
End: 2021-10-14

## 2021-10-14 DIAGNOSIS — F31.32 BIPOLAR 1 DISORDER, DEPRESSED, MODERATE (HCC): ICD-10-CM

## 2021-10-14 RX ORDER — LITHIUM CARBONATE 300 MG/1
CAPSULE ORAL
Qty: 270 CAPSULE | Refills: 1 | Status: SHIPPED | OUTPATIENT
Start: 2021-10-14 | End: 2022-04-30

## 2021-10-15 ENCOUNTER — PATIENT MESSAGE (OUTPATIENT)
Dept: FAMILY MEDICINE CLINIC | Age: 46
End: 2021-10-15

## 2021-11-10 DIAGNOSIS — F41.8 DEPRESSION WITH ANXIETY: ICD-10-CM

## 2021-11-11 RX ORDER — ALPRAZOLAM 0.25 MG/1
TABLET ORAL
Qty: 60 TABLET | Refills: 1 | Status: SHIPPED | OUTPATIENT
Start: 2021-11-11 | End: 2022-01-06 | Stop reason: SDUPTHER

## 2021-11-12 ENCOUNTER — TELEPHONE (OUTPATIENT)
Dept: FAMILY MEDICINE CLINIC | Age: 46
End: 2021-11-12

## 2021-11-12 NOTE — TELEPHONE ENCOUNTER
Chief Complaint   Patient presents with    Prior Auth     Amphetamine-Dextroamphetamine 10MG tablets:  APPROVED:  [place] Case: [id number], Status: Approved, Coverage Starts on: 8/13/2021 12:00:00 AM, Coverage Ends on: 11/12/2022 12:00:00 AM.     [facility] pharmacy was faxed approval notification at [phone] with confirmation received, patient informed via mychart.   Zoya Callahan LPN

## 2021-12-15 DIAGNOSIS — F31.32 BIPOLAR 1 DISORDER, DEPRESSED, MODERATE (HCC): ICD-10-CM

## 2021-12-15 RX ORDER — ESCITALOPRAM OXALATE 20 MG/1
TABLET ORAL
Qty: 90 TABLET | Refills: 1 | Status: SHIPPED | OUTPATIENT
Start: 2021-12-15 | End: 2022-06-13

## 2021-12-23 DIAGNOSIS — F98.8 ADD (ATTENTION DEFICIT DISORDER) WITHOUT HYPERACTIVITY: Chronic | ICD-10-CM

## 2021-12-23 RX ORDER — DEXTROAMPHETAMINE SACCHARATE, AMPHETAMINE ASPARTATE, DEXTROAMPHETAMINE SULFATE AND AMPHETAMINE SULFATE 2.5; 2.5; 2.5; 2.5 MG/1; MG/1; MG/1; MG/1
TABLET ORAL
Qty: 120 TABLET | Refills: 0 | Status: SHIPPED | OUTPATIENT
Start: 2021-12-23 | End: 2022-01-20 | Stop reason: SDUPTHER

## 2021-12-23 NOTE — TELEPHONE ENCOUNTER
----- Message from Jesus Schmidt sent at 12/23/2021 11:12 AM EST -----  Regarding: Adderalzoila Betancourt - I tried getting ny Adderrall prescription refilled and they said I dont have any refills. Can you see if Dr. Richard Murillo can send it over please?      Thanks,     Concetta Gregg

## 2021-12-27 ENCOUNTER — OFFICE VISIT (OUTPATIENT)
Dept: FAMILY MEDICINE CLINIC | Age: 46
End: 2021-12-27
Payer: COMMERCIAL

## 2021-12-27 VITALS
WEIGHT: 224 LBS | OXYGEN SATURATION: 98 % | BODY MASS INDEX: 37.32 KG/M2 | DIASTOLIC BLOOD PRESSURE: 74 MMHG | HEART RATE: 77 BPM | RESPIRATION RATE: 16 BRPM | HEIGHT: 65 IN | SYSTOLIC BLOOD PRESSURE: 137 MMHG | TEMPERATURE: 98.3 F

## 2021-12-27 DIAGNOSIS — F31.32 BIPOLAR 1 DISORDER, DEPRESSED, MODERATE (HCC): ICD-10-CM

## 2021-12-27 DIAGNOSIS — F51.04 PSYCHOPHYSIOLOGICAL INSOMNIA: ICD-10-CM

## 2021-12-27 DIAGNOSIS — M50.30 DDD (DEGENERATIVE DISC DISEASE), CERVICAL: ICD-10-CM

## 2021-12-27 DIAGNOSIS — F11.90 OPIATE USE: ICD-10-CM

## 2021-12-27 DIAGNOSIS — F31.32 BIPOLAR DISORDER WITH MODERATE DEPRESSION (HCC): ICD-10-CM

## 2021-12-27 DIAGNOSIS — Z79.899 LONG-TERM CURRENT USE OF LITHIUM: ICD-10-CM

## 2021-12-27 DIAGNOSIS — R63.5 WEIGHT GAIN, ABNORMAL: ICD-10-CM

## 2021-12-27 DIAGNOSIS — Z79.899 MEDICATION MANAGEMENT: ICD-10-CM

## 2021-12-27 DIAGNOSIS — F17.210 CIGARETTE SMOKER MOTIVATED TO QUIT: ICD-10-CM

## 2021-12-27 DIAGNOSIS — M47.816 FACET ARTHRITIS OF LUMBAR REGION: ICD-10-CM

## 2021-12-27 DIAGNOSIS — F98.8 ADD (ATTENTION DEFICIT DISORDER) WITHOUT HYPERACTIVITY: Chronic | ICD-10-CM

## 2021-12-27 DIAGNOSIS — Z86.69 HISTORY OF MIGRAINE HEADACHES: ICD-10-CM

## 2021-12-27 DIAGNOSIS — M51.36 DDD (DEGENERATIVE DISC DISEASE), LUMBAR: ICD-10-CM

## 2021-12-27 DIAGNOSIS — M54.50 CHRONIC MIDLINE LOW BACK PAIN WITHOUT SCIATICA: Primary | ICD-10-CM

## 2021-12-27 DIAGNOSIS — F42.2 MIXED OBSESSIONAL THOUGHTS AND ACTS: ICD-10-CM

## 2021-12-27 DIAGNOSIS — Z79.899 LITHIUM USE: ICD-10-CM

## 2021-12-27 DIAGNOSIS — G89.29 CHRONIC MIDLINE LOW BACK PAIN WITHOUT SCIATICA: Primary | ICD-10-CM

## 2021-12-27 DIAGNOSIS — M17.0 BILATERAL PRIMARY OSTEOARTHRITIS OF KNEE: ICD-10-CM

## 2021-12-27 PROCEDURE — 99215 OFFICE O/P EST HI 40 MIN: CPT | Performed by: FAMILY MEDICINE

## 2021-12-27 RX ORDER — OXYCODONE AND ACETAMINOPHEN 5; 325 MG/1; MG/1
1-2 TABLET ORAL
Qty: 100 TABLET | Refills: 0 | Status: SHIPPED | OUTPATIENT
Start: 2022-02-08 | End: 2022-01-20 | Stop reason: SDUPTHER

## 2021-12-27 RX ORDER — OXYCODONE AND ACETAMINOPHEN 5; 325 MG/1; MG/1
1-2 TABLET ORAL
Qty: 100 TABLET | Refills: 0 | Status: SHIPPED | OUTPATIENT
Start: 2022-03-08 | End: 2022-02-11 | Stop reason: SDUPTHER

## 2021-12-27 RX ORDER — OXYCODONE AND ACETAMINOPHEN 5; 325 MG/1; MG/1
1-2 TABLET ORAL
Qty: 100 TABLET | Refills: 0 | Status: SHIPPED | OUTPATIENT
Start: 2022-01-09 | End: 2022-01-20 | Stop reason: SDUPTHER

## 2021-12-27 NOTE — PROGRESS NOTES
HISTORY OF PRESENT ILLNESS  HPI  Radha Soliman a 23 I. o. female with a history of migraine with aura, bilateral CTS, cervical DDD, facet arthritis of lumbar region, ADD, depression with anxiety, insomnia, OCD and bipolar disorder, who presents to the office today c/o weight gain and multiple subcutaneous cysts on her face, and for f/u of these health problems.     Pt asks about seeing a specialist for her weight gain/ doing gastric sleeve surgery    Pt occasionally checks her BP outside of the office and it averages below 140/90. Pt denies unusual SOB, chest pain, and any recent ER visits or hospitalizations.        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 Ernst Ramey 8/25/2016    Asthma     last attack 2 months ago    Bilateral carpal tunnel syndrome- R>>L 9/25/2016    Bipolar disorder with moderate depression ([facility] [address] 6/28/2017    Depression with anxiety 3/2/2010    Facet arthritis of lumbar region 12/3/2017    History of migraine headaches 11/25/2018    Insomnia     Lithium use 6/28/2017    Migraine 02/20/2010    Psychiatric disorder     Depression, anxiety    Psychophysiological insomnia 2/23/2016     Past Surgical History:   Procedure Laterality Date    HX APPENDECTOMY  1996    HX BREAST REDUCTION  2002    HX ORTHOPAEDIC  2003    cervical disc    HX ORTHOPAEDIC      second right hand digit fx with pins index     Current Outpatient Medications on File Prior to Visit   Medication Sig Dispense Refill    dextroamphetamine-amphetamine (ADDERALL) 10 mg tablet TAKE 2 TABS PO Q AM AND 1-2 TABS IN AFTERNOON 120 Tablet 0    escitalopram oxalate (LEXAPRO) 20 mg tablet TAKE 1 TABLET BY MOUTH EVERY DAY 90 Tablet 1    ALPRAZolam (XANAX) 0.25 mg tablet TAKE 1/2 - 1 TABLET BY MOUTH TWICE DAILY AS NEEDED FOR ANXIETY 60 Tablet 1    lithium carbonate 300 mg capsule TAKE 1 CAPSULE BY MOUTH EVERY MORNING AND 2 CAPSULES IN THE EVENINGS 270 Capsule 1    zolpidem (AMBIEN) 10 mg tablet TAKE 2 TABLETS BY MOUTH EVERY DAY AT BEDTIME AS NEEDED FOR INSOMNIA 60 Tablet 2    dextroamphetamine-amphetamine (ADDERALL) 10 mg tablet TAKE 2 TABS PO Q AM AND 1-2 TABS IN AFTERNOON 120 Tablet 0    dextroamphetamine-amphetamine (ADDERALL) 10 mg tablet TAKE 2 TABS PO Q AM AND 1-2 TABS IN AFTERNOON 120 Tablet 0    lidocaine (LIDODERM) 5 % APPLY PATCH TO THE AFFECTED AREA FOR 12 HOURS A DAY AND REMOVE FOR 12 HOURS A DAY. 15 Patch 3    [DISCONTINUED] oxyCODONE-acetaminophen (PERCOCET) 5-325 mg per tablet Take 1-2 Tablets by mouth every eight (8) hours as needed for Pain for up to 30 days. Max Daily Amount: 6 Tablets. 100 Tablet 0    [DISCONTINUED] oxyCODONE-acetaminophen (PERCOCET) 5-325 mg per tablet Take 1-2 Tablets by mouth every eight (8) hours as needed for Pain for up to 30 days. Max Daily Amount: 6 Tablets. 100 Tablet 0    [DISCONTINUED] oxyCODONE-acetaminophen (PERCOCET) 5-325 mg per tablet Take 1-2 Tablets by mouth every eight (8) hours as needed for Pain for up to 30 days. Max Daily Amount: 6 Tablets. 100 Tablet 0    naloxone (NARCAN) 4 mg/actuation nasal spray Use 1 spray intranasally, then discard. Repeat with new spray every 2 min as needed for opioid overdose symptoms, alternating nostrils. (Patient not taking: Reported on 9/20/2021) 1 Each 3     No current facility-administered medications on file prior to visit.      Allergies   Allergen Reactions    Pcn [Penicillins] Other (comments)     As a child; tolerates Keflex       Family History   Problem Relation Age of Onset    Diabetes Father     Hypertension Father     Heart Attack Father     High Cholesterol Father     Diabetes Mother     High Cholesterol Mother      Social History     Socioeconomic History    Marital status:    Tobacco Use    Smoking status: Current Every Day Smoker     Packs/day: 1.50     Years: 18.00     Pack years: 27.00     Types: Cigarettes    Smokeless tobacco: Never Used   Vaping Use    Vaping Use: Never used   Substance and Sexual Activity    Alcohol use: Yes     Comment: occasionally    Drug use: No    Sexual activity: Yes     Partners: Male     Birth control/protection: Condom             Review of Systems   Constitutional: Negative for chills, diaphoresis, fever, malaise/fatigue and weight loss. Eyes: Negative for blurred vision, double vision, pain and redness. Respiratory: Negative for cough, shortness of breath and wheezing. Cardiovascular: Negative for chest pain, palpitations, orthopnea, claudication, leg swelling and PND. Skin: Negative for itching and rash. Neurological: Negative for dizziness, tingling, tremors, sensory change, speech change, focal weakness, seizures, loss of consciousness, weakness and headaches. Results for orders placed or performed during the hospital encounter of 12/09/20   CBC WITH AUTOMATED DIFF   Result Value Ref Range    WBC 11.8 (H) 3.6 - 11.0 K/uL    RBC 4.23 3.80 - 5.20 M/uL    HGB 13.0 11.5 - 16.0 g/dL    HCT 40.3 35.0 - 47.0 %    MCV 95.3 80.0 - 99.0 FL    MCH 30.7 26.0 - 34.0 PG    MCHC 32.3 30.0 - 36.5 g/dL    RDW 12.8 11.5 - 14.5 %    PLATELET 025 515 - 137 K/uL    MPV 10.2 8.9 - 12.9 FL    NRBC 0.0 0  WBC    ABSOLUTE NRBC 0.00 0.00 - 0.01 K/uL    NEUTROPHILS 71 32 - 75 %    LYMPHOCYTES 22 12 - 49 %    MONOCYTES 6 5 - 13 %    EOSINOPHILS 1 0 - 7 %    BASOPHILS 0 0 - 1 %    IMMATURE GRANULOCYTES 0 0.0 - 0.5 %    ABS. NEUTROPHILS 8.3 (H) 1.8 - 8.0 K/UL    ABS. LYMPHOCYTES 2.6 0.8 - 3.5 K/UL    ABS. MONOCYTES 0.7 0.0 - 1.0 K/UL    ABS. EOSINOPHILS 0.1 0.0 - 0.4 K/UL    ABS. BASOPHILS 0.1 0.0 - 0.1 K/UL    ABS. IMM.  GRANS. 0.1 (H) 0.00 - 0.04 K/UL    DF AUTOMATED     METABOLIC PANEL, BASIC   Result Value Ref Range    Sodium 137 136 - 145 mmol/L    Potassium 3.8 3.5 - 5.1 mmol/L    Chloride 106 97 - 108 mmol/L    CO2 28 21 - 32 mmol/L    Anion gap 3 (L) 5 - 15 mmol/L    Glucose 103 (H) 65 - 100 mg/dL    BUN 9 6 - 20 MG/DL    Creatinine 0.62 0.55 - 1.02 MG/DL    BUN/Creatinine ratio 15 12 - 20      GFR est AA >60 >60 ml/min/1.73m2    GFR est non-AA >60 >60 ml/min/1.73m2    Calcium 8.9 8.5 - 10.1 MG/DL             Physical Exam  Visit Vitals  /74 (BP 1 Location: Left arm, BP Patient Position: Sitting, BP Cuff Size: Large adult)   Pulse 77   Temp 98.3 °F (36.8 °C)   Resp 16   Ht 5' 4.5\" (1.638 m)   Wt 224 lb (101.6 kg)   SpO2 98%   BMI 37.86 kg/m²         Head: Normocephalic, without obvious abnormality, atraumatic  Eyes: conjunctivae/corneas clear. PERRL, EOM's intact. Neck: supple, symmetrical, trachea midline, no adenopathy, thyroid: not enlarged, symmetric, no tenderness/mass/nodules, no carotid bruit and no JVD  Lungs: clear to auscultation bilaterally  Skin: has scars from previous severe acne on the face. She also has a few less than 1 cm subcutaneous cyst of unclear etiology (they do not appear acne related)  Heart: regular rate and rhythm, S1, S2 normal, no murmur, click, rub or gallop  Extremities: extremities normal, atraumatic, no cyanosis or edema  Pulses: 2+ and symmetric  Lymph nodes: Cervical, supraclavicular, and axillary nodes normal.  Neurologic: Grossly normal      ASSESSMENT and PLAN    ICD-10-CM ICD-9-CM    1. Chronic midline low back pain without sciatica  M54.50 724.2 oxyCODONE-acetaminophen (PERCOCET) 5-325 mg per tablet    G89.29 338.29 oxyCODONE-acetaminophen (PERCOCET) 5-325 mg per tablet      oxyCODONE-acetaminophen (PERCOCET) 5-325 mg per tablet   2. Medication management  Z79.899 V58.69 oxyCODONE-acetaminophen (PERCOCET) 5-325 mg per tablet      oxyCODONE-acetaminophen (PERCOCET) 5-325 mg per tablet      oxyCODONE-acetaminophen (PERCOCET) 5-325 mg per tablet      10-DRUG SCREEN W/CONF      CBC W/O DIFF   3.  Opiate use  F11.90 305.50 oxyCODONE-acetaminophen (PERCOCET) 5-325 mg per tablet      oxyCODONE-acetaminophen (PERCOCET) 5-325 mg per tablet oxyCODONE-acetaminophen (PERCOCET) 5-325 mg per tablet      10-DRUG SCREEN W/CONF      METABOLIC PANEL, COMPREHENSIVE   4. DDD (degenerative disc disease), cervical  M50.30 722.4 oxyCODONE-acetaminophen (PERCOCET) 5-325 mg per tablet      oxyCODONE-acetaminophen (PERCOCET) 5-325 mg per tablet      oxyCODONE-acetaminophen (PERCOCET) 5-325 mg per tablet   5. Facet arthritis of lumbar region  M47.816 721.3 oxyCODONE-acetaminophen (PERCOCET) 5-325 mg per tablet      oxyCODONE-acetaminophen (PERCOCET) 5-325 mg per tablet      oxyCODONE-acetaminophen (PERCOCET) 5-325 mg per tablet   6. Bipolar 1 disorder, depressed, moderate (HCC)  F31.32 296.52 LITHIUM   7. 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 Sola Shea  F98.8 314.00 10-DRUG SCREEN W/CONF   8. Long-term current use of lithium  Z79.899 V58.69 CBC W/O DIFF      METABOLIC PANEL, COMPREHENSIVE   9. Psychophysiological insomnia  F51.04 307.42    10. Mixed obsessional thoughts and acts  F42.2 300.3    11. History of migraine headaches  Z86.69 V12.49    12. DDD (degenerative disc disease), lumbar  M51.36 722.52    13. Cigarette smoker motivated to quit  F17.210 305.1    14. Lithium use  Z79.899 V58.69    15. Weight gain, abnormal  R63.5 783.1    16. Bipolar disorder with moderate depression ([facility] [address]  F31.32 296.52 LITHIUM   17. Bilateral primary osteoarthritis of knee  M17.0 715.16      Diagnoses and all orders for this visit:    1. Chronic midline low back pain without sciatica  -     oxyCODONE-acetaminophen (PERCOCET) 5-325 mg per tablet; Take 1-2 Tablets by mouth every eight (8) hours as needed for Pain for up to 30 days. Max Daily Amount: 6 Tablets. -     oxyCODONE-acetaminophen (PERCOCET) 5-325 mg per tablet; Take 1-2 Tablets by mouth every eight (8) hours as needed for Pain for up to 30 days. Max Daily Amount: 6 Tablets. -     oxyCODONE-acetaminophen (PERCOCET) 5-325 mg per tablet;  Take 1-2 Tablets by mouth every eight (8) hours as needed for Pain for up to 30 days. Max Daily Amount: 6 Tablets. 2. Medication management  -     oxyCODONE-acetaminophen (PERCOCET) 5-325 mg per tablet; Take 1-2 Tablets by mouth every eight (8) hours as needed for Pain for up to 30 days. Max Daily Amount: 6 Tablets. -     oxyCODONE-acetaminophen (PERCOCET) 5-325 mg per tablet; Take 1-2 Tablets by mouth every eight (8) hours as needed for Pain for up to 30 days. Max Daily Amount: 6 Tablets. -     oxyCODONE-acetaminophen (PERCOCET) 5-325 mg per tablet; Take 1-2 Tablets by mouth every eight (8) hours as needed for Pain for up to 30 days. Max Daily Amount: 6 Tablets. -     10-DRUG SCREEN W/CONF  -     CBC W/O DIFF    3. Opiate use  -     oxyCODONE-acetaminophen (PERCOCET) 5-325 mg per tablet; Take 1-2 Tablets by mouth every eight (8) hours as needed for Pain for up to 30 days. Max Daily Amount: 6 Tablets. -     oxyCODONE-acetaminophen (PERCOCET) 5-325 mg per tablet; Take 1-2 Tablets by mouth every eight (8) hours as needed for Pain for up to 30 days. Max Daily Amount: 6 Tablets. -     oxyCODONE-acetaminophen (PERCOCET) 5-325 mg per tablet; Take 1-2 Tablets by mouth every eight (8) hours as needed for Pain for up to 30 days. Max Daily Amount: 6 Tablets. -     10-DRUG SCREEN W/CONF  -     METABOLIC PANEL, COMPREHENSIVE    4. DDD (degenerative disc disease), cervical  -     oxyCODONE-acetaminophen (PERCOCET) 5-325 mg per tablet; Take 1-2 Tablets by mouth every eight (8) hours as needed for Pain for up to 30 days. Max Daily Amount: 6 Tablets. -     oxyCODONE-acetaminophen (PERCOCET) 5-325 mg per tablet; Take 1-2 Tablets by mouth every eight (8) hours as needed for Pain for up to 30 days. Max Daily Amount: 6 Tablets. -     oxyCODONE-acetaminophen (PERCOCET) 5-325 mg per tablet; Take 1-2 Tablets by mouth every eight (8) hours as needed for Pain for up to 30 days. Max Daily Amount: 6 Tablets.     5. Facet arthritis of lumbar region  -     oxyCODONE-acetaminophen (PERCOCET) 5-325 mg per tablet; Take 1-2 Tablets by mouth every eight (8) hours as needed for Pain for up to 30 days. Max Daily Amount: 6 Tablets. -     oxyCODONE-acetaminophen (PERCOCET) 5-325 mg per tablet; Take 1-2 Tablets by mouth every eight (8) hours as needed for Pain for up to 30 days. Max Daily Amount: 6 Tablets. -     oxyCODONE-acetaminophen (PERCOCET) 5-325 mg per tablet; Take 1-2 Tablets by mouth every eight (8) hours as needed for Pain for up to 30 days. Max Daily Amount: 6 Tablets. 6. Bipolar 1 disorder, depressed, moderate (HCC)  -     LITHIUM    7. 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 Kenton Gomez  -     10-DRUG SCREEN W/CONF    8. Long-term current use of lithium  -     CBC W/O DIFF  -     METABOLIC PANEL, COMPREHENSIVE    9. Psychophysiological insomnia    10. Mixed obsessional thoughts and acts    11. History of migraine headaches    12. DDD (degenerative disc disease), lumbar    13. Cigarette smoker motivated to quit    14. Lithium use    15. Weight gain, abnormal    16. Bipolar disorder with moderate depression (HCC)  -     LITHIUM    17. Bilateral primary osteoarthritis of knee      Follow-up and Dispositions    · Return in about 3 months (around 3/27/2022) for 3 mth F/U chr controlled subst use, ADD 3 mth F/U, F/U of obesity, bipolar/depression, insomnia. Follow-up and Disposition History       reviewed medications and side effects in detail  Please call my office if there are any questions- [phone]. Discussed expected course/resolution/complications of diagnosis in detail with patient. Medication risks/benefits/costs/interactions/alternatives discussed with patient. Pt was given an after visit summary which includes diagnoses, current medications & vitals. Pt expressed understanding with the diagnosis and plan. Patient to call if no better in 3 -4 days and prn new problems. BMI is significantly elevated- in the obese range.  I reviewed diet, exercise and weight control. Discussed weight control in detail, the importance of mainly decreased carbs, and for weight maintenance, exercise; discussed different diets and that it isn't as important to watch the type of foods as it is to decrease calorie intake no matter what type of diet you do, etc.     Total 30 minutes  re:  Regular exercise is very important to your health; it helps mentally, physically, socially; it prevents injuries if done properly. Exercise, even as simple as walking 20-30 minutes daily has major benefits to your health even though your \"numbers\" are the same in the lab. See if you can add this into your daily regimen and after a few months it will become a regular habit-\"just something you do,\" like brushing your teeth. A combination of aerobic exercise and strengthening and stretching is felt to be the best for you, so this should be your ultimate goal.   This can be done in the privacy of your home or in a group setting as at the gym  Some prefer having a , others prefer to do exercise in groups or individually. Do what \"works\" for you. You need to make it simple and \"fun,\" or you most likely will not continue it. Recommended a weekly \"heart check. \" I went into detail how to do this. Also, discussed symptoms of concern that were noted today in the note above, treatment options( including doing nothing), when to follow up before recommended time frame. Also, answered all questions. Pt was concerned about her weight and was interest in looking into gastric sleeve procedure, so we will refer her for that. I told her that there are other procedures for weight loss and she needs to look at different options and compare risk/benefit. I congratulated her on her decision to work aggressively on her weight.     Controlled substance agreement up to date and urine drug screen done today    Referred to dermatology at her requests for the subcutaneous cyst(also, has hx of previous severe facial acne). I told her that they may refer her to a plastic surgeon for this but we will have her see the dermatologist first.     Early knee arthritis- need to cycle regularly( indoor exercise bike or outside biking are both effective), working your way up to 30 minutes 3 times a week and continue lifelong. It is very important to put the seat up high enough that your leg is almost straight at the bottom of each stroke of the pedal.    This document was written by Lexi Dyer, as dictated by Robert Everett MD.  I have reviewed and agree with the above note and have made corrections where appropriate Clarke Nguyen M.D.

## 2021-12-28 ENCOUNTER — TELEPHONE (OUTPATIENT)
Dept: FAMILY MEDICINE CLINIC | Age: 46
End: 2021-12-28

## 2021-12-28 LAB
ALBUMIN SERPL-MCNC: 3.9 G/DL (ref 3.5–5)
ALBUMIN/GLOB SERPL: 1.2 {RATIO} (ref 1.1–2.2)
ALP SERPL-CCNC: 87 U/L (ref 45–117)
ALT SERPL-CCNC: 20 U/L (ref 12–78)
ANION GAP SERPL CALC-SCNC: 4 MMOL/L (ref 5–15)
AST SERPL-CCNC: 17 U/L (ref 15–37)
BILIRUB SERPL-MCNC: 0.2 MG/DL (ref 0.2–1)
BUN SERPL-MCNC: 9 MG/DL (ref 6–20)
BUN/CREAT SERPL: 14 (ref 12–20)
CALCIUM SERPL-MCNC: 9.6 MG/DL (ref 8.5–10.1)
CHLORIDE SERPL-SCNC: 108 MMOL/L (ref 97–108)
CO2 SERPL-SCNC: 25 MMOL/L (ref 21–32)
CREAT SERPL-MCNC: 0.63 MG/DL (ref 0.55–1.02)
DATE LAST DOSE: ABNORMAL
ERYTHROCYTE [DISTWIDTH] IN BLOOD BY AUTOMATED COUNT: 13.3 % (ref 11.5–14.5)
GLOBULIN SER CALC-MCNC: 3.3 G/DL (ref 2–4)
GLUCOSE SERPL-MCNC: 90 MG/DL (ref 65–100)
HCT VFR BLD AUTO: 43.8 % (ref 35–47)
HGB BLD-MCNC: 14 G/DL (ref 11.5–16)
LITHIUM SERPL-SCNC: 0.37 MMOL/L (ref 0.6–1.2)
MCH RBC QN AUTO: 30.9 PG (ref 26–34)
MCHC RBC AUTO-ENTMCNC: 32 G/DL (ref 30–36.5)
MCV RBC AUTO: 96.7 FL (ref 80–99)
NRBC # BLD: 0 K/UL (ref 0–0.01)
NRBC BLD-RTO: 0 PER 100 WBC
PLATELET # BLD AUTO: 352 K/UL (ref 150–400)
PMV BLD AUTO: 11.1 FL (ref 8.9–12.9)
POTASSIUM SERPL-SCNC: 4 MMOL/L (ref 3.5–5.1)
PROT SERPL-MCNC: 7.2 G/DL (ref 6.4–8.2)
RBC # BLD AUTO: 4.53 M/UL (ref 3.8–5.2)
REPORTED DOSE,DOSE: 0 UNITS
REPORTED DOSE/TIME,TMG: ABNORMAL
SODIUM SERPL-SCNC: 137 MMOL/L (ref 136–145)
WBC # BLD AUTO: 12.5 K/UL (ref 3.6–11)

## 2021-12-28 NOTE — TELEPHONE ENCOUNTER
----- Message from Neville Paz sent at 12/27/2021  5:05 PM EST -----  Regarding: gastric referral  Luke Gentile can you call me? I completely forgot to ask Dr. Leanne Zarco to change my Katha Barger to something different for me to sleep.

## 2021-12-29 LAB
AMPHETAMINES UR QL SCN: NEGATIVE NG/ML
BARBITURATES UR QL SCN: NEGATIVE NG/ML
BENZODIAZ UR QL SCN: NEGATIVE NG/ML
BZE UR QL SCN: NEGATIVE NG/ML
CANNABINOIDS UR QL SCN: NEGATIVE NG/ML
CREAT UR-MCNC: 99.8 MG/DL (ref 20–300)
METHADONE UR QL SCN: NEGATIVE NG/ML
OPIATES UR QL SCN: NEGATIVE NG/ML
OXYCODONE+OXYMORPHONE UR QL SCN: NEGATIVE NG/ML
PCP UR QL: NEGATIVE NG/ML
PH UR: 7 [PH] (ref 4.5–8.9)
PLEASE NOTE:, 733157: NORMAL
PROPOXYPH UR QL SCN: NEGATIVE NG/ML

## 2022-01-06 DIAGNOSIS — F51.04 PSYCHOPHYSIOLOGICAL INSOMNIA: ICD-10-CM

## 2022-01-06 DIAGNOSIS — F41.8 DEPRESSION WITH ANXIETY: ICD-10-CM

## 2022-01-07 RX ORDER — ALPRAZOLAM 0.25 MG/1
TABLET ORAL
Qty: 60 TABLET | Refills: 2 | Status: SHIPPED | OUTPATIENT
Start: 2022-01-07 | End: 2022-03-23 | Stop reason: SDUPTHER

## 2022-01-07 RX ORDER — ZOLPIDEM TARTRATE 10 MG/1
TABLET ORAL
Qty: 60 TABLET | Refills: 2 | Status: SHIPPED | OUTPATIENT
Start: 2022-01-07 | End: 2022-03-23 | Stop reason: SDUPTHER

## 2022-01-20 ENCOUNTER — HOSPITAL ENCOUNTER (EMERGENCY)
Age: 47
Discharge: HOME | End: 2022-01-20
Attending: STUDENT IN AN ORGANIZED HEALTH CARE EDUCATION/TRAINING PROGRAM
Payer: COMMERCIAL

## 2022-01-20 VITALS
OXYGEN SATURATION: 100 % | TEMPERATURE: 99.3 F | DIASTOLIC BLOOD PRESSURE: 77 MMHG | HEART RATE: 88 BPM | HEIGHT: 64 IN | BODY MASS INDEX: 37.86 KG/M2 | WEIGHT: 221.78 LBS | RESPIRATION RATE: 18 BRPM | SYSTOLIC BLOOD PRESSURE: 132 MMHG

## 2022-01-20 DIAGNOSIS — F17.200 SMOKER: ICD-10-CM

## 2022-01-20 DIAGNOSIS — B34.9 VIRAL ILLNESS: Primary | ICD-10-CM

## 2022-01-20 DIAGNOSIS — Z20.822 PERSON UNDER INVESTIGATION FOR COVID-19: ICD-10-CM

## 2022-01-20 LAB — SARS-COV-2, COV2: NORMAL

## 2022-01-20 PROCEDURE — U0005 INFEC AGEN DETEC AMPLI PROBE: HCPCS

## 2022-01-20 PROCEDURE — 99281 EMR DPT VST MAYX REQ PHY/QHP: CPT

## 2022-01-20 RX ORDER — PREDNISONE 10 MG/1
TABLET ORAL
Qty: 21 TABLET | Refills: 0 | Status: SHIPPED | OUTPATIENT
Start: 2022-01-20 | End: 2022-03-23 | Stop reason: ALTCHOICE

## 2022-01-20 RX ORDER — BENZONATATE 100 MG/1
100 CAPSULE ORAL
Qty: 30 CAPSULE | Refills: 0 | Status: SHIPPED | OUTPATIENT
Start: 2022-01-20 | End: 2022-01-27

## 2022-01-20 RX ORDER — ASCORBIC ACID 500 MG
500 TABLET ORAL 2 TIMES DAILY
Qty: 20 TABLET | Refills: 0 | Status: SHIPPED | OUTPATIENT
Start: 2022-01-20 | End: 2022-01-30

## 2022-01-20 NOTE — Clinical Note
[address]  [facility] EMERGENCY DEPT  [address]  [place] [address]  [phone]    Work/School Note    Date: 1/20/2022     To Whom It May concern:    [facility] was evaluated by the following provider(s):  Attending Provider: Nataliya Garza MD  Physician Assistant: Al Neville, [address] virus is suspected. Per the CDC guidelines we recommend home isolation until the following conditions are all met:    1. At least five days have passed since symptoms first appeared and/or had a close exposure,   2. After home isolation for five days, wearing a mask around others for the next five days,  3. At least 24 have passed since last fever without the use of fever-reducing medications and  4.  Symptoms (eg cough, shortness of breath) have improved      Sincerely,          Kaitlyn Obrien

## 2022-01-20 NOTE — DISCHARGE INSTRUCTIONS
It was a pleasure taking care of you at [facility] Emergency [facility] today. We know that when you come to [facility], you are entrusting us with your health, comfort, and safety. Our physicians and nurses honor that trust, and we truly appreciate the opportunity to care for you and your loved ones. We also value your feedback. If you receive a survey about your Emergency Department experience today, please fill it out. We care about our patients' feedback, and we listen to what you have to say. Thank you!

## 2022-01-20 NOTE — ED PROVIDER NOTES
EMERGENCY DEPARTMENT HISTORY AND PHYSICAL EXAM      Date: 1/20/2022  Patient Name: Renae Quach    History of Presenting Illness     Chief Complaint   Patient presents with    Concern For COVID-19 (Coronavirus)     Pt arrived via triage from home for body aches, fatigue, nausea, chills since tuesday PM. Pt states her husbands dept at work has closed down for several positive covid cases. Has not been tested, and  is not showing any symptoms at this time. History Provided By: Patient    HPI: Renae Quach, 40 y.o. female with PMHx significant as below, presents by POV to the ED with cc of myalgia, headache, chills, fatigue. She is certain that she may have COVID as her  has had lots of work based exposures. Her  is asymptomatic. She denies cough, congestion, fever. She is able to smell and taste. She is vaccinated for COVID. She has been taking her Percocet, Tylenol and Nyquil without relief. Occupation: sales    Travel: There has been no recent international or domestic travel. There are no other complaints, changes, or physical findings at this time. Social Hx: Tobacco (1 ppd), EtOH (social), Illicit drug use (denies)     PCP: Jett Richter MD    No current facility-administered medications on file prior to encounter. Current Outpatient Medications on File Prior to Encounter   Medication Sig Dispense Refill    zolpidem (AMBIEN) 10 mg tablet TAKE 2 TABLETS BY MOUTH EVERY DAY AT BEDTIME AS NEEDED FOR INSOMNIA 60 Tablet 2    ALPRAZolam (XANAX) 0.25 mg tablet TAKE 1/2 - 1 TABLET BY MOUTH TWICE DAILY AS NEEDED FOR ANXIETY 60 Tablet 2    [START ON 3/8/2022] oxyCODONE-acetaminophen (PERCOCET) 5-325 mg per tablet Take 1-2 Tablets by mouth every eight (8) hours as needed for Pain for up to 30 days. Max Daily Amount: 6 Tablets.  100 Tablet 0    escitalopram oxalate (LEXAPRO) 20 mg tablet TAKE 1 TABLET BY MOUTH EVERY DAY 90 Tablet 1    lithium carbonate 300 mg capsule TAKE 1 CAPSULE BY MOUTH EVERY MORNING AND 2 CAPSULES IN THE EVENINGS 270 Capsule 1    dextroamphetamine-amphetamine (ADDERALL) 10 mg tablet TAKE 2 TABS PO Q AM AND 1-2 TABS IN AFTERNOON 120 Tablet 0    lidocaine (LIDODERM) 5 % APPLY PATCH TO THE AFFECTED AREA FOR 12 HOURS A DAY AND REMOVE FOR 12 HOURS A DAY. 15 Patch 3    [DISCONTINUED] oxyCODONE-acetaminophen (PERCOCET) 5-325 mg per tablet Take 1-2 Tablets by mouth every eight (8) hours as needed for Pain for up to 30 days. Max Daily Amount: 6 Tablets. 100 Tablet 0    [DISCONTINUED] oxyCODONE-acetaminophen (PERCOCET) 5-325 mg per tablet Take 1-2 Tablets by mouth every eight (8) hours as needed for Pain for up to 30 days. Max Daily Amount: 6 Tablets. 100 Tablet 0    [DISCONTINUED] dextroamphetamine-amphetamine (ADDERALL) 10 mg tablet TAKE 2 TABS PO Q AM AND 1-2 TABS IN AFTERNOON 120 Tablet 0    [DISCONTINUED] dextroamphetamine-amphetamine (ADDERALL) 10 mg tablet TAKE 2 TABS PO Q AM AND 1-2 TABS IN AFTERNOON 120 Tablet 0    [DISCONTINUED] naloxone (NARCAN) 4 mg/actuation nasal spray Use 1 spray intranasally, then discard. Repeat with new spray every 2 min as needed for opioid overdose symptoms, alternating nostrils.  (Patient not taking: Reported on 9/20/2021) 1 Each 3       Past History     Past Medical History:  Past Medical History:   Diagnosis Date    ADD (attention deficit disorder) without hyperactivity- neuropsych testing + ADD -Dr. Anabella Avila 8/25/2016    Asthma     last attack 2 months ago    Bilateral carpal tunnel syndrome- R>>L 9/25/2016    Bipolar disorder with moderate depression ([facility] [address] 6/28/2017    Depression with anxiety 3/2/2010    Facet arthritis of lumbar region 12/3/2017    History of migraine headaches 11/25/2018    Insomnia     Lithium use 6/28/2017    Migraine 02/20/2010    Psychiatric disorder     Depression, anxiety    Psychophysiological insomnia 2/23/2016       Past Surgical History:  Past Surgical History:   Procedure Laterality Date  HX APPENDECTOMY  1996    HX BREAST REDUCTION  2002    HX ORTHOPAEDIC  2003    cervical disc    HX ORTHOPAEDIC      second right hand digit fx with pins index       Family History:  Family History   Problem Relation Age of Onset    Diabetes Father     Hypertension Father     Heart Attack Father     High Cholesterol Father     Diabetes Mother     High Cholesterol Mother        Social History:  Social History     Tobacco Use    Smoking status: Current Every Day Smoker     Packs/day: 1.50     Years: 18.00     Pack years: 27.00     Types: Cigarettes    Smokeless tobacco: Never Used   Vaping Use    Vaping Use: Never used   Substance Use Topics    Alcohol use: Yes     Comment: occasionally    Drug use: No       Allergies: Allergies   Allergen Reactions    Pcn [Penicillins] Other (comments)     As a child; tolerates Keflex           Review of Systems   Review of Systems   Constitutional: Positive for chills and fatigue. Negative for diaphoresis and fever. HENT: Negative for congestion, ear pain, rhinorrhea and sore throat. Respiratory: Negative for cough and shortness of breath. Cardiovascular: Negative for chest pain. Gastrointestinal: Negative for abdominal pain, constipation, diarrhea, nausea and vomiting. Genitourinary: Negative for difficulty urinating, dysuria, frequency and hematuria. Musculoskeletal: Positive for arthralgias and myalgias. Neurological: Positive for headaches. All other systems reviewed and are negative. Physical Exam   Physical Exam  Vitals and nursing note reviewed. Constitutional:       General: She is not in acute distress. Appearance: She is well-developed. She is not diaphoretic. Comments: 40 y.o. -American female    HENT:      Head: Normocephalic and atraumatic. Eyes:      General:         Right eye: No discharge. Left eye: No discharge.       Conjunctiva/sclera: Conjunctivae normal.   Cardiovascular:      Rate and Rhythm: Normal rate and regular rhythm. Heart sounds: Normal heart sounds. No murmur heard. Pulmonary:      Effort: Pulmonary effort is normal. No respiratory distress. Breath sounds: Normal breath sounds. Musculoskeletal:      Cervical back: Normal range of motion and neck supple. Skin:     General: Skin is warm and dry. Neurological:      Mental Status: She is alert and oriented to person, place, and time. Psychiatric:         Behavior: Behavior normal.         Diagnostic Study Results     Labs - COVID-19 testing pending at discharge. Radiologic Studies - None    Medical Decision Making   I am the first provider for this patient. I reviewed the vital signs, available nursing notes, past medical history, past surgical history, family history and social history. Vital Signs-Reviewed the patient's vital signs. Patient Vitals for the past 12 hrs:   Temp Pulse Resp BP SpO2   01/20/22 0748 99.3 °F (37.4 °C) 88 18 132/77 100 %       Records Reviewed: Nursing Notes    Provider Notes (Medical Decision Making): The evaluation, management, and disposition decisions of this patient have been made in the context of the current and rapidly developing COVID-19 pandemic. In my clinical judgment, the balance of clinical factors dictate expedited evaluation and discharge from the ED. I have carefully considered the risk and benefits of prolonged ED workups and/or hospitalization vs their risk of acquiring or transmitting COVID-19. I have made reasonable efforts to conserve healthcare resources and defer to safe outpatient alternatives when feasible. I have also discussed the importance of social distancing and proper hygiene to the patient. Based on an appropriate medical screening exam, there is currently no evidence of an emergency medical condition in the patient, and she is clinically safe for discharge. This was a collective decision made with the patient and/or any available family/caretakers.  They expressed understanding and agreement with the above. Patient's vitals are stable. She is not hypoxic. ED Course:   Initial assessment performed. The patients presenting problems have been discussed, and they are in agreement with the care plan formulated and outlined with them. I have encouraged them to ask questions as they arise throughout their visit. Tobacco Counseling  Discussed the risks of smoking and the benefits of smoking cessation as well as the long term sequelae of smoking with the patient. The patient verbalized their understanding. Counseled patient for approximately 3 minutes. Critical Care Time: None    Disposition:  DISCHARGE NOTE:  7:50 AM  The pt is ready for discharge. The pt's signs, symptoms, diagnosis, and discharge instructions have been discussed and pt has conveyed their understanding. The pt is to follow up as recommended or return to ER should their symptoms worsen. Plan has been discussed and pt is in agreement. PLAN:  1. Current Discharge Medication List        2. Follow-up Information    None       3. COVID Testing results will be called once available if positive. Patient should utilize My Chart to access results. 4. Take Tylenol as needed  5. Drink plenty of fluids  6. It is advised to lay prone for 3 hours daily  Return to ED if worse     Diagnosis     Clinical Impression: No diagnosis found. Please note that this dictation was completed with [employer], the computer voice recognition software. Quite often unanticipated grammatical, syntax, homophones, and other interpretive errors are inadvertently transcribed by the computer software. Please disregards these errors. Please excuse any errors that have escaped final proofreading.

## 2022-01-21 ENCOUNTER — PATIENT OUTREACH (OUTPATIENT)
Dept: CASE MANAGEMENT | Age: 47
End: 2022-01-21

## 2022-01-21 DIAGNOSIS — F98.8 ADD (ATTENTION DEFICIT DISORDER) WITHOUT HYPERACTIVITY: Chronic | ICD-10-CM

## 2022-01-21 LAB
SARS-COV-2, XPLCVT: DETECTED
SOURCE, COVRS: ABNORMAL

## 2022-01-21 NOTE — PROGRESS NOTES
1/21/2022  1:29 PM    Ambulatory Care Coordination ED COVID Follow up Call    Challenges to be reviewed by the provider   Additional needs identified to be addressed with provider no  none           Encounter was not routed to provider for escalation. Method of communication with provider : none    Discussed COVID-19 related testing which was available at this time. Test results were positive. Patient informed of results, if available? yes. Current Symptoms: fatigue, pain or aching joints, chills or shaking, sweating, diarrhea, no new symptoms and no worsening symptoms    Reviewed New or Changed Meds: Yes; prednisone, tessalon perles, zinc, vitamin c. (ordered at ED discharge). Do you have what you need at home?  Durable Medical Equipment ordered at discharge: None   Home Health/Outpatient orders at discharge: none    Pulse oximeter? No. Discussed and confirmed pulse oximeter discharge instructions and when to notify provider or seek emergency care. Patient education provided: Reviewed appropriate site of care based on symptoms and resources available to patient including: When to call 911. Follow up appointment recommended: yes; PCP (Dr. Pebbles Avila). If no appointment scheduled, scheduling offered: no.  Future Appointments   Date Time Provider Lucia Pelaez   3/23/2022  3:45 PM Hailey Kaplan MD [facility]       Interventions: Obtained and reviewed discharge summary and/or continuity of care documents, Education of patient/family/caregiver/guardian to support self-management-emergency warning signs and when to seek emergency medical attention and patient is advised to contact her PCP regarding scheduling outpatient follow-up, encouraged rest and hydration  Reviewed discharge instructions, medical action plan and red flags with patient who verbalized understanding. Provided contact information for future needs.  Plan for follow-up call in 7-10 days based on severity of symptoms and risk factors.   Plan for next call: follow-up assessment     Latesha De La Cruz RN

## 2022-01-23 RX ORDER — DEXTROAMPHETAMINE SACCHARATE, AMPHETAMINE ASPARTATE, DEXTROAMPHETAMINE SULFATE AND AMPHETAMINE SULFATE 2.5; 2.5; 2.5; 2.5 MG/1; MG/1; MG/1; MG/1
TABLET ORAL
Qty: 120 TABLET | Refills: 0 | Status: SHIPPED | OUTPATIENT
Start: 2022-02-21 | End: 2022-03-23 | Stop reason: SDUPTHER

## 2022-01-23 RX ORDER — DEXTROAMPHETAMINE SACCHARATE, AMPHETAMINE ASPARTATE, DEXTROAMPHETAMINE SULFATE AND AMPHETAMINE SULFATE 2.5; 2.5; 2.5; 2.5 MG/1; MG/1; MG/1; MG/1
TABLET ORAL
Qty: 120 TABLET | Refills: 0 | Status: SHIPPED | OUTPATIENT
Start: 2022-01-23 | End: 2022-03-23 | Stop reason: SDUPTHER

## 2022-01-28 ENCOUNTER — PATIENT OUTREACH (OUTPATIENT)
Dept: CASE MANAGEMENT | Age: 47
End: 2022-01-28

## 2022-01-28 NOTE — PROGRESS NOTES
1/28/2022  3:36 PM    Follow Up Call     Challenges to be reviewed by the provider   Additional needs identified to be addressed with provider: no  none           Encounter was not routed to provider for escalation. Method of communication with provider: none. Contacted the patient by telephone to follow up after ED. Status: significantly improved; \"I feel much better. \"  Interventions to address identified needs: patient is advised to contact her PCP regarding scheduling follow-up. [facility]):   Future Appointments   Date Time Provider Lucia Pelaez   3/23/2022  3:45 PM Omar Barbosa MD [facility]     Non-[facility] follow up appointment(s): N/A   Follow up appointment completed? no.     Provided contact information for future needs. No further follow-up call indicated based on severity of symptoms and risk factors.   Plan for next call: Rob Aguilar RN

## 2022-01-28 NOTE — PROGRESS NOTES
1/28/2022  11:36 AM    Patient outreach by this ACM today to perform follow-up assessment. Patient is unable to follow-up with ACM at this time and requests ACM outreach at a later time.

## 2022-02-10 DIAGNOSIS — Z79.899 MEDICATION MANAGEMENT: ICD-10-CM

## 2022-02-10 DIAGNOSIS — M50.30 DDD (DEGENERATIVE DISC DISEASE), CERVICAL: ICD-10-CM

## 2022-02-10 DIAGNOSIS — F11.90 OPIATE USE: ICD-10-CM

## 2022-02-10 DIAGNOSIS — M47.816 FACET ARTHRITIS OF LUMBAR REGION: ICD-10-CM

## 2022-02-10 DIAGNOSIS — M54.50 CHRONIC MIDLINE LOW BACK PAIN WITHOUT SCIATICA: ICD-10-CM

## 2022-02-10 DIAGNOSIS — G89.29 CHRONIC MIDLINE LOW BACK PAIN WITHOUT SCIATICA: ICD-10-CM

## 2022-02-10 RX ORDER — OXYCODONE AND ACETAMINOPHEN 5; 325 MG/1; MG/1
1-2 TABLET ORAL
Qty: 100 TABLET | Refills: 0 | Status: CANCELLED | OUTPATIENT
Start: 2022-02-10 | End: 2022-03-12

## 2022-02-10 NOTE — TELEPHONE ENCOUNTER
----- Message from Willow Nagel sent at 2/10/2022  1:20 PM EST -----  Regarding: Prescription   Hi Belén - I tried getting my Percocet filled today and the pharmacy said they dont have any prescription for this month, they do have one for March and had one for January but February didnt go through. The pharmacist said you can call if you need to, his name is Shelva Spurling or Dr. Andree Carcamo can submit the one for February.      Thanks,     Sree Doan

## 2022-03-19 PROBLEM — Z79.899 LITHIUM USE: Status: ACTIVE | Noted: 2017-06-28

## 2022-03-19 PROBLEM — F31.32 BIPOLAR 1 DISORDER, DEPRESSED, MODERATE (HCC): Status: ACTIVE | Noted: 2020-08-27

## 2022-03-19 PROBLEM — Z86.69 HISTORY OF MIGRAINE HEADACHES: Status: ACTIVE | Noted: 2018-11-25

## 2022-03-19 PROBLEM — F31.32 BIPOLAR DISORDER WITH MODERATE DEPRESSION (HCC): Status: ACTIVE | Noted: 2017-06-28

## 2022-03-20 PROBLEM — M47.816 FACET ARTHRITIS OF LUMBAR REGION: Status: ACTIVE | Noted: 2017-12-03

## 2022-03-20 PROBLEM — M17.0 BILATERAL PRIMARY OSTEOARTHRITIS OF KNEE: Status: ACTIVE | Noted: 2021-12-27

## 2022-03-23 ENCOUNTER — OFFICE VISIT (OUTPATIENT)
Dept: FAMILY MEDICINE CLINIC | Age: 47
End: 2022-03-23
Payer: COMMERCIAL

## 2022-03-23 DIAGNOSIS — G89.29 CHRONIC MIDLINE LOW BACK PAIN WITHOUT SCIATICA: ICD-10-CM

## 2022-03-23 DIAGNOSIS — M54.50 CHRONIC MIDLINE LOW BACK PAIN WITHOUT SCIATICA: ICD-10-CM

## 2022-03-23 DIAGNOSIS — Z79.899 LONG-TERM CURRENT USE OF LITHIUM: ICD-10-CM

## 2022-03-23 DIAGNOSIS — Z79.899 MEDICATION MANAGEMENT: ICD-10-CM

## 2022-03-23 DIAGNOSIS — F41.8 DEPRESSION WITH ANXIETY: ICD-10-CM

## 2022-03-23 DIAGNOSIS — M47.816 FACET ARTHRITIS OF LUMBAR REGION: ICD-10-CM

## 2022-03-23 DIAGNOSIS — Z12.31 BREAST CANCER SCREENING BY MAMMOGRAM: ICD-10-CM

## 2022-03-23 DIAGNOSIS — F98.8 ADD (ATTENTION DEFICIT DISORDER) WITHOUT HYPERACTIVITY: Primary | Chronic | ICD-10-CM

## 2022-03-23 DIAGNOSIS — F11.90 OPIATE USE: ICD-10-CM

## 2022-03-23 DIAGNOSIS — F51.04 PSYCHOPHYSIOLOGICAL INSOMNIA: ICD-10-CM

## 2022-03-23 DIAGNOSIS — M50.30 DDD (DEGENERATIVE DISC DISEASE), CERVICAL: ICD-10-CM

## 2022-03-23 DIAGNOSIS — F31.32 BIPOLAR 1 DISORDER, DEPRESSED, MODERATE (HCC): ICD-10-CM

## 2022-03-23 PROCEDURE — 99214 OFFICE O/P EST MOD 30 MIN: CPT | Performed by: FAMILY MEDICINE

## 2022-03-23 NOTE — PROGRESS NOTES
HISTORY OF PRESENT ILLNESS  HPI  Violet Jean a 25 M. o. female with a history of migraine with aura, bilateral CTS, cervical DDD, facet arthritis of lumbar region, ADD, depression with anxiety, insomnia, OCD and bipolar disorder, who presents to the office today for f/u of these health problems. Pt comes in to follow up on her Adderall and lithium. She takes one tablet lithium 1 in the morning and 2 tablets in the evening. She was on the same dose when she last had lab work done. She rarely misses any doses. Pt was seen at [facility] on 01/20 presenting with body aches, HA, fatigue, nausea, and chills. Pt tested positive for COVID. She denies any coughing or difficulty breathing. She had her initial series of the COVID vaccine at that point. She states that her sense of taste was gone for 2-3 days and then returned. Her symptoms have resolved. Pt denies unusual SOB, chest pain, and any other ER visits or hospitalizations since 01/20.          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 Jeanette Diaz 8/25/2016    Asthma     last attack 2 months ago    Bilateral carpal tunnel syndrome- R>>L 9/25/2016    Bipolar disorder with moderate depression ([facility] [address] 6/28/2017    Depression with anxiety 3/2/2010    Facet arthritis of lumbar region 12/3/2017    History of migraine headaches 11/25/2018    Insomnia     Lithium use 6/28/2017    Migraine 02/20/2010    Psychiatric disorder     Depression, anxiety    Psychophysiological insomnia 2/23/2016     Past Surgical History:   Procedure Laterality Date    HX APPENDECTOMY  1996    HX BREAST REDUCTION  2002    HX ORTHOPAEDIC  2003    cervical disc    HX ORTHOPAEDIC      second right hand digit fx with pins index     Current Outpatient Medications on File Prior to Visit   Medication Sig Dispense Refill    escitalopram oxalate (LEXAPRO) 20 mg tablet TAKE 1 TABLET BY MOUTH EVERY DAY 90 Tablet 1    lithium carbonate 300 mg capsule TAKE 1 CAPSULE BY MOUTH EVERY MORNING AND 2 CAPSULES IN THE EVENINGS 270 Capsule 1    [DISCONTINUED] lidocaine (LIDODERM) 5 % APPLY PATCH TO THE AFFECTED AREA FOR 12 HOURS A DAY AND REMOVE FOR 12 HOURS A DAY. (Patient not taking: Reported on 3/23/2022) 15 Patch 3     No current facility-administered medications on file prior to visit. Allergies   Allergen Reactions    Pcn [Penicillins] Other (comments)     As a child; tolerates Keflex       Family History   Problem Relation Age of Onset    Diabetes Father     Hypertension Father     Heart Attack Father     High Cholesterol Father     Diabetes Mother     High Cholesterol Mother      Social History     Socioeconomic History    Marital status:    Tobacco Use    Smoking status: Current Every Day Smoker     Packs/day: 1.50     Years: 18.00     Pack years: 27.00     Types: Cigarettes    Smokeless tobacco: Never Used   Vaping Use    Vaping Use: Never used   Substance and Sexual Activity    Alcohol use: Yes     Comment: occasionally    Drug use: No    Sexual activity: Yes     Partners: Male     Birth control/protection: Condom                 Review of Systems   Constitutional: Negative for chills, diaphoresis, fever, malaise/fatigue and weight loss. Eyes: Negative for blurred vision, double vision, pain and redness. Respiratory: Negative for cough, shortness of breath and wheezing. Cardiovascular: Negative for chest pain, palpitations, orthopnea, claudication, leg swelling and PND. Skin: Negative for itching and rash. Neurological: Negative for dizziness, tingling, tremors, sensory change, speech change, focal weakness, seizures, loss of consciousness, weakness and headaches.      Results for orders placed or performed during the hospital encounter of 01/20/22   SARS-COV-2   Result Value Ref Range    SARS-CoV-2 by PCR Please find results under separate order     SARS-COV-2   Result Value Ref Range    Specimen source Nasopharyngeal      SARS-CoV-2 Detected (A) NOTD               Physical Exam  Visit Vitals  /70 (BP 1 Location: Left arm, BP Patient Position: Sitting, BP Cuff Size: Large adult)   Pulse 89   Temp 98.3 °F (36.8 °C)   Resp 16   Ht 5' 4\" (1.626 m)   Wt 228 lb (103.4 kg)   SpO2 97%   BMI 39.14 kg/m²         Head: Normocephalic, without obvious abnormality, atraumatic  Eyes: conjunctivae/corneas clear. PERRL, EOM's intact. Neck: supple, symmetrical, trachea midline, no adenopathy, thyroid: not enlarged, symmetric, no tenderness/mass/nodules, no carotid bruit and no JVD  Lungs: clear to auscultation bilaterally  Heart: regular rate and rhythm, S1, S2 normal, no murmur, click, rub or gallop  Extremities: extremities normal, atraumatic, no cyanosis or edema  Pulses: 2+ and symmetric  Lymph nodes: Cervical, supraclavicular, and axillary nodes normal.  Neurologic: Grossly normal        ASSESSMENT and PLAN    ICD-10-CM ICD-9-CM    1. 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 Patrick Vance  F98.8 314.00 dextroamphetamine-amphetamine (ADDERALL) 10 mg tablet      dextroamphetamine-amphetamine (ADDERALL) 10 mg tablet      dextroamphetamine-amphetamine (ADDERALL) 10 mg tablet   2. Medication management  Z79.899 V58.69 oxyCODONE-acetaminophen (PERCOCET) 5-325 mg per tablet      oxyCODONE-acetaminophen (PERCOCET) 5-325 mg per tablet      oxyCODONE-acetaminophen (PERCOCET) 5-325 mg per tablet   3. Opiate use  F11.90 305.50 oxyCODONE-acetaminophen (PERCOCET) 5-325 mg per tablet      oxyCODONE-acetaminophen (PERCOCET) 5-325 mg per tablet      oxyCODONE-acetaminophen (PERCOCET) 5-325 mg per tablet   4. DDD (degenerative disc disease), cervical  M50.30 722.4 oxyCODONE-acetaminophen (PERCOCET) 5-325 mg per tablet      oxyCODONE-acetaminophen (PERCOCET) 5-325 mg per tablet      oxyCODONE-acetaminophen (PERCOCET) 5-325 mg per tablet   5.  Facet arthritis of lumbar region M47.816 721.3 oxyCODONE-acetaminophen (PERCOCET) 5-325 mg per tablet      oxyCODONE-acetaminophen (PERCOCET) 5-325 mg per tablet      oxyCODONE-acetaminophen (PERCOCET) 5-325 mg per tablet   6. Chronic midline low back pain without sciatica  M54.50 724.2 oxyCODONE-acetaminophen (PERCOCET) 5-325 mg per tablet    G89.29 338.29 oxyCODONE-acetaminophen (PERCOCET) 5-325 mg per tablet      oxyCODONE-acetaminophen (PERCOCET) 5-325 mg per tablet   7. Depression with anxiety  F41.8 300.4 ALPRAZolam (XANAX) 0.25 mg tablet   8. Psychophysiological insomnia  F51.04 307.42 zolpidem (AMBIEN) 10 mg tablet   9. Long-term current use of lithium  Z79.899 V58.69    10. Bipolar 1 disorder, depressed, moderate (HCC)  F31.32 296.52    11. Breast cancer screening by mammogram  Z12.31 V76.12 PATY MAMMO BI SCREENING INCL CAD     Diagnoses and all orders for this visit:    1. 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 Gomez Folds  -     dextroamphetamine-amphetamine (ADDERALL) 10 mg tablet; TAKE 2 TABS PO Q AM AND 1-2 TABS IN AFTERNOON  -     dextroamphetamine-amphetamine (ADDERALL) 10 mg tablet; TAKE 2 TABS PO Q AM AND 1-2 TABS IN AFTERNOON  -     dextroamphetamine-amphetamine (ADDERALL) 10 mg tablet; TAKE 2 TABS PO Q AM AND 1-2 TABS IN AFTERNOON    2. Medication management  -     oxyCODONE-acetaminophen (PERCOCET) 5-325 mg per tablet; Take 1-2 Tablets by mouth every eight (8) hours as needed for Pain for up to 30 days. Max Daily Amount: 6 Tablets. -     oxyCODONE-acetaminophen (PERCOCET) 5-325 mg per tablet; Take 1-2 Tablets by mouth every eight (8) hours as needed for Pain for up to 30 days. Max Daily Amount: 6 Tablets. -     oxyCODONE-acetaminophen (PERCOCET) 5-325 mg per tablet; Take 1-2 Tablets by mouth every eight (8) hours as needed for Pain for up to 30 days. Max Daily Amount: 6 Tablets. 3. Opiate use  -     oxyCODONE-acetaminophen (PERCOCET) 5-325 mg per tablet;  Take 1-2 Tablets by mouth every eight (8) hours as needed for Pain for up to 30 days. Max Daily Amount: 6 Tablets. -     oxyCODONE-acetaminophen (PERCOCET) 5-325 mg per tablet; Take 1-2 Tablets by mouth every eight (8) hours as needed for Pain for up to 30 days. Max Daily Amount: 6 Tablets. -     oxyCODONE-acetaminophen (PERCOCET) 5-325 mg per tablet; Take 1-2 Tablets by mouth every eight (8) hours as needed for Pain for up to 30 days. Max Daily Amount: 6 Tablets. 4. DDD (degenerative disc disease), cervical  -     oxyCODONE-acetaminophen (PERCOCET) 5-325 mg per tablet; Take 1-2 Tablets by mouth every eight (8) hours as needed for Pain for up to 30 days. Max Daily Amount: 6 Tablets. -     oxyCODONE-acetaminophen (PERCOCET) 5-325 mg per tablet; Take 1-2 Tablets by mouth every eight (8) hours as needed for Pain for up to 30 days. Max Daily Amount: 6 Tablets. -     oxyCODONE-acetaminophen (PERCOCET) 5-325 mg per tablet; Take 1-2 Tablets by mouth every eight (8) hours as needed for Pain for up to 30 days. Max Daily Amount: 6 Tablets. 5. Facet arthritis of lumbar region  -     oxyCODONE-acetaminophen (PERCOCET) 5-325 mg per tablet; Take 1-2 Tablets by mouth every eight (8) hours as needed for Pain for up to 30 days. Max Daily Amount: 6 Tablets. -     oxyCODONE-acetaminophen (PERCOCET) 5-325 mg per tablet; Take 1-2 Tablets by mouth every eight (8) hours as needed for Pain for up to 30 days. Max Daily Amount: 6 Tablets. -     oxyCODONE-acetaminophen (PERCOCET) 5-325 mg per tablet; Take 1-2 Tablets by mouth every eight (8) hours as needed for Pain for up to 30 days. Max Daily Amount: 6 Tablets. 6. Chronic midline low back pain without sciatica  -     oxyCODONE-acetaminophen (PERCOCET) 5-325 mg per tablet; Take 1-2 Tablets by mouth every eight (8) hours as needed for Pain for up to 30 days. Max Daily Amount: 6 Tablets. -     oxyCODONE-acetaminophen (PERCOCET) 5-325 mg per tablet;  Take 1-2 Tablets by mouth every eight (8) hours as needed for Pain for up to 30 days. Max Daily Amount: 6 Tablets. -     oxyCODONE-acetaminophen (PERCOCET) 5-325 mg per tablet; Take 1-2 Tablets by mouth every eight (8) hours as needed for Pain for up to 30 days. Max Daily Amount: 6 Tablets. 7. Depression with anxiety  -     ALPRAZolam (XANAX) 0.25 mg tablet; TAKE 1/2 - 1 TABLET BY MOUTH TWICE DAILY AS NEEDED FOR ANXIETY    8. Psychophysiological insomnia  -     zolpidem (AMBIEN) 10 mg tablet; TAKE 2 TABLETS BY MOUTH EVERY DAY AT BEDTIME AS NEEDED FOR INSOMNIA    9. Long-term current use of lithium    10. Bipolar 1 disorder, depressed, moderate ([facility] [address]    11. Breast cancer screening by mammogram  -     [facility] MAMMO BI SCREENING INCL CAD; Future      Follow-up and Dispositions    · Return in about 3 months (around 6/23/2022), or if anxiety or depression or arthritis symptoms worsen or fail to improve, for 3 month F/U chronic controlled substance use, F/U of obesity, anxiety/depression/bipolar condition. Follow-up and Disposition History         reviewed medications and side effects in detail  Please call my office if there are any questions- [phone]. Discussed expected course/resolution/complications of diagnosis in detail with patient. Medication risks/benefits/costs/interactions/alternatives discussed with patient. Pt was given an after visit summary which includes diagnoses, current medications & vitals. Pt expressed understanding with the diagnosis and plan. Patient to call if no better in 3 -4 days and prn new problems. BMI is significantly elevated- in the obese range. I reviewed diet, exercise and weight control.  Discussed weight control in detail, the importance of mainly decreased carbs, and for weight maintenance, exercise; discussed different diets and that it isn't as important to watch the type of foods as it is to decrease calorie intake no matter what type of diet you do, etc.       Total 30 minutes  re:  Regular exercise is very important to your health; it helps mentally, physically, socially; it prevents injuries if done properly. Exercise, even as simple as walking 20-30 minutes daily has major benefits to your health even though your \"numbers\" are the same in the lab. See if you can add this into your daily regimen and after a few months it will become a regular habit-\"just something you do,\" like brushing your teeth. A combination of aerobic exercise and strengthening and stretching is felt to be the best for you, so this should be your ultimate goal.   This can be done in the privacy of your home or in a group setting as at the gym  Some prefer having a , others prefer to do exercise in groups or individually. Do what \"works\" for you. You need to make it simple and \"fun,\" or you most likely will not continue it. Recommended a weekly \"heart check. \" I went into detail how to do this. Also, discussed symptoms of concern that were noted today in the note above, treatment options( including doing nothing), when to follow up before recommended time frame. Also, answered all questions. Pt's lithium level was slighlty low in December, so I encouraged pt to get all of her lithium doses. Her electrolytes and her liver and kidney function were normal.    We did talk about her having COVID in January and I told her the infection would act like a booster dose of the vaccine. She would normally be getting her booster dose now. I told her to determine when her next shot would be due. She should treat the January infection as a booster dose. So far, there is no additional booster dose recommended but there is some discussion of that. Until they finalize the recommendation, I would not do anything different. She mentioned her COVID infection and we also discussed the risk of smoking and I encouraged her very strongly to stop smoking.  When we see her in June, we will address that, and if unsuccessful, we will talk about options to help her stop. I would at this point try nicotine patch or gum which are available OTC and Rx, so she will let us know if she would rather have a Rx for that. This document was written by Dallas Monreal, as dictated by Alberta Tejada MD.   I have reviewed and agree with the above note and have made corrections where appropriate Clarke Quispe M.D.

## 2022-03-23 NOTE — PROGRESS NOTES
Chief Complaint   Patient presents with    Attention Deficit Disorder   1. \"Have you been to the ER, urgent care clinic since your last visit? Hospitalized since your last visit? \" Yes ER for covid in jan 2. \"Have you seen or consulted any other health care providers outside of the [address] [place] [address] since your last visit? \" No     3. For patients aged 39-70: Has the patient had a colonoscopy / FIT/ Cologuard? No      If the patient is female:    4. For patients aged 41-77: Has the patient had a mammogram within the past 2 years? No      5. For patients aged 21-65: Has the patient had a pap smear?  No

## 2022-03-24 RX ORDER — DEXTROAMPHETAMINE SACCHARATE, AMPHETAMINE ASPARTATE, DEXTROAMPHETAMINE SULFATE AND AMPHETAMINE SULFATE 2.5; 2.5; 2.5; 2.5 MG/1; MG/1; MG/1; MG/1
TABLET ORAL
Qty: 120 TABLET | Refills: 0 | Status: SHIPPED | OUTPATIENT
Start: 2022-03-24 | End: 2022-06-06 | Stop reason: SDUPTHER

## 2022-03-24 RX ORDER — OXYCODONE AND ACETAMINOPHEN 5; 325 MG/1; MG/1
1-2 TABLET ORAL
Qty: 100 TABLET | Refills: 0 | Status: SHIPPED | OUTPATIENT
Start: 2022-06-10 | End: 2022-06-06 | Stop reason: SDUPTHER

## 2022-03-24 RX ORDER — ZOLPIDEM TARTRATE 10 MG/1
TABLET ORAL
Qty: 60 TABLET | Refills: 2 | Status: SHIPPED | OUTPATIENT
Start: 2022-03-24 | End: 2022-04-01

## 2022-03-24 RX ORDER — OXYCODONE AND ACETAMINOPHEN 5; 325 MG/1; MG/1
1-2 TABLET ORAL
Qty: 100 TABLET | Refills: 0 | Status: SHIPPED | OUTPATIENT
Start: 2022-05-11 | End: 2022-06-06 | Stop reason: SDUPTHER

## 2022-03-24 RX ORDER — DEXTROAMPHETAMINE SACCHARATE, AMPHETAMINE ASPARTATE, DEXTROAMPHETAMINE SULFATE AND AMPHETAMINE SULFATE 2.5; 2.5; 2.5; 2.5 MG/1; MG/1; MG/1; MG/1
TABLET ORAL
Qty: 120 TABLET | Refills: 0 | Status: SHIPPED | OUTPATIENT
Start: 2022-05-22 | End: 2022-06-06 | Stop reason: SDUPTHER

## 2022-03-24 RX ORDER — ALPRAZOLAM 0.25 MG/1
TABLET ORAL
Qty: 60 TABLET | Refills: 2 | Status: SHIPPED | OUTPATIENT
Start: 2022-04-01 | End: 2022-03-29 | Stop reason: SDUPTHER

## 2022-03-24 RX ORDER — OXYCODONE AND ACETAMINOPHEN 5; 325 MG/1; MG/1
1-2 TABLET ORAL
Qty: 100 TABLET | Refills: 0 | Status: SHIPPED | OUTPATIENT
Start: 2022-04-12 | End: 2022-06-06 | Stop reason: SDUPTHER

## 2022-03-24 RX ORDER — DEXTROAMPHETAMINE SACCHARATE, AMPHETAMINE ASPARTATE, DEXTROAMPHETAMINE SULFATE AND AMPHETAMINE SULFATE 2.5; 2.5; 2.5; 2.5 MG/1; MG/1; MG/1; MG/1
TABLET ORAL
Qty: 120 TABLET | Refills: 0 | Status: SHIPPED | OUTPATIENT
Start: 2022-04-22 | End: 2022-06-06 | Stop reason: SDUPTHER

## 2022-03-26 VITALS
OXYGEN SATURATION: 97 % | RESPIRATION RATE: 16 BRPM | BODY MASS INDEX: 38.93 KG/M2 | DIASTOLIC BLOOD PRESSURE: 70 MMHG | TEMPERATURE: 98.3 F | HEIGHT: 64 IN | HEART RATE: 89 BPM | WEIGHT: 228 LBS | SYSTOLIC BLOOD PRESSURE: 135 MMHG

## 2022-03-29 DIAGNOSIS — F41.8 DEPRESSION WITH ANXIETY: ICD-10-CM

## 2022-03-29 RX ORDER — ALPRAZOLAM 0.25 MG/1
TABLET ORAL
Qty: 60 TABLET | Refills: 2 | Status: SHIPPED | OUTPATIENT
Start: 2022-04-01 | End: 2022-06-06 | Stop reason: SDUPTHER

## 2022-03-29 NOTE — TELEPHONE ENCOUNTER
----- Message from Julia Cordero sent at 3/29/2022  9:39 AM EDT -----  Regarding: Xanax Prescription   Good Morning Belén,     [facility] doesnt have Xanax in stock and said that by law they are unable to transfer my script to another location. Can you send only my Xanax prescription to the [facility] on [address]?      Ruby Rosenthal

## 2022-03-31 DIAGNOSIS — F51.04 PSYCHOPHYSIOLOGICAL INSOMNIA: ICD-10-CM

## 2022-04-01 RX ORDER — ZOLPIDEM TARTRATE 10 MG/1
TABLET ORAL
Qty: 60 TABLET | Refills: 2 | Status: SHIPPED | OUTPATIENT
Start: 2022-04-01 | End: 2022-09-07 | Stop reason: SDUPTHER

## 2022-04-11 ENCOUNTER — HOSPITAL ENCOUNTER (OUTPATIENT)
Dept: MAMMOGRAPHY | Age: 47
Discharge: HOME | End: 2022-04-11
Attending: FAMILY MEDICINE
Payer: COMMERCIAL

## 2022-04-11 DIAGNOSIS — Z12.31 BREAST CANCER SCREENING BY MAMMOGRAM: ICD-10-CM

## 2022-04-11 PROCEDURE — 77067 SCR MAMMO BI INCL CAD: CPT

## 2022-04-30 DIAGNOSIS — F31.32 BIPOLAR 1 DISORDER, DEPRESSED, MODERATE (HCC): ICD-10-CM

## 2022-04-30 RX ORDER — LITHIUM CARBONATE 300 MG/1
CAPSULE ORAL
Qty: 270 CAPSULE | Refills: 1 | Status: SHIPPED | OUTPATIENT
Start: 2022-04-30

## 2022-06-06 ENCOUNTER — VIRTUAL VISIT (OUTPATIENT)
Dept: FAMILY MEDICINE CLINIC | Age: 47
End: 2022-06-06
Payer: COMMERCIAL

## 2022-06-06 DIAGNOSIS — F42.9 OBSESSIVE-COMPULSIVE DISORDER, UNSPECIFIED TYPE: ICD-10-CM

## 2022-06-06 DIAGNOSIS — M17.0 BILATERAL PRIMARY OSTEOARTHRITIS OF KNEE: ICD-10-CM

## 2022-06-06 DIAGNOSIS — F42.2 MIXED OBSESSIONAL THOUGHTS AND ACTS: ICD-10-CM

## 2022-06-06 DIAGNOSIS — Z79.899 MEDICATION MANAGEMENT: ICD-10-CM

## 2022-06-06 DIAGNOSIS — N92.6 IRREGULAR MENSES: ICD-10-CM

## 2022-06-06 DIAGNOSIS — M47.816 FACET ARTHRITIS OF LUMBAR REGION: ICD-10-CM

## 2022-06-06 DIAGNOSIS — F11.90 OPIATE USE: ICD-10-CM

## 2022-06-06 DIAGNOSIS — R05.8 NOCTURNAL COUGH: Primary | ICD-10-CM

## 2022-06-06 DIAGNOSIS — G89.29 CHRONIC MIDLINE LOW BACK PAIN WITHOUT SCIATICA: ICD-10-CM

## 2022-06-06 DIAGNOSIS — F31.32 BIPOLAR 1 DISORDER, DEPRESSED, MODERATE (HCC): ICD-10-CM

## 2022-06-06 DIAGNOSIS — N95.1 MENOPAUSAL HOT FLUSHES: ICD-10-CM

## 2022-06-06 DIAGNOSIS — Z79.899 LITHIUM USE: ICD-10-CM

## 2022-06-06 DIAGNOSIS — M50.30 DDD (DEGENERATIVE DISC DISEASE), CERVICAL: ICD-10-CM

## 2022-06-06 DIAGNOSIS — F98.8 ADD (ATTENTION DEFICIT DISORDER) WITHOUT HYPERACTIVITY: Chronic | ICD-10-CM

## 2022-06-06 DIAGNOSIS — Z86.69 HISTORY OF MIGRAINE HEADACHES: ICD-10-CM

## 2022-06-06 DIAGNOSIS — M54.50 CHRONIC MIDLINE LOW BACK PAIN WITHOUT SCIATICA: ICD-10-CM

## 2022-06-06 DIAGNOSIS — F51.04 PSYCHOPHYSIOLOGICAL INSOMNIA: ICD-10-CM

## 2022-06-06 DIAGNOSIS — F41.8 DEPRESSION WITH ANXIETY: ICD-10-CM

## 2022-06-06 PROCEDURE — 99214 OFFICE O/P EST MOD 30 MIN: CPT | Performed by: FAMILY MEDICINE

## 2022-06-06 RX ORDER — PROMETHAZINE HYDROCHLORIDE AND CODEINE PHOSPHATE 6.25; 1 MG/5ML; MG/5ML
5-10 SOLUTION ORAL
Qty: 180 ML | Refills: 0 | Status: SHIPPED | OUTPATIENT
Start: 2022-06-06 | End: 2022-06-06 | Stop reason: SDUPTHER

## 2022-06-06 RX ORDER — DEXTROAMPHETAMINE SACCHARATE, AMPHETAMINE ASPARTATE, DEXTROAMPHETAMINE SULFATE AND AMPHETAMINE SULFATE 2.5; 2.5; 2.5; 2.5 MG/1; MG/1; MG/1; MG/1
TABLET ORAL
Qty: 120 TABLET | Refills: 0 | Status: SHIPPED | OUTPATIENT
Start: 2022-07-24 | End: 2022-09-19 | Stop reason: SDUPTHER

## 2022-06-06 RX ORDER — DEXTROAMPHETAMINE SACCHARATE, AMPHETAMINE ASPARTATE, DEXTROAMPHETAMINE SULFATE AND AMPHETAMINE SULFATE 2.5; 2.5; 2.5; 2.5 MG/1; MG/1; MG/1; MG/1
TABLET ORAL
Qty: 120 TABLET | Refills: 0 | Status: SHIPPED | OUTPATIENT
Start: 2022-08-23 | End: 2022-09-19 | Stop reason: SDUPTHER

## 2022-06-06 RX ORDER — OXYCODONE AND ACETAMINOPHEN 5; 325 MG/1; MG/1
1-2 TABLET ORAL
Qty: 100 TABLET | Refills: 0 | Status: SHIPPED | OUTPATIENT
Start: 2022-07-10 | End: 2022-09-19 | Stop reason: SDUPTHER

## 2022-06-06 RX ORDER — OXYCODONE AND ACETAMINOPHEN 5; 325 MG/1; MG/1
1-2 TABLET ORAL
Qty: 100 TABLET | Refills: 0 | Status: SHIPPED | OUTPATIENT
Start: 2022-06-10 | End: 2022-09-12 | Stop reason: SDUPTHER

## 2022-06-06 RX ORDER — DEXTROAMPHETAMINE SACCHARATE, AMPHETAMINE ASPARTATE, DEXTROAMPHETAMINE SULFATE AND AMPHETAMINE SULFATE 2.5; 2.5; 2.5; 2.5 MG/1; MG/1; MG/1; MG/1
TABLET ORAL
Qty: 120 TABLET | Refills: 0 | Status: SHIPPED | OUTPATIENT
Start: 2022-06-24 | End: 2022-09-19 | Stop reason: SDUPTHER

## 2022-06-06 RX ORDER — PROMETHAZINE HYDROCHLORIDE AND CODEINE PHOSPHATE 6.25; 1 MG/5ML; MG/5ML
5-10 SOLUTION ORAL
Qty: 180 ML | Refills: 0 | Status: SHIPPED | OUTPATIENT
Start: 2022-06-06 | End: 2022-06-16

## 2022-06-06 RX ORDER — OXYCODONE AND ACETAMINOPHEN 5; 325 MG/1; MG/1
1-2 TABLET ORAL
Qty: 100 TABLET | Refills: 0 | Status: SHIPPED | OUTPATIENT
Start: 2022-08-09 | End: 2022-09-19 | Stop reason: SDUPTHER

## 2022-06-06 RX ORDER — ALPRAZOLAM 0.25 MG/1
TABLET ORAL
Qty: 60 TABLET | Refills: 2 | Status: SHIPPED | OUTPATIENT
Start: 2022-06-06 | End: 2022-09-08 | Stop reason: SDUPTHER

## 2022-06-06 NOTE — PROGRESS NOTES
HISTORY OF PRESENT ILLNESS  HPI  Jordan Yeh a 12 O. o. female with a history of migraine with aura, bilateral CTS, cervical DDD, facet arthritis of lumbar region, ADD, depression with anxiety, insomnia, OCD and bipolar disorder. Pt is seen through virtual visit today c/o cough and for f/u of these health problems. Pt reports she has had a productive cough with clear material for 4-5 days. She denies any F/C but endorses some congestion. Her throat was bothering her but now it feels fine. Pt tested negative for COVID 3 days ago. Pt says the cough makes it difficult to sleep. Pt asks if she can check for menopause. Pt still has her uterus. Pt still has her cycle but it has become irregular and she has noticed significant night sweats    Pt denies unusual SOB, chest pain, and any recent ER visits or hospitalizations. Fe Felder, who was evaluated through a synchronous (real-time) audio-video encounter, and/or her healthcare decision maker, is aware that it is a billable service, which includes applicable co-pays, with coverage as determined by her insurance carrier. She provided verbal consent to proceed and patient identification was verified. This visit was conducted pursuant to the emergency declaration under the [address] [place] [address] authority and the [employer] Act. A caregiver was present when appropriate. Ability to conduct physical exam was limited. The patient was located at: Home: [address] [place] [phone]  The provider was located at:  [id number]    --Jaison Mcneil on 6/12/2022 at 3:48 PM                 Past Medical History:   Diagnosis Date    ADD (attention deficit disorder) without hyperactivity- neuropsych testing + ADD -Dr. Itzel Carmen 8/25/2016    Asthma     last attack 2 months ago    Bilateral carpal tunnel syndrome- R>>L 9/25/2016    Bipolar disorder with moderate depression ([facility] [address] 6/28/2017    Depression with anxiety 3/2/2010    Facet arthritis of lumbar region 12/3/2017    History of migraine headaches 11/25/2018    Insomnia     Lithium use 6/28/2017    Migraine 02/20/2010    Psychiatric disorder     Depression, anxiety    Psychophysiological insomnia 2/23/2016     Past Surgical History:   Procedure Laterality Date    HX APPENDECTOMY  1996    HX BREAST REDUCTION  2002    HX ORTHOPAEDIC  2003    cervical disc    HX ORTHOPAEDIC      second right hand digit fx with pins index     Current Outpatient Medications on File Prior to Visit   Medication Sig Dispense Refill    lithium carbonate 300 mg capsule TAKE 1 CAPSULE BY MOUTH EVERY MORNING AND 2 CAPSULES IN THE EVENINGS 270 Capsule 1    zolpidem (AMBIEN) 10 mg tablet TAKE 2 TABLETS BY MOUTH EVERY DAY AT BEDTIME AS NEEDED FOR INSOMNIA 60 Tablet 2    escitalopram oxalate (LEXAPRO) 20 mg tablet TAKE 1 TABLET BY MOUTH EVERY DAY 90 Tablet 1     No current facility-administered medications on file prior to visit.      Allergies   Allergen Reactions    Pcn [Penicillins] Other (comments)     As a child; tolerates Keflex       Family History   Problem Relation Age of Onset    Diabetes Father     Hypertension Father     Heart Attack Father     High Cholesterol Father     Diabetes Mother     High Cholesterol Mother     Breast Cancer Maternal Aunt      Social History     Socioeconomic History    Marital status:    Tobacco Use    Smoking status: Current Every Day Smoker     Packs/day: 1.50     Years: 18.00     Pack years: 27.00     Types: Cigarettes    Smokeless tobacco: Never Used   Vaping Use    Vaping Use: Never used   Substance and Sexual Activity    Alcohol use: Yes     Comment: occasionally    Drug use: No    Sexual activity: Yes     Partners: Male     Birth control/protection: Condom               Review of Systems   Constitutional: Positive for diaphoresis. Negative for chills, fever, malaise/fatigue and weight loss. Eyes: Negative for blurred vision, double vision, pain and redness. Respiratory: Positive for cough. Negative for shortness of breath and wheezing. Cardiovascular: Negative for chest pain, palpitations, orthopnea, claudication, leg swelling and PND. Skin: Negative for itching and rash. Neurological: Negative for dizziness, tingling, tremors, sensory change, speech change, focal weakness, seizures, loss of consciousness, weakness and headaches. Results for orders placed or performed during the hospital encounter of 01/20/22   SARS-COV-2   Result Value Ref Range    SARS-CoV-2 by PCR Please find results under separate order     SARS-COV-2   Result Value Ref Range    Specimen source Nasopharyngeal      SARS-CoV-2 Detected (A) NOTD               Physical Exam  There were no vitals taken for this visit. She appeared to be in no acute distress but did have an occasional cough  General: alert, cooperative, no distress   Mental  status: normal mood, behavior, speech, dress, motor activity, and thought processes, able to follow commands   HENT: NCAT   Neck: no visualized mass   Resp: no respiratory distress   Neuro: no gross deficits   Skin: no discoloration or lesions of concern on visible areas   Psychiatric: normal affect, consistent with stated mood, no evidence of hallucinations            ASSESSMENT and PLAN    ICD-10-CM ICD-9-CM    1. Nocturnal cough  R05.8 786.2 promethazine-codeine (PHENERGAN with CODEINE) 6.25-10 mg/5 mL syrup      DISCONTINUED: promethazine-codeine (PHENERGAN with CODEINE) 6.25-10 mg/5 mL syrup    probable viral URI   2. Depression with anxiety  F41.8 300.4 ALPRAZolam (XANAX) 0.25 mg tablet   3.  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 Jessica Ching  F98.8 314.00 dextroamphetamine-amphetamine (ADDERALL) 10 mg tablet      dextroamphetamine-amphetamine (ADDERALL) 10 mg tablet      dextroamphetamine-amphetamine (ADDERALL) 10 mg tablet   4. Medication management  Z79.899 V58.69 oxyCODONE-acetaminophen (PERCOCET) 5-325 mg per tablet      oxyCODONE-acetaminophen (PERCOCET) 5-325 mg per tablet      oxyCODONE-acetaminophen (PERCOCET) 5-325 mg per tablet   5. Opiate use  F11.90 305.50 oxyCODONE-acetaminophen (PERCOCET) 5-325 mg per tablet      oxyCODONE-acetaminophen (PERCOCET) 5-325 mg per tablet      oxyCODONE-acetaminophen (PERCOCET) 5-325 mg per tablet   6. DDD (degenerative disc disease), cervical  M50.30 722.4 oxyCODONE-acetaminophen (PERCOCET) 5-325 mg per tablet      oxyCODONE-acetaminophen (PERCOCET) 5-325 mg per tablet      oxyCODONE-acetaminophen (PERCOCET) 5-325 mg per tablet   7. Facet arthritis of lumbar region  M47.816 721.3 oxyCODONE-acetaminophen (PERCOCET) 5-325 mg per tablet      oxyCODONE-acetaminophen (PERCOCET) 5-325 mg per tablet      oxyCODONE-acetaminophen (PERCOCET) 5-325 mg per tablet   8. Chronic midline low back pain without sciatica  M54.50 724.2 oxyCODONE-acetaminophen (PERCOCET) 5-325 mg per tablet    G89.29 338.29 oxyCODONE-acetaminophen (PERCOCET) 5-325 mg per tablet      oxyCODONE-acetaminophen (PERCOCET) 5-325 mg per tablet   9. Irregular menses  C52.5 502.1 FOLLICLE STIMULATING HORMONE      TSH 3RD GENERATION      FOLLICLE STIMULATING HORMONE      TSH 3RD GENERATION   10. Bipolar 1 disorder, depressed, moderate (HCC)  F31.32 296.52    11. Psychophysiological insomnia  F51.04 307.42    12. Mixed obsessional thoughts and acts  F42.2 300.3    13. History of migraine headaches  Z86.69 V12.49    14. Lithium use  Z79.899 V58.69    15. Bilateral primary osteoarthritis of knee  M17.0 715.16    16. Obsessive-compulsive disorder, unspecified type  F42.9 300.3    17. Menopausal hot flushes  N95.1 627.2      Diagnoses and all orders for this visit:    1.  Nocturnal cough  Comments:  probable viral URI  Orders:  -     promethazine-codeine (PHENERGAN with CODEINE) 6.25-10 mg/5 mL syrup; Take 5-10 mL by mouth four (4) times daily as needed for Cough for up to 10 days. Max Daily Amount: 40 mL. 2. Depression with anxiety  -     ALPRAZolam (XANAX) 0.25 mg tablet; TAKE 1/2 - 1 TABLET BY MOUTH TWICE DAILY AS NEEDED FOR ANXIETY    3. 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 Ella Xiao  -     dextroamphetamine-amphetamine (ADDERALL) 10 mg tablet; TAKE 2 TABS PO Q AM AND 1-2 TABS IN AFTERNOON  -     dextroamphetamine-amphetamine (ADDERALL) 10 mg tablet; TAKE 2 TABS PO Q AM AND 1-2 TABS IN AFTERNOON  -     dextroamphetamine-amphetamine (ADDERALL) 10 mg tablet; TAKE 2 TABS PO Q AM AND 1-2 TABS IN AFTERNOON    4. Medication management  -     oxyCODONE-acetaminophen (PERCOCET) 5-325 mg per tablet; Take 1-2 Tablets by mouth every eight (8) hours as needed for Pain for up to 30 days. Max Daily Amount: 6 Tablets. -     oxyCODONE-acetaminophen (PERCOCET) 5-325 mg per tablet; Take 1-2 Tablets by mouth every eight (8) hours as needed for Pain for up to 30 days. Max Daily Amount: 6 Tablets. -     oxyCODONE-acetaminophen (PERCOCET) 5-325 mg per tablet; Take 1-2 Tablets by mouth every eight (8) hours as needed for Pain for up to 30 days. Max Daily Amount: 6 Tablets. 5. Opiate use  -     oxyCODONE-acetaminophen (PERCOCET) 5-325 mg per tablet; Take 1-2 Tablets by mouth every eight (8) hours as needed for Pain for up to 30 days. Max Daily Amount: 6 Tablets. -     oxyCODONE-acetaminophen (PERCOCET) 5-325 mg per tablet; Take 1-2 Tablets by mouth every eight (8) hours as needed for Pain for up to 30 days. Max Daily Amount: 6 Tablets. -     oxyCODONE-acetaminophen (PERCOCET) 5-325 mg per tablet; Take 1-2 Tablets by mouth every eight (8) hours as needed for Pain for up to 30 days. Max Daily Amount: 6 Tablets. 6. DDD (degenerative disc disease), cervical  -     oxyCODONE-acetaminophen (PERCOCET) 5-325 mg per tablet;  Take 1-2 Tablets by mouth every eight (8) hours as needed for Pain for up to 30 days. Max Daily Amount: 6 Tablets. -     oxyCODONE-acetaminophen (PERCOCET) 5-325 mg per tablet; Take 1-2 Tablets by mouth every eight (8) hours as needed for Pain for up to 30 days. Max Daily Amount: 6 Tablets. -     oxyCODONE-acetaminophen (PERCOCET) 5-325 mg per tablet; Take 1-2 Tablets by mouth every eight (8) hours as needed for Pain for up to 30 days. Max Daily Amount: 6 Tablets. 7. Facet arthritis of lumbar region  -     oxyCODONE-acetaminophen (PERCOCET) 5-325 mg per tablet; Take 1-2 Tablets by mouth every eight (8) hours as needed for Pain for up to 30 days. Max Daily Amount: 6 Tablets. -     oxyCODONE-acetaminophen (PERCOCET) 5-325 mg per tablet; Take 1-2 Tablets by mouth every eight (8) hours as needed for Pain for up to 30 days. Max Daily Amount: 6 Tablets. -     oxyCODONE-acetaminophen (PERCOCET) 5-325 mg per tablet; Take 1-2 Tablets by mouth every eight (8) hours as needed for Pain for up to 30 days. Max Daily Amount: 6 Tablets. 8. Chronic midline low back pain without sciatica  -     oxyCODONE-acetaminophen (PERCOCET) 5-325 mg per tablet; Take 1-2 Tablets by mouth every eight (8) hours as needed for Pain for up to 30 days. Max Daily Amount: 6 Tablets. -     oxyCODONE-acetaminophen (PERCOCET) 5-325 mg per tablet; Take 1-2 Tablets by mouth every eight (8) hours as needed for Pain for up to 30 days. Max Daily Amount: 6 Tablets. -     oxyCODONE-acetaminophen (PERCOCET) 5-325 mg per tablet; Take 1-2 Tablets by mouth every eight (8) hours as needed for Pain for up to 30 days. Max Daily Amount: 6 Tablets. 9. Irregular menses  -     FOLLICLE STIMULATING HORMONE; Future  -     TSH 3RD GENERATION; Future    10. Bipolar 1 disorder, depressed, moderate ([address]    11. Psychophysiological insomnia    12. Mixed obsessional thoughts and acts    13. History of migraine headaches    14. Lithium use    15. Bilateral primary osteoarthritis of knee    16. Obsessive-compulsive disorder, unspecified type    17. Menopausal hot flushes      Follow-up and Dispositions    · Return in about 3 months (around 9/6/2022) for 3 month F/U chronic controlled substance use, insomnia, anxiety/depression. reviewed medications and side effects in detail  Please call my office if there are any questions- [phone]. Discussed expected course/resolution/complications of diagnosis in detail with patient. Medication risks/benefits/costs/interactions/alternatives discussed with patient. Pt was given an after visit summary which includes diagnoses, current medications & vitals. Pt expressed understanding with the diagnosis and plan. Patient to call if no better in 3 -4 days and prn new problems. BMI is significantly elevated- in the obese range. I reviewed diet, exercise and weight control. Discussed weight control in detail, the importance of mainly decreased carbs, and for weight maintenance, exercise; discussed different diets and that it isn't as important to watch the type of foods as it is to decrease calorie intake no matter what type of diet you do, etc.       Total 30 minutes  re:  Regular exercise is very important to your health; it helps mentally, physically, socially; it prevents injuries if done properly. Exercise, even as simple as walking 20-30 minutes daily has major benefits to your health even though your \"numbers\" are the same in the lab. See if you can add this into your daily regimen and after a few months it will become a regular habit-\"just something you do,\" like brushing your teeth. A combination of aerobic exercise and strengthening and stretching is felt to be the best for you, so this should be your ultimate goal.   This can be done in the privacy of your home or in a group setting as at the gym  Some prefer having a , others prefer to do exercise in groups or individually. Do what \"works\" for you.  You need to make it simple and \"fun,\" or you most likely will not continue it. Recommended a weekly \"heart check. \" I went into detail how to do this. Also, discussed symptoms of concern that were noted today in the note above, treatment options( including doing nothing), when to follow up before recommended time frame. Also, answered all questions. Probably has a viral infection. We will treat symptoms with Mucinex DM and if she has more trouble at nighttime, she can switch to Phenergan with codeine. She is aware this medicine can cause drowsiness and she should not be operating machinery. Pt should call back if her cough becomes productive with yellow or green material, or has streaks of blood. I sent in her medicine for ADD and her medicines she takes for migraines and chronic back pain. Also refilled her Xanax. There has been no change in her use of these medicines. For her concern about menopause, we will check her [facility] which she can come in lab only. Because of her complaint of sweating, we will also check her thyroid. This document was written by Jas Mehta, as dictated by Evie Quinn MD  I have reviewed and agree with the above note and have made corrections where appropriate Clarke Phillips

## 2022-06-06 NOTE — PROGRESS NOTES
Chief Complaint   Patient presents with    Attention Deficit Disorder    Cough     neg for covid, clear no fever   1. \"Have you been to the ER, urgent care clinic since your last visit? Hospitalized since your last visit? \" No    2. \"Have you seen or consulted any other health care providers outside of the [address] [place] [address] since your last visit? \" No     3. For patients aged 39-70: Has the patient had a colonoscopy / FIT/ Cologuard? No      If the patient is female:    4. For patients aged 41-77: Has the patient had a mammogram within the past 2 years? Yes - no Care Gap present      5. For patients aged 21-65: Has the patient had a pap smear?  No

## 2022-06-12 DIAGNOSIS — F31.32 BIPOLAR 1 DISORDER, DEPRESSED, MODERATE (HCC): ICD-10-CM

## 2022-06-13 RX ORDER — ESCITALOPRAM OXALATE 20 MG/1
TABLET ORAL
Qty: 90 TABLET | Refills: 1 | Status: SHIPPED | OUTPATIENT
Start: 2022-06-13

## 2022-09-07 DIAGNOSIS — F51.04 PSYCHOPHYSIOLOGICAL INSOMNIA: ICD-10-CM

## 2022-09-08 DIAGNOSIS — F41.8 DEPRESSION WITH ANXIETY: ICD-10-CM

## 2022-09-08 RX ORDER — ZOLPIDEM TARTRATE 10 MG/1
TABLET ORAL
Qty: 60 TABLET | Refills: 2 | Status: SHIPPED | OUTPATIENT
Start: 2022-09-08 | End: 2022-09-19 | Stop reason: ALTCHOICE

## 2022-09-09 RX ORDER — ALPRAZOLAM 0.25 MG/1
TABLET ORAL
Qty: 60 TABLET | Refills: 2 | Status: SHIPPED | OUTPATIENT
Start: 2022-09-09

## 2022-09-11 ENCOUNTER — PATIENT MESSAGE (OUTPATIENT)
Dept: FAMILY MEDICINE CLINIC | Age: 47
End: 2022-09-11

## 2022-09-12 DIAGNOSIS — M47.816 FACET ARTHRITIS OF LUMBAR REGION: ICD-10-CM

## 2022-09-12 DIAGNOSIS — M50.30 DDD (DEGENERATIVE DISC DISEASE), CERVICAL: ICD-10-CM

## 2022-09-12 DIAGNOSIS — G89.29 CHRONIC MIDLINE LOW BACK PAIN WITHOUT SCIATICA: ICD-10-CM

## 2022-09-12 DIAGNOSIS — F11.90 OPIATE USE: ICD-10-CM

## 2022-09-12 DIAGNOSIS — M54.50 CHRONIC MIDLINE LOW BACK PAIN WITHOUT SCIATICA: ICD-10-CM

## 2022-09-12 DIAGNOSIS — Z79.899 MEDICATION MANAGEMENT: ICD-10-CM

## 2022-09-12 RX ORDER — OXYCODONE AND ACETAMINOPHEN 5; 325 MG/1; MG/1
1-2 TABLET ORAL
Qty: 100 TABLET | Refills: 0 | Status: SHIPPED | OUTPATIENT
Start: 2022-09-12 | End: 2022-09-19 | Stop reason: SDUPTHER

## 2022-09-12 NOTE — TELEPHONE ENCOUNTER
----- Message from Ignacia Lopes sent at 9/11/2022 10:28 AM EDT -----  Regarding: Refill  Dr. Yayo Rowe,     My pharmacy is stating that I dont have any refills for my Percocet. Can you please send over a refill request for this month? My appointment with you is on the 19th.      Thanks,    Faye Rosenthal

## 2022-09-12 NOTE — TELEPHONE ENCOUNTER
From: Rebe[facility] Heart  To: Andrew Lopez MD  Sent: 9/11/2022 10:28 AM EDT  Subject: Refill    Dr. Ellen Barber,     My pharmacy is stating that I dont have any refills for my Percocet. Can you please send over a refill request for this month? My appointment with you is on the 19th.      Thanks,    Emilie Johnson

## 2022-09-19 ENCOUNTER — OFFICE VISIT (OUTPATIENT)
Dept: FAMILY MEDICINE CLINIC | Age: 47
End: 2022-09-19
Payer: COMMERCIAL

## 2022-09-19 VITALS
RESPIRATION RATE: 16 BRPM | WEIGHT: 225 LBS | OXYGEN SATURATION: 98 % | HEART RATE: 95 BPM | TEMPERATURE: 98.4 F | SYSTOLIC BLOOD PRESSURE: 126 MMHG | HEIGHT: 64 IN | DIASTOLIC BLOOD PRESSURE: 78 MMHG | BODY MASS INDEX: 38.41 KG/M2

## 2022-09-19 DIAGNOSIS — Z79.899 LONG-TERM CURRENT USE OF LITHIUM: ICD-10-CM

## 2022-09-19 DIAGNOSIS — N92.6 IRREGULAR MENSES: ICD-10-CM

## 2022-09-19 DIAGNOSIS — E66.01 SEVERE OBESITY (BMI 35.0-39.9) WITH COMORBIDITY (HCC): ICD-10-CM

## 2022-09-19 DIAGNOSIS — M47.816 FACET ARTHRITIS OF LUMBAR REGION: ICD-10-CM

## 2022-09-19 DIAGNOSIS — Z79.899 LITHIUM USE: ICD-10-CM

## 2022-09-19 DIAGNOSIS — F98.8 ADD (ATTENTION DEFICIT DISORDER) WITHOUT HYPERACTIVITY: Chronic | ICD-10-CM

## 2022-09-19 DIAGNOSIS — F51.04 PSYCHOPHYSIOLOGICAL INSOMNIA: ICD-10-CM

## 2022-09-19 DIAGNOSIS — F31.32 BIPOLAR 1 DISORDER, DEPRESSED, MODERATE (HCC): ICD-10-CM

## 2022-09-19 DIAGNOSIS — N95.1 MENOPAUSAL HOT FLUSHES: Primary | ICD-10-CM

## 2022-09-19 DIAGNOSIS — M54.50 CHRONIC MIDLINE LOW BACK PAIN WITHOUT SCIATICA: ICD-10-CM

## 2022-09-19 DIAGNOSIS — E78.5 HYPERLIPIDEMIA LDL GOAL <130: ICD-10-CM

## 2022-09-19 DIAGNOSIS — G89.29 CHRONIC MIDLINE LOW BACK PAIN WITHOUT SCIATICA: ICD-10-CM

## 2022-09-19 DIAGNOSIS — F17.210 CIGARETTE SMOKER MOTIVATED TO QUIT: ICD-10-CM

## 2022-09-19 DIAGNOSIS — F11.90 OPIATE USE: ICD-10-CM

## 2022-09-19 DIAGNOSIS — M50.30 DDD (DEGENERATIVE DISC DISEASE), CERVICAL: ICD-10-CM

## 2022-09-19 DIAGNOSIS — F41.8 DEPRESSION WITH ANXIETY: ICD-10-CM

## 2022-09-19 DIAGNOSIS — Z79.899 MEDICATION MANAGEMENT: ICD-10-CM

## 2022-09-19 DIAGNOSIS — M17.0 BILATERAL PRIMARY OSTEOARTHRITIS OF KNEE: ICD-10-CM

## 2022-09-19 PROCEDURE — 99215 OFFICE O/P EST HI 40 MIN: CPT | Performed by: FAMILY MEDICINE

## 2022-09-19 RX ORDER — DEXTROAMPHETAMINE SACCHARATE, AMPHETAMINE ASPARTATE, DEXTROAMPHETAMINE SULFATE AND AMPHETAMINE SULFATE 2.5; 2.5; 2.5; 2.5 MG/1; MG/1; MG/1; MG/1
TABLET ORAL
Qty: 120 TABLET | Refills: 0 | Status: SHIPPED | OUTPATIENT
Start: 2022-10-30

## 2022-09-19 RX ORDER — OXYCODONE AND ACETAMINOPHEN 5; 325 MG/1; MG/1
1-2 TABLET ORAL
Qty: 100 TABLET | Refills: 0 | Status: SHIPPED | OUTPATIENT
Start: 2022-12-11 | End: 2023-01-10

## 2022-09-19 RX ORDER — DEXTROAMPHETAMINE SACCHARATE, AMPHETAMINE ASPARTATE, DEXTROAMPHETAMINE SULFATE AND AMPHETAMINE SULFATE 2.5; 2.5; 2.5; 2.5 MG/1; MG/1; MG/1; MG/1
TABLET ORAL
Qty: 120 TABLET | Refills: 0 | Status: SHIPPED | OUTPATIENT
Start: 2022-09-30

## 2022-09-19 RX ORDER — DEXTROAMPHETAMINE SACCHARATE, AMPHETAMINE ASPARTATE, DEXTROAMPHETAMINE SULFATE AND AMPHETAMINE SULFATE 2.5; 2.5; 2.5; 2.5 MG/1; MG/1; MG/1; MG/1
TABLET ORAL
Qty: 120 TABLET | Refills: 0 | Status: SHIPPED | OUTPATIENT
Start: 2022-11-29

## 2022-09-19 RX ORDER — NALOXONE HYDROCHLORIDE 4 MG/.1ML
SPRAY NASAL
Qty: 1 EACH | Refills: 5 | Status: SHIPPED | OUTPATIENT
Start: 2022-09-19

## 2022-09-19 RX ORDER — OXYCODONE AND ACETAMINOPHEN 5; 325 MG/1; MG/1
1-2 TABLET ORAL
Qty: 100 TABLET | Refills: 0 | Status: SHIPPED | OUTPATIENT
Start: 2022-10-12 | End: 2022-11-11

## 2022-09-19 RX ORDER — OXYCODONE AND ACETAMINOPHEN 5; 325 MG/1; MG/1
1-2 TABLET ORAL
Qty: 100 TABLET | Refills: 0 | Status: SHIPPED | OUTPATIENT
Start: 2022-11-11 | End: 2022-12-11

## 2022-09-19 NOTE — LETTER
CONTROLLED SUBSTANCE MEDICATION AGREEMENT     Patient Name: Juan Daniel Ugalde  Patient YOB: 1975     I understand, that controlled substance medications may be used to help better manage my symptoms and to improve my ability to function at home, work and in social settings. However, I also understand that these medications do have risks, which have been discussed with me, including possible development of physical or psychological dependence. I understand that successful treatment requires mutual trust and honesty between me and my provider. I understand and agree that following this Medication Agreement is necessary in continuing my provider-patient relationship and the success of my treatment plan. Explanation from my Provider: Benefits and Goals of Controlled Substance Medications: There are two potential goals for your treatment: (1) decreased pain and suffering (2) improved daily life functions. There are many possible treatments for your chronic condition(s). Alternatives such as physical therapy, yoga, massage, home daily exercise, meditation, relaxation techniques, injections, chiropractic manipulations, surgery, cognitive therapy, hypnosis and many medications that are not habit-forming may be used. Use of controlled substance medications may be helpful, but they are unlikely to resolve all symptoms or restore all function. Explanation from my Provider: Risks of Controlled Substance Medications:  Opioid pain medications: These medications can lead to problems such as addiction/dependence, sedation, lightheadedness/dizziness, memory issues, falls, constipation, nausea, or vomiting. They may also impair the ability to drive or operate machinery. Additionally, these medications may lower testosterone levels, leading to loss of bone strength, stamina and sex drive.   They may cause problems with breathing, sleep apnea and reduced coughing, which is especially dangerous for patients with lung disease. Overdose or dangerous interactions with alcohol and other medications may occur, leading to death. Hyperalgesia may develop, which means patients receiving opioids for the treatment of pain may become more sensitive to certain painful stimuli, and in some cases, experience pain from ordinarily non-painful stimuli. Women between the ages of 14-53 who could become pregnant should carefully weigh the risks and benefits of opioids with their physicians, as these medications increase the risk of pregnancy complications, including miscarriage,  delivery and stillbirth. It is also possible for babies to be born addicted to opioids. Opioid dependence withdrawal symptoms may include; feelings of uneasiness, increased pain, irritability, belly pain, diarrhea, sweats and goose-flesh. Benzodiazepines and non-benzodiazepine sleep medications: These medications can lead to problems such as addiction/dependence, sedation, fatigue, lightheadedness, dizziness, incoordination, falls, depression, hallucinations, and impaired judgment, memory and concentration. The ability to drive and operate machinery may also be affected. Abnormal sleep-related behaviors have been reported, including sleepwalking, driving, making telephone calls, eating, or having sex while not fully awake. These medications can suppress breathing and worsen sleep apnea, particularly when combined with alcohol or other sedating medications, potentially leading to death. Dependence withdrawal symptoms may include tremors, anxiety, hallucinations and seizures. Initials:_______  Stimulants:  Common adverse effects include addiction/dependence, increased blood  pressure and heart rate, decreased appetite, nausea, involuntary weight loss, insomnia,  irritability, and headaches.   These risks may increase when these medications are combined with other stimulants, such as caffeine pills or energy drinks, certain weight loss supplements and oral decongestants. Dependence withdrawal symptoms may include depressed mood, loss of interest, suicidal thoughts, anxiety, fatigue, appetite changes and agitation. Testosterone replacement therapy:  Potential side effects include increased risk of stroke and heart attack, blood clots, increased blood pressure, increased cholesterol, enlarged prostate, sleep apnea, irritability/aggression and other mood disorders, and decreased fertility. I agree and understand that I and my prescriber have the following rights and responsibilities regarding my treatment plan:     1. MY RIGHTS:  To be informed of my treatment and medication plan. To be an active participant in my health and wellbeing. 2. MY RESPONSIBILITY AND UNDERSTANDING FOR USE OF MEDICATIONS   I will take medications at the dose and frequency as directed. For my safety, I will not increase or change how I take my medications without the recommendation of my healthcare provider.  I will actively participate in any program recommended by my provider which may improve function, including social, physical, psychological programs.  I will not take my medications with alcohol or other drugs not prescribed to me. I understand that drinking alcohol with my medications increases the chances of side effects, including reduced breathing rate and could lead to personal injury when operating machinery.  I understand that if I have a history of substance use disorders, including alcohol or other illicit drugs, that I may be at increased risk of addiction to my medications.  I agree to notify my provider immediately if I should become pregnant so that my treatment plan can be adjusted.    I agree and understand that I shall only receive controlled substance medications from the prescriber that signed this agreement unless there is written agreement among other prescribers of controlled substances outlining the responsibility of the medications being prescribed.  I understand that the if the controlled medication is not helping to achieve goals, the dosage may be tapered and no longer prescribed. 3. MY RESPONSIBILITY FOR COMMUNICATION / PRESCRIPTION RENEWALS   I agree that all controlled substance medications that I take will be prescribed only by my provider. If another healthcare provider prescribes me medication in an emergency, I will notify my provider within seventy-two (72) hours.  I will arrange for refills at the prescribed interval ONLY during regular office hours. I will not ask for refills earlier than agreed, after-hours, on holidays or weekends. Refills may take up to 72 hours for processing and prescriptions to reach the pharmacy.  I will inform my other health care providers that I am taking these medications and of the existence of this [id number]. In the event of an emergency, I will provide the same information to the emergency department prescribers. Initials:_______  Daksha Melissa I will keep my provider updated on the pharmacy I am using for controlled medication prescription filling. 4. MY RESPONSIBILITY FOR PROTECTING MEDICATIONS   I will protect my prescriptions and medications. I understand that lost or misplaced prescriptions will not be replaced.  I will keep medications only for my own use and will not share them with others. I will keep all medications away from children.  I agree that if my medications are adjusted or discontinued, I will properly dispose of any remaining medications. I understand that I will be required to dispose of any remaining controlled medications as, directed by my prescriber, prior to being provided with any prescriptions for other controlled medications. Medication drop box locations can be found at: [email or web address]    5.  MY RESPONSIBILITY WITH ILLEGAL DRUGS    I will not use illegal or street drugs or another person's prescription medications not prescribed to me.  If there are identified addiction type symptoms, then referral to a program may be provided by my provider and I agree to follow through with this recommendation. 6. MY RESPONSIBILITY FOR COOPERATION WITH INVESTIGATIONS   I understand that my provider will comply with any applicable law and may discuss my use and/or possible misuse/abuse of controlled substances and alcohol, as appropriate, with any health care provider involved in my care, pharmacist, or legal authority.  I authorize my provider and pharmacy to cooperate fully with law enforcement agencies (as permitted by law) in the investigation of any possible misuse, sale, or other diversion of my controlled substances.  I agree to waive any applicable privilege or right of privacy or confidentiality with respect to these authorizations. 7. PROVIDERS RIGHT TO MONITOR FOR SAFETY: PRESCRIPTION MONITORING / DRUG TESTING   I consent to drug/toxicology screening and will submit to a drug screen upon my providers request to assure I am only taking the prescribed drugs for my safety monitoring. I understand that a drug screen is a laboratory test in which a sample of my urine, blood or saliva is checked to see what drugs I have been taking. This may entail an observed urine specimen, which means that a nurse or other health care provider may watch me provide urine, and I will cooperate if I am asked to provide an observed specimen.  I understand that my provider will check a copy of my State Prescription Monitoring Program () Report in order to safely prescribe medications.  Pill Counts: I consent to pill counts when requested. I may be asked to bring all my prescribed controlled substance medications, in their original bottles, to all of my scheduled appointments. In addition, my provider may ask me to come to the practice at any time for a random pill count. Initials:_______    8. TERMINATION OF THIS AGREEMENT  For my safety, my prescriber has the right to stop prescribing controlled substance medications and may end this agreement.  Conditions that may result in termination of this agreement:  a. I do not show any improvement in pain, or my activity has not improved. b. I develop rapid tolerance or loss of improvement, as described in my treatment plan.  c. I develop significant side effects from the medication. d. My behavior is not consistent with the responsibilities outlined above, thereby causing safety concerns to continue prescribing controlled substance medications. e. I fail to follow the terms of this agreement. f. Other:____________________________       UNDERSTANDING THIS MEDICATION AGREEMENT:    I have read the above and have had all my questions answered. For chronic disease management, I know that my symptoms can be managed with many types of treatments. A chronic medication trial may be part of my treatment, but I must be an active participant in my care. Medication therapy is only one part of my symptom management plan. In some cases, there may be limited scientific evidence to support the chronic use of certain medications to improve symptoms and daily function. Furthermore, in certain circumstances, there may be scientific information that suggests that the use of chronic controlled substances may worsen my symptoms and increase my risk of unintentional death directly related to this medication therapy. I know that if my provider feels my risk from controlled medications is greater than my benefit, I will have my controlled substance medication(s) compassionately lowered or removed altogether. I further agree to allow this office to contact my HIPAA contact if there are concerns about my safety and use of the controlled medications.    I have agreed to use the prescribed controlled substance medications to me as instructed by my provider and as stated in this Medication Agreement. My initial on each page and my signature below shows that I have read each page and I have had the opportunity to ask questions with answers provided by my provider.     Patient Name (Printed): _____________________________________  Patient Signature:  ______________________   Date: _____________    Prescriber Name (Printed): ___________________________________  Prescriber Signature: _____________________  Date: _____________

## 2022-09-19 NOTE — PROGRESS NOTES
Chief Complaint   Patient presents with    Pain (Chronic)    Attention Deficit Disorder    Sleep Problem     Change meds   1. \"Have you been to the ER, urgent care clinic since your last visit? Hospitalized since your last visit? \" No    2. \"Have you seen or consulted any other health care providers outside of the [address] [place] [address] since your last visit? \" No     3. For patients over 45: Has the patient had a colonoscopy? No     If the patient is female:    4. For patients over 40: Has the patient had a mammogram? Yes - no Care Gap present    5. For patients over 21: Has the patient had a pap smear?  Yes - no Care Gap present

## 2022-09-19 NOTE — PROGRESS NOTES
HISTORY OF PRESENT ILLNESS  LISBETH Church is a 56 y.o. female with a history of migraine with aura, bilateral CTS, cervical DDD, facet arthritis of lumbar region, ADD, depression with anxiety, insomnia, OCD and bipolar disorder, who presents to the office today for f/u of these health problems. Pt comes in to follow up on her medications. Pt is taking 2 tablets of Ambien 10 mg every night. She says this is not helping her sleep. She also has had some issues with sleep walking where she would eat large amounts and not remember doing that. Pt has not tried other sleep medications. Before starting the Ambien, pt would have troubles getting and staying asleep. Pt goes to bed at 10 pm/10:30 pm and sleeps until 4 am.     Pt asks about getting tested for menopause. Last month she had 2 period. Typically, she will have breast soreness and swelling before her period begins. Last month she had this, but before a second period started she had severe back pain. She has never had this before. Pt adds, for the past few months, she has had trouble with hot flashes and feeling cold during the day or at night. Pt denies unusual SOB, chest pain, and any recent ER visits or hospitalizations.          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 Lisa Torres 8/25/2016    Asthma     last attack 2 months ago    Bilateral carpal tunnel syndrome- R>>L 9/25/2016    Bipolar disorder with moderate depression ([facility] [address] 6/28/2017    Depression with anxiety 3/2/2010    Facet arthritis of lumbar region 12/3/2017    History of migraine headaches 11/25/2018    Insomnia     Lithium use 6/28/2017    Migraine 02/20/2010    Psychiatric disorder     Depression, anxiety    Psychophysiological insomnia 2/23/2016     Past Surgical History:   Procedure Laterality Date    HX APPENDECTOMY  1996    HX BREAST REDUCTION  2002    HX ORTHOPAEDIC  2003    cervical disc    HX ORTHOPAEDIC      second right hand digit fx with pins index     Current Outpatient Medications on File Prior to Visit   Medication Sig Dispense Refill    ALPRAZolam (XANAX) 0.25 mg tablet TAKE 1/2 - 1 TABLET BY MOUTH TWICE DAILY AS NEEDED FOR ANXIETY 60 Tablet 2    escitalopram oxalate (LEXAPRO) 20 mg tablet TAKE 1 TABLET BY MOUTH EVERY DAY 90 Tablet 1    lithium carbonate 300 mg capsule TAKE 1 CAPSULE BY MOUTH EVERY MORNING AND 2 CAPSULES IN THE EVENINGS 270 Capsule 1     No current facility-administered medications on file prior to visit. Allergies   Allergen Reactions    Pcn [Penicillins] Other (comments)     As a child; tolerates Keflex       Family History   Problem Relation Age of Onset    Diabetes Father     Hypertension Father     Heart Attack Father     High Cholesterol Father     Diabetes Mother     High Cholesterol Mother     Breast Cancer Maternal Aunt      Social History     Socioeconomic History    Marital status:    Tobacco Use    Smoking status: Every Day     Packs/day: 1.50     Years: 18.00     Pack years: 27.00     Types: Cigarettes    Smokeless tobacco: Never   Vaping Use    Vaping Use: Never used   Substance and Sexual Activity    Alcohol use: Yes     Comment: occasionally    Drug use: No    Sexual activity: Yes     Partners: Male     Birth control/protection: Condom                 Review of Systems   Constitutional:  Negative for chills, diaphoresis, fever, malaise/fatigue and weight loss. Eyes:  Negative for blurred vision, double vision, pain and redness. Respiratory:  Negative for cough, shortness of breath and wheezing. Cardiovascular:  Negative for chest pain, palpitations, orthopnea, claudication, leg swelling and PND. Skin:  Negative for itching and rash. Neurological:  Negative for dizziness, tingling, tremors, sensory change, speech change, focal weakness, seizures, loss of consciousness, weakness and headaches.    Results for orders placed or performed during the hospital encounter of 01/20/22   SARS-COV-2   Result Value Ref Range    SARS-CoV-2 by PCR Please find results under separate order     SARS-COV-2   Result Value Ref Range    Specimen source Nasopharyngeal      SARS-CoV-2 Detected (A) NOTD               Physical Exam  Visit Vitals  /78 (BP 1 Location: Left upper arm, BP Patient Position: Sitting, BP Cuff Size: Adult long)   Pulse 95   Temp 98.4 °F (36.9 °C) (Oral)   Resp 16   Ht 5' 4\" (1.626 m)   Wt 225 lb (102.1 kg)   SpO2 98%   BMI 38.62 kg/m²         Head: Normocephalic, without obvious abnormality, atraumatic  Eyes: conjunctivae/corneas clear. PERRL, EOM's intact. Neck: supple, symmetrical, trachea midline, no adenopathy, thyroid: not enlarged, symmetric, no tenderness/mass/nodules, no carotid bruit and no JVD  Lungs: clear to auscultation bilaterally  Heart: regular rate and rhythm, S1, S2 normal, no murmur, click, rub or gallop  Extremities: extremities normal, atraumatic, no cyanosis or edema  Pulses: 2+ and symmetric  Lymph nodes: Cervical, supraclavicular, and axillary nodes normal.  Neurologic: Grossly normal        ASSESSMENT and PLAN    ICD-10-CM ICD-9-CM    1. Menopausal hot flushes  N95.1 627.2 [address] [facility] AND       [facility] AND [facility]      2. 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 Annalisa Beckwith  F98.8 314.00 dextroamphetamine-amphetamine (ADDERALL) 10 mg tablet      dextroamphetamine-amphetamine (ADDERALL) 10 mg tablet      dextroamphetamine-amphetamine (ADDERALL) 10 mg tablet      3. Medication management  Z79.899 V58.69 oxyCODONE-acetaminophen (PERCOCET) 5-325 mg per tablet      oxyCODONE-acetaminophen (PERCOCET) 5-325 mg per tablet      oxyCODONE-acetaminophen (PERCOCET) 5-325 mg per tablet      10-DRUG SCREEN W/CONF      10-DRUG SCREEN W/CONF      4.  Opiate use  F11.90 305.50 oxyCODONE-acetaminophen (PERCOCET) 5-325 mg per tablet      oxyCODONE-acetaminophen (PERCOCET) 5-325 mg per tablet      oxyCODONE-acetaminophen (PERCOCET) 5-325 mg per tablet      10-DRUG SCREEN W/CONF      naloxone (NARCAN) 4 mg/actuation nasal spray      10-DRUG SCREEN W/CONF      5. DDD (degenerative disc disease), cervical  M50.30 722.4 oxyCODONE-acetaminophen (PERCOCET) 5-325 mg per tablet      oxyCODONE-acetaminophen (PERCOCET) 5-325 mg per tablet      oxyCODONE-acetaminophen (PERCOCET) 5-325 mg per tablet      6. Facet arthritis of lumbar region  M47.816 721.3 oxyCODONE-acetaminophen (PERCOCET) 5-325 mg per tablet      oxyCODONE-acetaminophen (PERCOCET) 5-325 mg per tablet      oxyCODONE-acetaminophen (PERCOCET) 5-325 mg per tablet      7. Chronic midline low back pain without sciatica  M54.50 724.2 oxyCODONE-acetaminophen (PERCOCET) 5-325 mg per tablet    G89.29 338.29 oxyCODONE-acetaminophen (PERCOCET) 5-325 mg per tablet      oxyCODONE-acetaminophen (PERCOCET) 5-325 mg per tablet      naloxone (NARCAN) 4 mg/actuation nasal spray      8. Bipolar 1 disorder, depressed, moderate (HCC)  F31.32 296.52 LITHIUM      METABOLIC PANEL, COMPREHENSIVE      LITHIUM      METABOLIC PANEL, COMPREHENSIVE      9. Hyperlipidemia LDL goal <130  L22.1 736.8 METABOLIC PANEL, COMPREHENSIVE      LIPID PANEL      METABOLIC PANEL, COMPREHENSIVE      LIPID PANEL      10. Severe obesity (BMI 35.0-39. 9) with comorbidity ([facility]  E66.01 278.01       11. Lithium use  Z79.899 V58.69 LITHIUM      METABOLIC PANEL, COMPREHENSIVE      LITHIUM      METABOLIC PANEL, COMPREHENSIVE      12. Psychophysiological insomnia  F51.04 307.42 suvorexant (BELSOMRA) 10 mg tablet      13. Irregular menses  N92.6 626.4       14. Depression with anxiety  F41.8 300.4       15. Bilateral primary osteoarthritis of knee  M17.0 715.16       16. Long-term current use of lithium  Z79.899 V58.69       17. Cigarette smoker motivated to quit  F17.210 305.1         Diagnoses and all orders for this visit:    1. Menopausal hot flushes  -     [facility] AND LH; Future    2.  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 Sola Shea  -     dextroamphetamine-amphetamine (ADDERALL) 10 mg tablet; TAKE 2 TABS PO Q AM AND 1-2 TABS IN AFTERNOON  -     dextroamphetamine-amphetamine (ADDERALL) 10 mg tablet; TAKE 2 TABS PO Q AM AND 1-2 TABS IN AFTERNOON  -     dextroamphetamine-amphetamine (ADDERALL) 10 mg tablet; TAKE 2 TABS PO Q AM AND 1-2 TABS IN AFTERNOON    3. Medication management  -     oxyCODONE-acetaminophen (PERCOCET) 5-325 mg per tablet; Take 1-2 Tablets by mouth every eight (8) hours as needed for Pain for up to 30 days. Max Daily Amount: 6 Tablets. -     oxyCODONE-acetaminophen (PERCOCET) 5-325 mg per tablet; Take 1-2 Tablets by mouth every eight (8) hours as needed for Pain for up to 30 days. Max Daily Amount: 6 Tablets. -     oxyCODONE-acetaminophen (PERCOCET) 5-325 mg per tablet; Take 1-2 Tablets by mouth every eight (8) hours as needed for Pain for up to 30 days. Max Daily Amount: 6 Tablets. -     10-DRUG SCREEN W/CONF; Future    4. Opiate use  -     oxyCODONE-acetaminophen (PERCOCET) 5-325 mg per tablet; Take 1-2 Tablets by mouth every eight (8) hours as needed for Pain for up to 30 days. Max Daily Amount: 6 Tablets. -     oxyCODONE-acetaminophen (PERCOCET) 5-325 mg per tablet; Take 1-2 Tablets by mouth every eight (8) hours as needed for Pain for up to 30 days. Max Daily Amount: 6 Tablets. -     oxyCODONE-acetaminophen (PERCOCET) 5-325 mg per tablet; Take 1-2 Tablets by mouth every eight (8) hours as needed for Pain for up to 30 days. Max Daily Amount: 6 Tablets. -     10-DRUG SCREEN W/CONF; Future  -     naloxone (NARCAN) 4 mg/actuation nasal spray; Use 1 spray intranasally, then discard. Repeat with new spray every 2 min as needed for opioid overdose symptoms, alternating nostrils. 5. DDD (degenerative disc disease), cervical  -     oxyCODONE-acetaminophen (PERCOCET) 5-325 mg per tablet; Take 1-2 Tablets by mouth every eight (8) hours as needed for Pain for up to 30 days. Max Daily Amount: 6 Tablets. -     oxyCODONE-acetaminophen (PERCOCET) 5-325 mg per tablet; Take 1-2 Tablets by mouth every eight (8) hours as needed for Pain for up to 30 days. Max Daily Amount: 6 Tablets. -     oxyCODONE-acetaminophen (PERCOCET) 5-325 mg per tablet; Take 1-2 Tablets by mouth every eight (8) hours as needed for Pain for up to 30 days. Max Daily Amount: 6 Tablets. 6. Facet arthritis of lumbar region  -     oxyCODONE-acetaminophen (PERCOCET) 5-325 mg per tablet; Take 1-2 Tablets by mouth every eight (8) hours as needed for Pain for up to 30 days. Max Daily Amount: 6 Tablets. -     oxyCODONE-acetaminophen (PERCOCET) 5-325 mg per tablet; Take 1-2 Tablets by mouth every eight (8) hours as needed for Pain for up to 30 days. Max Daily Amount: 6 Tablets. -     oxyCODONE-acetaminophen (PERCOCET) 5-325 mg per tablet; Take 1-2 Tablets by mouth every eight (8) hours as needed for Pain for up to 30 days. Max Daily Amount: 6 Tablets. 7. Chronic midline low back pain without sciatica  -     oxyCODONE-acetaminophen (PERCOCET) 5-325 mg per tablet; Take 1-2 Tablets by mouth every eight (8) hours as needed for Pain for up to 30 days. Max Daily Amount: 6 Tablets. -     oxyCODONE-acetaminophen (PERCOCET) 5-325 mg per tablet; Take 1-2 Tablets by mouth every eight (8) hours as needed for Pain for up to 30 days. Max Daily Amount: 6 Tablets. -     oxyCODONE-acetaminophen (PERCOCET) 5-325 mg per tablet; Take 1-2 Tablets by mouth every eight (8) hours as needed for Pain for up to 30 days. Max Daily Amount: 6 Tablets. -     naloxone (NARCAN) 4 mg/actuation nasal spray; Use 1 spray intranasally, then discard. Repeat with new spray every 2 min as needed for opioid overdose symptoms, alternating nostrils. 8. Bipolar 1 disorder, depressed, moderate ([address]  -     LITHIUM; Future  -     METABOLIC PANEL, COMPREHENSIVE; Future    9. Hyperlipidemia LDL goal <915  -     METABOLIC PANEL, COMPREHENSIVE;  Future  -     LIPID PANEL; Future    10. Severe obesity (BMI 35.0-39. 9) with comorbidity ([facility] [address]    11. Lithium use  -     LITHIUM; Future  -     METABOLIC PANEL, COMPREHENSIVE; Future    12. Psychophysiological insomnia  -     suvorexant (BELSOMRA) 10 mg tablet; Take 1 Tablet by mouth nightly as needed for Insomnia. Max Daily Amount: 10 mg.    13. Irregular menses    14. Depression with anxiety    15. Bilateral primary osteoarthritis of knee    16. Long-term current use of lithium    17. Cigarette smoker motivated to quit    Follow-up and Dispositions    Return in about 3 months (around 12/19/2022), or if bipolar symptoms worsen or fail to improve, for 3 mnth F/U chronic cntrledsubst use, arthritis, insomnia, low back pain, F/U anxiety, F/U of obesity. reviewed medications and side effects in detail  Please call my office if there are any questions- [phone]. Discussed expected course/resolution/complications of diagnosis in detail with patient. Medication risks/benefits/costs/interactions/alternatives discussed with patient. Pt was given an after visit summary which includes diagnoses, current medications & vitals. Pt expressed understanding with the diagnosis and plan. Patient to call if no better in 3 -4 days and prn new problems. BMI is significantly elevated- in the obese range. I reviewed diet, exercise and weight control. Discussed weight control in detail, the importance of mainly decreased carbs, and for weight maintenance, exercise; discussed different diets and that it isn't as important to watch the type of foods as it is to decrease calorie intake no matter what type of diet you do, etc.     Total 45 minutes  re:  Regular exercise is very important to your health; it helps mentally, physically, socially; it prevents injuries if done properly. Exercise, even as simple as walking 20-30 minutes daily has major benefits to your health even though your \"numbers\" are the same in the lab.  See if you can add this into your daily regimen and after a few months it will become a regular habit-\"just something you do,\" like brushing your teeth. A combination of aerobic exercise and strengthening and stretching is felt to be the best for you, so this should be your ultimate goal.   This can be done in the privacy of your home or in a group setting as at the gym  Some prefer having a , others prefer to do exercise in groups or individually. Do what \"works\" for you. You need to make it simple and \"fun,\" or you most likely will not continue it. Recommended a weekly \"heart check. \" I went into detail how to do this. Also, discussed symptoms of concern that were noted today in the note above, treatment options( including doing nothing), when to follow up before recommended time frame. Also, answered all questions. Because of her hot flashes, we will check her [facility]. I encouraged her once again to stop smoking. She does have a plan to use  nicotine patches. We talked briefly about estrogen use and the benefit versus risk, and that is something she should discuss with her gynecologist if she does have menopause. This document was written by Kaelyn Ferraro, as dictated by Chey Britt MD.   I have reviewed and agree with the above note and have made corrections where appropriate Clarke Zuleta M.D.

## 2022-09-20 LAB
ALBUMIN SERPL-MCNC: 4.4 G/DL (ref 3.8–4.8)
ALBUMIN/GLOB SERPL: 1.7 {RATIO} (ref 1.2–2.2)
ALP SERPL-CCNC: 102 IU/L (ref 44–121)
ALT SERPL-CCNC: 9 IU/L (ref 0–32)
AST SERPL-CCNC: 13 IU/L (ref 0–40)
BILIRUB SERPL-MCNC: 0.2 MG/DL (ref 0–1.2)
BUN SERPL-MCNC: 7 MG/DL (ref 6–24)
BUN/CREAT SERPL: 11 (ref 9–23)
CALCIUM SERPL-MCNC: 9.4 MG/DL (ref 8.7–10.2)
CHLORIDE SERPL-SCNC: 102 MMOL/L (ref 96–106)
CHOLEST SERPL-MCNC: 210 MG/DL (ref 100–199)
CO2 SERPL-SCNC: 21 MMOL/L (ref 20–29)
CREAT SERPL-MCNC: 0.66 MG/DL (ref 0.57–1)
EGFR: 109 ML/MIN/1.73
FSH SERPL-ACNC: 2.3 MIU/ML
GLOBULIN SER CALC-MCNC: 2.6 G/DL (ref 1.5–4.5)
GLUCOSE SERPL-MCNC: 106 MG/DL (ref 65–99)
HDLC SERPL-MCNC: 47 MG/DL
IMP & REVIEW OF LAB RESULTS: NORMAL
LDLC SERPL CALC-MCNC: 146 MG/DL (ref 0–99)
LH SERPL-ACNC: 1.1 MIU/ML
LITHIUM SERPL-SCNC: 0.5 MMOL/L (ref 0.5–1.2)
POTASSIUM SERPL-SCNC: 4.4 MMOL/L (ref 3.5–5.2)
PROT SERPL-MCNC: 7 G/DL (ref 6–8.5)
SODIUM SERPL-SCNC: 137 MMOL/L (ref 134–144)
TRIGL SERPL-MCNC: 92 MG/DL (ref 0–149)
TSH SERPL DL<=0.005 MIU/L-ACNC: 2.37 UIU/ML (ref 0.45–4.5)
VLDLC SERPL CALC-MCNC: 17 MG/DL (ref 5–40)

## 2022-09-21 ENCOUNTER — PATIENT MESSAGE (OUTPATIENT)
Dept: FAMILY MEDICINE CLINIC | Age: 47
End: 2022-09-21

## 2022-09-21 DIAGNOSIS — F51.04 PSYCHOPHYSIOLOGICAL INSOMNIA: ICD-10-CM

## 2022-09-23 ENCOUNTER — PATIENT MESSAGE (OUTPATIENT)
Dept: FAMILY MEDICINE CLINIC | Age: 47
End: 2022-09-23

## 2022-09-23 NOTE — TELEPHONE ENCOUNTER
Belén the pharmacy just called and said they dont have it in but Dr. Nando Sarah can call it in to the [address] [facility] [address]. The  number is  [phone] Its [address].

## 2022-10-06 DIAGNOSIS — F51.04 PSYCHOPHYSIOLOGICAL INSOMNIA: ICD-10-CM

## 2022-10-07 RX ORDER — ZOLPIDEM TARTRATE 10 MG/1
TABLET ORAL
Qty: 60 TABLET | Refills: 2 | Status: SHIPPED | OUTPATIENT
Start: 2022-10-07

## 2022-11-28 DIAGNOSIS — F41.8 DEPRESSION WITH ANXIETY: ICD-10-CM

## 2022-11-29 RX ORDER — ALPRAZOLAM 0.25 MG/1
TABLET ORAL
Qty: 60 TABLET | Refills: 0 | Status: SHIPPED | OUTPATIENT
Start: 2022-11-29

## 2022-12-22 DIAGNOSIS — F51.04 PSYCHOPHYSIOLOGICAL INSOMNIA: ICD-10-CM

## 2022-12-22 NOTE — TELEPHONE ENCOUNTER
MD Ananth Shannon,    Patient mychart request for refill zolpidem. Check .  (May be a little early?) Thanks, Jazmyne Noel    Last Visit: 9/19/22 MD Ananth Shannon  Next Appointment: 12/28/22 MD Ananth Shannon  Previous Refill Encounter(s): 10/7/22 60 + 2    Requested Prescriptions     Pending Prescriptions Disp Refills    zolpidem (AMBIEN) 10 mg tablet 60 Tablet 2     Sig: TAKE 2 TABLETS BY MOUTH EVERY DAY AT BEDTIME AS NEEDED FOR INSOMNIA     For Pharmacy Admin Tracking Only    Program: Medication Refill  CPA in place:   Recommendation Provided To:    Intervention Detail: New Rx: 1, reason: Patient Preference  Intervention Accepted By:   Bertha Corcoran Closed?:   Time Spent (min): 5

## 2022-12-23 RX ORDER — ZOLPIDEM TARTRATE 10 MG/1
TABLET ORAL
Qty: 60 TABLET | Refills: 2 | Status: SHIPPED | OUTPATIENT
Start: 2023-01-05

## 2022-12-28 ENCOUNTER — VIRTUAL VISIT (OUTPATIENT)
Dept: FAMILY MEDICINE CLINIC | Age: 47
End: 2022-12-28
Payer: COMMERCIAL

## 2022-12-28 ENCOUNTER — TELEPHONE (OUTPATIENT)
Dept: FAMILY MEDICINE CLINIC | Age: 47
End: 2022-12-28

## 2022-12-28 DIAGNOSIS — M50.30 DDD (DEGENERATIVE DISC DISEASE), CERVICAL: ICD-10-CM

## 2022-12-28 DIAGNOSIS — M54.50 CHRONIC MIDLINE LOW BACK PAIN WITHOUT SCIATICA: ICD-10-CM

## 2022-12-28 DIAGNOSIS — U07.1 COVID-19 VIRUS INFECTION: Primary | ICD-10-CM

## 2022-12-28 DIAGNOSIS — F98.8 ADD (ATTENTION DEFICIT DISORDER) WITHOUT HYPERACTIVITY: Chronic | ICD-10-CM

## 2022-12-28 DIAGNOSIS — Z79.899 MEDICATION MANAGEMENT: ICD-10-CM

## 2022-12-28 DIAGNOSIS — M47.816 FACET ARTHRITIS OF LUMBAR REGION: ICD-10-CM

## 2022-12-28 DIAGNOSIS — F41.8 DEPRESSION WITH ANXIETY: ICD-10-CM

## 2022-12-28 DIAGNOSIS — F11.90 OPIATE USE: ICD-10-CM

## 2022-12-28 DIAGNOSIS — G89.29 CHRONIC MIDLINE LOW BACK PAIN WITHOUT SCIATICA: ICD-10-CM

## 2022-12-28 RX ORDER — ALPRAZOLAM 0.25 MG/1
TABLET ORAL
Qty: 60 TABLET | Refills: 3 | Status: SHIPPED | OUTPATIENT
Start: 2022-12-28

## 2022-12-28 RX ORDER — OXYCODONE AND ACETAMINOPHEN 5; 325 MG/1; MG/1
1-2 TABLET ORAL
Qty: 100 TABLET | Refills: 0 | Status: SHIPPED | OUTPATIENT
Start: 2023-02-10 | End: 2023-03-12

## 2022-12-28 RX ORDER — OXYCODONE AND ACETAMINOPHEN 5; 325 MG/1; MG/1
1-2 TABLET ORAL
Qty: 100 TABLET | Refills: 0 | Status: SHIPPED | OUTPATIENT
Start: 2023-01-10 | End: 2023-02-09

## 2022-12-28 RX ORDER — DEXTROAMPHETAMINE SACCHARATE, AMPHETAMINE ASPARTATE, DEXTROAMPHETAMINE SULFATE AND AMPHETAMINE SULFATE 2.5; 2.5; 2.5; 2.5 MG/1; MG/1; MG/1; MG/1
TABLET ORAL
Qty: 120 TABLET | Refills: 0 | Status: SHIPPED | OUTPATIENT
Start: 2022-12-28

## 2022-12-28 RX ORDER — DEXTROAMPHETAMINE SACCHARATE, AMPHETAMINE ASPARTATE, DEXTROAMPHETAMINE SULFATE AND AMPHETAMINE SULFATE 2.5; 2.5; 2.5; 2.5 MG/1; MG/1; MG/1; MG/1
TABLET ORAL
Qty: 120 TABLET | Refills: 0 | Status: SHIPPED | OUTPATIENT
Start: 2023-01-27

## 2022-12-28 RX ORDER — DEXTROAMPHETAMINE SACCHARATE, AMPHETAMINE ASPARTATE, DEXTROAMPHETAMINE SULFATE AND AMPHETAMINE SULFATE 2.5; 2.5; 2.5; 2.5 MG/1; MG/1; MG/1; MG/1
TABLET ORAL
Qty: 120 TABLET | Refills: 0 | Status: SHIPPED | OUTPATIENT
Start: 2023-02-26

## 2022-12-28 RX ORDER — OXYCODONE AND ACETAMINOPHEN 5; 325 MG/1; MG/1
1-2 TABLET ORAL
Qty: 100 TABLET | Refills: 0 | Status: SHIPPED | OUTPATIENT
Start: 2023-03-12 | End: 2023-04-11

## 2022-12-28 NOTE — PROGRESS NOTES
HISTORY OF PRESENT ILLNESS  HPI  James Parada is a 56 y.o. female with a history of migraine with aura, bilateral CTS, cervical DDD, facet arthritis of lumbar region, ADD, depression with anxiety, insomnia, OCD and bipolar disorder. Pt is seen through virtual visit today c/o COVID infection and for f/u of these health problems. Pt is mainly seen for follow up for her chronic pain and anxiety medicines. She has not changed how she takes her medicines. Pt tested positive for COVID 2 days ago. She endorses HA, back ache, and cough. Her symptoms came on 3 days ago, Sunday. She has the initial series of the COVID vaccine and no booster. Pt denies unusual SOB, chest pain, and any recent ER visits or hospitalizations. James Parada, who was evaluated through a synchronous (real-time) audio-video encounter, and/or her healthcare decision maker, is aware that it is a billable service, which includes applicable co-pays, with coverage as determined by her insurance carrier. She provided verbal consent to proceed and patient identification was verified. This visit was conducted pursuant to the emergency declaration under the [address] [place] [address], [address] [place] [address] [facility] authority and the [employer] General Act. A caregiver was present when appropriate. Ability to conduct physical exam was limited. 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 Thalia Puri 8/25/2016    Asthma     last attack 2 months ago    Bilateral carpal tunnel syndrome- R>>L 9/25/2016    Bipolar disorder with moderate depression ([facility] [address] 6/28/2017    Depression with anxiety 3/2/2010 Facet arthritis of lumbar region 12/3/2017    History of migraine headaches 11/25/2018    Insomnia     Lithium use 6/28/2017    Migraine 02/20/2010    Psychiatric disorder     Depression, anxiety    Psychophysiological insomnia 2/23/2016     Past Surgical History:   Procedure Laterality Date    HX APPENDECTOMY  1996    HX BREAST REDUCTION  2002    HX ORTHOPAEDIC  2003    cervical disc    HX ORTHOPAEDIC      second right hand digit fx with pins index     Current Outpatient Medications on File Prior to Visit   Medication Sig Dispense Refill    zolpidem (AMBIEN) 10 mg tablet TAKE 2 TABLETS BY MOUTH EVERY DAY AT BEDTIME AS NEEDED FOR INSOMNIA 60 Tablet 2    escitalopram oxalate (LEXAPRO) 20 mg tablet TAKE 1 TABLET BY MOUTH EVERY DAY 90 Tablet 1    lithium carbonate 300 mg capsule TAKE 1 CAPSULE BY MOUTH EVERY MORNING AND 2 CAPSULES IN THE EVENINGS 270 Capsule 1    naloxone (NARCAN) 4 mg/actuation nasal spray Use 1 spray intranasally, then discard. Repeat with new spray every 2 min as needed for opioid overdose symptoms, alternating nostrils. 1 Each 5     No current facility-administered medications on file prior to visit.      Allergies   Allergen Reactions    Pcn [Penicillins] Other (comments)     As a child; tolerates Keflex       Family History   Problem Relation Age of Onset    Diabetes Father     Hypertension Father     Heart Attack Father     High Cholesterol Father     Diabetes Mother     High Cholesterol Mother     Breast Cancer Maternal Aunt      Social History     Socioeconomic History    Marital status:    Tobacco Use    Smoking status: Every Day     Packs/day: 1.50     Years: 18.00     Pack years: 27.00     Types: Cigarettes    Smokeless tobacco: Never   Vaping Use    Vaping Use: Never used   Substance and Sexual Activity    Alcohol use: Yes     Comment: occasionally    Drug use: No    Sexual activity: Yes     Partners: Male     Birth control/protection: Condom                   Review of Systems Constitutional:  Negative for chills, diaphoresis, fever, malaise/fatigue and weight loss. Eyes:  Negative for blurred vision, double vision, pain and redness. Respiratory:  Positive for cough. Negative for shortness of breath and wheezing. Cardiovascular:  Negative for chest pain, palpitations, orthopnea, claudication, leg swelling and PND. Musculoskeletal:  Positive for back pain (ache). Skin:  Negative for itching and rash. Neurological:  Positive for headaches. Negative for dizziness, tingling, tremors, sensory change, speech change, focal weakness, seizures, loss of consciousness and weakness. Results for orders placed or performed in visit on 09/19/22   LITHIUM   Result Value Ref Range    Lithium level 0.5 0.5 - 1.2 mmol/L   METABOLIC PANEL, COMPREHENSIVE   Result Value Ref Range    Glucose 106 (H) 65 - 99 mg/dL    BUN 7 6 - 24 mg/dL    Creatinine 0.66 0.57 - 1.00 mg/dL    eGFR 109 >59 mL/min/1.73    BUN/Creatinine ratio 11 9 - 23    Sodium 137 134 - 144 mmol/L    Potassium 4.4 3.5 - 5.2 mmol/L    Chloride 102 96 - 106 mmol/L    CO2 21 20 - 29 mmol/L    Calcium 9.4 8.7 - 10.2 mg/dL    Protein, total 7.0 6.0 - 8.5 g/dL    Albumin 4.4 3.8 - 4.8 g/dL    GLOBULIN, TOTAL 2.6 1.5 - 4.5 g/dL    A-G Ratio 1.7 1.2 - 2.2    Bilirubin, total 0.2 0.0 - 1.2 mg/dL    Alk. phosphatase 102 44 - 121 IU/L    AST (SGOT) 13 0 - 40 IU/L    ALT (SGPT) 9 0 - 32 IU/L   LIPID PANEL   Result Value Ref Range    Cholesterol, total 210 (H) 100 - 199 mg/dL    Triglyceride 92 0 - 149 mg/dL    HDL Cholesterol 47 >39 mg/dL    VLDL, calculated 17 5 - 40 mg/dL    LDL, calculated 146 (H) 0 - 99 mg/dL   [facility] [place] [facility]   Result Value Ref Range    Luteinizing hormone 1.1 mIU/mL    FSH 2.3 mIU/mL   TSH 3RD GENERATION   Result Value Ref Range    TSH 2.370 0.450 - 4.500 uIU/mL   CVD REPORT   Result Value Ref Range    INTERPRETATION Note              Physical Exam  There were no vitals taken for this visit.     She looks tired but is in no acute distress. No brain difficulties and no frequent coughing. General: alert, cooperative, no distress   Mental  status: normal mood, behavior, speech, dress, motor activity, and thought processes, able to follow commands   HENT: NCAT   Neck: no visualized mass   Resp: no respiratory distress   Neuro: no gross deficits   Skin: no discoloration or lesions of concern on visible areas   Psychiatric: normal affect, consistent with stated mood, no evidence of hallucinations           ASSESSMENT and PLAN    ICD-10-CM ICD-9-CM    1. COVID-19 virus infection  U07.1 079.89       2. Depression with anxiety  F41.8 300.4 ALPRAZolam (XANAX) 0.25 mg tablet      3. Medication management  Z79.899 V58.69 oxyCODONE-acetaminophen (PERCOCET) 5-325 mg per tablet      oxyCODONE-acetaminophen (PERCOCET) 5-325 mg per tablet      oxyCODONE-acetaminophen (PERCOCET) 5-325 mg per tablet      4. Opiate use  F11.90 305.50 oxyCODONE-acetaminophen (PERCOCET) 5-325 mg per tablet      oxyCODONE-acetaminophen (PERCOCET) 5-325 mg per tablet      oxyCODONE-acetaminophen (PERCOCET) 5-325 mg per tablet      5. DDD (degenerative disc disease), cervical  M50.30 722.4 oxyCODONE-acetaminophen (PERCOCET) 5-325 mg per tablet      oxyCODONE-acetaminophen (PERCOCET) 5-325 mg per tablet      oxyCODONE-acetaminophen (PERCOCET) 5-325 mg per tablet      6. Facet arthritis of lumbar region  M47.816 721.3 oxyCODONE-acetaminophen (PERCOCET) 5-325 mg per tablet      oxyCODONE-acetaminophen (PERCOCET) 5-325 mg per tablet      oxyCODONE-acetaminophen (PERCOCET) 5-325 mg per tablet      7. Chronic midline low back pain without sciatica  M54.50 724.2 oxyCODONE-acetaminophen (PERCOCET) 5-325 mg per tablet    G89.29 338.29 oxyCODONE-acetaminophen (PERCOCET) 5-325 mg per tablet      oxyCODONE-acetaminophen (PERCOCET) 5-325 mg per tablet      8.  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 Aisha Costello  F98.8 314.00 dextroamphetamine-amphetamine (ADDERALL) 10 mg tablet      dextroamphetamine-amphetamine (ADDERALL) 10 mg tablet      dextroamphetamine-amphetamine (ADDERALL) 10 mg tablet        Diagnoses and all orders for this visit:    1. COVID-19 virus infection    2. Depression with anxiety  -     ALPRAZolam (XANAX) 0.25 mg tablet; TAKE 1/2 - 1 TABLET BY MOUTH TWICE DAILY AS NEEDED FOR ANXIETY    3. Medication management  -     oxyCODONE-acetaminophen (PERCOCET) 5-325 mg per tablet; Take 1-2 Tablets by mouth every eight (8) hours as needed for Pain for up to 30 days. Max Daily Amount: 6 Tablets. -     oxyCODONE-acetaminophen (PERCOCET) 5-325 mg per tablet; Take 1-2 Tablets by mouth every eight (8) hours as needed for Pain for up to 30 days. Max Daily Amount: 6 Tablets. -     oxyCODONE-acetaminophen (PERCOCET) 5-325 mg per tablet; Take 1-2 Tablets by mouth every eight (8) hours as needed for Pain for up to 30 days. Max Daily Amount: 6 Tablets. 4. Opiate use  -     oxyCODONE-acetaminophen (PERCOCET) 5-325 mg per tablet; Take 1-2 Tablets by mouth every eight (8) hours as needed for Pain for up to 30 days. Max Daily Amount: 6 Tablets. -     oxyCODONE-acetaminophen (PERCOCET) 5-325 mg per tablet; Take 1-2 Tablets by mouth every eight (8) hours as needed for Pain for up to 30 days. Max Daily Amount: 6 Tablets. -     oxyCODONE-acetaminophen (PERCOCET) 5-325 mg per tablet; Take 1-2 Tablets by mouth every eight (8) hours as needed for Pain for up to 30 days. Max Daily Amount: 6 Tablets. 5. DDD (degenerative disc disease), cervical  -     oxyCODONE-acetaminophen (PERCOCET) 5-325 mg per tablet; Take 1-2 Tablets by mouth every eight (8) hours as needed for Pain for up to 30 days. Max Daily Amount: 6 Tablets. -     oxyCODONE-acetaminophen (PERCOCET) 5-325 mg per tablet; Take 1-2 Tablets by mouth every eight (8) hours as needed for Pain for up to 30 days. Max Daily Amount: 6 Tablets.   -     oxyCODONE-acetaminophen (PERCOCET) 5-325 mg per tablet; Take 1-2 Tablets by mouth every eight (8) hours as needed for Pain for up to 30 days. Max Daily Amount: 6 Tablets. 6. Facet arthritis of lumbar region  -     oxyCODONE-acetaminophen (PERCOCET) 5-325 mg per tablet; Take 1-2 Tablets by mouth every eight (8) hours as needed for Pain for up to 30 days. Max Daily Amount: 6 Tablets. -     oxyCODONE-acetaminophen (PERCOCET) 5-325 mg per tablet; Take 1-2 Tablets by mouth every eight (8) hours as needed for Pain for up to 30 days. Max Daily Amount: 6 Tablets. -     oxyCODONE-acetaminophen (PERCOCET) 5-325 mg per tablet; Take 1-2 Tablets by mouth every eight (8) hours as needed for Pain for up to 30 days. Max Daily Amount: 6 Tablets. 7. Chronic midline low back pain without sciatica  -     oxyCODONE-acetaminophen (PERCOCET) 5-325 mg per tablet; Take 1-2 Tablets by mouth every eight (8) hours as needed for Pain for up to 30 days. Max Daily Amount: 6 Tablets. -     oxyCODONE-acetaminophen (PERCOCET) 5-325 mg per tablet; Take 1-2 Tablets by mouth every eight (8) hours as needed for Pain for up to 30 days. Max Daily Amount: 6 Tablets. -     oxyCODONE-acetaminophen (PERCOCET) 5-325 mg per tablet; Take 1-2 Tablets by mouth every eight (8) hours as needed for Pain for up to 30 days. Max Daily Amount: 6 Tablets. 8. 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 Rhona Desouza  -     dextroamphetamine-amphetamine (ADDERALL) 10 mg tablet; TAKE 2 TABS PO Q AM AND 1-2 TABS IN AFTERNOON  -     dextroamphetamine-amphetamine (ADDERALL) 10 mg tablet; TAKE 2 TABS PO Q AM AND 1-2 TABS IN AFTERNOON  -     dextroamphetamine-amphetamine (ADDERALL) 10 mg tablet; TAKE 2 TABS PO Q AM AND 1-2 TABS IN AFTERNOON    Other orders  -     nirmatrelvir-ritonavir (Paxlovid, EUA,) 300 mg (150 mg x 2)-100 mg; Take 3 Tablets by mouth every twelve (12) hours.     Follow-up and Dispositions    Return in about 3 months (around 3/28/2023), or if Covid 19 symptoms worsen or fail to improve, for ADD 3 month follow up, low back pain, osteoarthritis, anxiety/depression. reviewed medications and side effects in detail  Please call my office if there are any questions- [phone]. Discussed expected course/resolution/complications of diagnosis in detail with patient. Medication risks/benefits/costs/interactions/alternatives discussed with patient. Pt was given an after visit summary which includes diagnoses, current medications & vitals. Pt expressed understanding with the diagnosis and plan. Patient to call if no better in 3 -4 days and prn new problems. BMI is significantly elevated- in the obese range. I reviewed diet, exercise and weight control. Discussed weight control in detail, the importance of mainly decreased carbs, and for weight maintenance, exercise; discussed different diets and that it isn't as important to watch the type of foods as it is to decrease calorie intake no matter what type of diet you do, etc.  Mindful eating also discussed- Naturally Slim( now Wond'r) or Noom are 2 options. Total 55 minutes  re: Recommended a weekly \"heart check. \" I went into detail how to do this. Regular exercise is very important to your health; it helps mentally, physically, socially; it prevents injuries if done properly. Exercise, even as simple as walking 20-30 minutes daily has major benefits to your health even though your \"numbers\" are the same in the lab. See if you can add this into your daily regimen and after a few months it will become a regular habit-\"just something you do,\" like brushing your teeth. A combination of aerobic exercise and strengthening and stretching is felt to be the best for you, so this should be your ultimate goal.   This can be done in the privacy of your home or in a group setting as at the gym  Some prefer having a , others prefer to do exercise in groups or individually. Do what \"works\" for you.  You need to make it simple and \"fun,\" or you most likely will not continue it. Also, discussed symptoms of concern that were noted today in the note above, treatment options( including doing nothing), when to follow up before recommended time frame. Also, answered all questions. Because she tested positive for COVID less than 5 days ago, is a smoker, and did not get the omicron variant booster, she is at risk, so we treated her with Paxlovid and apparently no drug interacts except with alprazolam. It potentiates the effect of that, so she needs to be careful when she is dosing that. Her controlled substance agreement and urine drug screen are up to date. Long discussion about chronic narcotic use, the risks of addiction, tolerance , overdose,etc. The national spotlight on this problem and the need for a narcotic agreement in order to continue receiving these, etc.  Continue to do the non-medication things that reduce pain such as adequate sleep, avoiding high impact activities, but at the same time staying active and avoiding sedentary activity. In addition, the patient was counseled today on the potential risks of opiate use including but not limited to death if not taken as prescribed or if taken in conjunction with other sedative, hypnotic, or other pain medications, and the need to refrain from any recreational drugs and/or alcohol. Further, we discussed the rationale and potential benefits of an opiate support regimen for the management of chronic non-malignant pain conditions.  checked and found to have no suspicious activity. This document was written by Aleck Skiff, as dictated by Blake Green MD.   I have reviewed and agree with the above note and have made corrections where appropriate Clarke Bonilla M.D.

## 2022-12-28 NOTE — PROGRESS NOTES
Chief Complaint   Patient presents with    Pain (Chronic)    Positive For Covid-19     Headache cough     Anxiety    1. \"Have you been to the ER, urgent care clinic since your last visit? Hospitalized since your last visit? \" No    2. \"Have you seen or consulted any other health care providers outside of the [address] [place] [address] since your last visit? \" No     3. For patients over 45: Has the patient had a colonoscopy? No     If the patient is female:    4. For patients over 40: Has the patient had a mammogram? No    5. For patients over 21: Has the patient had a pap smear?  No

## 2022-12-28 NOTE — TELEPHONE ENCOUNTER
MD Cinthya Orta,    [facility] stating the paxlovid 150-100mg is on backorder/unavailable.      Rx: 12/28/22 30    Thanks, Ramos Jones

## 2023-01-06 NOTE — TELEPHONE ENCOUNTER
----- Message from Matt Aly sent at 3/24/2020 12:03 PM EDT -----  Regarding: Dr. Brian Ricketts: [phone]  Whose call is being returned: Dr. Miko Kaplan Nurse- Name unknown    Details to clarify the request: Pt spoke with the nurse earlier, but would like to speak to them again. Call your physician immediately if you notice any of the following symptoms of a blood clot:   Sudden weakness in any limb  Numbness or tingling anywhere  Visual changes or loss of sight in either eye  Sudden onset of slurred speech or inability to speak  Dizziness or faintness  New pain, swelling, redness or heat in any extremity  New SOB or chest pain  Symptoms associated with blood clotting/low INR reviewed and patient verbalizes understanding: Yes:

## 2023-01-15 DIAGNOSIS — F31.32 BIPOLAR 1 DISORDER, DEPRESSED, MODERATE (HCC): ICD-10-CM

## 2023-01-16 RX ORDER — ESCITALOPRAM OXALATE 20 MG/1
TABLET ORAL
Qty: 90 TABLET | Refills: 1 | Status: SHIPPED | OUTPATIENT
Start: 2023-01-16

## 2023-01-30 NOTE — PROGRESS NOTES
We need to adjust the Lithium dose she is taking. The lithium level is low. How much Lithium was she taking when this was drawn? Was she taking it regularly? If not, missing the dose how often each week? She needs to stay on this regularly for it to work. Normal CBC( red and white blood cells and platelets). No diabetes, normal liver and kidney tests. [de-identified] : Let this serve as a formal request for comp authorization for PT \par heat\par lido patches\par HEP\par MRI showed strain RTC and ant labral tear\par request auth C spine MRI

## 2023-02-09 DIAGNOSIS — Z79.899 MEDICATION MANAGEMENT: ICD-10-CM

## 2023-02-09 DIAGNOSIS — M47.816 FACET ARTHRITIS OF LUMBAR REGION: ICD-10-CM

## 2023-02-09 DIAGNOSIS — F98.8 ADD (ATTENTION DEFICIT DISORDER) WITHOUT HYPERACTIVITY: Chronic | ICD-10-CM

## 2023-02-09 DIAGNOSIS — M50.30 DDD (DEGENERATIVE DISC DISEASE), CERVICAL: ICD-10-CM

## 2023-02-09 DIAGNOSIS — G89.29 CHRONIC MIDLINE LOW BACK PAIN WITHOUT SCIATICA: ICD-10-CM

## 2023-02-09 DIAGNOSIS — M54.50 CHRONIC MIDLINE LOW BACK PAIN WITHOUT SCIATICA: ICD-10-CM

## 2023-02-09 DIAGNOSIS — F11.90 OPIATE USE: ICD-10-CM

## 2023-02-09 NOTE — TELEPHONE ENCOUNTER
Patient comment: Lc Farah - can you please have Dr. Dorothea Tomlinson transfer all my medicine from the [facility] to the [address] [place] [address]. Im not sure why it was ever sent to that location. They are so short staffed that they dont  the phone.  Thank you    Last visit 12/28  Next appt 3/28/23    Last refill 10/10/23 per

## 2023-02-10 RX ORDER — DEXTROAMPHETAMINE SACCHARATE, AMPHETAMINE ASPARTATE, DEXTROAMPHETAMINE SULFATE AND AMPHETAMINE SULFATE 2.5; 2.5; 2.5; 2.5 MG/1; MG/1; MG/1; MG/1
TABLET ORAL
Qty: 120 TABLET | Refills: 0 | Status: SHIPPED | OUTPATIENT
Start: 2023-02-10

## 2023-02-10 RX ORDER — OXYCODONE AND ACETAMINOPHEN 5; 325 MG/1; MG/1
1-2 TABLET ORAL
Qty: 100 TABLET | Refills: 0 | Status: SHIPPED | OUTPATIENT
Start: 2023-02-10 | End: 2023-03-12

## 2023-03-19 DIAGNOSIS — F51.04 PSYCHOPHYSIOLOGICAL INSOMNIA: ICD-10-CM

## 2023-03-19 RX ORDER — ZOLPIDEM TARTRATE 10 MG/1
TABLET ORAL
Qty: 60 TABLET | Refills: 2 | Status: CANCELLED | OUTPATIENT
Start: 2023-03-19

## 2023-03-19 RX ORDER — ZOLPIDEM TARTRATE 10 MG/1
TABLET ORAL
Qty: 60 TABLET | Refills: 3 | Status: SHIPPED | OUTPATIENT
Start: 2023-03-19

## 2023-03-21 ENCOUNTER — TELEPHONE (OUTPATIENT)
Dept: FAMILY MEDICINE CLINIC | Age: 48
End: 2023-03-21

## 2023-03-21 NOTE — TELEPHONE ENCOUNTER
MD Roger Greenfield,    [facility] stating max dose of zolpidem is 10mg. Patient taking 2 tablets (20mg).       Needs PA Renewal.  Thanks, Luz     New rx 3/19/23 #60 + 3   (last noted PA 11/12/21)      For Pharmacy Admin Tracking Only  Program: Medication Refill  Intervention Detail: New Rx: 1, reason: Patient Preference  Time Spent (min): 5

## 2023-03-28 ENCOUNTER — OFFICE VISIT (OUTPATIENT)
Dept: FAMILY MEDICINE CLINIC | Age: 48
End: 2023-03-28
Payer: COMMERCIAL

## 2023-03-28 VITALS
HEIGHT: 64 IN | SYSTOLIC BLOOD PRESSURE: 120 MMHG | RESPIRATION RATE: 16 BRPM | WEIGHT: 227 LBS | DIASTOLIC BLOOD PRESSURE: 68 MMHG | BODY MASS INDEX: 38.76 KG/M2 | HEART RATE: 88 BPM | TEMPERATURE: 97.2 F | OXYGEN SATURATION: 98 %

## 2023-03-28 DIAGNOSIS — Z79.899 MEDICATION MANAGEMENT: ICD-10-CM

## 2023-03-28 DIAGNOSIS — F41.8 DEPRESSION WITH ANXIETY: ICD-10-CM

## 2023-03-28 DIAGNOSIS — M47.816 FACET ARTHRITIS OF LUMBAR REGION: ICD-10-CM

## 2023-03-28 DIAGNOSIS — M54.50 CHRONIC MIDLINE LOW BACK PAIN WITHOUT SCIATICA: Primary | ICD-10-CM

## 2023-03-28 DIAGNOSIS — M50.30 DDD (DEGENERATIVE DISC DISEASE), CERVICAL: ICD-10-CM

## 2023-03-28 DIAGNOSIS — Z79.899 LONG-TERM CURRENT USE OF LITHIUM: ICD-10-CM

## 2023-03-28 DIAGNOSIS — M54.50 SEVERE LOW BACK PAIN: ICD-10-CM

## 2023-03-28 DIAGNOSIS — F17.210 CIGARETTE SMOKER MOTIVATED TO QUIT: ICD-10-CM

## 2023-03-28 DIAGNOSIS — F51.04 PSYCHOPHYSIOLOGICAL INSOMNIA: ICD-10-CM

## 2023-03-28 DIAGNOSIS — E66.01 SEVERE OBESITY (BMI 35.0-39.9) WITH COMORBIDITY (HCC): ICD-10-CM

## 2023-03-28 DIAGNOSIS — F98.8 ADD (ATTENTION DEFICIT DISORDER) WITHOUT HYPERACTIVITY: Chronic | ICD-10-CM

## 2023-03-28 DIAGNOSIS — M51.36 DDD (DEGENERATIVE DISC DISEASE), LUMBAR: ICD-10-CM

## 2023-03-28 DIAGNOSIS — G89.29 CHRONIC MIDLINE LOW BACK PAIN WITHOUT SCIATICA: Primary | ICD-10-CM

## 2023-03-28 DIAGNOSIS — F11.90 OPIATE USE: ICD-10-CM

## 2023-03-28 DIAGNOSIS — M17.0 BILATERAL PRIMARY OSTEOARTHRITIS OF KNEE: ICD-10-CM

## 2023-03-28 PROCEDURE — 99215 OFFICE O/P EST HI 40 MIN: CPT | Performed by: FAMILY MEDICINE

## 2023-03-28 RX ORDER — DEXTROAMPHETAMINE SACCHARATE, AMPHETAMINE ASPARTATE, DEXTROAMPHETAMINE SULFATE AND AMPHETAMINE SULFATE 2.5; 2.5; 2.5; 2.5 MG/1; MG/1; MG/1; MG/1
TABLET ORAL
Qty: 120 TABLET | Refills: 0 | Status: SHIPPED | OUTPATIENT
Start: 2023-05-06

## 2023-03-28 RX ORDER — DEXTROAMPHETAMINE SACCHARATE, AMPHETAMINE ASPARTATE, DEXTROAMPHETAMINE SULFATE AND AMPHETAMINE SULFATE 2.5; 2.5; 2.5; 2.5 MG/1; MG/1; MG/1; MG/1
TABLET ORAL
Qty: 120 TABLET | Refills: 0 | Status: SHIPPED | OUTPATIENT
Start: 2023-04-06

## 2023-03-28 RX ORDER — OXYCODONE AND ACETAMINOPHEN 5; 325 MG/1; MG/1
1-2 TABLET ORAL
Qty: 100 TABLET | Refills: 0 | Status: SHIPPED | OUTPATIENT
Start: 2023-05-11 | End: 2023-06-10

## 2023-03-28 RX ORDER — ZOLPIDEM TARTRATE 10 MG/1
TABLET ORAL
Qty: 60 TABLET | Refills: 3 | Status: SHIPPED | OUTPATIENT
Start: 2023-03-28

## 2023-03-28 RX ORDER — ALPRAZOLAM 0.25 MG/1
TABLET ORAL
Qty: 60 TABLET | Refills: 3 | Status: SHIPPED | OUTPATIENT
Start: 2023-03-28

## 2023-03-28 RX ORDER — OXYCODONE AND ACETAMINOPHEN 5; 325 MG/1; MG/1
1-2 TABLET ORAL
Qty: 100 TABLET | Refills: 0 | Status: SHIPPED | OUTPATIENT
Start: 2023-04-11 | End: 2023-05-11

## 2023-03-28 RX ORDER — DEXTROAMPHETAMINE SACCHARATE, AMPHETAMINE ASPARTATE, DEXTROAMPHETAMINE SULFATE AND AMPHETAMINE SULFATE 2.5; 2.5; 2.5; 2.5 MG/1; MG/1; MG/1; MG/1
TABLET ORAL
Qty: 120 TABLET | Refills: 0 | Status: SHIPPED | OUTPATIENT
Start: 2023-06-05

## 2023-03-28 RX ORDER — SPIRONOLACTONE 50 MG/1
50 TABLET, FILM COATED ORAL
COMMUNITY
Start: 2023-03-20

## 2023-03-28 RX ORDER — OXYCODONE AND ACETAMINOPHEN 5; 325 MG/1; MG/1
1-2 TABLET ORAL
Qty: 100 TABLET | Refills: 0 | Status: SHIPPED | OUTPATIENT
Start: 2023-06-10 | End: 2023-07-10

## 2023-03-28 NOTE — PROGRESS NOTES
Chief Complaint   Patient presents with    Pain (Chronic)    Attention Deficit Disorder    Melena     Dark colored stool with blood    1. \"Have you been to the ER, urgent care clinic since your last visit? Hospitalized since your last visit? \" No    2. \"Have you seen or consulted any other health care providers outside of the [address] [place] [address] since your last visit? \" Yes Derm   [facility]    3. For patients over 45: Has the patient had a colonoscopy? No     If the patient is female:    4. For patients over 40: Has the patient had a mammogram? Yes - no Care Gap present    5. For patients over 21: Has the patient had a pap smear?  NA - based on age

## 2023-03-28 NOTE — PROGRESS NOTES
HISTORY OF PRESENT ILLNESS  HPI  Yoana Kenney is a 56 y.o. female with a history of migraine with aura, bilateral CTS, cervical DDD, facet arthritis of lumbar region, ADD, depression with anxiety, insomnia, OCD and bipolar disorder, who presents to the office today for f/u of these health problems. Pt has noticed some blood in her stool over the past couple of months. She has mostly noticed bright red blood on her toilet tissue after wiping. She states she is often constipated, having to strain to evacuate the hard stool. Pt has not seen anyone for this as she thought the blood was from her hemorrhoids. This morning she had some dark stools this morning, but she admits she used Pepto Bismol the night before. Pt mainly comes in to follow up on her pain and ADD medications. Pt denies unusual SOB, chest pain, and any recent ER visits or hospitalizations.      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 Sweta Alegria 8/25/2016    Asthma     last attack 2 months ago    Bilateral carpal tunnel syndrome- R>>L 9/25/2016    Bipolar disorder with moderate depression ([facility] [address] 6/28/2017    Depression with anxiety 3/2/2010    Facet arthritis of lumbar region 12/3/2017    History of migraine headaches 11/25/2018    Insomnia     Lithium use 6/28/2017    Migraine 02/20/2010    Psychiatric disorder     Depression, anxiety    Psychophysiological insomnia 2/23/2016     Past Surgical History:   Procedure Laterality Date    HX APPENDECTOMY  1996    HX BREAST REDUCTION  2002    HX ORTHOPAEDIC  2003    cervical disc    HX ORTHOPAEDIC      second right hand digit fx with pins index     Current Outpatient Medications on File Prior to Visit   Medication Sig Dispense Refill    lithium carbonate 300 mg capsule TAKE 1 CAPSULE BY MOUTH EVERY MORNING AND 2 CAPSULES IN THE EVENINGS 270 Capsule 0    escitalopram oxalate (LEXAPRO) 20 mg tablet TAKE 1 TABLET BY MOUTH EVERY DAY 90 Tablet 1    spironolactone (ALDACTONE) 50 mg tablet 50 mg nightly. [DISCONTINUED] zolpidem (AMBIEN) 10 mg tablet TAKE 2 TABLETS BY MOUTH EVERY DAY AT BEDTIME AS NEEDED FOR INSOMNIA 60 Tablet 3    [DISCONTINUED] oxyCODONE-acetaminophen (PERCOCET) 5-325 mg per tablet Take 1-2 Tablets by mouth every eight (8) hours as needed for Pain for up to 30 days. Max Daily Amount: 6 Tablets. 100 Tablet 0    [DISCONTINUED] dextroamphetamine-amphetamine (ADDERALL) 10 mg tablet TAKE 2 TABS PO Q AM AND 1-2 TABS IN AFTERNOON 120 Tablet 0    [DISCONTINUED] ALPRAZolam (XANAX) 0.25 mg tablet TAKE 1/2 - 1 TABLET BY MOUTH TWICE DAILY AS NEEDED FOR ANXIETY 60 Tablet 3    [DISCONTINUED] dextroamphetamine-amphetamine (ADDERALL) 10 mg tablet TAKE 2 TABS PO Q AM AND 1-2 TABS IN AFTERNOON 120 Tablet 0    [DISCONTINUED] dextroamphetamine-amphetamine (ADDERALL) 10 mg tablet TAKE 2 TABS PO Q AM AND 1-2 TABS IN AFTERNOON 120 Tablet 0    [DISCONTINUED] oxyCODONE-acetaminophen (PERCOCET) 5-325 mg per tablet Take 1-2 Tablets by mouth every eight (8) hours as needed for Pain for up to 30 days. Max Daily Amount: 6 Tablets. 100 Tablet 0    [DISCONTINUED] nirmatrelvir-ritonavir (Paxlovid, EUA,) 300 mg (150 mg x 2)-100 mg Take 3 Tablets by mouth every twelve (12) hours. 1 Box 0    naloxone (NARCAN) 4 mg/actuation nasal spray Use 1 spray intranasally, then discard. Repeat with new spray every 2 min as needed for opioid overdose symptoms, alternating nostrils. 1 Each 5     No current facility-administered medications on file prior to visit.      Allergies   Allergen Reactions    Pcn [Penicillins] Other (comments)     As a child; tolerates Keflex       Family History   Problem Relation Age of Onset    Diabetes Father     Hypertension Father     Heart Attack Father     High Cholesterol Father     Diabetes Mother     High Cholesterol Mother     Breast Cancer Maternal Aunt      Social History     Socioeconomic History    Marital status:  Tobacco Use    Smoking status: Every Day     Packs/day: 1.50     Years: 18.00     Pack years: 27.00     Types: Cigarettes    Smokeless tobacco: Never   Vaping Use    Vaping Use: Never used   Substance and Sexual Activity    Alcohol use: Yes     Comment: occasionally    Drug use: No    Sexual activity: Yes     Partners: Male     Birth control/protection: Condom     Social Determinants of Health     Financial Resource Strain: Low Risk     Difficulty of Paying Living Expenses: Not very hard   Food Insecurity: No Food Insecurity    Worried About Running Out of Food in the Last Year: Never true    Ran Out of Food in the Last Year: Never true                 ROS  Results for orders placed or performed in visit on 09/19/22   LITHIUM   Result Value Ref Range    Lithium level 0.5 0.5 - 1.2 mmol/L   METABOLIC PANEL, COMPREHENSIVE   Result Value Ref Range    Glucose 106 (H) 65 - 99 mg/dL    BUN 7 6 - 24 mg/dL    Creatinine 0.66 0.57 - 1.00 mg/dL    eGFR 109 >59 mL/min/1.73    BUN/Creatinine ratio 11 9 - 23    Sodium 137 134 - 144 mmol/L    Potassium 4.4 3.5 - 5.2 mmol/L    Chloride 102 96 - 106 mmol/L    CO2 21 20 - 29 mmol/L    Calcium 9.4 8.7 - 10.2 mg/dL    Protein, total 7.0 6.0 - 8.5 g/dL    Albumin 4.4 3.8 - 4.8 g/dL    GLOBULIN, TOTAL 2.6 1.5 - 4.5 g/dL    A-G Ratio 1.7 1.2 - 2.2    Bilirubin, total 0.2 0.0 - 1.2 mg/dL    Alk.  phosphatase 102 44 - 121 IU/L    AST (SGOT) 13 0 - 40 IU/L    ALT (SGPT) 9 0 - 32 IU/L   LIPID PANEL   Result Value Ref Range    Cholesterol, total 210 (H) 100 - 199 mg/dL    Triglyceride 92 0 - 149 mg/dL    HDL Cholesterol 47 >39 mg/dL    VLDL, calculated 17 5 - 40 mg/dL    LDL, calculated 146 (H) 0 - 99 mg/dL   [facility]   Result Value Ref Range    Luteinizing hormone 1.1 mIU/mL    FSH 2.3 mIU/mL   TSH 3RD GENERATION   Result Value Ref Range    TSH 2.370 0.450 - 4.500 uIU/mL   CVD REPORT   Result Value Ref Range    INTERPRETATION Note            Physical Exam  Visit Vitals  /68 (BP 1 Location: Left upper arm, BP Patient Position: Sitting, BP Cuff Size: Large adult)   Pulse 88   Temp 97.2 °F (36.2 °C) (Temporal)   Resp 16   Ht 5' 4\" (1.626 m)   Wt 227 lb (103 kg)   SpO2 98%   BMI 38.96 kg/m²       Head: Normocephalic, without obvious abnormality, atraumatic  Eyes: conjunctivae/corneas clear. PERRL, EOM's intact. Neck: supple, symmetrical, trachea midline, no adenopathy, thyroid: not enlarged, symmetric, no tenderness/mass/nodules, no carotid bruit and no JVD  Lungs: clear to auscultation bilaterally  Heart: regular rate and rhythm, S1, S2 normal, no murmur, click, rub or gallop  Extremities: extremities normal, atraumatic, no cyanosis or edema  Pulses: 2+ and symmetric  Lymph nodes: Cervical, supraclavicular, and axillary nodes normal.  Neurologic: Grossly normal      ASSESSMENT and PLAN    ICD-10-CM ICD-9-CM    1. Chronic midline low back pain without sciatica  M54.50 724.2 oxyCODONE-acetaminophen (PERCOCET) 5-325 mg per tablet    G89.29 338.29 oxyCODONE-acetaminophen (PERCOCET) 5-325 mg per tablet      oxyCODONE-acetaminophen (PERCOCET) 5-325 mg per tablet      10-DRUG SCREEN W/CONF      10-DRUG SCREEN W/CONF      2. Medication management  Z79.899 V58.69 oxyCODONE-acetaminophen (PERCOCET) 5-325 mg per tablet      oxyCODONE-acetaminophen (PERCOCET) 5-325 mg per tablet      oxyCODONE-acetaminophen (PERCOCET) 5-325 mg per tablet      10-DRUG SCREEN W/CONF      10-DRUG SCREEN W/CONF      3. Opiate use  F11.90 305.50 oxyCODONE-acetaminophen (PERCOCET) 5-325 mg per tablet      oxyCODONE-acetaminophen (PERCOCET) 5-325 mg per tablet      oxyCODONE-acetaminophen (PERCOCET) 5-325 mg per tablet      10-DRUG SCREEN W/CONF      10-DRUG SCREEN W/CONF      4. DDD (degenerative disc disease), cervical  M50.30 722.4 oxyCODONE-acetaminophen (PERCOCET) 5-325 mg per tablet      oxyCODONE-acetaminophen (PERCOCET) 5-325 mg per tablet      oxyCODONE-acetaminophen (PERCOCET) 5-325 mg per tablet      5.  Facet arthritis of lumbar region  M47.816 721.3 oxyCODONE-acetaminophen (PERCOCET) 5-325 mg per tablet      oxyCODONE-acetaminophen (PERCOCET) 5-325 mg per tablet      oxyCODONE-acetaminophen (PERCOCET) 5-325 mg per tablet      6. 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 Arjun Penny  F98.8 314.00 dextroamphetamine-amphetamine (ADDERALL) 10 mg tablet      dextroamphetamine-amphetamine (ADDERALL) 10 mg tablet      dextroamphetamine-amphetamine (ADDERALL) 10 mg tablet      7. Psychophysiological insomnia  F51.04 307.42 zolpidem (AMBIEN) 10 mg tablet      8. Depression with anxiety  F41.8 300.4 ALPRAZolam (XANAX) 0.25 mg tablet      9. Severe obesity (BMI 35.0-39. 9) with comorbidity ([facility] [address]  E66.01 278.01       10. Long-term current use of lithium  Z79.899 V58.69       11. Cigarette smoker motivated to quit  F17.210 305.1       12. Bilateral primary osteoarthritis of knee  M17.0 715.16       13. DDD (degenerative disc disease), lumbar  M51.36 722.52       14. Severe low back pain  M54.50 724.2         Diagnoses and all orders for this visit:    1. Chronic midline low back pain without sciatica  -     oxyCODONE-acetaminophen (PERCOCET) 5-325 mg per tablet; Take 1-2 Tablets by mouth every eight (8) hours as needed for Pain for up to 30 days. Max Daily Amount: 6 Tablets. -     oxyCODONE-acetaminophen (PERCOCET) 5-325 mg per tablet; Take 1-2 Tablets by mouth every eight (8) hours as needed for Pain for up to 30 days. Max Daily Amount: 6 Tablets. -     oxyCODONE-acetaminophen (PERCOCET) 5-325 mg per tablet; Take 1-2 Tablets by mouth every eight (8) hours as needed for Pain for up to 30 days. Max Daily Amount: 6 Tablets. -     10-DRUG SCREEN W/CONF; Future    2. Medication management  -     oxyCODONE-acetaminophen (PERCOCET) 5-325 mg per tablet; Take 1-2 Tablets by mouth every eight (8) hours as needed for Pain for up to 30 days. Max Daily Amount: 6 Tablets.   -     oxyCODONE-acetaminophen (PERCOCET) 5-325 mg per tablet; Take 1-2 Tablets by mouth every eight (8) hours as needed for Pain for up to 30 days. Max Daily Amount: 6 Tablets. -     oxyCODONE-acetaminophen (PERCOCET) 5-325 mg per tablet; Take 1-2 Tablets by mouth every eight (8) hours as needed for Pain for up to 30 days. Max Daily Amount: 6 Tablets. -     10-DRUG SCREEN W/CONF; Future    3. Opiate use  -     oxyCODONE-acetaminophen (PERCOCET) 5-325 mg per tablet; Take 1-2 Tablets by mouth every eight (8) hours as needed for Pain for up to 30 days. Max Daily Amount: 6 Tablets. -     oxyCODONE-acetaminophen (PERCOCET) 5-325 mg per tablet; Take 1-2 Tablets by mouth every eight (8) hours as needed for Pain for up to 30 days. Max Daily Amount: 6 Tablets. -     oxyCODONE-acetaminophen (PERCOCET) 5-325 mg per tablet; Take 1-2 Tablets by mouth every eight (8) hours as needed for Pain for up to 30 days. Max Daily Amount: 6 Tablets. -     10-DRUG SCREEN W/CONF; Future    4. DDD (degenerative disc disease), cervical  -     oxyCODONE-acetaminophen (PERCOCET) 5-325 mg per tablet; Take 1-2 Tablets by mouth every eight (8) hours as needed for Pain for up to 30 days. Max Daily Amount: 6 Tablets. -     oxyCODONE-acetaminophen (PERCOCET) 5-325 mg per tablet; Take 1-2 Tablets by mouth every eight (8) hours as needed for Pain for up to 30 days. Max Daily Amount: 6 Tablets. -     oxyCODONE-acetaminophen (PERCOCET) 5-325 mg per tablet; Take 1-2 Tablets by mouth every eight (8) hours as needed for Pain for up to 30 days. Max Daily Amount: 6 Tablets. 5. Facet arthritis of lumbar region  -     oxyCODONE-acetaminophen (PERCOCET) 5-325 mg per tablet; Take 1-2 Tablets by mouth every eight (8) hours as needed for Pain for up to 30 days. Max Daily Amount: 6 Tablets. -     oxyCODONE-acetaminophen (PERCOCET) 5-325 mg per tablet; Take 1-2 Tablets by mouth every eight (8) hours as needed for Pain for up to 30 days. Max Daily Amount: 6 Tablets.   - oxyCODONE-acetaminophen (PERCOCET) 5-325 mg per tablet; Take 1-2 Tablets by mouth every eight (8) hours as needed for Pain for up to 30 days. Max Daily Amount: 6 Tablets. 6. 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 Dian Snyder  -     dextroamphetamine-amphetamine (ADDERALL) 10 mg tablet; TAKE 2 TABS PO Q AM AND 1-2 TABS IN AFTERNOON  -     dextroamphetamine-amphetamine (ADDERALL) 10 mg tablet; TAKE 2 TABS PO Q AM AND 1-2 TABS IN AFTERNOON  -     dextroamphetamine-amphetamine (ADDERALL) 10 mg tablet; TAKE 2 TABS PO Q AM AND 1-2 TABS IN AFTERNOON    7. Psychophysiological insomnia  -     zolpidem (AMBIEN) 10 mg tablet; TAKE 2 TABLETS BY MOUTH EVERY DAY AT BEDTIME AS NEEDED FOR INSOMNIA    8. Depression with anxiety  -     ALPRAZolam (XANAX) 0.25 mg tablet; TAKE 1/2 - 1 TABLET BY MOUTH TWICE DAILY AS NEEDED FOR ANXIETY    9. Severe obesity (BMI 35.0-39. 9) with comorbidity ([address]    10. Long-term current use of lithium    11. Cigarette smoker motivated to quit    12. Bilateral primary osteoarthritis of knee    13. DDD (degenerative disc disease), lumbar    14. Severe low back pain    Follow-up and Dispositions    Return in about 6 months (around 9/28/2023), or additional BRBPR, for low back pain, F/U weight concerns, chronic headaches, ADD 3 month follow up. reviewed medications and side effects in detail  Please call my office if there are any questions- [phone]. Discussed expected course/resolution/complications of diagnosis in detail with patient. Medication risks/benefits/costs/interactions/alternatives discussed with patient. Pt was given an after visit summary which includes diagnoses, current medications & vitals. Pt expressed understanding with the diagnosis and plan. Patient to call if no better in 3 -4 days and prn new problems. BMI is significantly elevated- in the obese range. I reviewed diet, exercise and weight control.  Discussed weight control in detail, the importance of mainly decreased carbs, and for weight maintenance, exercise; discussed different diets and that it isn't as important to watch the type of foods as it is to decrease calorie intake no matter what type of diet you do, etc.  Mindful eating also discussed- Naturally Slim( now W[employer]) or Noom are 2 options. Total 50 minutes  re: Recommended a weekly \"heart check. \" I went into detail how to do this. Regular exercise is very important to your health; it helps mentally, physically, socially; it prevents injuries if done properly. Exercise, even as simple as walking 20-30 minutes daily has major benefits to your health even though your \"numbers\" are the same in the lab. See if you can add this into your daily regimen and after a few months it will become a regular habit-\"just something you do,\" like brushing your teeth. A combination of aerobic exercise and strengthening and stretching is felt to be the best for you, so this should be your ultimate goal.   This can be done in the privacy of your home or in a group setting as at the gym  Some prefer having a , others prefer to do exercise in groups or individually. Do what \"works\" for you. You need to make it simple and \"fun,\" or you most likely will not continue it. Also, discussed symptoms of concern that were noted today in the note above, treatment options( including doing nothing), when to follow up before recommended time frame. Also, answered all questions. Pt asked about weight loss and she heard about a new medicine called Wegovy. I encouraged pt call her insurance company to see if her insurance will cover that. I explained to her how it works and that you have to gradually increase the dose.    For her chronic constipation that runs in family, I recommended she titrate mag citrate until it works and stay on mag citrate to keep BM normal. If she has had no constipation with BM for a couple pf weeks, then she can add in Metamucil. I told her it is okay to stay on Metamucil and mag citrate long term, but she might find she can stay on one or the other. Controlled substance agreement was signed  and she will return for urine drug screen as lab was closed when she left. She continue to benefit from the Adderall to help her stay on track and be more efficient. The Xanax helps with anxiety. She continues to keep the oxycodone to no more than 3 a day for her chronic back pain. She finds 2 of the Ambien work for her sleep without any morning drowsiness.    Her last lab done to monitor her use of spironolactone was in September and it her electrolytes, renal function, and LFT's are all normal.  This document was written by Juanita Mendiola, as dictated by Ginger Cordero MD.

## 2023-03-28 NOTE — LETTER
CONTROLLED SUBSTANCE MEDICATION AGREEMENT     Patient Name: Marcus Human  Patient YOB: 1975     I understand, that controlled substance medications may be used to help better manage my symptoms and to improve my ability to function at home, work and in social settings. However, I also understand that these medications do have risks, which have been discussed with me, including possible development of physical or psychological dependence. I understand that successful treatment requires mutual trust and honesty between me and my provider. I understand and agree that following this Medication Agreement is necessary in continuing my provider-patient relationship and the success of my treatment plan. Explanation from my Provider: Benefits and Goals of Controlled Substance Medications: There are two potential goals for your treatment: (1) decreased pain and suffering (2) improved daily life functions. There are many possible treatments for your chronic condition(s). Alternatives such as physical therapy, yoga, massage, home daily exercise, meditation, relaxation techniques, injections, chiropractic manipulations, surgery, cognitive therapy, hypnosis and many medications that are not habit-forming may be used. Use of controlled substance medications may be helpful, but they are unlikely to resolve all symptoms or restore all function. Explanation from my Provider: Risks of Controlled Substance Medications:  Opioid pain medications: These medications can lead to problems such as addiction/dependence, sedation, lightheadedness/dizziness, memory issues, falls, constipation, nausea, or vomiting. They may also impair the ability to drive or operate machinery. Additionally, these medications may lower testosterone levels, leading to loss of bone strength, stamina and sex drive.   They may cause problems with breathing, sleep apnea and reduced coughing, which is especially dangerous for patients with lung disease. Overdose or dangerous interactions with alcohol and other medications may occur, leading to death. Hyperalgesia may develop, which means patients receiving opioids for the treatment of pain may become more sensitive to certain painful stimuli, and in some cases, experience pain from ordinarily non-painful stimuli. Women between the ages of 14-53 who could become pregnant should carefully weigh the risks and benefits of opioids with their physicians, as these medications increase the risk of pregnancy complications, including miscarriage,  delivery and stillbirth. It is also possible for babies to be born addicted to opioids. Opioid dependence withdrawal symptoms may include; feelings of uneasiness, increased pain, irritability, belly pain, diarrhea, sweats and goose-flesh. Benzodiazepines and non-benzodiazepine sleep medications: These medications can lead to problems such as addiction/dependence, sedation, fatigue, lightheadedness, dizziness, incoordination, falls, depression, hallucinations, and impaired judgment, memory and concentration. The ability to drive and operate machinery may also be affected. Abnormal sleep-related behaviors have been reported, including sleepwalking, driving, making telephone calls, eating, or having sex while not fully awake. These medications can suppress breathing and worsen sleep apnea, particularly when combined with alcohol or other sedating medications, potentially leading to death. Dependence withdrawal symptoms may include tremors, anxiety, hallucinations and seizures. Initials:_______  Stimulants:  Common adverse effects include addiction/dependence, increased blood  pressure and heart rate, decreased appetite, nausea, involuntary weight loss, insomnia,  irritability, and headaches.   These risks may increase when these medications are combined with other stimulants, such as caffeine pills or energy drinks, certain weight loss supplements and oral decongestants. Dependence withdrawal symptoms may include depressed mood, loss of interest, suicidal thoughts, anxiety, fatigue, appetite changes and agitation. Testosterone replacement therapy:  Potential side effects include increased risk of stroke and heart attack, blood clots, increased blood pressure, increased cholesterol, enlarged prostate, sleep apnea, irritability/aggression and other mood disorders, and decreased fertility. I agree and understand that I and my prescriber have the following rights and responsibilities regarding my treatment plan:     1. MY RIGHTS:  To be informed of my treatment and medication plan. To be an active participant in my health and wellbeing. 2. MY RESPONSIBILITY AND UNDERSTANDING FOR USE OF MEDICATIONS   I will take medications at the dose and frequency as directed. For my safety, I will not increase or change how I take my medications without the recommendation of my healthcare provider.  I will actively participate in any program recommended by my provider which may improve function, including social, physical, psychological programs.  I will not take my medications with alcohol or other drugs not prescribed to me. I understand that drinking alcohol with my medications increases the chances of side effects, including reduced breathing rate and could lead to personal injury when operating machinery.  I understand that if I have a history of substance use disorders, including alcohol or other illicit drugs, that I may be at increased risk of addiction to my medications.  I agree to notify my provider immediately if I should become pregnant so that my treatment plan can be adjusted.    I agree and understand that I shall only receive controlled substance medications from the prescriber that signed this agreement unless there is written agreement among other prescribers of controlled substances outlining the responsibility of the medications being prescribed.  I understand that the if the controlled medication is not helping to achieve goals, the dosage may be tapered and no longer prescribed. 3. MY RESPONSIBILITY FOR COMMUNICATION / PRESCRIPTION RENEWALS   I agree that all controlled substance medications that I take will be prescribed only by my provider. If another healthcare provider prescribes me medication in an emergency, I will notify my provider within seventy-two (72) hours.  I will arrange for refills at the prescribed interval ONLY during regular office hours. I will not ask for refills earlier than agreed, after-hours, on holidays or weekends. Refills may take up to 72 hours for processing and prescriptions to reach the pharmacy.  I will inform my other health care providers that I am taking these medications and of the existence of this [id number]. In the event of an emergency, I will provide the same information to the emergency department prescribers. Initials:_______  Zoya Vicente I will keep my provider updated on the pharmacy I am using for controlled medication prescription filling. 4. MY RESPONSIBILITY FOR PROTECTING MEDICATIONS   I will protect my prescriptions and medications. I understand that lost or misplaced prescriptions will not be replaced.  I will keep medications only for my own use and will not share them with others. I will keep all medications away from children.  I agree that if my medications are adjusted or discontinued, I will properly dispose of any remaining medications. I understand that I will be required to dispose of any remaining controlled medications as, directed by my prescriber, prior to being provided with any prescriptions for other controlled medications. Medication drop box locations can be found at: [email or web address]    5.  MY RESPONSIBILITY WITH ILLEGAL DRUGS    I will not use illegal or street drugs or another person's prescription medications not prescribed to me.  If there are identified addiction type symptoms, then referral to a program may be provided by my provider and I agree to follow through with this recommendation. 6. MY RESPONSIBILITY FOR COOPERATION WITH INVESTIGATIONS   I understand that my provider will comply with any applicable law and may discuss my use and/or possible misuse/abuse of controlled substances and alcohol, as appropriate, with any health care provider involved in my care, pharmacist, or legal authority.  I authorize my provider and pharmacy to cooperate fully with law enforcement agencies (as permitted by law) in the investigation of any possible misuse, sale, or other diversion of my controlled substances.  I agree to waive any applicable privilege or right of privacy or confidentiality with respect to these authorizations. 7. PROVIDERS RIGHT TO MONITOR FOR SAFETY: PRESCRIPTION MONITORING / DRUG TESTING   I consent to drug/toxicology screening and will submit to a drug screen upon my providers request to assure I am only taking the prescribed drugs for my safety monitoring. I understand that a drug screen is a laboratory test in which a sample of my urine, blood or saliva is checked to see what drugs I have been taking. This may entail an observed urine specimen, which means that a nurse or other health care provider may watch me provide urine, and I will cooperate if I am asked to provide an observed specimen.  I understand that my provider will check a copy of my State Prescription Monitoring Program () Report in order to safely prescribe medications.  Pill Counts: I consent to pill counts when requested. I may be asked to bring all my prescribed controlled substance medications, in their original bottles, to all of my scheduled appointments. In addition, my provider may ask me to come to the practice at any time for a random pill count. Initials:_______    8. TERMINATION OF THIS AGREEMENT  For my safety, my prescriber has the right to stop prescribing controlled substance medications and may end this agreement.  Conditions that may result in termination of this agreement:  a. I do not show any improvement in pain, or my activity has not improved. b. I develop rapid tolerance or loss of improvement, as described in my treatment plan.  c. I develop significant side effects from the medication. d. My behavior is not consistent with the responsibilities outlined above, thereby causing safety concerns to continue prescribing controlled substance medications. e. I fail to follow the terms of this agreement. f. Other:____________________________       UNDERSTANDING THIS MEDICATION AGREEMENT:    I have read the above and have had all my questions answered. For chronic disease management, I know that my symptoms can be managed with many types of treatments. A chronic medication trial may be part of my treatment, but I must be an active participant in my care. Medication therapy is only one part of my symptom management plan. In some cases, there may be limited scientific evidence to support the chronic use of certain medications to improve symptoms and daily function. Furthermore, in certain circumstances, there may be scientific information that suggests that the use of chronic controlled substances may worsen my symptoms and increase my risk of unintentional death directly related to this medication therapy. I know that if my provider feels my risk from controlled medications is greater than my benefit, I will have my controlled substance medication(s) compassionately lowered or removed altogether. I further agree to allow this office to contact my HIPAA contact if there are concerns about my safety and use of the controlled medications.    I have agreed to use the prescribed controlled substance medications to me as instructed by my provider and as stated in this Medication Agreement. My initial on each page and my signature below shows that I have read each page and I have had the opportunity to ask questions with answers provided by my provider.     Patient Name (Printed): _____________________________________  Patient Signature:  ______________________   Date: _____________    Prescriber Name (Printed): ___________________________________  Prescriber Signature: _____________________  Date: _____________

## 2023-06-16 ENCOUNTER — TELEPHONE (OUTPATIENT)
Age: 48
End: 2023-06-16

## 2023-06-16 DIAGNOSIS — M50.30 OTHER CERVICAL DISC DEGENERATION, UNSPECIFIED CERVICAL REGION: ICD-10-CM

## 2023-06-16 DIAGNOSIS — G89.29 OTHER CHRONIC PAIN: ICD-10-CM

## 2023-06-16 DIAGNOSIS — M47.816 SPONDYLOSIS WITHOUT MYELOPATHY OR RADICULOPATHY, LUMBAR REGION: ICD-10-CM

## 2023-06-16 DIAGNOSIS — Z86.69 HISTORY OF MIGRAINE HEADACHES: ICD-10-CM

## 2023-06-16 RX ORDER — OXYCODONE HYDROCHLORIDE AND ACETAMINOPHEN 5; 325 MG/1; MG/1
1-2 TABLET ORAL EVERY 8 HOURS PRN
Qty: 100 TABLET | Refills: 0 | Status: CANCELLED | OUTPATIENT
Start: 2023-06-16 | End: 2023-07-16

## 2023-06-19 DIAGNOSIS — F31.32 BIPOLAR DISORDER, CURRENT EPISODE DEPRESSED, MODERATE (HCC): ICD-10-CM

## 2023-06-21 RX ORDER — LITHIUM CARBONATE 300 MG/1
CAPSULE ORAL
Qty: 90 CAPSULE | Refills: 0 | Status: SHIPPED | OUTPATIENT
Start: 2023-06-21 | End: 2023-07-21

## 2023-06-21 NOTE — TELEPHONE ENCOUNTER
PCP: Rey Álvarez MD    Last appt: [unfilled]  Future Appointments   Date Time Provider [address]   7/12/2023  4:00 PM Virginia Yeboah MD PAFP [facility]       Requested Prescriptions     Pending Prescriptions Disp Refills    lithium 300 MG capsule [Pharmacy Med Name: LITHIUM CARBONATE 300 MG CAP] 270 capsule      Sig: TAKE 1 CAPSULE BY MOUTH EVERY MORNING AND 2 CAPSULES IN THE EVENINGS

## 2023-07-12 ENCOUNTER — OFFICE VISIT (OUTPATIENT)
Age: 48
End: 2023-07-12
Payer: COMMERCIAL

## 2023-07-12 VITALS
HEART RATE: 78 BPM | RESPIRATION RATE: 16 BRPM | OXYGEN SATURATION: 95 % | HEIGHT: 64 IN | WEIGHT: 220 LBS | BODY MASS INDEX: 37.56 KG/M2 | SYSTOLIC BLOOD PRESSURE: 132 MMHG | TEMPERATURE: 98 F | DIASTOLIC BLOOD PRESSURE: 70 MMHG

## 2023-07-12 DIAGNOSIS — M47.816 SPONDYLOSIS WITHOUT MYELOPATHY OR RADICULOPATHY, LUMBAR REGION: Primary | ICD-10-CM

## 2023-07-12 DIAGNOSIS — F90.2 ATTENTION DEFICIT HYPERACTIVITY DISORDER (ADHD), COMBINED TYPE: ICD-10-CM

## 2023-07-12 DIAGNOSIS — F31.32 BIPOLAR DISORDER, CURRENT EPISODE DEPRESSED, MODERATE (HCC): ICD-10-CM

## 2023-07-12 DIAGNOSIS — Z79.899 MEDICATION MANAGEMENT: ICD-10-CM

## 2023-07-12 DIAGNOSIS — G89.29 OTHER CHRONIC PAIN: ICD-10-CM

## 2023-07-12 DIAGNOSIS — Z86.69 HISTORY OF MIGRAINE HEADACHES: ICD-10-CM

## 2023-07-12 DIAGNOSIS — M50.30 OTHER CERVICAL DISC DEGENERATION, UNSPECIFIED CERVICAL REGION: ICD-10-CM

## 2023-07-12 DIAGNOSIS — F41.1 GENERALIZED ANXIETY DISORDER: ICD-10-CM

## 2023-07-12 DIAGNOSIS — M25.562 ACUTE PAIN OF LEFT KNEE: ICD-10-CM

## 2023-07-12 PROCEDURE — 99214 OFFICE O/P EST MOD 30 MIN: CPT | Performed by: FAMILY MEDICINE

## 2023-07-12 RX ORDER — DEXTROAMPHETAMINE SACCHARATE, AMPHETAMINE ASPARTATE, DEXTROAMPHETAMINE SULFATE AND AMPHETAMINE SULFATE 2.5; 2.5; 2.5; 2.5 MG/1; MG/1; MG/1; MG/1
TABLET ORAL
Qty: 120 TABLET | Refills: 0 | Status: SHIPPED | OUTPATIENT
Start: 2023-09-29 | End: 2023-10-29

## 2023-07-12 RX ORDER — OXYCODONE HYDROCHLORIDE AND ACETAMINOPHEN 5; 325 MG/1; MG/1
1-2 TABLET ORAL EVERY 8 HOURS PRN
Qty: 100 TABLET | Refills: 0 | Status: SHIPPED | OUTPATIENT
Start: 2023-08-15 | End: 2023-09-14

## 2023-07-12 RX ORDER — OXYCODONE HYDROCHLORIDE AND ACETAMINOPHEN 5; 325 MG/1; MG/1
1-2 TABLET ORAL EVERY 8 HOURS PRN
Qty: 100 TABLET | Refills: 0 | Status: SHIPPED | OUTPATIENT
Start: 2023-09-15 | End: 2023-10-15

## 2023-07-12 RX ORDER — OXYCODONE HYDROCHLORIDE AND ACETAMINOPHEN 5; 325 MG/1; MG/1
1-2 TABLET ORAL EVERY 8 HOURS PRN
Qty: 100 TABLET | Refills: 0 | Status: SHIPPED | OUTPATIENT
Start: 2023-07-15 | End: 2023-08-14

## 2023-07-12 RX ORDER — ALPRAZOLAM 0.25 MG/1
TABLET ORAL
Qty: 60 TABLET | Refills: 2 | Status: SHIPPED | OUTPATIENT
Start: 2023-07-12 | End: 2023-08-12

## 2023-07-12 RX ORDER — DEXTROAMPHETAMINE SACCHARATE, AMPHETAMINE ASPARTATE, DEXTROAMPHETAMINE SULFATE AND AMPHETAMINE SULFATE 2.5; 2.5; 2.5; 2.5 MG/1; MG/1; MG/1; MG/1
TABLET ORAL
Qty: 120 TABLET | Refills: 0 | Status: SHIPPED | OUTPATIENT
Start: 2023-08-30 | End: 2023-09-30

## 2023-07-12 RX ORDER — DEXTROAMPHETAMINE SACCHARATE, AMPHETAMINE ASPARTATE, DEXTROAMPHETAMINE SULFATE AND AMPHETAMINE SULFATE 2.5; 2.5; 2.5; 2.5 MG/1; MG/1; MG/1; MG/1
TABLET ORAL
Qty: 120 TABLET | Refills: 0 | Status: SHIPPED | OUTPATIENT
Start: 2023-07-31 | End: 2023-08-10

## 2023-07-12 SDOH — ECONOMIC STABILITY: HOUSING INSECURITY
IN THE LAST 12 MONTHS, WAS THERE A TIME WHEN YOU DID NOT HAVE A STEADY PLACE TO SLEEP OR SLEPT IN A SHELTER (INCLUDING NOW)?: NO

## 2023-07-12 SDOH — ECONOMIC STABILITY: INCOME INSECURITY: HOW HARD IS IT FOR YOU TO PAY FOR THE VERY BASICS LIKE FOOD, HOUSING, MEDICAL CARE, AND HEATING?: NOT HARD AT ALL

## 2023-07-12 SDOH — ECONOMIC STABILITY: FOOD INSECURITY: WITHIN THE PAST 12 MONTHS, YOU WORRIED THAT YOUR FOOD WOULD RUN OUT BEFORE YOU GOT MONEY TO BUY MORE.: NEVER TRUE

## 2023-07-12 SDOH — ECONOMIC STABILITY: FOOD INSECURITY: WITHIN THE PAST 12 MONTHS, THE FOOD YOU BOUGHT JUST DIDN'T LAST AND YOU DIDN'T HAVE MONEY TO GET MORE.: NEVER TRUE

## 2023-07-12 ASSESSMENT — PATIENT HEALTH QUESTIONNAIRE - PHQ9
1. LITTLE INTEREST OR PLEASURE IN DOING THINGS: 0
SUM OF ALL RESPONSES TO PHQ QUESTIONS 1-9: 0
2. FEELING DOWN, DEPRESSED OR HOPELESS: 0
SUM OF ALL RESPONSES TO PHQ QUESTIONS 1-9: 0
SUM OF ALL RESPONSES TO PHQ QUESTIONS 1-9: 0
SUM OF ALL RESPONSES TO PHQ9 QUESTIONS 1 & 2: 0
SUM OF ALL RESPONSES TO PHQ QUESTIONS 1-9: 0

## 2023-07-12 ASSESSMENT — ENCOUNTER SYMPTOMS
SHORTNESS OF BREATH: 0
WHEEZING: 0
EYE PAIN: 0
EYE REDNESS: 0
COUGH: 0

## 2023-07-12 NOTE — PROGRESS NOTES
Chief Complaint   Patient presents with    ADD    Pain    Knee Pain     Left knee after fall 1 month ago no swelling    1. \"Have you been to the ER, urgent care clinic since your last visit? Hospitalized since your last visit? \" no  2. \"Have you seen or consulted any other health care providers outside of the [address] [place] [address] since your last visit? \" no  3. For patients over 45: Has the patient had a colonoscopy? No     If the patient is female:    4. For patients over 40: Has the patient had a mammogram? Yes    5. For patients over 21: Has the patient had a pap smear?  N/A
better in 3 -4 days and prn new problems. BMI is significantly elevated- in the obese range. I reviewed diet, exercise and weight control. Discussed weight control in detail, the importance of mainly decreased carbs, and for weight maintenance, exercise; discussed different diets and that it isn't as important to watch the type of foods as it is to decrease calorie intake no matter what type of diet you do, etc.  Mindful eating also discussed- Naturally Slim( now Wond'r) or Noom are 2 options. Total 30 minutes  re: Recommended a weekly \"heart check. \" I went into detail how to do this. Regular exercise is very important to your health; it helps mentally, physically, socially; it prevents injuries if done properly. Exercise, even as simple as walking 20-30 minutes daily has major benefits to your health even though your \"numbers\" are the same in the lab. See if you can add this into your daily regimen and after a few months it will become a regular habit-\"just something you do,\" like brushing your teeth. A combination of aerobic exercise and strengthening and stretching is felt to be the best for you, so this should be your ultimate goal. This can be done in the privacy of your home or in a group setting as at the gym  Some prefer having a , others prefer to do exercise in groups or individually. Do what \"works\" for you. You need to make it simple and \"fun,\" or you most likely will not continue it. Also, discussed symptoms of concern that were noted today in the note above, treatment options( including doing nothing), when to follow up before recommended time frame. Also, answered all questions. Pt states that she uses the oxycodone for 2 things: neck injury/pain that she has had for years, and about a 1/3 of the oxycodone she uses for her migraine headaches. She has not changed the frequency uses of these medicines. She continues on the same dose of Xanax and Adderall for her anxiety and ADD.

## 2023-07-20 DIAGNOSIS — F31.32 BIPOLAR DISORDER, CURRENT EPISODE DEPRESSED, MODERATE (HCC): ICD-10-CM

## 2023-07-21 RX ORDER — LITHIUM CARBONATE 300 MG/1
CAPSULE ORAL
Qty: 90 CAPSULE | Refills: 3 | Status: SHIPPED | OUTPATIENT
Start: 2023-07-21

## 2023-08-17 DIAGNOSIS — F31.32 BIPOLAR DISORDER, CURRENT EPISODE DEPRESSED, MODERATE (HCC): ICD-10-CM

## 2023-08-17 RX ORDER — ESCITALOPRAM OXALATE 20 MG/1
TABLET ORAL
Qty: 90 TABLET | Refills: 1 | Status: SHIPPED | OUTPATIENT
Start: 2023-08-17

## 2023-09-24 DIAGNOSIS — F51.04 PSYCHOPHYSIOLOGIC INSOMNIA: Primary | ICD-10-CM

## 2023-09-25 RX ORDER — ZOLPIDEM TARTRATE 10 MG/1
TABLET ORAL
Qty: 60 TABLET | Refills: 5 | Status: SHIPPED | OUTPATIENT
Start: 2023-09-25 | End: 2023-10-25

## 2023-10-02 DIAGNOSIS — F41.8 OTHER SPECIFIED ANXIETY DISORDERS: ICD-10-CM

## 2023-10-02 DIAGNOSIS — F41.1 GENERALIZED ANXIETY DISORDER: ICD-10-CM

## 2023-10-03 RX ORDER — ALPRAZOLAM 0.25 MG/1
TABLET ORAL
Qty: 60 TABLET | OUTPATIENT
Start: 2023-10-03

## 2023-10-03 RX ORDER — ALPRAZOLAM 0.25 MG/1
TABLET ORAL
Qty: 60 TABLET | Refills: 2 | Status: SHIPPED | OUTPATIENT
Start: 2023-10-07 | End: 2023-11-06

## 2023-10-09 DIAGNOSIS — F31.32 BIPOLAR DISORDER, CURRENT EPISODE DEPRESSED, MODERATE (HCC): ICD-10-CM

## 2023-10-09 RX ORDER — LITHIUM CARBONATE 300 MG/1
CAPSULE ORAL
Qty: 90 CAPSULE | Refills: 3 | Status: SHIPPED | OUTPATIENT
Start: 2023-10-09 | End: 2023-10-13 | Stop reason: SDUPTHER

## 2023-10-12 DIAGNOSIS — F31.32 BIPOLAR DISORDER, CURRENT EPISODE DEPRESSED, MODERATE (HCC): ICD-10-CM

## 2023-10-13 RX ORDER — LITHIUM CARBONATE 300 MG/1
CAPSULE ORAL
Qty: 270 CAPSULE | Refills: 1 | Status: SHIPPED | OUTPATIENT
Start: 2023-10-13

## 2023-10-17 ENCOUNTER — PATIENT MESSAGE (OUTPATIENT)
Age: 48
End: 2023-10-17

## 2023-10-17 DIAGNOSIS — M47.816 SPONDYLOSIS WITHOUT MYELOPATHY OR RADICULOPATHY, LUMBAR REGION: ICD-10-CM

## 2023-10-17 DIAGNOSIS — M50.30 OTHER CERVICAL DISC DEGENERATION, UNSPECIFIED CERVICAL REGION: ICD-10-CM

## 2023-10-17 DIAGNOSIS — Z86.69 HISTORY OF MIGRAINE HEADACHES: ICD-10-CM

## 2023-10-17 DIAGNOSIS — G89.29 OTHER CHRONIC PAIN: ICD-10-CM

## 2023-10-18 RX ORDER — OXYCODONE HYDROCHLORIDE AND ACETAMINOPHEN 5; 325 MG/1; MG/1
1-2 TABLET ORAL EVERY 8 HOURS PRN
Qty: 100 TABLET | Refills: 0 | Status: SHIPPED | OUTPATIENT
Start: 2023-10-18 | End: 2023-11-17

## 2023-10-25 ENCOUNTER — OFFICE VISIT (OUTPATIENT)
Age: 48
End: 2023-10-25
Payer: COMMERCIAL

## 2023-10-25 VITALS
BODY MASS INDEX: 39.27 KG/M2 | TEMPERATURE: 98 F | HEART RATE: 78 BPM | WEIGHT: 230 LBS | DIASTOLIC BLOOD PRESSURE: 78 MMHG | SYSTOLIC BLOOD PRESSURE: 140 MMHG | RESPIRATION RATE: 16 BRPM | OXYGEN SATURATION: 96 % | HEIGHT: 64 IN

## 2023-10-25 DIAGNOSIS — M50.30 OTHER CERVICAL DISC DEGENERATION, UNSPECIFIED CERVICAL REGION: ICD-10-CM

## 2023-10-25 DIAGNOSIS — G89.29 OTHER CHRONIC PAIN: ICD-10-CM

## 2023-10-25 DIAGNOSIS — M47.816 SPONDYLOSIS WITHOUT MYELOPATHY OR RADICULOPATHY, LUMBAR REGION: Primary | ICD-10-CM

## 2023-10-25 DIAGNOSIS — F90.2 ATTENTION DEFICIT HYPERACTIVITY DISORDER (ADHD), COMBINED TYPE: ICD-10-CM

## 2023-10-25 DIAGNOSIS — Z86.69 HISTORY OF MIGRAINE HEADACHES: ICD-10-CM

## 2023-10-25 DIAGNOSIS — N64.52 NIPPLE DISCHARGE: ICD-10-CM

## 2023-10-25 DIAGNOSIS — Z79.899 MEDICATION MANAGEMENT: ICD-10-CM

## 2023-10-25 PROCEDURE — 99214 OFFICE O/P EST MOD 30 MIN: CPT | Performed by: FAMILY MEDICINE

## 2023-10-25 RX ORDER — DEXTROAMPHETAMINE SACCHARATE, AMPHETAMINE ASPARTATE, DEXTROAMPHETAMINE SULFATE AND AMPHETAMINE SULFATE 2.5; 2.5; 2.5; 2.5 MG/1; MG/1; MG/1; MG/1
TABLET ORAL
Qty: 120 TABLET | Refills: 0 | Status: SHIPPED | OUTPATIENT
Start: 2023-11-16 | End: 2023-11-24

## 2023-10-25 RX ORDER — DEXTROAMPHETAMINE SACCHARATE, AMPHETAMINE ASPARTATE, DEXTROAMPHETAMINE SULFATE AND AMPHETAMINE SULFATE 2.5; 2.5; 2.5; 2.5 MG/1; MG/1; MG/1; MG/1
TABLET ORAL
Qty: 120 TABLET | Refills: 0 | Status: SHIPPED | OUTPATIENT
Start: 2024-01-15 | End: 2024-02-14

## 2023-10-25 RX ORDER — OXYCODONE HYDROCHLORIDE AND ACETAMINOPHEN 5; 325 MG/1; MG/1
1-2 TABLET ORAL EVERY 8 HOURS PRN
Qty: 100 TABLET | Refills: 0 | Status: SHIPPED | OUTPATIENT
Start: 2023-12-17 | End: 2024-01-16

## 2023-10-25 RX ORDER — OXYCODONE HYDROCHLORIDE AND ACETAMINOPHEN 5; 325 MG/1; MG/1
1-2 TABLET ORAL EVERY 8 HOURS PRN
Qty: 100 TABLET | Refills: 0 | Status: SHIPPED | OUTPATIENT
Start: 2024-01-16 | End: 2024-02-15

## 2023-10-25 RX ORDER — DEXTROAMPHETAMINE SACCHARATE, AMPHETAMINE ASPARTATE, DEXTROAMPHETAMINE SULFATE AND AMPHETAMINE SULFATE 2.5; 2.5; 2.5; 2.5 MG/1; MG/1; MG/1; MG/1
TABLET ORAL
Qty: 120 TABLET | Refills: 0 | Status: SHIPPED | OUTPATIENT
Start: 2023-12-16 | End: 2024-01-15

## 2023-10-25 RX ORDER — OXYCODONE HYDROCHLORIDE AND ACETAMINOPHEN 5; 325 MG/1; MG/1
1-2 TABLET ORAL EVERY 8 HOURS PRN
Qty: 100 TABLET | Refills: 0 | Status: SHIPPED | OUTPATIENT
Start: 2023-11-17 | End: 2023-12-17

## 2023-10-25 ASSESSMENT — PATIENT HEALTH QUESTIONNAIRE - PHQ9
SUM OF ALL RESPONSES TO PHQ QUESTIONS 1-9: 0
2. FEELING DOWN, DEPRESSED OR HOPELESS: 0
SUM OF ALL RESPONSES TO PHQ QUESTIONS 1-9: 0
SUM OF ALL RESPONSES TO PHQ9 QUESTIONS 1 & 2: 0
SUM OF ALL RESPONSES TO PHQ QUESTIONS 1-9: 0
1. LITTLE INTEREST OR PLEASURE IN DOING THINGS: 0
SUM OF ALL RESPONSES TO PHQ QUESTIONS 1-9: 0

## 2023-10-25 ASSESSMENT — ENCOUNTER SYMPTOMS
COUGH: 0
EYE REDNESS: 0
SHORTNESS OF BREATH: 0
EYE PAIN: 0
WHEEZING: 0

## 2023-10-27 LAB — PROLACTIN SERPL-MCNC: 28.5 NG/ML (ref 4.8–23.3)

## 2023-10-30 LAB
AMPHETAMINES UR QL SCN: NORMAL NG/ML
BARBITURATES UR QL SCN: NEGATIVE NG/ML
BENZODIAZ UR QL SCN: NEGATIVE NG/ML
BZE UR QL SCN: NEGATIVE NG/ML
CANNABINOIDS UR QL SCN: NORMAL NG/ML
CREAT UR-MCNC: 141.9 MG/DL (ref 20–300)
LABORATORY COMMENT REPORT: NORMAL
METHADONE UR QL SCN: NEGATIVE NG/ML
OPIATES UR QL SCN: NEGATIVE NG/ML
OPIATES UR QL SCN: NORMAL NG/ML
OXYCODONE UR CFM-MCNC: 2371 NG/ML
OXYCODONE UR QL CFM: POSITIVE
OXYCODONE+OXYMORPHONE UR QL SCN: NORMAL NG/ML
OXYCODONE+OXYMORPHONE UR QL SCN: POSITIVE
OXYMORPHONE UR CFM-MCNC: 598 NG/ML
OXYMORPHONE UR QL CFM: POSITIVE
PCP UR QL: NEGATIVE NG/ML
PH UR: 6 [PH] (ref 4.5–8.9)
PROPOXYPH UR QL SCN: NEGATIVE NG/ML

## 2024-01-01 ENCOUNTER — HOSPITAL ENCOUNTER (EMERGENCY)
Facility: HOSPITAL | Age: 49
Discharge: HOME | End: 2024-01-01
Payer: COMMERCIAL

## 2024-01-01 ENCOUNTER — APPOINTMENT (OUTPATIENT)
Facility: HOSPITAL | Age: 49
End: 2024-01-01
Payer: COMMERCIAL

## 2024-01-01 VITALS
BODY MASS INDEX: 39.13 KG/M2 | TEMPERATURE: 98.4 F | WEIGHT: 227.96 LBS | HEART RATE: 90 BPM | RESPIRATION RATE: 14 BRPM | OXYGEN SATURATION: 99 % | DIASTOLIC BLOOD PRESSURE: 89 MMHG | SYSTOLIC BLOOD PRESSURE: 145 MMHG

## 2024-01-01 DIAGNOSIS — Z72.0 TOBACCO USE: ICD-10-CM

## 2024-01-01 DIAGNOSIS — F41.1 GENERALIZED ANXIETY DISORDER: ICD-10-CM

## 2024-01-01 DIAGNOSIS — J18.9 PNEUMONIA OF RIGHT LUNG DUE TO INFECTIOUS ORGANISM, UNSPECIFIED PART OF LUNG: ICD-10-CM

## 2024-01-01 DIAGNOSIS — R05.1 ACUTE COUGH: Primary | ICD-10-CM

## 2024-01-01 DIAGNOSIS — R09.81 NASAL SINUS CONGESTION: ICD-10-CM

## 2024-01-01 LAB
DEPRECATED S PYO AG THROAT QL EIA: NEGATIVE
FLUAV AG NPH QL IA: NEGATIVE
FLUBV AG NOSE QL IA: NEGATIVE
SARS-COV-2 RDRP RESP QL NAA+PROBE: NOT DETECTED
SOURCE: NORMAL

## 2024-01-01 PROCEDURE — 87635 SARS-COV-2 COVID-19 AMP PRB: CPT

## 2024-01-01 PROCEDURE — 87804 INFLUENZA ASSAY W/OPTIC: CPT

## 2024-01-01 PROCEDURE — 87070 CULTURE OTHR SPECIMN AEROBIC: CPT

## 2024-01-01 PROCEDURE — 87880 STREP A ASSAY W/OPTIC: CPT

## 2024-01-01 PROCEDURE — 6370000000 HC RX 637 (ALT 250 FOR IP): Performed by: PHYSICIAN ASSISTANT

## 2024-01-01 PROCEDURE — 71045 X-RAY EXAM CHEST 1 VIEW: CPT

## 2024-01-01 PROCEDURE — 94640 AIRWAY INHALATION TREATMENT: CPT

## 2024-01-01 PROCEDURE — 99284 EMERGENCY DEPT VISIT MOD MDM: CPT

## 2024-01-01 PROCEDURE — 6360000002 HC RX W HCPCS: Performed by: PHYSICIAN ASSISTANT

## 2024-01-01 RX ORDER — GUAIFENESIN 200 MG/10ML
100 LIQUID ORAL 3 TIMES DAILY PRN
Qty: 118 ML | Refills: 0 | Status: SHIPPED | OUTPATIENT
Start: 2024-01-01

## 2024-01-01 RX ORDER — METHYLPREDNISOLONE 4 MG/1
TABLET ORAL
Qty: 1 KIT | Refills: 0 | Status: SHIPPED | OUTPATIENT
Start: 2024-01-01 | End: 2024-01-07

## 2024-01-01 RX ORDER — AZITHROMYCIN 250 MG/1
500 TABLET, FILM COATED ORAL DAILY
Status: DISCONTINUED | OUTPATIENT
Start: 2024-01-01 | End: 2024-01-01 | Stop reason: HOSPADM

## 2024-01-01 RX ORDER — ALBUTEROL SULFATE 90 UG/1
2 AEROSOL, METERED RESPIRATORY (INHALATION) EVERY 4 HOURS PRN
Qty: 6.7 G | Refills: 0 | Status: SHIPPED | OUTPATIENT
Start: 2024-01-01

## 2024-01-01 RX ORDER — AZITHROMYCIN 250 MG/1
250 TABLET, FILM COATED ORAL SEE ADMIN INSTRUCTIONS
Qty: 6 TABLET | Refills: 0 | Status: SHIPPED | OUTPATIENT
Start: 2024-01-01 | End: 2024-01-06

## 2024-01-01 RX ORDER — ALBUTEROL SULFATE 90 UG/1
2 AEROSOL, METERED RESPIRATORY (INHALATION)
Status: COMPLETED | OUTPATIENT
Start: 2024-01-01 | End: 2024-01-01

## 2024-01-01 RX ORDER — FLUTICASONE PROPIONATE 50 MCG
1 SPRAY, SUSPENSION (ML) NASAL DAILY
Qty: 16 G | Refills: 0 | Status: SHIPPED | OUTPATIENT
Start: 2024-01-01

## 2024-01-01 RX ORDER — DEXAMETHASONE SODIUM PHOSPHATE 10 MG/ML
4 INJECTION, SOLUTION INTRAMUSCULAR; INTRAVENOUS ONCE
Status: COMPLETED | OUTPATIENT
Start: 2024-01-01 | End: 2024-01-01

## 2024-01-01 RX ORDER — CETIRIZINE HYDROCHLORIDE 10 MG/1
10 TABLET ORAL DAILY
Qty: 30 TABLET | Refills: 0 | Status: SHIPPED | OUTPATIENT
Start: 2024-01-01 | End: 2024-01-31

## 2024-01-01 RX ORDER — POLYETHYLENE GLYCOL 3350 17 G/17G
17 POWDER, FOR SOLUTION ORAL DAILY
Qty: 578 G | Refills: 0 | Status: SHIPPED | OUTPATIENT
Start: 2024-01-01 | End: 2024-01-31

## 2024-01-01 RX ADMIN — DEXAMETHASONE SODIUM PHOSPHATE 4 MG: 10 INJECTION INTRAMUSCULAR; INTRAVENOUS at 13:38

## 2024-01-01 RX ADMIN — ALBUTEROL SULFATE 2 PUFF: 90 AEROSOL, METERED RESPIRATORY (INHALATION) at 13:53

## 2024-01-01 RX ADMIN — AZITHROMYCIN 500 MG: 250 TABLET, FILM COATED ORAL at 15:10

## 2024-01-01 ASSESSMENT — PAIN SCALES - GENERAL: PAINLEVEL_OUTOF10: 0

## 2024-01-01 NOTE — DISCHARGE INSTRUCTIONS
Thank You!    It was a pleasure taking care of you in our Emergency Department today. We know that when you come to [facility], you are entrusting us with your health, comfort, and safety. Our clinicians honor that trust, and truly appreciate the opportunity to care for you and your loved ones.    If you receive a survey about your Emergency Department experience today, please fill it out.  We value your feedback. Thank you.      Ivonne Clements PA-C    ___________________________________  I have included a copy of your lab results and/or radiologic studies from today's visit so you can have them easily available at your follow-up visit.   Recent Results (from the past 12 hour(s))   Rapid influenza A/B antigens    Collection Time: 01/01/24  1:44 PM    Specimen: Nasopharyngeal   Result Value Ref Range    Influenza A Ag Negative NEG      Influenza B Ag Negative NEG     Rapid Strep Screen    Collection Time: 01/01/24  1:44 PM    Specimen: Swab; Throat   Result Value Ref Range    Strep A Ag Negative NEG     COVID-19, Rapid    Collection Time: 01/01/24  1:44 PM    Specimen: Nasopharyngeal   Result Value Ref Range    Source Nasopharyngeal      SARS-CoV-2, Rapid Not detected NOTD         XR CHEST PORTABLE   Final Result      Artifact versus right lung interstitial pneumonia. Consider PA and lateral chest   views when the patient can better tolerate.           [unfilled]

## 2024-01-01 NOTE — ED NOTES
Pt's IV removed. Pt provided D/C instructions and states understanding of D/C teaching. Pt has all belongings. Pt ambulates self out via wheelchair out of ED to personal vehicle at this time.  Patient is A&Ox4, on RA, VSS, and is in NAD at the time of discharge.

## 2024-01-02 RX ORDER — ALPRAZOLAM 0.25 MG/1
TABLET ORAL
Qty: 60 TABLET | Refills: 2 | Status: SHIPPED | OUTPATIENT
Start: 2024-01-02 | End: 2024-01-03 | Stop reason: SDUPTHER

## 2024-01-03 DIAGNOSIS — F41.1 GENERALIZED ANXIETY DISORDER: ICD-10-CM

## 2024-01-03 LAB
BACTERIA SPEC CULT: NORMAL
SERVICE CMNT-IMP: NORMAL

## 2024-01-03 RX ORDER — ALPRAZOLAM 0.25 MG/1
TABLET ORAL
Qty: 60 TABLET | Refills: 2 | Status: SHIPPED | OUTPATIENT
Start: 2024-01-03 | End: 2024-02-03

## 2024-01-04 NOTE — ED PROVIDER NOTES
tablet  Commonly known as: PERCOCET  Take 1-2 tablets by mouth every 8 hours as needed for Pain for up to 30 days. Max Daily Amount: 6 tablets  Start taking on: January 16, 2024           * This list has 5 medication(s) that are the same as other medications prescribed for you. Read the directions carefully, and ask your doctor or other care provider to review them with you.                   Where to Get Your Medications        These medications were sent to [facility]/pharmacy #[id number] - [place], [place] - [address] - P [phone] - F [phone]  [address] [facility] [address]      Hours: 24-hours Phone: [phone]   albuterol sulfate  (90 Base) MCG/ACT inhaler  azithromycin 250 MG tablet  cetirizine 10 MG tablet  fluticasone 50 MCG/ACT nasal spray  guaiFENesin 100 MG/5ML liquid  methylPREDNISolone 4 MG tablet  polyethylene glycol 17 GM/SCOOP powder           DISCONTINUED MEDICATIONS:  Discharge Medication List as of 1/1/2024  2:52 PM          I have seen and evaluated the patient autonomously. My supervision physician was on site and available for consultation if needed.     I am the Primary Clinician of Record.   WAYNE Serrato (electronically signed)    (Please note that parts of this dictation were completed with voice recognition software. Quite often unanticipated grammatical, syntax, homophones, and other interpretive errors are inadvertently transcribed by the computer software. Please disregards these errors. Please excuse any errors that have escaped final proofreading.)         Ivonne Clements PA  01/04/24 0113

## 2024-02-02 DIAGNOSIS — F31.32 BIPOLAR DISORDER, CURRENT EPISODE DEPRESSED, MODERATE (HCC): ICD-10-CM

## 2024-02-02 RX ORDER — ESCITALOPRAM OXALATE 20 MG/1
TABLET ORAL
Qty: 90 TABLET | Refills: 3 | Status: SHIPPED | OUTPATIENT
Start: 2024-02-02

## 2024-02-06 ENCOUNTER — TELEMEDICINE (OUTPATIENT)
Age: 49
End: 2024-02-06
Payer: COMMERCIAL

## 2024-02-06 DIAGNOSIS — F51.04 PSYCHOPHYSIOLOGIC INSOMNIA: ICD-10-CM

## 2024-02-06 DIAGNOSIS — G89.29 OTHER CHRONIC PAIN: ICD-10-CM

## 2024-02-06 DIAGNOSIS — F90.2 ATTENTION DEFICIT HYPERACTIVITY DISORDER (ADHD), COMBINED TYPE: ICD-10-CM

## 2024-02-06 DIAGNOSIS — M47.816 SPONDYLOSIS WITHOUT MYELOPATHY OR RADICULOPATHY, LUMBAR REGION: ICD-10-CM

## 2024-02-06 DIAGNOSIS — Z86.69 HISTORY OF MIGRAINE HEADACHES: ICD-10-CM

## 2024-02-06 DIAGNOSIS — F41.1 GENERALIZED ANXIETY DISORDER: Primary | ICD-10-CM

## 2024-02-06 DIAGNOSIS — M50.30 OTHER CERVICAL DISC DEGENERATION, UNSPECIFIED CERVICAL REGION: ICD-10-CM

## 2024-02-06 PROCEDURE — 99213 OFFICE O/P EST LOW 20 MIN: CPT | Performed by: FAMILY MEDICINE

## 2024-02-06 RX ORDER — ZOLPIDEM TARTRATE 10 MG/1
TABLET ORAL
Qty: 60 TABLET | Refills: 5 | Status: SHIPPED | OUTPATIENT
Start: 2024-02-06 | End: 2024-03-05

## 2024-02-06 RX ORDER — OXYCODONE HYDROCHLORIDE AND ACETAMINOPHEN 5; 325 MG/1; MG/1
1-2 TABLET ORAL EVERY 8 HOURS PRN
Qty: 100 TABLET | Refills: 0 | Status: SHIPPED | OUTPATIENT
Start: 2024-04-16 | End: 2024-06-27

## 2024-02-06 RX ORDER — DEXTROAMPHETAMINE SACCHARATE, AMPHETAMINE ASPARTATE, DEXTROAMPHETAMINE SULFATE AND AMPHETAMINE SULFATE 2.5; 2.5; 2.5; 2.5 MG/1; MG/1; MG/1; MG/1
TABLET ORAL
Qty: 120 TABLET | Refills: 0 | Status: SHIPPED | OUTPATIENT
Start: 2024-04-16 | End: 2024-05-01

## 2024-02-06 RX ORDER — OXYCODONE HYDROCHLORIDE AND ACETAMINOPHEN 5; 325 MG/1; MG/1
1-2 TABLET ORAL EVERY 8 HOURS PRN
Qty: 100 TABLET | Refills: 0 | Status: SHIPPED | OUTPATIENT
Start: 2024-03-17 | End: 2024-05-27

## 2024-02-06 RX ORDER — OXYCODONE HYDROCHLORIDE AND ACETAMINOPHEN 5; 325 MG/1; MG/1
1-2 TABLET ORAL EVERY 8 HOURS PRN
Qty: 100 TABLET | Refills: 0 | Status: SHIPPED | OUTPATIENT
Start: 2024-02-17 | End: 2024-03-18

## 2024-02-06 RX ORDER — DEXTROAMPHETAMINE SACCHARATE, AMPHETAMINE ASPARTATE, DEXTROAMPHETAMINE SULFATE AND AMPHETAMINE SULFATE 2.5; 2.5; 2.5; 2.5 MG/1; MG/1; MG/1; MG/1
TABLET ORAL
Qty: 120 TABLET | Refills: 0 | Status: SHIPPED | OUTPATIENT
Start: 2024-02-17 | End: 2024-03-07

## 2024-02-06 RX ORDER — DEXTROAMPHETAMINE SACCHARATE, AMPHETAMINE ASPARTATE, DEXTROAMPHETAMINE SULFATE AND AMPHETAMINE SULFATE 2.5; 2.5; 2.5; 2.5 MG/1; MG/1; MG/1; MG/1
TABLET ORAL
Qty: 120 TABLET | Refills: 0 | Status: SHIPPED | OUTPATIENT
Start: 2024-03-17 | End: 2024-04-18

## 2024-02-06 RX ORDER — ZOLPIDEM TARTRATE 10 MG/1
TABLET ORAL
COMMUNITY
Start: 2024-01-10 | End: 2024-02-06 | Stop reason: SDUPTHER

## 2024-02-06 ASSESSMENT — PATIENT HEALTH QUESTIONNAIRE - PHQ9
1. LITTLE INTEREST OR PLEASURE IN DOING THINGS: 0
SUM OF ALL RESPONSES TO PHQ QUESTIONS 1-9: 0
2. FEELING DOWN, DEPRESSED OR HOPELESS: 0
SUM OF ALL RESPONSES TO PHQ9 QUESTIONS 1 & 2: 0

## 2024-02-06 ASSESSMENT — ENCOUNTER SYMPTOMS
EYE REDNESS: 0
COUGH: 0
SHORTNESS OF BREATH: 0
EYE PAIN: 0
WHEEZING: 0

## 2024-02-06 NOTE — PROGRESS NOTES
Chief Complaint   Patient presents with    Chronic Pain      \"Have you been to the ER, urgent care clinic since your last visit?  Hospitalized since your last visit?\"    Er for pneumonia    “Have you seen or consulted any other health care providers outside of [facility] since your last visit?”    NO    “Have you had a colorectal cancer screening such as a colonoscopy/FIT/Cologuard?    NO      “Have you had a pap smear?”    NO       
problems. She continues to benefit from the oxycodone to give her relief for her chronic neck pain, but also for migraines. The Adderall helps with focus, and staying on track. Controlled substance agreement and urine drug screen are up to date.     Discussion re: Covid 19 infection, prevention, treatment limitations, importance of masks and distancing, and the available vaccines and now boosters recommended for Pfizex and Moderna at 5-6 months and then again at additional 4 months for those at high risk. The new recommendation Sept '22 to get the bivalent vaccine booster for the Omicron variant and in summer 2023 to get this vaccine again in the fall for \"high risk patients and optional for others due to anticipated flu and Covid 19 increase in the fall and winter season. New update end of Sept 2023- new booster available for everyone but highly recommended for those over 65 and anyone with significant health  problems that put them at risk of complications from a Covid infection.    This document was written by Елена Wooten, as dictated by Darrin Jay MD.   I have reviewed and agree with the above note and have made corrections where appropriate Darrin Jay M.D.

## 2024-02-19 DIAGNOSIS — F41.1 GENERALIZED ANXIETY DISORDER: ICD-10-CM

## 2024-02-19 DIAGNOSIS — F51.04 PSYCHOPHYSIOLOGIC INSOMNIA: ICD-10-CM

## 2024-02-19 RX ORDER — OXYCODONE AND ACETAMINOPHEN 5; 325 MG/1; MG/1
1-2 TABLET ORAL EVERY 8 HOURS PRN
Qty: 100 TABLET | Refills: 0 | OUTPATIENT
Start: 2024-02-19 | End: 2024-03-20

## 2024-02-19 RX ORDER — ZOLPIDEM TARTRATE 10 MG/1
TABLET ORAL
Qty: 60 TABLET | Refills: 5 | OUTPATIENT
Start: 2024-02-19 | End: 2024-03-19

## 2024-02-19 NOTE — TELEPHONE ENCOUNTER
Dea Crowe stating the Oxycodone/apap 5-325 is not covered.      Stating Preferred is ENDOCET tablets.    Entered the [facility] brand Endocet to try?  Mikayla Walters    Last appointment: ALEX 2/6/24 Barr  Next appointment: 7/24/24 Misty  Previous refill encounter(s): 2/17/24 100 (oxycodone/apap not covered)    Requested Prescriptions     Pending Prescriptions Disp Refills    ENDOCET 5-325 MG per tablet 100 tablet 0     Sig: Take 1-2 tablets by mouth every 8 hours as needed for Pain for up to 30 days. Max Daily Amount: 6 tablets     For Pharmacy Admin Tracking Only    Program: Medication Refill  CPA in place:    Recommendation Provided To:   Intervention Detail: New Rx: 1, reason: Patient Preference  Intervention Accepted By:   Gap Closed?:    Time Spent (min): 10

## 2024-03-18 DIAGNOSIS — M47.816 SPONDYLOSIS WITHOUT MYELOPATHY OR RADICULOPATHY, LUMBAR REGION: Primary | ICD-10-CM

## 2024-03-18 RX ORDER — OXYCODONE HYDROCHLORIDE AND ACETAMINOPHEN 5; 325 MG/1; MG/1
1-2 TABLET ORAL EVERY 8 HOURS PRN
Qty: 100 TABLET | Refills: 0 | Status: SHIPPED | OUTPATIENT
Start: 2024-03-18 | End: 2024-04-17

## 2024-03-18 NOTE — TELEPHONE ENCOUNTER
Oxycodone/acetaminophen is not covered  Endocet tab is covered.   Needs new rx sent to [facility] [phone]

## 2024-03-24 DIAGNOSIS — F51.04 PSYCHOPHYSIOLOGIC INSOMNIA: ICD-10-CM

## 2024-03-24 DIAGNOSIS — F41.1 GENERALIZED ANXIETY DISORDER: ICD-10-CM

## 2024-03-25 RX ORDER — ZOLPIDEM TARTRATE 10 MG/1
TABLET ORAL
Qty: 60 TABLET | Refills: 5 | Status: SHIPPED | OUTPATIENT
Start: 2024-03-25 | End: 2024-04-24

## 2024-03-25 RX ORDER — ALPRAZOLAM 0.25 MG/1
TABLET ORAL
Qty: 60 TABLET | Refills: 2 | Status: SHIPPED | OUTPATIENT
Start: 2024-03-25 | End: 2024-04-25

## 2024-04-17 ENCOUNTER — HOSPITAL ENCOUNTER (EMERGENCY)
Facility: HOSPITAL | Age: 49
Discharge: HOME | End: 2024-04-17
Attending: EMERGENCY MEDICINE
Payer: COMMERCIAL

## 2024-04-17 ENCOUNTER — APPOINTMENT (OUTPATIENT)
Facility: HOSPITAL | Age: 49
End: 2024-04-17
Payer: COMMERCIAL

## 2024-04-17 VITALS
RESPIRATION RATE: 18 BRPM | BODY MASS INDEX: 39.9 KG/M2 | HEIGHT: 64 IN | OXYGEN SATURATION: 99 % | DIASTOLIC BLOOD PRESSURE: 88 MMHG | HEART RATE: 97 BPM | TEMPERATURE: 99.1 F | WEIGHT: 233.69 LBS | SYSTOLIC BLOOD PRESSURE: 166 MMHG

## 2024-04-17 DIAGNOSIS — J02.9 ACUTE PHARYNGITIS, UNSPECIFIED ETIOLOGY: Primary | ICD-10-CM

## 2024-04-17 DIAGNOSIS — R49.0 HOARSENESS: ICD-10-CM

## 2024-04-17 LAB — DEPRECATED S PYO AG THROAT QL EIA: NEGATIVE

## 2024-04-17 PROCEDURE — 70360 X-RAY EXAM OF NECK: CPT

## 2024-04-17 PROCEDURE — 6370000000 HC RX 637 (ALT 250 FOR IP): Performed by: EMERGENCY MEDICINE

## 2024-04-17 PROCEDURE — 99284 EMERGENCY DEPT VISIT MOD MDM: CPT

## 2024-04-17 PROCEDURE — 71046 X-RAY EXAM CHEST 2 VIEWS: CPT

## 2024-04-17 PROCEDURE — 87070 CULTURE OTHR SPECIMN AEROBIC: CPT

## 2024-04-17 PROCEDURE — 87880 STREP A ASSAY W/OPTIC: CPT

## 2024-04-17 RX ORDER — IBUPROFEN 600 MG/1
600 TABLET ORAL EVERY 6 HOURS PRN
Qty: 20 TABLET | Refills: 0 | Status: SHIPPED | OUTPATIENT
Start: 2024-04-17

## 2024-04-17 RX ORDER — LIDOCAINE HYDROCHLORIDE 20 MG/ML
15 SOLUTION OROPHARYNGEAL
Status: COMPLETED | OUTPATIENT
Start: 2024-04-17 | End: 2024-04-17

## 2024-04-17 RX ADMIN — LIDOCAINE HYDROCHLORIDE 15 ML: 20 SOLUTION ORAL at 13:30

## 2024-04-17 ASSESSMENT — PAIN DESCRIPTION - LOCATION: LOCATION: THROAT

## 2024-04-17 ASSESSMENT — PAIN SCALES - GENERAL: PAINLEVEL_OUTOF10: 3

## 2024-04-17 NOTE — ED NOTES
Pt discharged by Leonard BLACK. Discharge instructions discussed and pt given opportunity to ask questions. Pt ambulatory out of ED

## 2024-04-17 NOTE — ED PROVIDER NOTES
ORTHOPEDIC SURGERY  2003    cervical disc       Family History:  Family History   Problem Relation Age of Onset    High Cholesterol Mother     Diabetes Father     High Cholesterol Father     Diabetes Mother     Hypertension Father     Breast Cancer Maternal Aunt     Heart Attack Father        Social History:  Social History     Tobacco Use    Smoking status: Every Day     Current packs/day: 1.50     Types: Cigarettes    Smokeless tobacco: Never   Substance Use Topics    Alcohol use: Yes    Drug use: No       Allergies:  Allergies   Allergen Reactions    Penicillins Other (See Comments)     As a child; tolerates Keflex       CURRENT MEDICATIONS      Previous Medications    ALBUTEROL SULFATE HFA (PROVENTIL HFA) 108 (90 BASE) MCG/ACT INHALER    Inhale 2 puffs into the lungs every 4 hours as needed for Wheezing    ALPRAZOLAM (XANAX) 0.25 MG TABLET    TAKE 1/2 - 1 TABLET BY MOUTH TWICE DAILY AS NEEDED FOR ANXIETY    AMPHETAMINE-DEXTROAMPHETAMINE (ADDERALL) 10 MG TABLET    TAKE 2 TABS PO Q AM AND 1-2 TABS IN AFTERNOON    AMPHETAMINE-DEXTROAMPHETAMINE (ADDERALL, 10MG,) 10 MG TABLET    TAKE 2 TABS QAM AND 1-2 TABS IN AFTERNOON    AMPHETAMINE-DEXTROAMPHETAMINE (ADDERALL, 10MG,) 10 MG TABLET    TAKE 2 IN AM AND 1-2 TABS IN AFTERNOON    ESCITALOPRAM (LEXAPRO) 20 MG TABLET    TAKE 1 TABLET BY MOUTH EVERY DAY    LITHIUM 300 MG CAPSULE    TAKE 1 CAPSULE BY MOUTH EVERY MORNING AND 2 CAPSULES IN THE EVENINGS    NALOXONE 4 MG/0.1ML LIQD NASAL SPRAY    Use 1 spray intranasally, then discard. Repeat with new spray every 2 min as needed for opioid overdose symptoms, alternating nostrils.    OXYCODONE-ACETAMINOPHEN (ENDOCET) 5-325 MG PER TABLET    Take 1-2 tablets by mouth every 8 hours as needed for Pain for up to 30 days. Intended supply: 3 days. Take lowest dose possible to manage pain Max Daily Amount: 6 tablets    OXYCODONE-ACETAMINOPHEN (PERCOCET) 5-325 MG PER TABLET    Take 1-2 tablets by mouth every 8 hours as needed for Pain

## 2024-04-19 LAB
BACTERIA SPEC CULT: NORMAL
SERVICE CMNT-IMP: NORMAL

## 2024-06-11 ENCOUNTER — TELEPHONE (OUTPATIENT)
Age: 49
End: 2024-06-11

## 2024-06-11 DIAGNOSIS — M47.816 SPONDYLOSIS WITHOUT MYELOPATHY OR RADICULOPATHY, LUMBAR REGION: ICD-10-CM

## 2024-06-11 DIAGNOSIS — M50.30 OTHER CERVICAL DISC DEGENERATION, UNSPECIFIED CERVICAL REGION: ICD-10-CM

## 2024-06-11 DIAGNOSIS — F90.2 ATTENTION DEFICIT HYPERACTIVITY DISORDER (ADHD), COMBINED TYPE: ICD-10-CM

## 2024-06-11 NOTE — TELEPHONE ENCOUNTER
Patient called would like for nurse to call back has some question about her medication    Best call back #[phone]

## 2024-06-11 NOTE — TELEPHONE ENCOUNTER
Has appt with someone for adderall but needs cardiac clearance before they will give ADHD medication  Can't find anyone to give her meds  She has seen other providers but no one will give meds  Last refill 05/07/24 per   Wants refill until he can find someone

## 2024-06-13 RX ORDER — DEXTROAMPHETAMINE SACCHARATE, AMPHETAMINE ASPARTATE, DEXTROAMPHETAMINE SULFATE AND AMPHETAMINE SULFATE 2.5; 2.5; 2.5; 2.5 MG/1; MG/1; MG/1; MG/1
TABLET ORAL
Qty: 120 TABLET | Refills: 0 | Status: SHIPPED | OUTPATIENT
Start: 2024-06-13 | End: 2024-06-26

## 2024-06-13 RX ORDER — OXYCODONE HYDROCHLORIDE AND ACETAMINOPHEN 5; 325 MG/1; MG/1
1-2 TABLET ORAL EVERY 8 HOURS PRN
Qty: 100 TABLET | Refills: 0 | Status: SHIPPED | OUTPATIENT
Start: 2024-06-17 | End: 2024-08-28

## 2024-06-21 ENCOUNTER — HOSPITAL ENCOUNTER (OUTPATIENT)
Age: 49
Discharge: HOME | End: 2024-06-21
Payer: COMMERCIAL

## 2024-06-21 DIAGNOSIS — M54.2 CERVICALGIA: ICD-10-CM

## 2024-06-21 PROCEDURE — 72050 X-RAY EXAM NECK SPINE 4/5VWS: CPT

## 2024-07-20 DIAGNOSIS — F31.32 BIPOLAR DISORDER, CURRENT EPISODE DEPRESSED, MODERATE (HCC): ICD-10-CM

## 2024-07-22 NOTE — TELEPHONE ENCOUNTER
PCP: Darrin Jay MD      Future Appointments   Date Time Provider Department Center   7/24/2024  4:00 PM Darrin Jay MD [facility]       Requested Prescriptions     Pending Prescriptions Disp Refills    lithium 300 MG capsule [Pharmacy Med Name: LITHIUM CARBONATE 300 MG CAP] 270 capsule 1     Sig: TAKE 1 CAPSULE BY MOUTH EVERY MORNING AND 2 CAPSULES IN THE EVENINGS       Prior labs and Blood pressures:  BP Readings from Last 3 Encounters:   04/17/24 (!) 166/88   01/01/24 (!) 145/89   10/25/23 (!) 140/78     Lab Results   Component Value Date/Time     09/19/2022 12:00 AM    K 4.4 09/19/2022 12:00 AM     09/19/2022 12:00 AM    CO2 21 09/19/2022 12:00 AM    BUN 7 09/19/2022 12:00 AM    GFRAA >60 12/27/2021 04:06 PM     No results found for: \"HBA1C\", \"[id number]\"  Lab Results   Component Value Date/Time    CHOL 210 09/19/2022 12:00 AM    HDL 47 09/19/2022 12:00 AM     09/19/2022 12:00 AM    VLDL 17 09/19/2022 12:00 AM     No results found for: \"VITD3\"    Lab Results   Component Value Date/Time    TSH 2.370 09/19/2022 12:00 AM

## 2024-07-23 RX ORDER — LITHIUM CARBONATE 300 MG/1
CAPSULE ORAL
Qty: 270 CAPSULE | Refills: 1 | Status: SHIPPED | OUTPATIENT
Start: 2024-07-23

## 2024-07-24 ENCOUNTER — TELEMEDICINE (OUTPATIENT)
Age: 49
End: 2024-07-24
Payer: COMMERCIAL

## 2024-07-24 DIAGNOSIS — M72.2 BILATERAL PLANTAR FASCIITIS: ICD-10-CM

## 2024-07-24 DIAGNOSIS — F31.32 BIPOLAR DISORDER WITH MODERATE DEPRESSION (HCC): ICD-10-CM

## 2024-07-24 DIAGNOSIS — E66.01 OBESITY, CLASS III, BMI 40-49.9 (MORBID OBESITY) (HCC): Primary | ICD-10-CM

## 2024-07-24 DIAGNOSIS — F90.2 ATTENTION DEFICIT HYPERACTIVITY DISORDER (ADHD), COMBINED TYPE: ICD-10-CM

## 2024-07-24 DIAGNOSIS — M50.30 DDD (DEGENERATIVE DISC DISEASE), CERVICAL: ICD-10-CM

## 2024-07-24 PROBLEM — E66.813 OBESITY, CLASS III, BMI 40-49.9 (MORBID OBESITY) (HCC): Status: ACTIVE | Noted: 2024-07-24

## 2024-07-24 PROCEDURE — 99214 OFFICE O/P EST MOD 30 MIN: CPT | Performed by: FAMILY MEDICINE

## 2024-07-24 RX ORDER — DEXTROAMPHETAMINE SACCHARATE, AMPHETAMINE ASPARTATE, DEXTROAMPHETAMINE SULFATE AND AMPHETAMINE SULFATE 2.5; 2.5; 2.5; 2.5 MG/1; MG/1; MG/1; MG/1
TABLET ORAL
Qty: 120 TABLET | Refills: 0 | Status: SHIPPED | OUTPATIENT
Start: 2024-07-24 | End: 2024-08-06

## 2024-07-24 ASSESSMENT — ENCOUNTER SYMPTOMS
EYE REDNESS: 0
SHORTNESS OF BREATH: 0
EYE PAIN: 0
COUGH: 0
WHEEZING: 0

## 2024-07-24 NOTE — PROGRESS NOTES
Chief Complaint   Patient presents with    Medication Refill    Foot Pain     Both feet saw podiatrist plantar fascitis- given steroid. Still painful     Weight Management     Wants meds-       \"Have you been to the ER, urgent care clinic since your last visit?  Hospitalized since your last visit?\"    NO    “Have you seen or consulted any other health care providers outside of [facility] since your last visit?”    Pain management     Have you had a mammogram?”   NO    Date of last Mammogram: 4/11/2022      “Have you had a pap smear?”    NO    No cervical cancer screening on file         “Have you had a colorectal cancer screening such as a colonoscopy/FIT/Cologuard?    NO    No colonoscopy on file  No cologuard on file  No FIT/FOBT on file   No flexible sigmoidoscopy on file         Click Here for Release of Records Request   
Management (WEGOVY) 0.25 MG/0.5ML SOAJ SC injection; Inject 0.25 mg into the skin every 7 days  Attention deficit hyperactivity disorder (ADHD), combined type  -     amphetamine-dextroamphetamine (ADDERALL, 10MG,) 10 MG tablet; TAKE 2 TABS QAM AND 1-2 TABS IN AFTERNOON      reviewed medications and side effects in detail  Please call my office if there are any questions- [phone].  Discussed expected course/resolution/complications of diagnosis in detail with patient.   Medication risks/benefits/costs/interactions/alternatives discussed with patient.   Pt was given an after visit summary which includes diagnoses, current medications & vitals.   Pt expressed understanding with the diagnosis and plan.  Patient to call if no better in 3 -4 days and prn new problems.    BMI is significantly elevated- in the morbidly obese range. I reviewed diet, exercise and weight control. Discussed weight control in detail, the importance of mainly decreased carbs, and for weight maintenance, exercise; discussed different diets and that it isn't as important to watch the type of foods as it is to decrease calorie intake no matter what type of diet you do, etc. Patient is aware that there are medications and surgery that are options to assist in weight loss.   Mindful eating also discussed- Naturally Slim( now Wond'r) or Noom are 2 options.   Should follow up monthly with an office visit if has interest in working on weight loss.    Total 30 minutes  re: Recommended a weekly \"heart check.\" I went into detail how to do this. Regular exercise is very important to your health; it helps mentally, physically, socially; it prevents injuries if done properly. Exercise, even as simple as walking 20-30 minutes daily has major benefits to your health even though your \"numbers\" are the same in the lab. See if you can add this into your daily regimen and after a few months it will become a regular habit-\"just something you do,\" like brushing your

## 2024-08-22 DIAGNOSIS — F31.32 BIPOLAR DISORDER, CURRENT EPISODE DEPRESSED, MODERATE (HCC): ICD-10-CM

## 2024-08-23 NOTE — TELEPHONE ENCOUNTER
PCP: Darrin Jay MD    Last appt: 7/24/2024   No future appointments.    Requested Prescriptions     Pending Prescriptions Disp Refills    escitalopram (LEXAPRO) 20 MG tablet 90 tablet 3     Sig: Take 1 tablet by mouth daily         Prior labs and Blood pressures:  BP Readings from Last 3 Encounters:   04/17/24 (!) 166/88   01/01/24 (!) 145/89   10/25/23 (!) 140/78     Lab Results   Component Value Date/Time     09/19/2022 12:00 AM    K 4.4 09/19/2022 12:00 AM     09/19/2022 12:00 AM    CO2 21 09/19/2022 12:00 AM    BUN 7 09/19/2022 12:00 AM    GFRAA >60 12/27/2021 04:06 PM     No results found for: \"HBA1C\", \"[id number]\"  Lab Results   Component Value Date/Time    CHOL 210 09/19/2022 12:00 AM    HDL 47 09/19/2022 12:00 AM     09/19/2022 12:00 AM    VLDL 17 09/19/2022 12:00 AM     No results found for: \"VITD3\"    Lab Results   Component Value Date/Time    TSH 2.370 09/19/2022 12:00 AM

## 2024-08-25 ENCOUNTER — PATIENT MESSAGE (OUTPATIENT)
Age: 49
End: 2024-08-25

## 2024-08-25 DIAGNOSIS — F51.04 PSYCHOPHYSIOLOGIC INSOMNIA: ICD-10-CM

## 2024-08-25 RX ORDER — ESCITALOPRAM OXALATE 20 MG/1
20 TABLET ORAL DAILY
Qty: 90 TABLET | Refills: 3 | Status: SHIPPED | OUTPATIENT
Start: 2024-08-25

## 2024-08-28 DIAGNOSIS — F90.2 ATTENTION DEFICIT HYPERACTIVITY DISORDER (ADHD), COMBINED TYPE: ICD-10-CM

## 2024-08-28 RX ORDER — DEXTROAMPHETAMINE SACCHARATE, AMPHETAMINE ASPARTATE, DEXTROAMPHETAMINE SULFATE AND AMPHETAMINE SULFATE 2.5; 2.5; 2.5; 2.5 MG/1; MG/1; MG/1; MG/1
TABLET ORAL
Qty: 120 TABLET | Refills: 0 | Status: CANCELLED | OUTPATIENT
Start: 2024-08-28 | End: 2024-09-10

## 2024-08-30 NOTE — TELEPHONE ENCOUNTER
Patient requesting Ambien.  (Not the adderall that was entered per message).  Check .  Mikayla Walters    [facility] [address] per rx 3/25/24 for 60 + 5 refills.    Advised patient via [employer] message to contact [facility] at [facility].  Mikayla Walters    For Pharmacy Admin Tracking Only    Program: Medication Refill  CPA in place:    Recommendation Provided To:   Intervention Detail: Discontinued Rx: 1, reason: Duplicate Therapy  Intervention Accepted By:   Gap Closed?:    Time Spent (min): 5

## 2024-09-03 RX ORDER — ZOLPIDEM TARTRATE 10 MG/1
20 TABLET ORAL NIGHTLY PRN
Qty: 60 TABLET | Refills: 5 | Status: SHIPPED | OUTPATIENT
Start: 2024-09-03 | End: 2025-03-02

## 2024-11-04 DIAGNOSIS — F90.2 ATTENTION DEFICIT HYPERACTIVITY DISORDER (ADHD), COMBINED TYPE: ICD-10-CM

## 2024-11-04 NOTE — TELEPHONE ENCOUNTER
PCP: Darrin Jay MD    Last appt: 7/24/2024   No future appointments.    Requested Prescriptions     Pending Prescriptions Disp Refills    amphetamine-dextroamphetamine (ADDERALL, 10MG,) 10 MG tablet 120 tablet 0     Sig: TAKE 2 TABS QAM AND 1-2 TABS IN AFTERNOON         Prior labs and Blood pressures:  BP Readings from Last 3 Encounters:   04/17/24 (!) 166/88   01/01/24 (!) 145/89   10/25/23 (!) 140/78     Lab Results   Component Value Date/Time     09/19/2022 12:00 AM    K 4.4 09/19/2022 12:00 AM     09/19/2022 12:00 AM    CO2 21 09/19/2022 12:00 AM    BUN 7 09/19/2022 12:00 AM    GFRAA >60 12/27/2021 04:06 PM     No results found for: \"HBA1C\", \"[id number]\"  Lab Results   Component Value Date/Time    CHOL 210 09/19/2022 12:00 AM    HDL 47 09/19/2022 12:00 AM     09/19/2022 12:00 AM    VLDL 17 09/19/2022 12:00 AM     No results found for: \"VITD3\"    Lab Results   Component Value Date/Time    TSH 2.370 09/19/2022 12:00 AM

## 2024-11-06 NOTE — TELEPHONE ENCOUNTER
Patient comment: Hi Dr. Jay, I have not been able to find a primary care physician and I have been out of my Adderral for over a month. i am all over the place at work and need my medication. can you please send over a new request to [facility] for the next 90 days while I continue to get a new doctor? Thank you, Gale

## 2024-11-07 RX ORDER — DEXTROAMPHETAMINE SACCHARATE, AMPHETAMINE ASPARTATE, DEXTROAMPHETAMINE SULFATE AND AMPHETAMINE SULFATE 2.5; 2.5; 2.5; 2.5 MG/1; MG/1; MG/1; MG/1
TABLET ORAL
Qty: 120 TABLET | Refills: 0 | Status: SHIPPED | OUTPATIENT
Start: 2024-11-07 | End: 2024-11-17

## 2024-11-19 ENCOUNTER — APPOINTMENT (OUTPATIENT)
Facility: HOSPITAL | Age: 49
End: 2024-11-19
Payer: COMMERCIAL

## 2024-11-19 ENCOUNTER — HOSPITAL ENCOUNTER (EMERGENCY)
Facility: HOSPITAL | Age: 49
Discharge: HOME | End: 2024-11-19
Attending: STUDENT IN AN ORGANIZED HEALTH CARE EDUCATION/TRAINING PROGRAM
Payer: COMMERCIAL

## 2024-11-19 VITALS
TEMPERATURE: 98.3 F | OXYGEN SATURATION: 100 % | HEART RATE: 88 BPM | RESPIRATION RATE: 18 BRPM | DIASTOLIC BLOOD PRESSURE: 90 MMHG | HEIGHT: 64 IN | WEIGHT: 231.26 LBS | BODY MASS INDEX: 39.48 KG/M2 | SYSTOLIC BLOOD PRESSURE: 163 MMHG

## 2024-11-19 DIAGNOSIS — R10.84 GENERALIZED ABDOMINAL PAIN: Primary | ICD-10-CM

## 2024-11-19 DIAGNOSIS — K64.9 ACUTE HEMORRHOID: ICD-10-CM

## 2024-11-19 LAB
ALBUMIN SERPL-MCNC: 3.7 G/DL (ref 3.5–5)
ALBUMIN/GLOB SERPL: 0.9 (ref 1.1–2.2)
ALP SERPL-CCNC: 127 U/L (ref 45–117)
ALT SERPL-CCNC: 18 U/L (ref 12–78)
ANION GAP SERPL CALC-SCNC: ABNORMAL MMOL/L (ref 2–12)
APPEARANCE UR: ABNORMAL
AST SERPL-CCNC: 9 U/L (ref 15–37)
BACTERIA URNS QL MICRO: ABNORMAL /HPF
BASOPHILS # BLD: 0.1 K/UL (ref 0–0.1)
BASOPHILS NFR BLD: 0 % (ref 0–1)
BILIRUB SERPL-MCNC: 0.3 MG/DL (ref 0.2–1)
BILIRUB UR QL: NEGATIVE
BUN SERPL-MCNC: 8 MG/DL (ref 6–20)
BUN/CREAT SERPL: 13 (ref 12–20)
CALCIUM SERPL-MCNC: 9.2 MG/DL (ref 8.5–10.1)
CHLORIDE SERPL-SCNC: 105 MMOL/L (ref 97–108)
CO2 SERPL-SCNC: 32 MMOL/L (ref 21–32)
COLOR UR: ABNORMAL
CREAT SERPL-MCNC: 0.64 MG/DL (ref 0.55–1.02)
DIFFERENTIAL METHOD BLD: ABNORMAL
EKG ATRIAL RATE: 86 BPM
EKG DIAGNOSIS: NORMAL
EKG P AXIS: 58 DEGREES
EKG P-R INTERVAL: 158 MS
EKG Q-T INTERVAL: 380 MS
EKG QRS DURATION: 78 MS
EKG QTC CALCULATION (BAZETT): 454 MS
EKG R AXIS: 59 DEGREES
EKG T AXIS: 61 DEGREES
EKG VENTRICULAR RATE: 86 BPM
EOSINOPHIL # BLD: 0.1 K/UL (ref 0–0.4)
EOSINOPHIL NFR BLD: 1 % (ref 0–7)
EPITH CASTS URNS QL MICRO: ABNORMAL /LPF
ERYTHROCYTE [DISTWIDTH] IN BLOOD BY AUTOMATED COUNT: 13.7 % (ref 11.5–14.5)
GLOBULIN SER CALC-MCNC: 4.2 G/DL (ref 2–4)
GLUCOSE SERPL-MCNC: 108 MG/DL (ref 65–100)
GLUCOSE UR STRIP.AUTO-MCNC: NEGATIVE MG/DL
HCG SERPL-ACNC: <1 MIU/ML (ref 0–6)
HCT VFR BLD AUTO: 40.6 % (ref 35–47)
HGB BLD-MCNC: 12.9 G/DL (ref 11.5–16)
HGB UR QL STRIP: ABNORMAL
HYALINE CASTS URNS QL MICRO: ABNORMAL /LPF (ref 0–2)
IMM GRANULOCYTES # BLD AUTO: 0.1 K/UL (ref 0–0.04)
IMM GRANULOCYTES NFR BLD AUTO: 1 % (ref 0–0.5)
KETONES UR QL STRIP.AUTO: NEGATIVE MG/DL
LEUKOCYTE ESTERASE UR QL STRIP.AUTO: NEGATIVE
LIPASE SERPL-CCNC: 17 U/L (ref 13–75)
LYMPHOCYTES # BLD: 3.6 K/UL (ref 0.8–3.5)
LYMPHOCYTES NFR BLD: 22 % (ref 12–49)
MAGNESIUM SERPL-MCNC: 2.3 MG/DL (ref 1.6–2.4)
MCH RBC QN AUTO: 29.2 PG (ref 26–34)
MCHC RBC AUTO-ENTMCNC: 31.8 G/DL (ref 30–36.5)
MCV RBC AUTO: 91.9 FL (ref 80–99)
MONOCYTES # BLD: 0.8 K/UL (ref 0–1)
MONOCYTES NFR BLD: 5 % (ref 5–13)
NEUTS SEG # BLD: 11.7 K/UL (ref 1.8–8)
NEUTS SEG NFR BLD: 71 % (ref 32–75)
NITRITE UR QL STRIP.AUTO: NEGATIVE
NRBC # BLD: 0 K/UL (ref 0–0.01)
NRBC BLD-RTO: 0 PER 100 WBC
PH UR STRIP: 5.5 (ref 5–8)
PLATELET # BLD AUTO: 342 K/UL (ref 150–400)
PMV BLD AUTO: 10 FL (ref 8.9–12.9)
POTASSIUM SERPL-SCNC: 3.7 MMOL/L (ref 3.5–5.1)
PROT SERPL-MCNC: 7.9 G/DL (ref 6.4–8.2)
PROT UR STRIP-MCNC: NEGATIVE MG/DL
RBC # BLD AUTO: 4.42 M/UL (ref 3.8–5.2)
RBC #/AREA URNS HPF: ABNORMAL /HPF (ref 0–5)
SODIUM SERPL-SCNC: 136 MMOL/L (ref 136–145)
SP GR UR REFRACTOMETRY: 1.02
TROPONIN I SERPL HS-MCNC: 6 NG/L (ref 0–51)
URINE CULTURE IF INDICATED: ABNORMAL
UROBILINOGEN UR QL STRIP.AUTO: 1 EU/DL (ref 0.2–1)
WBC # BLD AUTO: 16.4 K/UL (ref 3.6–11)
WBC URNS QL MICRO: ABNORMAL /HPF (ref 0–4)

## 2024-11-19 PROCEDURE — 83735 ASSAY OF MAGNESIUM: CPT

## 2024-11-19 PROCEDURE — 36415 COLL VENOUS BLD VENIPUNCTURE: CPT

## 2024-11-19 PROCEDURE — 93005 ELECTROCARDIOGRAM TRACING: CPT | Performed by: EMERGENCY MEDICINE

## 2024-11-19 PROCEDURE — 84702 CHORIONIC GONADOTROPIN TEST: CPT

## 2024-11-19 PROCEDURE — 81001 URINALYSIS AUTO W/SCOPE: CPT

## 2024-11-19 PROCEDURE — 84484 ASSAY OF TROPONIN QUANT: CPT

## 2024-11-19 PROCEDURE — 6360000004 HC RX CONTRAST MEDICATION

## 2024-11-19 PROCEDURE — 80053 COMPREHEN METABOLIC PANEL: CPT

## 2024-11-19 PROCEDURE — 85025 COMPLETE CBC W/AUTO DIFF WBC: CPT

## 2024-11-19 PROCEDURE — 74177 CT ABD & PELVIS W/CONTRAST: CPT

## 2024-11-19 PROCEDURE — 99285 EMERGENCY DEPT VISIT HI MDM: CPT

## 2024-11-19 PROCEDURE — 83690 ASSAY OF LIPASE: CPT

## 2024-11-19 RX ORDER — MORPHINE SULFATE 2 MG/ML
2 INJECTION, SOLUTION INTRAMUSCULAR; INTRAVENOUS
Status: DISCONTINUED | OUTPATIENT
Start: 2024-11-19 | End: 2024-11-19 | Stop reason: HOSPADM

## 2024-11-19 RX ORDER — HYDROCORTISONE 25 MG/G
CREAM TOPICAL
Qty: 28 G | Refills: 0 | Status: SHIPPED | OUTPATIENT
Start: 2024-11-19

## 2024-11-19 RX ORDER — IOPAMIDOL 755 MG/ML
100 INJECTION, SOLUTION INTRAVASCULAR
Status: COMPLETED | OUTPATIENT
Start: 2024-11-19 | End: 2024-11-19

## 2024-11-19 RX ADMIN — IOPAMIDOL 100 ML: 755 INJECTION, SOLUTION INTRAVENOUS at 15:00

## 2024-11-19 ASSESSMENT — PAIN SCALES - GENERAL: PAINLEVEL_OUTOF10: 9

## 2024-11-19 ASSESSMENT — PAIN DESCRIPTION - ORIENTATION: ORIENTATION: RIGHT;UPPER;LOWER

## 2024-11-19 ASSESSMENT — PAIN DESCRIPTION - DESCRIPTORS: DESCRIPTORS: ACHING

## 2024-11-19 ASSESSMENT — PAIN DESCRIPTION - LOCATION: LOCATION: ABDOMEN

## 2024-11-19 NOTE — ED NOTES
[facility] EMERGENCY DEPT  EMERGENCY DEPARTMENT ENCOUNTER    Patient Name: Gale Valle  MRN: [id number]  YOB: 1975  PCP: Darrin Jay MD    Time/Date of evaluation: 3:09 PM EST on 11/19/24    History of Presenting Illness     Chief Complaint   Patient presents with    Rectal Bleeding    Abdominal Pain     Pt arrives ambulatory into  w CC of sob, RUQ & RLQ abd pain and rectal bleeding onset 11/19. Pt denies taking blood thinner. Pt denies cp.     Shortness of Breath     History Provided by: Patient   History is limited by: Nothing    HISTORY (Narrative):   Gale Valle is a 49 y.o. female with past medical history of anxiety, OCD, ADHD.  Patient is here for abdominal pain and increased rectal bleeding.  She states bleeding for roughly 7 months mostly with wiping but is worse today.  She also states that she has bilateral lower abdominal pain that does not radiate that started today.  She denies any chest pain or shortness of breath.  She denies any lightheadedness.  She states the blood is bright red, only with wiping.  She states she does have constipation and uses MiraLAX and strains to have bowel movements.      Nursing Notes were all reviewed and agreed with or any disagreements were addressed in the HPI.    Past History     PAST MEDICAL HISTORY:  Past Medical History:   Diagnosis Date    ADD (attention deficit disorder) without hyperactivity 8/25/2016    Asthma     last attack 2 months ago    Bilateral carpal tunnel syndrome 9/25/2016    Bipolar disorder with moderate depression (HCC) 6/28/2017    Depression with anxiety 3/2/2010    Facet arthritis of lumbar region 12/3/2017    History of migraine headaches 11/25/2018    Insomnia     Lithium use 6/28/2017    Migraine 02/20/2010    Psychiatric disorder     Depression, anxiety    Psychophysiological insomnia 2/23/2016       PAST SURGICAL HISTORY:  Past Surgical History:   Procedure Laterality Date    APPENDECTOMY  1996    BREAST REDUCTION SURGERY

## 2024-11-19 NOTE — ED NOTES
Assumed care of patient. Patient complaining of rectal bleeding and R-sided abdominal pain that began today. Patient reports that she has a history of hemorrhoids. Patient placed on monitor x3 with call bell within reach and side rails x2.

## 2024-11-19 NOTE — DISCHARGE INSTRUCTIONS
Please follow-up with your regular doctor for treatment of your hemorrhoids.  Your CT scan was normal and showed no internal bleeding.  Did show some chronic findings as below.    Mild diffuse fatty liver disease.  Please follow-up with your primary care doctor no immediate imaging required.  Small right ovarian cyst 3 cm, small calcification near the urethra.  Please follow-up with your OB/GYN doctor for both of these no immediate intervention is required        Thank you for choosing our Emergency Department for your care.  It is our privilege to care for you in your time of need.  In the next several days, you may receive a survey via email or mailed to your home about your experience with our team.  We would greatly appreciate you taking a few minutes to complete the survey, as we use this information to learn what we have done well and what we could be doing better. Thank you for trusting us with your care!    Below you will find a list of your tests from today's visit.   Labs  Recent Results (from the past 12 hour(s))   EKG 12 Lead “IF” over 40 years of age, and Upper Abd pain, SOB, Diaphoresis, Diabetes, or Tachycardia greater than or equal to 120 bmp. (Use as indication for order).    Collection Time: 11/19/24 11:49 AM   Result Value Ref Range    Ventricular Rate 86 BPM    Atrial Rate 86 BPM    P-R Interval 158 ms    QRS Duration 78 ms    Q-T Interval 380 ms    QTc Calculation (Bazett) 454 ms    P Axis 58 degrees    R Axis 59 degrees    T Axis 61 degrees    Diagnosis       Normal sinus rhythm  Normal ECG  No previous ECGs available  Confirmed by Ramiro Stout MD ([id number]) on 11/19/2024 4:55:37 PM     CBC with Auto Differential    Collection Time: 11/19/24 12:34 PM   Result Value Ref Range    WBC 16.4 (H) 3.6 - 11.0 K/uL    RBC 4.42 3.80 - 5.20 M/uL    Hemoglobin 12.9 11.5 - 16.0 g/dL    Hematocrit 40.6 35.0 - 47.0 %    MCV 91.9 80.0 - 99.0 FL    MCH 29.2 26.0 - 34.0 PG    MCHC 31.8 30.0 - 36.5 g/dL    RDW 13.7

## 2024-11-26 ENCOUNTER — OFFICE VISIT (OUTPATIENT)
Age: 49
End: 2024-11-26

## 2024-11-26 VITALS
HEART RATE: 87 BPM | TEMPERATURE: 97.8 F | RESPIRATION RATE: 16 BRPM | BODY MASS INDEX: 39.75 KG/M2 | WEIGHT: 232.8 LBS | DIASTOLIC BLOOD PRESSURE: 81 MMHG | SYSTOLIC BLOOD PRESSURE: 122 MMHG | OXYGEN SATURATION: 97 % | HEIGHT: 64 IN

## 2024-11-26 DIAGNOSIS — Z79.899 LITHIUM USE: ICD-10-CM

## 2024-11-26 DIAGNOSIS — F51.04 PSYCHOPHYSIOLOGICAL INSOMNIA: ICD-10-CM

## 2024-11-26 DIAGNOSIS — M50.30 DDD (DEGENERATIVE DISC DISEASE), CERVICAL: ICD-10-CM

## 2024-11-26 DIAGNOSIS — J45.20 MILD INTERMITTENT ASTHMA WITHOUT COMPLICATION: ICD-10-CM

## 2024-11-26 DIAGNOSIS — F98.8 ADD (ATTENTION DEFICIT DISORDER) WITHOUT HYPERACTIVITY: ICD-10-CM

## 2024-11-26 DIAGNOSIS — Z12.31 ENCOUNTER FOR SCREENING MAMMOGRAM FOR BREAST CANCER: ICD-10-CM

## 2024-11-26 DIAGNOSIS — Z13.1 DIABETES MELLITUS SCREENING: ICD-10-CM

## 2024-11-26 DIAGNOSIS — D72.829 LEUKOCYTOSIS, UNSPECIFIED TYPE: ICD-10-CM

## 2024-11-26 DIAGNOSIS — E66.01 CLASS 2 SEVERE OBESITY DUE TO EXCESS CALORIES WITH SERIOUS COMORBIDITY AND BODY MASS INDEX (BMI) OF 39.0 TO 39.9 IN ADULT: ICD-10-CM

## 2024-11-26 DIAGNOSIS — Z12.11 COLON CANCER SCREENING: ICD-10-CM

## 2024-11-26 DIAGNOSIS — E66.812 CLASS 2 SEVERE OBESITY DUE TO EXCESS CALORIES WITH SERIOUS COMORBIDITY AND BODY MASS INDEX (BMI) OF 39.0 TO 39.9 IN ADULT: ICD-10-CM

## 2024-11-26 DIAGNOSIS — F17.210 TOBACCO DEPENDENCE DUE TO CIGARETTES: ICD-10-CM

## 2024-11-26 DIAGNOSIS — F31.32 BIPOLAR DISORDER WITH MODERATE DEPRESSION (HCC): Primary | ICD-10-CM

## 2024-11-26 LAB
T4 FREE SERPL-MCNC: 0.9 NG/DL (ref 0.8–1.5)
TSH SERPL DL<=0.05 MIU/L-ACNC: 2.57 UIU/ML (ref 0.36–3.74)

## 2024-11-26 RX ORDER — TIZANIDINE 2 MG/1
2 TABLET ORAL DAILY
COMMUNITY
Start: 2024-11-07

## 2024-11-26 RX ORDER — PREGABALIN 50 MG/1
50 CAPSULE ORAL 2 TIMES DAILY
COMMUNITY
Start: 2024-10-17

## 2024-11-26 RX ORDER — TRAZODONE HYDROCHLORIDE 100 MG/1
100 TABLET ORAL NIGHTLY
COMMUNITY
Start: 2024-11-07

## 2024-11-26 RX ORDER — OXYCODONE AND ACETAMINOPHEN 5; 325 MG/1; MG/1
1 TABLET ORAL EVERY 4 HOURS PRN
COMMUNITY
Start: 2024-11-21

## 2024-11-26 ASSESSMENT — PATIENT HEALTH QUESTIONNAIRE - PHQ9
SUM OF ALL RESPONSES TO PHQ QUESTIONS 1-9: 8
9. THOUGHTS THAT YOU WOULD BE BETTER OFF DEAD, OR OF HURTING YOURSELF: NOT AT ALL
SUM OF ALL RESPONSES TO PHQ QUESTIONS 1-9: 8
4. FEELING TIRED OR HAVING LITTLE ENERGY: SEVERAL DAYS
3. TROUBLE FALLING OR STAYING ASLEEP: NEARLY EVERY DAY
2. FEELING DOWN, DEPRESSED OR HOPELESS: SEVERAL DAYS
7. TROUBLE CONCENTRATING ON THINGS, SUCH AS READING THE NEWSPAPER OR WATCHING TELEVISION: SEVERAL DAYS
5. POOR APPETITE OR OVEREATING: SEVERAL DAYS
SUM OF ALL RESPONSES TO PHQ QUESTIONS 1-9: 8
6. FEELING BAD ABOUT YOURSELF - OR THAT YOU ARE A FAILURE OR HAVE LET YOURSELF OR YOUR FAMILY DOWN: SEVERAL DAYS
1. LITTLE INTEREST OR PLEASURE IN DOING THINGS: NOT AT ALL
10. IF YOU CHECKED OFF ANY PROBLEMS, HOW DIFFICULT HAVE THESE PROBLEMS MADE IT FOR YOU TO DO YOUR WORK, TAKE CARE OF THINGS AT HOME, OR GET ALONG WITH OTHER PEOPLE: SOMEWHAT DIFFICULT
8. MOVING OR SPEAKING SO SLOWLY THAT OTHER PEOPLE COULD HAVE NOTICED. OR THE OPPOSITE, BEING SO FIGETY OR RESTLESS THAT YOU HAVE BEEN MOVING AROUND A LOT MORE THAN USUAL: NOT AT ALL
SUM OF ALL RESPONSES TO PHQ QUESTIONS 1-9: 8
SUM OF ALL RESPONSES TO PHQ9 QUESTIONS 1 & 2: 1

## 2024-11-26 NOTE — PROGRESS NOTES
Chief Complaint   Patient presents with    Established New Doctor    Medication Refill     For depression     \"Have you been to the ER, urgent care clinic since your last visit?  Hospitalized since your last visit?\"    NO    “Have you seen or consulted any other health care providers outside of [facility] since your last visit?”    NO    Have you had a mammogram?”   NO    Date of last Mammogram: 4/11/2022      “Have you had a pap smear?”    NO    No cervical cancer screening on file         “Have you had a colorectal cancer screening such as a colonoscopy/FIT/Cologuard?    NO    No colonoscopy on file  No cologuard on file  No FIT/FOBT on file   No flexible sigmoidoscopy on file         Click Here for Release of Records Request             7/24/2024     3:24 PM   PHQ-9    Little interest or pleasure in doing things 0   Feeling down, depressed, or hopeless 0   PHQ-2 Score 0   PHQ-9 Total Score 0           Financial Resource Strain: Low Risk  (7/21/2024)    Overall Financial Resource Strain (CARDIA)     Difficulty of Paying Living Expenses: Not hard at all      Food Insecurity: No Food Insecurity (7/21/2024)    Hunger Vital Sign     Worried About Running Out of Food in the Last Year: Never true     Ran Out of Food in the Last Year: Never true          Health Maintenance Due   Topic Date Due    Pneumococcal 0-64 years Vaccine (1 of 2 - PCV) Never done    HIV screen  Never done    Hepatitis C screen  Never done    Hepatitis B vaccine (1 of 3 - 19+ 3-dose series) Never done    DTaP/Tdap/Td vaccine (1 - Tdap) Never done    Cervical cancer screen  Never done    Diabetes screen  Never done    Colorectal Cancer Screen  Never done    Breast cancer screen  04/11/2024    Flu vaccine (1) 08/01/2024    COVID-19 Vaccine (3 - 2023-24 season) 09/01/2024        
Administer rectally for hemorrhoid twice a day 28 g 0    escitalopram (LEXAPRO) 20 MG tablet Take 1 tablet by mouth daily 90 tablet 3    lithium 300 MG capsule TAKE 1 CAPSULE BY MOUTH EVERY MORNING AND 2 CAPSULES IN THE EVENINGS 270 capsule 1    albuterol sulfate HFA (PROVENTIL HFA) 108 (90 Base) MCG/ACT inhaler Inhale 2 puffs into the lungs every 4 hours as needed for Wheezing 6.7 g 0    amphetamine-dextroamphetamine (ADDERALL, 10MG,) 10 MG tablet TAKE 2 TABS QAM AND 1-2 TABS IN AFTERNOON 120 tablet 0    zolpidem (AMBIEN) 10 MG tablet Take 2 tablets by mouth nightly as needed for Sleep for up to 180 days. Max Daily Amount: 20 mg 60 tablet 5    naloxone 4 MG/0.1ML LIQD nasal spray Use 1 spray intranasally, then discard. Repeat with new spray every 2 min as needed for opioid overdose symptoms, alternating nostrils. (Patient not taking: Reported on 7/24/2024)       No current facility-administered medications on file prior to visit.        Objective:   Vitals  I personally reviewed.  /81 (Site: Left Upper Arm, Position: Sitting, Cuff Size: Large Adult)   Pulse 87   Temp 97.8 °F (36.6 °C) (Temporal)   Resp 16   Ht 1.626 m (5' 4\")   Wt 105.6 kg (232 lb 12.8 oz)   LMP 11/25/2024 (Exact Date)   SpO2 97%   BMI 39.96 kg/m²      Physical Exam    Vitals Reviewed.    General AO x 3. No distress. Not diaphoretic. No jaundice. No cyanosis. No pallor.   Cardio Normal rate, regular rhythm.    Pulmonary Effort normal. CTAB. No wheezes, rales, or rhonchi.     Abdominal Soft. Bowel sounds normal.    Extremities No edema of lower extremities. Pulses present.     Neurological No focal deficits.   Skin No rash.                Please note that this dictation was completed with [employer], the [employer] voice recognition software.  Quite often unanticipated grammatical, syntax, homophones, and other interpretive errors are inadvertently transcribed by the computer software.  Please disregard these errors.  Please excuse any

## 2024-11-27 DIAGNOSIS — D72.829 LEUKOCYTOSIS, UNSPECIFIED TYPE: Primary | ICD-10-CM

## 2024-11-27 LAB
BASOPHILS # BLD: 0.1 K/UL (ref 0–0.1)
BASOPHILS NFR BLD: 0 % (ref 0–1)
DATE LAST DOSE: NORMAL
DIFFERENTIAL METHOD BLD: ABNORMAL
DOSE AMOUNT: NORMAL UNITS
DOSE DATE/TIME: NORMAL
EOSINOPHIL # BLD: 0.1 K/UL (ref 0–0.4)
EOSINOPHIL NFR BLD: 1 % (ref 0–7)
ERYTHROCYTE [DISTWIDTH] IN BLOOD BY AUTOMATED COUNT: 13.5 % (ref 11.5–14.5)
EST. AVERAGE GLUCOSE BLD GHB EST-MCNC: 131 MG/DL
HBA1C MFR BLD: 6.2 % (ref 4–5.6)
HCT VFR BLD AUTO: 42.5 % (ref 35–47)
HGB BLD-MCNC: 13.3 G/DL (ref 11.5–16)
IMM GRANULOCYTES # BLD AUTO: 0 K/UL (ref 0–0.04)
IMM GRANULOCYTES NFR BLD AUTO: 0 % (ref 0–0.5)
LITHIUM SERPL-SCNC: 0.72 MMOL/L (ref 0.6–1.2)
LYMPHOCYTES # BLD: 2.9 K/UL (ref 0.8–3.5)
LYMPHOCYTES NFR BLD: 26 % (ref 12–49)
MCH RBC QN AUTO: 29 PG (ref 26–34)
MCHC RBC AUTO-ENTMCNC: 31.3 G/DL (ref 30–36.5)
MCV RBC AUTO: 92.6 FL (ref 80–99)
MONOCYTES # BLD: 0.4 K/UL (ref 0–1)
MONOCYTES NFR BLD: 4 % (ref 5–13)
NEUTS SEG # BLD: 7.7 K/UL (ref 1.8–8)
NEUTS SEG NFR BLD: 69 % (ref 32–75)
NRBC # BLD: 0 K/UL (ref 0–0.01)
NRBC BLD-RTO: 0 PER 100 WBC
PERIPHERAL SMEAR, MD REVIEW: NORMAL
PLATELET # BLD AUTO: 354 K/UL (ref 150–400)
PMV BLD AUTO: 10.7 FL (ref 8.9–12.9)
RBC # BLD AUTO: 4.59 M/UL (ref 3.8–5.2)
WBC # BLD AUTO: 11.2 K/UL (ref 3.6–11)

## 2024-11-27 NOTE — RESULT ENCOUNTER NOTE
A1c in prediabetic range, will advise patient to continue work on lifestyle modifications  CBC remarkable for improved leukocytosis to 11.2, peripheral smear showing neutrophilia, reporting that the reactive process is a consideration but additional testing may be indicated.  Patient is quite worried about this so will refer to heme-onc.  TSH/free T4/lithium level normal

## 2024-12-04 ENCOUNTER — OFFICE VISIT (OUTPATIENT)
Age: 49
End: 2024-12-04

## 2024-12-04 VITALS
WEIGHT: 235.4 LBS | RESPIRATION RATE: 16 BRPM | TEMPERATURE: 98.2 F | BODY MASS INDEX: 40.19 KG/M2 | HEIGHT: 64 IN | HEART RATE: 92 BPM | OXYGEN SATURATION: 98 % | DIASTOLIC BLOOD PRESSURE: 74 MMHG | SYSTOLIC BLOOD PRESSURE: 126 MMHG

## 2024-12-04 DIAGNOSIS — Z82.61 FAMILY HISTORY OF RHEUMATOID ARTHRITIS: ICD-10-CM

## 2024-12-04 DIAGNOSIS — D72.829 LEUKOCYTOSIS, UNSPECIFIED TYPE: Primary | ICD-10-CM

## 2024-12-04 DIAGNOSIS — F90.2 ATTENTION DEFICIT HYPERACTIVITY DISORDER (ADHD), COMBINED TYPE: ICD-10-CM

## 2024-12-04 DIAGNOSIS — E66.812 CLASS 2 SEVERE OBESITY DUE TO EXCESS CALORIES WITH SERIOUS COMORBIDITY AND BODY MASS INDEX (BMI) OF 39.0 TO 39.9 IN ADULT: ICD-10-CM

## 2024-12-04 DIAGNOSIS — E66.01 CLASS 2 SEVERE OBESITY DUE TO EXCESS CALORIES WITH SERIOUS COMORBIDITY AND BODY MASS INDEX (BMI) OF 39.0 TO 39.9 IN ADULT: ICD-10-CM

## 2024-12-04 DIAGNOSIS — M25.60 JOINT STIFFNESS: ICD-10-CM

## 2024-12-04 RX ORDER — DEXTROAMPHETAMINE SACCHARATE, AMPHETAMINE ASPARTATE, DEXTROAMPHETAMINE SULFATE AND AMPHETAMINE SULFATE 2.5; 2.5; 2.5; 2.5 MG/1; MG/1; MG/1; MG/1
TABLET ORAL
Qty: 120 TABLET | Refills: 0 | Status: SHIPPED | OUTPATIENT
Start: 2024-12-06 | End: 2024-12-14

## 2024-12-04 NOTE — PROGRESS NOTES
Chief Complaint   Patient presents with    Abnormal Lab     Follow Up.  Wants to be screened for rheumatoid arthritis.     \"Have you been to the ER, urgent care clinic since your last visit?  Hospitalized since your last visit?\"    NO    “Have you seen or consulted any other health care providers outside of [facility] since your last visit?”    NO     “Have you had a pap smear?”    NO    No cervical cancer screening on file         “Have you had a colorectal cancer screening such as a colonoscopy/FIT/Cologuard?    NO    No colonoscopy on file  No cologuard on file  No FIT/FOBT on file   No flexible sigmoidoscopy on file         Click Here for Release of Records Request             11/26/2024     8:52 AM   PHQ-9    Little interest or pleasure in doing things 0   Feeling down, depressed, or hopeless 1   Trouble falling or staying asleep, or sleeping too much 3   Feeling tired or having little energy 1   Poor appetite or overeating 1   Feeling bad about yourself - or that you are a failure or have let yourself or your family down 1   Trouble concentrating on things, such as reading the newspaper or watching television 1   Moving or speaking so slowly that other people could have noticed. Or the opposite - being so fidgety or restless that you have been moving around a lot more than usual 0   Thoughts that you would be better off dead, or of hurting yourself in some way 0   PHQ-2 Score 1   PHQ-9 Total Score 8   If you checked off any problems, how difficult have these problems made it for you to do your work, take care of things at home, or get along with other people? 1           Financial Resource Strain: Low Risk  (7/21/2024)    Overall Financial Resource Strain (CARDIA)     Difficulty of Paying Living Expenses: Not hard at all      Food Insecurity: No Food Insecurity (7/21/2024)    Hunger Vital Sign     Worried About Running Out of Food in the Last Year: Never true     Ran Out of Food in the Last 
Medications on File Prior to Visit   Medication Sig Dispense Refill    oxyCODONE-acetaminophen (PERCOCET) 5-325 MG per tablet Take 1 tablet by mouth every 4 hours as needed for Pain.      pregabalin (LYRICA) 50 MG capsule Take 1 capsule by mouth 2 times daily.      tiZANidine (ZANAFLEX) 2 MG tablet Take 1 tablet by mouth daily      traZODone (DESYREL) 100 MG tablet Take 1 tablet by mouth nightly      Semaglutide-Weight Management (WEGOVY) 0.25 MG/0.5ML SOAJ SC injection Inject 0.25 mg into the skin every 7 days 2 mL 0    hydrocortisone (ANUSOL-HC) 2.5 % CREA rectal cream Administer rectally for hemorrhoid twice a day 28 g 0    zolpidem (AMBIEN) 10 MG tablet Take 2 tablets by mouth nightly as needed for Sleep for up to 180 days. Max Daily Amount: 20 mg 60 tablet 5    escitalopram (LEXAPRO) 20 MG tablet Take 1 tablet by mouth daily 90 tablet 3    lithium 300 MG capsule TAKE 1 CAPSULE BY MOUTH EVERY MORNING AND 2 CAPSULES IN THE EVENINGS 270 capsule 1    albuterol sulfate HFA (PROVENTIL HFA) 108 (90 Base) MCG/ACT inhaler Inhale 2 puffs into the lungs every 4 hours as needed for Wheezing 6.7 g 0     No current facility-administered medications on file prior to visit.        Objective:   Vitals  I personally reviewed.  /74 (Site: Left Upper Arm, Position: Sitting, Cuff Size: Large Adult)   Pulse 92   Temp 98.2 °F (36.8 °C) (Temporal)   Resp 16   Ht 1.626 m (5' 4\")   Wt 106.8 kg (235 lb 6.4 oz)   LMP 11/25/2024   SpO2 98%   BMI 40.41 kg/m²      Physical Exam    Vitals Reviewed.    General AO x 3. No distress. Not diaphoretic. No jaundice. No cyanosis. No pallor.   Cardio Normal rate, regular rhythm.    Pulmonary Effort normal. CTAB. No wheezes, rales, or rhonchi.     Abdominal Soft. Bowel sounds normal.    Extremities No edema of lower extremities. Pulses present.     Neurological No focal deficits.   Skin No rash.                Please note that this dictation was completed with [employer], the [employer] voice

## 2025-02-03 NOTE — PROGRESS NOTES
HISTORY OF PRESENT ILLNESS  HPI  Yvonne Zarate is a 44 y.o. Female with a history of migraines, bilateral CTS, DDD of cervical spine, ADD, depression with anxiety, insomnia, OCD and bipolar disorder, who presents at the office today for a follow up for her Adderall, Xanax, Ambien and Percocet. Pt admits to regularly missing her second dose of the lithium carbonate. Pt complains of discomfort in her neck, which she states can last up to 2 days. Pt states that it can go into her shoulders. Pt notes that she did have surgery on her neck in 2003 for herniated disc(s). Pt denies unusual SOB, chest pain, and any recent ER visits or hospitalizations. 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 Louie Adams 8/25/2016    Asthma     last attack 2 months ago    Bilateral carpal tunnel syndrome- R>>L 9/25/2016    Bipolar disorder with moderate depression ([facility] [address] 6/28/2017    Depression with anxiety 3/2/2010    Facet arthritis of lumbar region ([facility] [address] 12/3/2017    History of migraine headaches 11/25/2018    Insomnia     Lithium use 6/28/2017    Migraine 2/20/2010    Psychiatric disorder     Depression, anxiety    Psychophysiological insomnia 2/23/2016     Past Surgical History:   Procedure Laterality Date    HX APPENDECTOMY  1996    HX BREAST REDUCTION  2002    HX ORTHOPAEDIC  2003    cervical disc    HX ORTHOPAEDIC      second right hand digit fx with pins index     Current Outpatient Medications on File Prior to Visit   Medication Sig Dispense Refill    lidocaine (LIDODERM) 5 % APPLY PATCH TO THE AFFECTED AREA FOR 12 HOURS A DAY AND REMOVE FOR 12 HOURS A DAY. 15 Patch 3    escitalopram oxalate (LEXAPRO) 20 mg tablet TAKE 1 TABLET BY MOUTH EVERY DAY 90 Tab 3    metoprolol succinate (TOPROL-XL) 25 mg XL tablet Take 1 Tab by mouth daily.  Indications: MIGRAINE PREVENTION 90 Tab 0    acetaminophen (TYLENOL) 325 mg tablet Take by mouth every four (4) hours as needed for Pain. No current facility-administered medications on file prior to visit. Allergies   Allergen Reactions    Pcn [Penicillins] Other (comments)     As a child     Family History   Problem Relation Age of Onset    Diabetes Father     Hypertension Father     Heart Attack Father     High Cholesterol Father     Diabetes Mother     High Cholesterol Mother      Social History     Socioeconomic History    Marital status:      Spouse name: Not on file    Number of children: Not on file    Years of education: Not on file    Highest education level: Not on file   Tobacco Use    Smoking status: Current Every Day Smoker     Packs/day: 1.50     Years: 18.00     Pack years: 27.00     Types: Cigarettes    Smokeless tobacco: Never Used   Substance and Sexual Activity    Alcohol use: Yes     Comment: occasionally    Drug use: No    Sexual activity: Yes     Partners: Male     Birth control/protection: Condom           Review of Systems   Constitutional: Negative for chills, diaphoresis, fever, malaise/fatigue and weight loss. Eyes: Negative for blurred vision, double vision, pain and redness. Respiratory: Negative for cough, shortness of breath and wheezing. Cardiovascular: Negative for chest pain, palpitations, orthopnea, claudication, leg swelling and PND. Skin: Negative for itching and rash. Neurological: Negative for dizziness, tingling, tremors, sensory change, speech change, focal weakness, seizures, loss of consciousness, weakness and headaches. Results for orders placed or performed in visit on 02/20/19   PAIN MGMT PANEL W/REFL, UR   Result Value Ref Range    Amphetamine Screen, urine Negative Pnwihy=0631 ng/mL    Barbiturates Screen, urine Negative Sxebko=667 ng/mL    Benzodiazepines Screen, urine Negative Zpyuvv=158 ng/mL    Cannabinoid Screen, urine Positive (A) Cutoff=20    Cocaine Metab.  Screen, urine Negative Lzctgc=133 ng/mL Opiate Screen, urine Negative Ukrteg=156 ng/mL    Oxycodone/Oxymorphone, urine Negative Xzlpuj=365    Phencyclidine Screen, urine Negative Cutoff=25 ng/mL    Methadone Screen, urine Negative Kgjnzm=446 ng/mL    Propoxyphene Screen, urine Negative Yfagkm=383 ng/mL    Meperidine screen Negative Qqkxrs=342 ng/mL    FENTANYL, URINE Negative Hwfvoe=6195 pg/mL    Tramadol Screen, urine Positive (A) Aavgpm=060    Creatinine, urine 26.1 20.0 - 300.0 mg/dL    Specific Gravity 1.007     pH, urine 6.5 4.5 - 8.9    Please Note Comment          Physical Exam  Visit Vitals  /84 (BP 1 Location: Right arm, BP Patient Position: Sitting)   Pulse 84   Temp 97.5 °F (36.4 °C) (Oral)   Resp 18   Ht 5' 4\" (1.626 m)   Wt 175 lb 12.8 oz (79.7 kg)   LMP 02/09/2019 (Exact Date)   SpO2 98%   BMI 30.18 kg/m²     Head: Normocephalic, without obvious abnormality, atraumatic  Eyes: conjunctivae/corneas clear. PERRL, EOM's intact. Neck: supple, symmetrical, trachea midline, no adenopathy, thyroid: not enlarged, symmetric, no tenderness/mass/nodules, no carotid bruit and no JVD  Lungs: clear to auscultation bilaterally  Heart: regular rate and rhythm, S1, S2 normal, no murmur, click, rub or gallop  Extremities: extremities normal, atraumatic, no cyanosis or edema  Pulses: 2+ and symmetric  Lymph nodes: Cervical, supraclavicular, and axillary nodes normal.  Neurologic: Grossly normal      ASSESSMENT and PLAN    ICD-10-CM ICD-9-CM    1. Chronic midline low back pain without sciatica M54.5 724.2     G89.29 338.29    2. DDD (degenerative disc disease), cervical M50.30 722.4 oxyCODONE-acetaminophen (PERCOCET 10)  mg per tablet      oxyCODONE-acetaminophen (PERCOCET 10)  mg per tablet      oxyCODONE-acetaminophen (PERCOCET 10)  mg per tablet   3.  Migraine with aura and without status migrainosus, not intractable G43.109 346.00 oxyCODONE-acetaminophen (PERCOCET 10)  mg per tablet      oxyCODONE-acetaminophen (PERCOCET 10)  mg per tablet      oxyCODONE-acetaminophen (PERCOCET 10)  mg per tablet   4. Facet arthritis of lumbar region ([facility] M46.96 721.3 oxyCODONE-acetaminophen (PERCOCET 10)  mg per tablet      oxyCODONE-acetaminophen (PERCOCET 10)  mg per tablet      oxyCODONE-acetaminophen (PERCOCET 10)  mg per tablet   5. Psychophysiological insomnia F51.04 307.42 zolpidem (AMBIEN) 10 mg tablet   6. 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 Ketty De Leon F98.8 314.00 dextroamphetamine-amphetamine (ADDERALL) 10 mg tablet      dextroamphetamine-amphetamine (ADDERALL) 10 mg tablet      dextroamphetamine-amphetamine (ADDERALL) 10 mg tablet   7. Medication management Z79.899 V58.69 PAIN MGMT PANEL W/REFL, UR   8. Bipolar disorder, in full remission, most recent episode depressed ([facility] F31.76 296.56    9. Obsessive-compulsive disorder, unspecified type F42.9 300.3    10. Cigarette smoker F17.210 305.1    11. Lithium use Z79.899 V58.69      Diagnoses and all orders for this visit:    1. Chronic midline low back pain without sciatica    2. DDD (degenerative disc disease), cervical  -     oxyCODONE-acetaminophen (PERCOCET 10)  mg per tablet; Take 1 Tab by mouth every six (6) hours as needed for Pain. 50 is a 30 day supply  -     oxyCODONE-acetaminophen (PERCOCET 10)  mg per tablet; Take 1 Tab by mouth every six (6) hours as needed for Pain. Max Daily Amount: 4 Tabs. 50 IS A 30 DAY SUPPLY  -     oxyCODONE-acetaminophen (PERCOCET 10)  mg per tablet; Take 1 Tab by mouth every six (6) hours as needed for Pain. 50 is a 30 day supply    3. Migraine with aura and without status migrainosus, not intractable  -     oxyCODONE-acetaminophen (PERCOCET 10)  mg per tablet; Take 1 Tab by mouth every six (6) hours as needed for Pain. 50 is a 30 day supply  -     oxyCODONE-acetaminophen (PERCOCET 10)  mg per tablet;  Take 1 Tab by mouth every six (6) hours as needed for Pain. Max Daily Amount: 4 Tabs. 50 IS A 30 DAY SUPPLY  -     oxyCODONE-acetaminophen (PERCOCET 10)  mg per tablet; Take 1 Tab by mouth every six (6) hours as needed for Pain. 50 is a 30 day supply    4. Facet arthritis of lumbar region ([facility] [address]  -     oxyCODONE-acetaminophen (PERCOCET 10)  mg per tablet; Take 1 Tab by mouth every six (6) hours as needed for Pain. 50 is a 30 day supply  -     oxyCODONE-acetaminophen (PERCOCET 10)  mg per tablet; Take 1 Tab by mouth every six (6) hours as needed for Pain. Max Daily Amount: 4 Tabs. 50 IS A 30 DAY SUPPLY  -     oxyCODONE-acetaminophen (PERCOCET 10)  mg per tablet; Take 1 Tab by mouth every six (6) hours as needed for Pain. 50 is a 30 day supply    5. Psychophysiological insomnia  -     zolpidem (AMBIEN) 10 mg tablet; TAKE 2 TABLETS BY MOUTH AT BEDTIME AS NEEDED FOR SLEEP    6. 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 Vick Mercer  -     dextroamphetamine-amphetamine (ADDERALL) 10 mg tablet; Take 1 Tab (10 mg total) by mouth See Admin InstructionsEarliest Fill Date: 4/26/19.  2 in am and 1-2 in afternoon  -     dextroamphetamine-amphetamine (ADDERALL) 10 mg tablet; Earliest Fill Date: 3/26/19. TAKE 2 TABS PO Q AM AND 1-2 TABS IN AFTERNOON  -     dextroamphetamine-amphetamine (ADDERALL) 10 mg tablet; Take 1 Tab (10 mg total) by mouth See Admin InstructionsEarliest Fill Date: 2/24/19.  2 in am and 1-2 in afternoon    7. Medication management  -     PAIN MGMT PANEL W/REFL, UR    8. Bipolar disorder, in full remission, most recent episode depressed ([facility] [address]    9. Obsessive-compulsive disorder, unspecified type    10. Cigarette smoker    11. Lithium use    Other orders  -     naloxone (NARCAN) 4 mg/actuation nasal spray; Use 1 spray intranasally, then discard. Repeat with new spray every 2 min as needed for opioid overdose symptoms, alternating nostrils.       Follow-up Disposition:  Return in about 3 months (around 5/20/2019), or if depression, janette symptoms return, for 3 month F/U chronic controlled substance use, ADD 3 month follow up. reviewed medications and side effects in detail  Please call my office if there are any questions- [phone]. Discussed expected course/resolution/complications of diagnosis in detail with patient. Medication risks/benefits/costs/interactions/alternatives discussed with patient. Pt was given an after visit summary which includes diagnoses, current medications & vitals. Pt expressed understanding with the diagnosis and plan. Total 25 minutes,60 % counseling re:   Retry Extra strength Tylenol 500 mg 1-2 every 6 hours as needed for pain would be the safest thing to take long term. OTC Advil or Aleve ( either advil 2-3 every 6 hrs. or aleve 2 po BID, preferably after food) should only be tried if Tylenol is not working. Would in that case, add Advil or Aleve to the Tylenol( they are completely safe together) and not replace the Tylenol. In other words, the less Advil or Aleve you take, the better, as they have the potential to cause ulcers, kidney and liver damage and counteract aspirin in individuals on this for heart protection. Tylenol has none of these at recommended doses. The risk you hear about with Tylenol is only seen when taken in higher doses than on the bottle or taken with alcohol. Long discussion about chronic narcotic use, the risks of addiction, tolerance , overdose,etc. The national spotlight on this problem and the need for a narcotic agreement in order to continue receiving these, etc.  Continue to do the non-medication things that reduce pain such as adequate sleep, avoiding high impact activities, but at the same time staying active and avoiding sedentary activity.  In addition, the patient was counseled today on the potential risks of opiate use including but not limited to death if not taken as prescribed or if taken in conjunction with other sedative, hypnotic, or other pain medications, and the need to refrain from any recreational drugs and/or alcohol. Further, we discussed the rationale and potential benefits of an opiate support regimen for the management of chronic non-malignant pain conditions. Long discussion about chronic benzodiazepine use, the risks of addiction, tolerance , overdose,etc. The national spotlight on this problem and the need for a controlled substance agreement in order to continue receiving these, etc.  Continue to do the non-medication things that reduce anxiety such as adequate sleep, avoiding high impact activities, but at the same time staying active and avoiding sedentary activity. In addition, the patient was counseled today on the potential risks of benzodiazepine use including but not limited to death if not taken as prescribed or if taken in conjunction with other sedative, hypnotic, or pain medications, and the need to refrain from any recreational drugs and/or alcohol. Signed and agreed to the controlled substance agreement in the past year; copy is in the chart.  checked and found to have no suspicious activity. Since she is having difficulty remembering her second dose of the lithium, I recommended she set a reminder on her phone. Unfortunately, missing this dose will negatively affect her bipolar disorder. Regular exercise is very important to your health; it helps mentally, physically, socially; it prevents injuries if done properly. Exercise, even as simple as walking 20-30 minutes daily has major benefits to your health even though your \"numbers\" are the same in the lab. See if you can add this into your daily regimen and after a few months it will become a regular habit-\"just something you do,\" like brushing your teeth.      A combination of aerobic exercise and strengthening and stretching is felt to be the best for you, so this should be your ultimate goal.   This can be done in the privacy of your home or in a group setting as at the gym  Some prefer having a , others prefer to do exercise in groups or individually. Do what \"works\" for you. You need to make it simple and \"fun,\" or you most likely you will not continue it. BMI is significantly elevated- in the obese range. I reviewed diet, exercise and weight control. Discussed weight control in detail, the importance of mainly decreased carbs, and for weight maintenance, exercise; discussed different diets and that it isn't as important to watch the type of foods as it is to decrease calorie intake no matter what type of diet you do, etc.     Because she now has insurance, she will be able to take her medications more regularly, and can also restart the Chantix, in order to help her stop smoking. Also, discussed symptoms of concern that were noted today in the note above, treatment options( including doing nothing), when to follow up before recommended time frame. Also, answered all questions. This document was written by Kim Sims, as dictated by Stevan Loyola MD.  I have reviewed and agree with the above note and have made corrections where appropriate Clarke Benjamin Or M.D. English

## 2025-03-03 DIAGNOSIS — F90.2 ATTENTION DEFICIT HYPERACTIVITY DISORDER (ADHD), COMBINED TYPE: ICD-10-CM

## 2025-03-03 RX ORDER — DEXTROAMPHETAMINE SACCHARATE, AMPHETAMINE ASPARTATE, DEXTROAMPHETAMINE SULFATE AND AMPHETAMINE SULFATE 2.5; 2.5; 2.5; 2.5 MG/1; MG/1; MG/1; MG/1
TABLET ORAL
Qty: 120 TABLET | Refills: 0 | OUTPATIENT
Start: 2025-03-03 | End: 2025-03-11

## 2025-03-06 DIAGNOSIS — F90.2 ATTENTION DEFICIT HYPERACTIVITY DISORDER (ADHD), COMBINED TYPE: ICD-10-CM

## 2025-03-06 RX ORDER — DEXTROAMPHETAMINE SACCHARATE, AMPHETAMINE ASPARTATE, DEXTROAMPHETAMINE SULFATE AND AMPHETAMINE SULFATE 2.5; 2.5; 2.5; 2.5 MG/1; MG/1; MG/1; MG/1
TABLET ORAL
Qty: 120 TABLET | Refills: 0 | OUTPATIENT
Start: 2025-03-06 | End: 2025-03-14

## 2025-03-06 RX ORDER — DEXTROAMPHETAMINE SACCHARATE, AMPHETAMINE ASPARTATE, DEXTROAMPHETAMINE SULFATE AND AMPHETAMINE SULFATE 2.5; 2.5; 2.5; 2.5 MG/1; MG/1; MG/1; MG/1
TABLET ORAL
Qty: 30 TABLET | Refills: 0 | Status: SHIPPED | OUTPATIENT
Start: 2025-03-06 | End: 2025-03-14

## 2025-03-06 RX ORDER — DEXTROAMPHETAMINE SACCHARATE, AMPHETAMINE ASPARTATE, DEXTROAMPHETAMINE SULFATE AND AMPHETAMINE SULFATE 2.5; 2.5; 2.5; 2.5 MG/1; MG/1; MG/1; MG/1
TABLET ORAL
Qty: 30 TABLET | Refills: 0 | OUTPATIENT
Start: 2025-03-06 | End: 2025-03-14

## 2025-03-06 NOTE — TELEPHONE ENCOUNTER
MD Carrington,    Contacted patient- she is very frustrated as she only received 1 month supply of Adderall and needs this refilled.       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  (Which is not usual)    Asking for nurse to call her back TODAY as she is flexible today and tomorrow- Needs partial fill or something as she is working and needs this medication for ADD.      She stated she understands about the OV, but was only provided 1 month of medication and not 3 months!      Can nurse call her to schedule asap?  [phone]    Thanks, Mikayla

## 2025-03-06 NOTE — TELEPHONE ENCOUNTER
MD Carrington,    Patient call and had sent refill request via [employer] for the Adderall-  Noted that request was refused as Medication discontinued?    Please advise- Patient seemed upset when calling as patient is out.      Last rx 12/6/24 for #120.  Asking for callback:  [phone]    Not noted as discontinued? Check .  Selena Mikayla    Last appointment: 12/4/24   Next appointment: None  Previous refill encounter(s): 12/6/24 120    Requested Prescriptions     Pending Prescriptions Disp Refills    amphetamine-dextroamphetamine (ADDERALL, 10MG,) 10 MG tablet 120 tablet 0     Sig: TAKE 2 TABS QAM AND 1-2 TABS IN AFTERNOON     For Pharmacy Admin Tracking Only    Program: Medication Refill  CPA in place:    Recommendation Provided To:   Intervention Detail: New Rx: 1, reason: Patient Preference  Intervention Accepted By:   Gap Closed?:    Time Spent (min): 5  '

## 2025-03-06 NOTE — TELEPHONE ENCOUNTER
MD Carrington,    Contacted patient to advise- she was very thankful and has scheduled a VV for 3/12/25.      She was frustrated that she was not contacted upon denial for the medication initially as she would have called then to schedule-wished she would have received a message.  She was thankful for my call and then follow-up call.     (Not sure why our system does not send request to front office when medications are denied due to appt needed.)       Thanks, Mikayla

## 2025-03-06 NOTE — TELEPHONE ENCOUNTER
PCP: Camron Carrington MD    Last appt: 12/4/2024   Future Appointments   Date Time Provider Department Center   3/12/2025 11:40 AM Camron Carrington MD [facility] DEP       Requested Prescriptions     Pending Prescriptions Disp Refills    amphetamine-dextroamphetamine (ADDERALL, 10MG,) 10 MG tablet 30 tablet 0     Sig: TAKE 2 TABS QAM AND 1-2 TABS IN AFTERNOON

## 2025-03-06 NOTE — TELEPHONE ENCOUNTER
Patient would like for the nurse to give a call about her adderall 10 mg    Best call back #[phone]

## 2025-03-12 ENCOUNTER — TELEMEDICINE (OUTPATIENT)
Age: 50
End: 2025-03-12

## 2025-03-12 DIAGNOSIS — E66.01 CLASS 2 SEVERE OBESITY DUE TO EXCESS CALORIES WITH SERIOUS COMORBIDITY AND BODY MASS INDEX (BMI) OF 39.0 TO 39.9 IN ADULT: ICD-10-CM

## 2025-03-12 DIAGNOSIS — F98.8 ADD (ATTENTION DEFICIT DISORDER) WITHOUT HYPERACTIVITY: Primary | ICD-10-CM

## 2025-03-12 DIAGNOSIS — F31.32 BIPOLAR DISORDER WITH MODERATE DEPRESSION (HCC): ICD-10-CM

## 2025-03-12 DIAGNOSIS — Z79.899 LITHIUM USE: ICD-10-CM

## 2025-03-12 DIAGNOSIS — E66.812 CLASS 2 SEVERE OBESITY DUE TO EXCESS CALORIES WITH SERIOUS COMORBIDITY AND BODY MASS INDEX (BMI) OF 39.0 TO 39.9 IN ADULT: ICD-10-CM

## 2025-03-12 DIAGNOSIS — F51.04 PSYCHOPHYSIOLOGICAL INSOMNIA: ICD-10-CM

## 2025-03-12 RX ORDER — DEXTROAMPHETAMINE SACCHARATE, AMPHETAMINE ASPARTATE, DEXTROAMPHETAMINE SULFATE AND AMPHETAMINE SULFATE 2.5; 2.5; 2.5; 2.5 MG/1; MG/1; MG/1; MG/1
TABLET ORAL
Qty: 120 TABLET | Refills: 0 | Status: SHIPPED | OUTPATIENT
Start: 2025-04-11 | End: 2025-05-11

## 2025-03-12 RX ORDER — DEXTROAMPHETAMINE SACCHARATE, AMPHETAMINE ASPARTATE, DEXTROAMPHETAMINE SULFATE AND AMPHETAMINE SULFATE 2.5; 2.5; 2.5; 2.5 MG/1; MG/1; MG/1; MG/1
TABLET ORAL
Qty: 120 TABLET | Refills: 0 | Status: SHIPPED | OUTPATIENT
Start: 2025-05-11 | End: 2025-06-10

## 2025-03-12 RX ORDER — DEXTROAMPHETAMINE SACCHARATE, AMPHETAMINE ASPARTATE, DEXTROAMPHETAMINE SULFATE AND AMPHETAMINE SULFATE 2.5; 2.5; 2.5; 2.5 MG/1; MG/1; MG/1; MG/1
TABLET ORAL
Qty: 120 TABLET | Refills: 0 | Status: SHIPPED | OUTPATIENT
Start: 2025-03-12 | End: 2025-04-11

## 2025-03-13 NOTE — PROGRESS NOTES
Telemedicine note    Encounter Date and Time: 03/13/25 at 1:05 PM EDT    Gale Valle, was evaluated through a synchronous (real-time) audio-video encounter. The patient (or guardian if applicable) is aware that this is a billable service, which includes applicable co-pays. This Virtual Visit was conducted with patient's (and/or legal guardian's) consent. Patient identification was verified, and a caregiver was present when appropriate.   This visit was virtual due to scheduling/transportation issues.  The patient was located at Home: [address] [place] [id number]  Provider was located at Facility (Appt Dept): [address] [place] [id number]  Confirm you are appropriately licensed, registered, or certified to deliver care in the state where the patient is located as indicated above. If you are not or unsure, please re-schedule the visit: Yes, I confirm.     --Camron Carrington MD on 8/20/2024 at 11:37 AM    Gale Valle is a 49 y.o. female who was evaluated by synchronous (real-time) audio-video technology from home, through a secure patient portal, presenting today for   Chief Complaint   Patient presents with    Medication Refill   .    Assessment/Plan:     Assessment & Plan  1. Bipolar disorder-ADHD: Stable on current regimen.  Discussed extensively again concerns regarding the use of stimulants and its potential to bring on a manic episode and recommended to establish with psychiatry but patient declines, wishing to continue current regimen which has been working for her for the last few years without any complications.  - Continue current medication regimen: Lexapro 20 mg once daily, Adderall 10 mg (2 in the morning and 1-2 in the afternoon), lithium 300 mg in the morning and 600 mg in the evening, and trazodone 100 mg at night.  - Prescription for a three-month supply of medications to be sent to [facility] in [place].  - Regular follow-ups every three months to monitor condition and

## 2025-05-15 NOTE — TELEPHONE ENCOUNTER
----- Message from [employer] sent at 6/15/2017  7:08 AM EDT -----  Regarding: Barr/telephone  Pt is requesting an appointment to see her doctor today for anxiety and depression. Pts number is [phone].
There is no availability today with Dr. Harris Saldaña (patient does not wish to see anyone else), is there anywhere we can put this patient?
appt given
No

## 2025-06-10 ENCOUNTER — OFFICE VISIT (OUTPATIENT)
Age: 50
End: 2025-06-10
Payer: COMMERCIAL

## 2025-06-10 VITALS
HEART RATE: 80 BPM | TEMPERATURE: 96.9 F | BODY MASS INDEX: 32.2 KG/M2 | RESPIRATION RATE: 16 BRPM | DIASTOLIC BLOOD PRESSURE: 71 MMHG | HEIGHT: 64 IN | OXYGEN SATURATION: 99 % | WEIGHT: 188.6 LBS | SYSTOLIC BLOOD PRESSURE: 118 MMHG

## 2025-06-10 DIAGNOSIS — Z79.899 LITHIUM USE: ICD-10-CM

## 2025-06-10 DIAGNOSIS — E78.00 HYPERCHOLESTEREMIA: ICD-10-CM

## 2025-06-10 DIAGNOSIS — F51.04 PSYCHOPHYSIOLOGICAL INSOMNIA: Primary | ICD-10-CM

## 2025-06-10 DIAGNOSIS — F17.210 TOBACCO DEPENDENCE DUE TO CIGARETTES: ICD-10-CM

## 2025-06-10 DIAGNOSIS — F98.8 ADD (ATTENTION DEFICIT DISORDER) WITHOUT HYPERACTIVITY: ICD-10-CM

## 2025-06-10 DIAGNOSIS — Z87.891 PERSONAL HISTORY OF TOBACCO USE, PRESENTING HAZARDS TO HEALTH: ICD-10-CM

## 2025-06-10 DIAGNOSIS — R73.03 PREDIABETES: ICD-10-CM

## 2025-06-10 DIAGNOSIS — F31.81 BIPOLAR 2 DISORDER (HCC): ICD-10-CM

## 2025-06-10 PROCEDURE — 99215 OFFICE O/P EST HI 40 MIN: CPT | Performed by: STUDENT IN AN ORGANIZED HEALTH CARE EDUCATION/TRAINING PROGRAM

## 2025-06-10 PROCEDURE — 99407 BEHAV CHNG SMOKING > 10 MIN: CPT | Performed by: STUDENT IN AN ORGANIZED HEALTH CARE EDUCATION/TRAINING PROGRAM

## 2025-06-10 RX ORDER — TIRZEPATIDE 10 MG/.5ML
10 INJECTION, SOLUTION SUBCUTANEOUS WEEKLY
COMMUNITY

## 2025-06-10 RX ORDER — VARENICLINE TARTRATE 0.5 MG/1
.5-1 TABLET, FILM COATED ORAL SEE ADMIN INSTRUCTIONS
Qty: 57 TABLET | Refills: 0 | Status: SHIPPED | OUTPATIENT
Start: 2025-06-10

## 2025-06-10 RX ORDER — DEXTROAMPHETAMINE SACCHARATE, AMPHETAMINE ASPARTATE, DEXTROAMPHETAMINE SULFATE AND AMPHETAMINE SULFATE 2.5; 2.5; 2.5; 2.5 MG/1; MG/1; MG/1; MG/1
TABLET ORAL
Qty: 120 TABLET | Refills: 0 | Status: SHIPPED | OUTPATIENT
Start: 2025-06-23 | End: 2025-07-10

## 2025-06-10 RX ORDER — LITHIUM CARBONATE 300 MG/1
CAPSULE ORAL
Qty: 270 CAPSULE | Refills: 1 | Status: SHIPPED | OUTPATIENT
Start: 2025-06-10

## 2025-06-10 ASSESSMENT — PATIENT HEALTH QUESTIONNAIRE - PHQ9
SUM OF ALL RESPONSES TO PHQ QUESTIONS 1-9: 0
1. LITTLE INTEREST OR PLEASURE IN DOING THINGS: NOT AT ALL
2. FEELING DOWN, DEPRESSED OR HOPELESS: NOT AT ALL

## 2025-06-10 NOTE — PATIENT INSTRUCTIONS
Please call central scheduling at [phone] to schedule your mammogram.        Learning About Benefits of Quitting Smoking  Quitting smoking helps your body in many ways. Quitting lowers your risk for cancer, lung disease, heart attack, stroke, blood vessel disease, and blindness. You'll also get sick less often and heal faster. And after you quit, you may find that your mood is better and you are less stressed.  When and how will you feel healthier after quitting smoking?        In the first hours or days:   Your blood pressure and heart rate go down.  Your oxygen levels increase.        Within weeks or months:   You will cough less and breathe deeper. It may be easier to be active.  Your sense of taste and smell should return.        Over the years:   Your risks of heart disease, heart attack, and stroke are lower.  Your risk of lung cancer is cut by about half after about 10 years. And your risk for other cancers is lower too.  How would quitting help others in your life?    Their heart, lung, and cancer risks may drop, much like yours. They will also be sick less.   If you are or will be pregnant someday, quitting smoking means a healthier .   Where can you learn more?  Go to https://www.[employer].net/patientEd and enter O319 to learn more about \"Learning About Benefits of Quitting Smoking.\"  Current as of: 2024  Content Version: 14.5  © 7545-9340 [employer].   Care instructions adapted under license by [employer]. If you have questions about a medical condition or this instruction, always ask your healthcare professional. [employer] disclaims any warranty or liability for your use of this information.

## 2025-06-10 NOTE — PROGRESS NOTES
Gale Valle (:  1975) is a 49 y.o. female, Established patient, here for evaluation of the following chief complaint(s):  3 Month Follow-Up (Chronic conditions follow up/Wants HbA1c checked.)         Assessment & Plan  1. Prediabetes: Stable.  - Previous A1c recorded at 6.2.  - Significant weight loss noted, anticipating improved A1c levels.  - A1c test to be conducted today.    2.  ADHD: Chronic, stable  - Continue Adderall 10 mg, 2 tabs in the morning and 1-2 tabs in the afternoon  - Previously counseled patient on concerns about stimulants and patient's bipolar diagnosis but she has been well-controlled for several years with the current regimen so we will continue and closely monitor  - Adderall refilled and PDMP reviewed    3.  Obesity/BMI 32.37: Stable.  - Weight reduced from 235 pounds to 188 pounds.  - BMI decreased from 40 to 32.  - Currently on Zepbound 10 mg once a week for weight loss.  - Advised to continue current regimen and incorporate regular exercise.    4. Insomnia.  Chronic, stable  - Currently taking trazodone 100 mg nightly for sleep.  - Reports trazodone is less effective than Ambien.  - Advised to continue with trazodone for now.    5. Pain management.   - Continues to take Percocet for pain.  - Under the care of a pain management specialist.    6. Health maintenance.  - Slightly elevated cholesterol levels.  - Marginally elevated alkaline phosphatase levels.  - Mammogram and colonoscopy ordered.  - Pap smear recommended.  - Cholesterol test to be conducted today.  - Comprehensive metabolic panel (CMP) to be ordered today to assess kidney and liver function.    7. Smoking cessation.  Chronic, unstable.  Reports previously trying nicotine patches and Wellbutrin without any success  - Smokes about a pack a day and has considered quitting.  - Chantix discussed as an option, and after ensuring no contraindication and significant interaction exists with current medications, will start

## 2025-06-10 NOTE — PROGRESS NOTES
Chief Complaint   Patient presents with    3 Month Follow-Up     Chronic conditions follow up  Wants HbA1c checked.     \"Have you been to the ER, urgent care clinic since your last visit?  Hospitalized since your last visit?\"    NO    “Have you seen or consulted any other health care providers outside of [facility] since your last visit?”    NO    Have you had a mammogram?”   NO    Date of last Mammogram: 4/11/2022      “Have you had a pap smear?”    NO    No cervical cancer screening on file         “Have you had a colorectal cancer screening such as a colonoscopy/FIT/Cologuard?    NO    No colonoscopy on file  No cologuard on file  No FIT/FOBT on file   No flexible sigmoidoscopy on file         Click Here for Release of Records Request             6/10/2025    11:19 AM   PHQ-9    Little interest or pleasure in doing things 0   Feeling down, depressed, or hopeless 0   PHQ-2 Score 0   PHQ-9 Total Score 0           Financial Resource Strain: Low Risk  (7/21/2024)    Overall Financial Resource Strain (CARDIA)     Difficulty of Paying Living Expenses: Not hard at all      Food Insecurity: No Food Insecurity (7/21/2024)    Hunger Vital Sign     Worried About Running Out of Food in the Last Year: Never true     Ran Out of Food in the Last Year: Never true          Health Maintenance Due   Topic Date Due    Hepatitis B vaccine (1 of 3 - 19+ 3-dose series) Never done    DTaP/Tdap/Td vaccine (1 - Tdap) Never done    Pneumococcal 0-49 years Vaccine (1 of 2 - PCV) Never done    Cervical cancer screen  Never done    Colorectal Cancer Screen  Never done    Breast cancer screen  04/11/2024

## 2025-06-11 LAB
ALBUMIN SERPL-MCNC: 4.2 G/DL (ref 3.9–4.9)
ALP SERPL-CCNC: 97 IU/L (ref 44–121)
ALT SERPL-CCNC: 7 IU/L (ref 0–32)
AST SERPL-CCNC: 10 IU/L (ref 0–40)
BILIRUB SERPL-MCNC: 0.3 MG/DL (ref 0–1.2)
BUN SERPL-MCNC: 12 MG/DL (ref 6–24)
BUN/CREAT SERPL: 20 (ref 9–23)
CALCIUM SERPL-MCNC: 9.3 MG/DL (ref 8.7–10.2)
CHLORIDE SERPL-SCNC: 103 MMOL/L (ref 96–106)
CHOLEST SERPL-MCNC: 211 MG/DL (ref 100–199)
CO2 SERPL-SCNC: 20 MMOL/L (ref 20–29)
CREAT SERPL-MCNC: 0.59 MG/DL (ref 0.57–1)
EGFRCR SERPLBLD CKD-EPI 2021: 111 ML/MIN/1.73
GLOBULIN SER CALC-MCNC: 2.5 G/DL (ref 1.5–4.5)
GLUCOSE SERPL-MCNC: 77 MG/DL (ref 70–99)
HBA1C MFR BLD: 5.3 % (ref 4.8–5.6)
HDLC SERPL-MCNC: 50 MG/DL
IMP & REVIEW OF LAB RESULTS: NORMAL
LDLC SERPL CALC-MCNC: 140 MG/DL (ref 0–99)
LITHIUM SERPL-SCNC: 0.3 MMOL/L (ref 0.5–1.2)
POTASSIUM SERPL-SCNC: 4.1 MMOL/L (ref 3.5–5.2)
PROT SERPL-MCNC: 6.7 G/DL (ref 6–8.5)
SODIUM SERPL-SCNC: 137 MMOL/L (ref 134–144)
TRIGL SERPL-MCNC: 116 MG/DL (ref 0–149)
VLDLC SERPL CALC-MCNC: 21 MG/DL (ref 5–40)

## 2025-06-12 ENCOUNTER — RESULTS FOLLOW-UP (OUTPATIENT)
Age: 50
End: 2025-06-12

## 2025-07-10 DIAGNOSIS — F17.210 TOBACCO DEPENDENCE DUE TO CIGARETTES: ICD-10-CM

## 2025-07-11 RX ORDER — VARENICLINE TARTRATE 0.5 MG/1
TABLET, FILM COATED ORAL
Qty: 168 TABLET | Refills: 0 | Status: SHIPPED | OUTPATIENT
Start: 2025-07-11

## 2025-07-11 NOTE — TELEPHONE ENCOUNTER
PCP: Camron Carrington MD    Last appt: 6/10/2025   Future Appointments   Date Time Provider Department Center   9/10/2025  8:00 AM Camron Carrington MD [facility]       Requested Prescriptions     Pending Prescriptions Disp Refills    varenicline (CHANTIX) 0.5 MG tablet [Pharmacy Med Name: VARENICLINE 0.5 MG TABLET] 168 tablet 1     Sig: TAKE 1 TABLET BY MOUTH DAILY FOR 3 DAYS,THEN 1 TAB 2X A DAY FOR 4 DAYS,THEN 2 TABS 2X A DAY         Prior labs and Blood pressures:  BP Readings from Last 3 Encounters:   06/10/25 118/71   12/04/24 126/74   11/26/24 122/81     Lab Results   Component Value Date/Time     06/10/2025 12:00 PM    K 4.1 06/10/2025 12:00 PM     06/10/2025 12:00 PM    CO2 20 06/10/2025 12:00 PM    BUN 12 06/10/2025 12:00 PM    GFRAA >60 12/27/2021 04:06 PM     No results found for: \"HBA1C\", \"[id number]\"  Lab Results   Component Value Date/Time    CHOL 211 06/10/2025 12:00 PM    HDL 50 06/10/2025 12:00 PM     06/10/2025 12:00 PM     09/19/2022 12:00 AM    VLDL 21 06/10/2025 12:00 PM    VLDL 17 09/19/2022 12:00 AM     No results found for: \"VITD3\"    Lab Results   Component Value Date/Time    TSH 2.57 11/26/2024 09:42 AM

## 2025-08-25 DIAGNOSIS — F17.210 TOBACCO DEPENDENCE DUE TO CIGARETTES: ICD-10-CM

## 2025-08-25 RX ORDER — VARENICLINE TARTRATE 0.5 MG/1
TABLET, FILM COATED ORAL
Qty: 168 TABLET | Refills: 1 | Status: SHIPPED | OUTPATIENT
Start: 2025-08-25